# Patient Record
Sex: FEMALE | Race: WHITE | NOT HISPANIC OR LATINO | Employment: OTHER | URBAN - METROPOLITAN AREA
[De-identification: names, ages, dates, MRNs, and addresses within clinical notes are randomized per-mention and may not be internally consistent; named-entity substitution may affect disease eponyms.]

---

## 2017-02-28 ENCOUNTER — COMMUNICATION - HEALTHEAST (OUTPATIENT)
Dept: CARDIOLOGY | Facility: CLINIC | Age: 82
End: 2017-02-28

## 2017-02-28 DIAGNOSIS — I25.10 CORONARY ATHEROSCLEROSIS: ICD-10-CM

## 2017-05-03 ENCOUNTER — RECORDS - HEALTHEAST (OUTPATIENT)
Dept: LAB | Facility: CLINIC | Age: 82
End: 2017-05-03

## 2017-05-03 LAB
CHOLEST SERPL-MCNC: 149 MG/DL
FASTING STATUS PATIENT QL REPORTED: NO
HDLC SERPL-MCNC: 34 MG/DL
LDLC SERPL CALC-MCNC: 94 MG/DL
TRIGL SERPL-MCNC: 107 MG/DL

## 2017-05-09 ENCOUNTER — AMBULATORY - HEALTHEAST (OUTPATIENT)
Dept: CARDIOLOGY | Facility: CLINIC | Age: 82
End: 2017-05-09

## 2017-05-31 ENCOUNTER — COMMUNICATION - HEALTHEAST (OUTPATIENT)
Dept: CARDIOLOGY | Facility: CLINIC | Age: 82
End: 2017-05-31

## 2017-05-31 DIAGNOSIS — I25.10 CORONARY ATHEROSCLEROSIS: ICD-10-CM

## 2017-05-31 DIAGNOSIS — I25.10 CORONARY ARTERY DISEASE INVOLVING NATIVE CORONARY ARTERY WITHOUT ANGINA PECTORIS: ICD-10-CM

## 2017-08-16 ENCOUNTER — OFFICE VISIT - HEALTHEAST (OUTPATIENT)
Dept: CARDIOLOGY | Facility: CLINIC | Age: 82
End: 2017-08-16

## 2017-08-16 DIAGNOSIS — I25.118 ATHEROSCLEROSIS OF NATIVE CORONARY ARTERY OF NATIVE HEART WITH STABLE ANGINA PECTORIS (H): ICD-10-CM

## 2017-08-16 DIAGNOSIS — I10 ESSENTIAL HYPERTENSION, BENIGN: ICD-10-CM

## 2017-08-16 DIAGNOSIS — R00.1 SINUS BRADYCARDIA: ICD-10-CM

## 2017-08-16 DIAGNOSIS — E78.2 MIXED HYPERLIPIDEMIA: ICD-10-CM

## 2017-08-16 DIAGNOSIS — I65.23 BILATERAL CAROTID ARTERY STENOSIS: ICD-10-CM

## 2017-08-16 RX ORDER — AZELASTINE HYDROCHLORIDE 0.5 MG/ML
1 SOLUTION/ DROPS OPHTHALMIC 2 TIMES DAILY
Status: ON HOLD | COMMUNITY
Start: 2017-07-30 | End: 2022-01-01

## 2017-08-16 ASSESSMENT — MIFFLIN-ST. JEOR: SCORE: 1086.12

## 2017-08-27 ENCOUNTER — COMMUNICATION - HEALTHEAST (OUTPATIENT)
Dept: CARDIOLOGY | Facility: CLINIC | Age: 82
End: 2017-08-27

## 2017-08-27 DIAGNOSIS — I25.10 CORONARY ARTERY DISEASE INVOLVING NATIVE CORONARY ARTERY WITHOUT ANGINA PECTORIS: ICD-10-CM

## 2017-08-27 DIAGNOSIS — I25.10 CORONARY ATHEROSCLEROSIS: ICD-10-CM

## 2017-09-05 ENCOUNTER — HOSPITAL ENCOUNTER (OUTPATIENT)
Dept: ULTRASOUND IMAGING | Facility: HOSPITAL | Age: 82
Discharge: HOME | End: 2017-09-05
Attending: INTERNAL MEDICINE

## 2017-09-05 DIAGNOSIS — I65.23 BILATERAL CAROTID ARTERY STENOSIS: ICD-10-CM

## 2017-11-14 ENCOUNTER — HOME CARE/HOSPICE - HEALTHEAST (OUTPATIENT)
Dept: HOME HEALTH SERVICES | Facility: HOME HEALTH | Age: 82
End: 2017-11-14

## 2017-11-16 ENCOUNTER — HOME CARE/HOSPICE - HEALTHEAST (OUTPATIENT)
Dept: HOME HEALTH SERVICES | Facility: HOME HEALTH | Age: 82
End: 2017-11-16

## 2017-11-18 ENCOUNTER — HOME CARE/HOSPICE - HEALTHEAST (OUTPATIENT)
Dept: HOME HEALTH SERVICES | Facility: HOME HEALTH | Age: 82
End: 2017-11-18

## 2017-11-20 ENCOUNTER — HOME CARE/HOSPICE - HEALTHEAST (OUTPATIENT)
Dept: HOME HEALTH SERVICES | Facility: HOME HEALTH | Age: 82
End: 2017-11-20

## 2017-11-22 ENCOUNTER — HOME CARE/HOSPICE - HEALTHEAST (OUTPATIENT)
Dept: HOME HEALTH SERVICES | Facility: HOME HEALTH | Age: 82
End: 2017-11-22

## 2017-11-23 ENCOUNTER — HOME CARE/HOSPICE - HEALTHEAST (OUTPATIENT)
Dept: HOME HEALTH SERVICES | Facility: HOME HEALTH | Age: 82
End: 2017-11-23

## 2017-11-28 ENCOUNTER — HOME CARE/HOSPICE - HEALTHEAST (OUTPATIENT)
Dept: HOME HEALTH SERVICES | Facility: HOME HEALTH | Age: 82
End: 2017-11-28

## 2017-11-29 ENCOUNTER — HOME CARE/HOSPICE - HEALTHEAST (OUTPATIENT)
Dept: HOME HEALTH SERVICES | Facility: HOME HEALTH | Age: 82
End: 2017-11-29

## 2017-11-30 ENCOUNTER — HOME CARE/HOSPICE - HEALTHEAST (OUTPATIENT)
Dept: HOME HEALTH SERVICES | Facility: HOME HEALTH | Age: 82
End: 2017-11-30

## 2017-12-01 ENCOUNTER — HOME CARE/HOSPICE - HEALTHEAST (OUTPATIENT)
Dept: HOME HEALTH SERVICES | Facility: HOME HEALTH | Age: 82
End: 2017-12-01

## 2017-12-04 ENCOUNTER — HOME CARE/HOSPICE - HEALTHEAST (OUTPATIENT)
Dept: HOME HEALTH SERVICES | Facility: HOME HEALTH | Age: 82
End: 2017-12-04

## 2017-12-05 ENCOUNTER — HOME CARE/HOSPICE - HEALTHEAST (OUTPATIENT)
Dept: HOME HEALTH SERVICES | Facility: HOME HEALTH | Age: 82
End: 2017-12-05

## 2017-12-06 ENCOUNTER — HOME CARE/HOSPICE - HEALTHEAST (OUTPATIENT)
Dept: HOME HEALTH SERVICES | Facility: HOME HEALTH | Age: 82
End: 2017-12-06

## 2017-12-07 ENCOUNTER — HOME CARE/HOSPICE - HEALTHEAST (OUTPATIENT)
Dept: HOME HEALTH SERVICES | Facility: HOME HEALTH | Age: 82
End: 2017-12-07

## 2017-12-11 ENCOUNTER — HOME CARE/HOSPICE - HEALTHEAST (OUTPATIENT)
Dept: HOME HEALTH SERVICES | Facility: HOME HEALTH | Age: 82
End: 2017-12-11

## 2017-12-12 ENCOUNTER — HOME CARE/HOSPICE - HEALTHEAST (OUTPATIENT)
Dept: HOME HEALTH SERVICES | Facility: HOME HEALTH | Age: 82
End: 2017-12-12

## 2017-12-14 ENCOUNTER — HOME CARE/HOSPICE - HEALTHEAST (OUTPATIENT)
Dept: HOME HEALTH SERVICES | Facility: HOME HEALTH | Age: 82
End: 2017-12-14

## 2017-12-18 ENCOUNTER — HOME CARE/HOSPICE - HEALTHEAST (OUTPATIENT)
Dept: HOME HEALTH SERVICES | Facility: HOME HEALTH | Age: 82
End: 2017-12-18

## 2017-12-19 ENCOUNTER — HOME CARE/HOSPICE - HEALTHEAST (OUTPATIENT)
Dept: HOME HEALTH SERVICES | Facility: HOME HEALTH | Age: 82
End: 2017-12-19

## 2017-12-19 ASSESSMENT — MIFFLIN-ST. JEOR: SCORE: 1039.63

## 2017-12-20 ENCOUNTER — HOME CARE/HOSPICE - HEALTHEAST (OUTPATIENT)
Dept: HOME HEALTH SERVICES | Facility: HOME HEALTH | Age: 82
End: 2017-12-20

## 2017-12-22 ENCOUNTER — HOME CARE/HOSPICE - HEALTHEAST (OUTPATIENT)
Dept: HOME HEALTH SERVICES | Facility: HOME HEALTH | Age: 82
End: 2017-12-22

## 2017-12-27 ENCOUNTER — HOME CARE/HOSPICE - HEALTHEAST (OUTPATIENT)
Dept: HOME HEALTH SERVICES | Facility: HOME HEALTH | Age: 82
End: 2017-12-27

## 2017-12-28 ENCOUNTER — HOME CARE/HOSPICE - HEALTHEAST (OUTPATIENT)
Dept: HOME HEALTH SERVICES | Facility: HOME HEALTH | Age: 82
End: 2017-12-28

## 2017-12-28 RX ORDER — MIRTAZAPINE 15 MG/1
15 TABLET, FILM COATED ORAL AT BEDTIME
Status: SHIPPED | COMMUNITY
Start: 2017-12-28

## 2018-02-12 ENCOUNTER — COMMUNICATION - HEALTHEAST (OUTPATIENT)
Dept: CARDIOLOGY | Facility: CLINIC | Age: 83
End: 2018-02-12

## 2018-02-12 DIAGNOSIS — I25.10 CORONARY ATHEROSCLEROSIS: ICD-10-CM

## 2018-02-24 ENCOUNTER — RECORDS - HEALTHEAST (OUTPATIENT)
Dept: ADMINISTRATIVE | Facility: OTHER | Age: 83
End: 2018-02-24

## 2018-02-25 ENCOUNTER — RECORDS - HEALTHEAST (OUTPATIENT)
Dept: ADMINISTRATIVE | Facility: OTHER | Age: 83
End: 2018-02-25

## 2018-02-25 ENCOUNTER — ANESTHESIA - HEALTHEAST (OUTPATIENT)
Dept: SURGERY | Facility: HOSPITAL | Age: 83
End: 2018-02-25

## 2018-02-26 ENCOUNTER — RECORDS - HEALTHEAST (OUTPATIENT)
Dept: ADMINISTRATIVE | Facility: OTHER | Age: 83
End: 2018-02-26

## 2018-02-27 ENCOUNTER — RECORDS - HEALTHEAST (OUTPATIENT)
Dept: ADMINISTRATIVE | Facility: OTHER | Age: 83
End: 2018-02-27

## 2018-02-28 ENCOUNTER — RECORDS - HEALTHEAST (OUTPATIENT)
Dept: ADMINISTRATIVE | Facility: OTHER | Age: 83
End: 2018-02-28

## 2018-03-02 ENCOUNTER — ANESTHESIA - HEALTHEAST (OUTPATIENT)
Dept: SURGERY | Facility: CLINIC | Age: 83
End: 2018-03-02

## 2018-03-02 ENCOUNTER — SURGERY - HEALTHEAST (OUTPATIENT)
Dept: SURGERY | Facility: CLINIC | Age: 83
End: 2018-03-02

## 2018-03-02 ENCOUNTER — COMMUNICATION - HEALTHEAST (OUTPATIENT)
Dept: GERIATRICS | Facility: CLINIC | Age: 83
End: 2018-03-02

## 2018-03-02 DIAGNOSIS — S82.891A FRACTURE DISLOCATION OF ANKLE, RIGHT, CLOSED, INITIAL ENCOUNTER: ICD-10-CM

## 2018-03-02 ASSESSMENT — MIFFLIN-ST. JEOR: SCORE: 1057.77

## 2018-03-05 ENCOUNTER — OFFICE VISIT - HEALTHEAST (OUTPATIENT)
Dept: GERIATRICS | Facility: CLINIC | Age: 83
End: 2018-03-05

## 2018-03-05 ENCOUNTER — AMBULATORY - HEALTHEAST (OUTPATIENT)
Dept: GERIATRICS | Facility: CLINIC | Age: 83
End: 2018-03-05

## 2018-03-05 DIAGNOSIS — I25.118 CORONARY ARTERY DISEASE OF NATIVE ARTERY OF NATIVE HEART WITH STABLE ANGINA PECTORIS (H): ICD-10-CM

## 2018-03-05 DIAGNOSIS — D62 ACUTE BLOOD LOSS ANEMIA: ICD-10-CM

## 2018-03-05 DIAGNOSIS — I10 ESSENTIAL HYPERTENSION: ICD-10-CM

## 2018-03-05 DIAGNOSIS — S82.891A CLOSED RIGHT ANKLE FRACTURE: ICD-10-CM

## 2018-03-05 DIAGNOSIS — K21.9 GERD (GASTROESOPHAGEAL REFLUX DISEASE): ICD-10-CM

## 2018-03-05 DIAGNOSIS — E46 MALNUTRITION (H): ICD-10-CM

## 2018-03-07 ENCOUNTER — RECORDS - HEALTHEAST (OUTPATIENT)
Dept: LAB | Facility: CLINIC | Age: 83
End: 2018-03-07

## 2018-03-07 ENCOUNTER — AMBULATORY - HEALTHEAST (OUTPATIENT)
Dept: GERIATRICS | Facility: CLINIC | Age: 83
End: 2018-03-07

## 2018-03-08 LAB
ERYTHROCYTE [DISTWIDTH] IN BLOOD BY AUTOMATED COUNT: 14.6 % (ref 11–14.5)
HCT VFR BLD AUTO: 29.5 % (ref 35–47)
HGB BLD-MCNC: 9.4 G/DL (ref 12–16)
MCH RBC QN AUTO: 30.7 PG (ref 27–34)
MCHC RBC AUTO-ENTMCNC: 31.9 G/DL (ref 32–36)
MCV RBC AUTO: 96 FL (ref 80–100)
PLATELET # BLD AUTO: 280 THOU/UL (ref 140–440)
PMV BLD AUTO: 9.8 FL (ref 8.5–12.5)
RBC # BLD AUTO: 3.06 MILL/UL (ref 3.8–5.4)
WBC: 4.5 THOU/UL (ref 4–11)

## 2018-03-14 ENCOUNTER — OFFICE VISIT - HEALTHEAST (OUTPATIENT)
Dept: GERIATRICS | Facility: CLINIC | Age: 83
End: 2018-03-14

## 2018-03-14 DIAGNOSIS — D62 ACUTE BLOOD LOSS ANEMIA: ICD-10-CM

## 2018-03-14 DIAGNOSIS — S82.891A CLOSED FRACTURE OF RIGHT ANKLE, INITIAL ENCOUNTER: ICD-10-CM

## 2018-03-14 DIAGNOSIS — K21.9 GERD (GASTROESOPHAGEAL REFLUX DISEASE): ICD-10-CM

## 2018-03-14 DIAGNOSIS — I10 ESSENTIAL HYPERTENSION: ICD-10-CM

## 2018-03-14 DIAGNOSIS — E46 MALNUTRITION (H): ICD-10-CM

## 2018-03-14 DIAGNOSIS — I25.118 CORONARY ARTERY DISEASE OF NATIVE ARTERY OF NATIVE HEART WITH STABLE ANGINA PECTORIS (H): ICD-10-CM

## 2018-03-19 ENCOUNTER — OFFICE VISIT - HEALTHEAST (OUTPATIENT)
Dept: GERIATRICS | Facility: CLINIC | Age: 83
End: 2018-03-19

## 2018-03-19 DIAGNOSIS — S82.891A CLOSED RIGHT ANKLE FRACTURE: ICD-10-CM

## 2018-03-19 DIAGNOSIS — D50.0 BLOOD LOSS ANEMIA: ICD-10-CM

## 2018-03-19 DIAGNOSIS — I25.10 CAD (CORONARY ARTERY DISEASE): ICD-10-CM

## 2018-03-19 DIAGNOSIS — I10 ESSENTIAL HYPERTENSION: ICD-10-CM

## 2018-03-26 ENCOUNTER — OFFICE VISIT - HEALTHEAST (OUTPATIENT)
Dept: GERIATRICS | Facility: CLINIC | Age: 83
End: 2018-03-26

## 2018-03-26 DIAGNOSIS — S82.891A CLOSED RIGHT ANKLE FRACTURE: ICD-10-CM

## 2018-03-26 DIAGNOSIS — K21.9 GERD (GASTROESOPHAGEAL REFLUX DISEASE): ICD-10-CM

## 2018-03-26 DIAGNOSIS — I10 ESSENTIAL HYPERTENSION: ICD-10-CM

## 2018-03-26 DIAGNOSIS — D64.9 ANEMIA: ICD-10-CM

## 2018-03-28 ENCOUNTER — AMBULATORY - HEALTHEAST (OUTPATIENT)
Dept: GERIATRICS | Facility: CLINIC | Age: 83
End: 2018-03-28

## 2018-03-28 ENCOUNTER — COMMUNICATION - HEALTHEAST (OUTPATIENT)
Dept: GERIATRICS | Facility: CLINIC | Age: 83
End: 2018-03-28

## 2018-03-28 DIAGNOSIS — R52 PAIN: ICD-10-CM

## 2018-03-29 ENCOUNTER — OFFICE VISIT - HEALTHEAST (OUTPATIENT)
Dept: GERIATRICS | Facility: CLINIC | Age: 83
End: 2018-03-29

## 2018-03-29 DIAGNOSIS — I10 ESSENTIAL HYPERTENSION: ICD-10-CM

## 2018-03-29 DIAGNOSIS — S82.891D CLOSED FRACTURE OF RIGHT ANKLE WITH ROUTINE HEALING, SUBSEQUENT ENCOUNTER: ICD-10-CM

## 2018-03-29 DIAGNOSIS — K21.00 REFLUX ESOPHAGITIS: ICD-10-CM

## 2018-03-29 DIAGNOSIS — I25.118 CORONARY ARTERY DISEASE OF NATIVE ARTERY OF NATIVE HEART WITH STABLE ANGINA PECTORIS (H): ICD-10-CM

## 2018-03-29 DIAGNOSIS — N18.2 CRI (CHRONIC RENAL INSUFFICIENCY), STAGE 2 (MILD): ICD-10-CM

## 2018-03-29 RX ORDER — METOPROLOL SUCCINATE 25 MG/1
75 TABLET, EXTENDED RELEASE ORAL DAILY
Status: SHIPPED | COMMUNITY
Start: 2018-04-06 | End: 2022-01-01 | Stop reason: ALTCHOICE

## 2018-03-29 ASSESSMENT — MIFFLIN-ST. JEOR: SCORE: 1046.89

## 2018-04-03 ENCOUNTER — COMMUNICATION - HEALTHEAST (OUTPATIENT)
Dept: GERIATRICS | Facility: CLINIC | Age: 83
End: 2018-04-03

## 2018-04-03 DIAGNOSIS — R52 PAIN: ICD-10-CM

## 2018-04-03 RX ORDER — OXYCODONE HYDROCHLORIDE 5 MG/1
5 TABLET ORAL 4 TIMES DAILY PRN
Qty: 60 TABLET | Refills: 0 | Status: SHIPPED | OUTPATIENT
Start: 2018-04-03 | End: 2022-01-01

## 2018-04-04 ENCOUNTER — OFFICE VISIT - HEALTHEAST (OUTPATIENT)
Dept: GERIATRICS | Facility: CLINIC | Age: 83
End: 2018-04-04

## 2018-04-04 ENCOUNTER — RECORDS - HEALTHEAST (OUTPATIENT)
Dept: LAB | Facility: CLINIC | Age: 83
End: 2018-04-04

## 2018-04-04 DIAGNOSIS — S82.891D CLOSED FRACTURE OF RIGHT ANKLE WITH ROUTINE HEALING, SUBSEQUENT ENCOUNTER: ICD-10-CM

## 2018-04-04 DIAGNOSIS — H04.129 DRY EYE: ICD-10-CM

## 2018-04-04 DIAGNOSIS — R21 RASH: ICD-10-CM

## 2018-04-04 DIAGNOSIS — M81.0 OSTEOPOROSIS: ICD-10-CM

## 2018-04-04 DIAGNOSIS — D64.9 ANEMIA, UNSPECIFIED TYPE: ICD-10-CM

## 2018-04-05 ENCOUNTER — AMBULATORY - HEALTHEAST (OUTPATIENT)
Dept: GERIATRICS | Facility: CLINIC | Age: 83
End: 2018-04-05

## 2018-04-05 LAB
ANION GAP SERPL CALCULATED.3IONS-SCNC: 7 MMOL/L (ref 5–18)
BUN SERPL-MCNC: 31 MG/DL (ref 8–28)
CALCIUM SERPL-MCNC: 9.5 MG/DL (ref 8.5–10.5)
CHLORIDE BLD-SCNC: 109 MMOL/L (ref 98–107)
CO2 SERPL-SCNC: 21 MMOL/L (ref 22–31)
CREAT SERPL-MCNC: 1.44 MG/DL (ref 0.6–1.1)
GFR SERPL CREATININE-BSD FRML MDRD: 35 ML/MIN/1.73M2
GLUCOSE BLD-MCNC: 86 MG/DL (ref 70–125)
POTASSIUM BLD-SCNC: 4.4 MMOL/L (ref 3.5–5)
SODIUM SERPL-SCNC: 137 MMOL/L (ref 136–145)

## 2018-04-06 ENCOUNTER — OFFICE VISIT - HEALTHEAST (OUTPATIENT)
Dept: GERIATRICS | Facility: CLINIC | Age: 83
End: 2018-04-06

## 2018-04-06 DIAGNOSIS — I10 ESSENTIAL HYPERTENSION: ICD-10-CM

## 2018-04-06 DIAGNOSIS — D64.9 ANEMIA: ICD-10-CM

## 2018-04-06 DIAGNOSIS — S82.891D CLOSED FRACTURE OF RIGHT ANKLE WITH ROUTINE HEALING, SUBSEQUENT ENCOUNTER: ICD-10-CM

## 2018-04-06 DIAGNOSIS — K21.9 GERD (GASTROESOPHAGEAL REFLUX DISEASE): ICD-10-CM

## 2018-04-11 ENCOUNTER — RECORDS - HEALTHEAST (OUTPATIENT)
Dept: LAB | Facility: CLINIC | Age: 83
End: 2018-04-11

## 2018-04-11 ENCOUNTER — AMBULATORY - HEALTHEAST (OUTPATIENT)
Dept: GERIATRICS | Facility: CLINIC | Age: 83
End: 2018-04-11

## 2018-04-11 LAB
ANION GAP SERPL CALCULATED.3IONS-SCNC: 9 MMOL/L (ref 5–18)
BUN SERPL-MCNC: 38 MG/DL (ref 8–28)
CALCIUM SERPL-MCNC: 9.5 MG/DL (ref 8.5–10.5)
CHLORIDE BLD-SCNC: 109 MMOL/L (ref 98–107)
CO2 SERPL-SCNC: 19 MMOL/L (ref 22–31)
CREAT SERPL-MCNC: 1.71 MG/DL (ref 0.6–1.1)
GFR SERPL CREATININE-BSD FRML MDRD: 29 ML/MIN/1.73M2
GLUCOSE BLD-MCNC: 102 MG/DL (ref 70–125)
POTASSIUM BLD-SCNC: 4.3 MMOL/L (ref 3.5–5)
SODIUM SERPL-SCNC: 137 MMOL/L (ref 136–145)

## 2018-04-12 ENCOUNTER — OFFICE VISIT - HEALTHEAST (OUTPATIENT)
Dept: GERIATRICS | Facility: CLINIC | Age: 83
End: 2018-04-12

## 2018-04-12 DIAGNOSIS — N18.2 CRI (CHRONIC RENAL INSUFFICIENCY), STAGE 2 (MILD): ICD-10-CM

## 2018-04-12 DIAGNOSIS — I25.118 CORONARY ARTERY DISEASE OF NATIVE ARTERY OF NATIVE HEART WITH STABLE ANGINA PECTORIS (H): ICD-10-CM

## 2018-04-12 DIAGNOSIS — S82.891D CLOSED FRACTURE OF RIGHT ANKLE WITH ROUTINE HEALING, SUBSEQUENT ENCOUNTER: ICD-10-CM

## 2018-04-12 DIAGNOSIS — I10 ESSENTIAL HYPERTENSION: ICD-10-CM

## 2018-04-12 DIAGNOSIS — K21.00 CHRONIC REFLUX ESOPHAGITIS: ICD-10-CM

## 2018-04-12 ASSESSMENT — MIFFLIN-ST. JEOR: SCORE: 1037.81

## 2018-04-18 ENCOUNTER — RECORDS - HEALTHEAST (OUTPATIENT)
Dept: LAB | Facility: CLINIC | Age: 83
End: 2018-04-18

## 2018-04-19 ENCOUNTER — OFFICE VISIT - HEALTHEAST (OUTPATIENT)
Dept: GERIATRICS | Facility: CLINIC | Age: 83
End: 2018-04-19

## 2018-04-19 DIAGNOSIS — S82.891D CLOSED FRACTURE OF RIGHT ANKLE WITH ROUTINE HEALING, SUBSEQUENT ENCOUNTER: ICD-10-CM

## 2018-04-19 DIAGNOSIS — K21.00 CHRONIC REFLUX ESOPHAGITIS: ICD-10-CM

## 2018-04-19 DIAGNOSIS — I25.118 CORONARY ARTERY DISEASE OF NATIVE ARTERY OF NATIVE HEART WITH STABLE ANGINA PECTORIS (H): ICD-10-CM

## 2018-04-19 DIAGNOSIS — N18.2 CRI (CHRONIC RENAL INSUFFICIENCY), STAGE 2 (MILD): ICD-10-CM

## 2018-04-19 DIAGNOSIS — I10 ESSENTIAL HYPERTENSION: ICD-10-CM

## 2018-04-19 LAB
ANION GAP SERPL CALCULATED.3IONS-SCNC: 8 MMOL/L (ref 5–18)
BUN SERPL-MCNC: 39 MG/DL (ref 8–28)
CALCIUM SERPL-MCNC: 9.2 MG/DL (ref 8.5–10.5)
CHLORIDE BLD-SCNC: 111 MMOL/L (ref 98–107)
CO2 SERPL-SCNC: 17 MMOL/L (ref 22–31)
CREAT SERPL-MCNC: 1.62 MG/DL (ref 0.6–1.1)
GFR SERPL CREATININE-BSD FRML MDRD: 30 ML/MIN/1.73M2
GLUCOSE BLD-MCNC: 89 MG/DL (ref 70–125)
POTASSIUM BLD-SCNC: 4.3 MMOL/L (ref 3.5–5)
SODIUM SERPL-SCNC: 136 MMOL/L (ref 136–145)

## 2018-04-19 ASSESSMENT — MIFFLIN-ST. JEOR: SCORE: 1035.09

## 2018-04-26 ENCOUNTER — OFFICE VISIT - HEALTHEAST (OUTPATIENT)
Dept: GERIATRICS | Facility: CLINIC | Age: 83
End: 2018-04-26

## 2018-04-26 DIAGNOSIS — I25.118 CORONARY ARTERY DISEASE OF NATIVE ARTERY OF NATIVE HEART WITH STABLE ANGINA PECTORIS (H): ICD-10-CM

## 2018-04-26 DIAGNOSIS — N18.30 CRI (CHRONIC RENAL INSUFFICIENCY), STAGE 3 (MODERATE) (H): ICD-10-CM

## 2018-04-26 DIAGNOSIS — K21.00 REFLUX ESOPHAGITIS: ICD-10-CM

## 2018-04-26 DIAGNOSIS — S82.891D CLOSED FRACTURE OF RIGHT ANKLE WITH ROUTINE HEALING, SUBSEQUENT ENCOUNTER: ICD-10-CM

## 2018-04-26 DIAGNOSIS — I10 ESSENTIAL HYPERTENSION: ICD-10-CM

## 2018-04-26 ASSESSMENT — MIFFLIN-ST. JEOR: SCORE: 1048.7

## 2018-04-27 ENCOUNTER — HOME CARE/HOSPICE - HEALTHEAST (OUTPATIENT)
Dept: HOME HEALTH SERVICES | Facility: HOME HEALTH | Age: 83
End: 2018-04-27

## 2018-05-02 ENCOUNTER — AMBULATORY - HEALTHEAST (OUTPATIENT)
Dept: GERIATRICS | Facility: CLINIC | Age: 83
End: 2018-05-02

## 2018-05-03 ENCOUNTER — HOME CARE/HOSPICE - HEALTHEAST (OUTPATIENT)
Dept: HOME HEALTH SERVICES | Facility: HOME HEALTH | Age: 83
End: 2018-05-03

## 2018-05-05 ENCOUNTER — HOME CARE/HOSPICE - HEALTHEAST (OUTPATIENT)
Dept: HOME HEALTH SERVICES | Facility: HOME HEALTH | Age: 83
End: 2018-05-05

## 2018-05-07 ENCOUNTER — HOME CARE/HOSPICE - HEALTHEAST (OUTPATIENT)
Dept: HOME HEALTH SERVICES | Facility: HOME HEALTH | Age: 83
End: 2018-05-07

## 2018-05-08 ENCOUNTER — RECORDS - HEALTHEAST (OUTPATIENT)
Dept: LAB | Facility: CLINIC | Age: 83
End: 2018-05-08

## 2018-05-08 LAB
CHOLEST SERPL-MCNC: 145 MG/DL
FASTING STATUS PATIENT QL REPORTED: NO
HDLC SERPL-MCNC: 29 MG/DL
LDLC SERPL CALC-MCNC: 85 MG/DL
TRIGL SERPL-MCNC: 153 MG/DL

## 2018-05-09 ENCOUNTER — HOME CARE/HOSPICE - HEALTHEAST (OUTPATIENT)
Dept: HOME HEALTH SERVICES | Facility: HOME HEALTH | Age: 83
End: 2018-05-09

## 2018-05-10 ENCOUNTER — HOME CARE/HOSPICE - HEALTHEAST (OUTPATIENT)
Dept: HOME HEALTH SERVICES | Facility: HOME HEALTH | Age: 83
End: 2018-05-10

## 2018-05-11 ENCOUNTER — HOME CARE/HOSPICE - HEALTHEAST (OUTPATIENT)
Dept: HOME HEALTH SERVICES | Facility: HOME HEALTH | Age: 83
End: 2018-05-11

## 2018-05-14 ENCOUNTER — HOME CARE/HOSPICE - HEALTHEAST (OUTPATIENT)
Dept: HOME HEALTH SERVICES | Facility: HOME HEALTH | Age: 83
End: 2018-05-14

## 2018-05-16 ENCOUNTER — HOME CARE/HOSPICE - HEALTHEAST (OUTPATIENT)
Dept: HOME HEALTH SERVICES | Facility: HOME HEALTH | Age: 83
End: 2018-05-16

## 2018-05-17 ENCOUNTER — HOME CARE/HOSPICE - HEALTHEAST (OUTPATIENT)
Dept: HOME HEALTH SERVICES | Facility: HOME HEALTH | Age: 83
End: 2018-05-17

## 2018-05-21 ENCOUNTER — HOME CARE/HOSPICE - HEALTHEAST (OUTPATIENT)
Dept: HOME HEALTH SERVICES | Facility: HOME HEALTH | Age: 83
End: 2018-05-21

## 2018-05-22 ENCOUNTER — HOME CARE/HOSPICE - HEALTHEAST (OUTPATIENT)
Dept: HOME HEALTH SERVICES | Facility: HOME HEALTH | Age: 83
End: 2018-05-22

## 2018-05-23 ENCOUNTER — HOME CARE/HOSPICE - HEALTHEAST (OUTPATIENT)
Dept: HOME HEALTH SERVICES | Facility: HOME HEALTH | Age: 83
End: 2018-05-23

## 2018-05-24 ENCOUNTER — HOME CARE/HOSPICE - HEALTHEAST (OUTPATIENT)
Dept: HOME HEALTH SERVICES | Facility: HOME HEALTH | Age: 83
End: 2018-05-24

## 2018-05-25 ENCOUNTER — HOME CARE/HOSPICE - HEALTHEAST (OUTPATIENT)
Dept: HOME HEALTH SERVICES | Facility: HOME HEALTH | Age: 83
End: 2018-05-25

## 2018-08-06 ENCOUNTER — RECORDS - HEALTHEAST (OUTPATIENT)
Dept: ADMINISTRATIVE | Facility: OTHER | Age: 83
End: 2018-08-06

## 2018-08-06 ENCOUNTER — AMBULATORY - HEALTHEAST (OUTPATIENT)
Dept: CARDIOLOGY | Facility: CLINIC | Age: 83
End: 2018-08-06

## 2018-08-09 ENCOUNTER — OFFICE VISIT - HEALTHEAST (OUTPATIENT)
Dept: CARDIOLOGY | Facility: CLINIC | Age: 83
End: 2018-08-09

## 2018-08-09 DIAGNOSIS — S82.891A CLOSED FRACTURE OF RIGHT ANKLE, INITIAL ENCOUNTER: ICD-10-CM

## 2018-08-09 DIAGNOSIS — I25.119 CORONARY ARTERY DISEASE INVOLVING NATIVE CORONARY ARTERY OF NATIVE HEART WITH ANGINA PECTORIS (H): ICD-10-CM

## 2018-08-09 DIAGNOSIS — I10 ESSENTIAL HYPERTENSION: ICD-10-CM

## 2018-08-09 DIAGNOSIS — I77.9 BILATERAL CAROTID ARTERY DISEASE (H): ICD-10-CM

## 2018-08-09 DIAGNOSIS — E78.2 MIXED HYPERLIPIDEMIA: ICD-10-CM

## 2018-08-09 RX ORDER — ASPIRIN 325 MG
325 TABLET ORAL DAILY
Qty: 60 TABLET | Refills: 0 | Status: SHIPPED | OUTPATIENT
Start: 2018-08-09 | End: 2022-01-01

## 2018-08-09 ASSESSMENT — MIFFLIN-ST. JEOR: SCORE: 1036.22

## 2018-08-15 ENCOUNTER — COMMUNICATION - HEALTHEAST (OUTPATIENT)
Dept: ADMINISTRATIVE | Facility: CLINIC | Age: 83
End: 2018-08-15

## 2018-08-22 ENCOUNTER — RECORDS - HEALTHEAST (OUTPATIENT)
Dept: LAB | Facility: CLINIC | Age: 83
End: 2018-08-22

## 2018-08-22 LAB
FOLATE SERPL-MCNC: 19.5 NG/ML
IRON SATN MFR SERPL: 27 % (ref 20–50)
IRON SERPL-MCNC: 62 UG/DL (ref 42–175)
TIBC SERPL-MCNC: 226 UG/DL (ref 313–563)
TRANSFERRIN SERPL-MCNC: 181 MG/DL (ref 212–360)
VIT B12 SERPL-MCNC: 295 PG/ML (ref 213–816)

## 2018-09-05 ENCOUNTER — HOSPITAL ENCOUNTER (OUTPATIENT)
Dept: CARDIOLOGY | Facility: CLINIC | Age: 83
Discharge: HOME | End: 2018-09-05
Attending: INTERNAL MEDICINE

## 2018-09-05 ENCOUNTER — HOSPITAL ENCOUNTER (OUTPATIENT)
Dept: NUCLEAR MEDICINE | Facility: CLINIC | Age: 83
Discharge: HOME | End: 2018-09-05
Attending: INTERNAL MEDICINE

## 2018-09-05 DIAGNOSIS — I25.119 CORONARY ARTERY DISEASE INVOLVING NATIVE CORONARY ARTERY OF NATIVE HEART WITH ANGINA PECTORIS (H): ICD-10-CM

## 2018-09-05 LAB
CV STRESS CURRENT BP HE: NORMAL
CV STRESS CURRENT HR HE: 51
CV STRESS CURRENT HR HE: 52
CV STRESS CURRENT HR HE: 53
CV STRESS CURRENT HR HE: 58
CV STRESS CURRENT HR HE: 64
CV STRESS CURRENT HR HE: 65
CV STRESS CURRENT HR HE: 66
CV STRESS CURRENT HR HE: 66
CV STRESS CURRENT HR HE: 67
CV STRESS CURRENT HR HE: 69
CV STRESS CURRENT HR HE: 69
CV STRESS CURRENT HR HE: 70
CV STRESS CURRENT HR HE: 71
CV STRESS CURRENT HR HE: 72
CV STRESS DEVIATION TIME HE: NORMAL
CV STRESS ECHO PERCENT HR HE: NORMAL
CV STRESS EXERCISE STAGE HE: NORMAL
CV STRESS FINAL RESTING BP HE: NORMAL
CV STRESS FINAL RESTING HR HE: 64
CV STRESS MAX HR HE: 73
CV STRESS MAX TREADMILL GRADE HE: 0
CV STRESS MAX TREADMILL SPEED HE: 0
CV STRESS PEAK DIA BP HE: NORMAL
CV STRESS PEAK SYS BP HE: NORMAL
CV STRESS PHASE HE: NORMAL
CV STRESS PROTOCOL HE: NORMAL
CV STRESS RESTING PT POSITION HE: NORMAL
CV STRESS ST DEVIATION AMOUNT HE: NORMAL
CV STRESS ST DEVIATION ELEVATION HE: NORMAL
CV STRESS ST EVELATION AMOUNT HE: NORMAL
CV STRESS TEST TYPE HE: NORMAL
CV STRESS TOTAL STAGE TIME MIN 1 HE: NORMAL
NUC STRESS EJECTION FRACTION: 70 %
STRESS ECHO BASELINE BP: NORMAL
STRESS ECHO BASELINE HR: 51
STRESS ECHO CALCULATED PERCENT HR: 53 %
STRESS ECHO LAST STRESS BP: NORMAL
STRESS ECHO LAST STRESS HR: 72
STRESS ECHO POST ESTIMATED WORKLOAD: 1
STRESS ECHO POST EXERCISE DUR MIN: 4
STRESS ECHO POST EXERCISE DUR SEC: 0

## 2018-09-11 ENCOUNTER — COMMUNICATION - HEALTHEAST (OUTPATIENT)
Dept: CARDIOLOGY | Facility: CLINIC | Age: 83
End: 2018-09-11

## 2018-09-12 ENCOUNTER — COMMUNICATION - HEALTHEAST (OUTPATIENT)
Dept: CARDIOLOGY | Facility: CLINIC | Age: 83
End: 2018-09-12

## 2018-09-13 ENCOUNTER — COMMUNICATION - HEALTHEAST (OUTPATIENT)
Dept: ADMINISTRATIVE | Facility: CLINIC | Age: 83
End: 2018-09-13

## 2018-09-15 ENCOUNTER — COMMUNICATION - HEALTHEAST (OUTPATIENT)
Dept: CARDIOLOGY | Facility: CLINIC | Age: 83
End: 2018-09-15

## 2018-09-15 DIAGNOSIS — I25.10 CORONARY ATHEROSCLEROSIS: ICD-10-CM

## 2018-09-17 RX ORDER — ISOSORBIDE MONONITRATE 120 MG/1
TABLET, EXTENDED RELEASE ORAL
Qty: 30 TABLET | Refills: 11 | Status: SHIPPED | OUTPATIENT
Start: 2018-09-17

## 2018-09-18 ENCOUNTER — COMMUNICATION - HEALTHEAST (OUTPATIENT)
Dept: CARDIOLOGY | Facility: CLINIC | Age: 83
End: 2018-09-18

## 2018-09-18 DIAGNOSIS — I25.10 CORONARY ATHEROSCLEROSIS: ICD-10-CM

## 2018-11-19 ENCOUNTER — RECORDS - HEALTHEAST (OUTPATIENT)
Dept: LAB | Facility: CLINIC | Age: 83
End: 2018-11-19

## 2018-11-19 LAB
ANION GAP SERPL CALCULATED.3IONS-SCNC: 7 MMOL/L (ref 5–18)
BUN SERPL-MCNC: 24 MG/DL (ref 8–28)
CALCIUM SERPL-MCNC: 9.2 MG/DL (ref 8.5–10.5)
CHLORIDE BLD-SCNC: 105 MMOL/L (ref 98–107)
CO2 SERPL-SCNC: 20 MMOL/L (ref 22–31)
CREAT SERPL-MCNC: 1.33 MG/DL (ref 0.6–1.1)
GFR SERPL CREATININE-BSD FRML MDRD: 38 ML/MIN/1.73M2
GLUCOSE BLD-MCNC: 98 MG/DL (ref 70–125)
POTASSIUM BLD-SCNC: 5.3 MMOL/L (ref 3.5–5)
SODIUM SERPL-SCNC: 132 MMOL/L (ref 136–145)

## 2018-11-20 LAB — BACTERIA SPEC CULT: NO GROWTH

## 2018-11-21 ENCOUNTER — RECORDS - HEALTHEAST (OUTPATIENT)
Dept: ADMINISTRATIVE | Facility: OTHER | Age: 83
End: 2018-11-21

## 2018-11-21 ENCOUNTER — AMBULATORY - HEALTHEAST (OUTPATIENT)
Dept: CARDIOLOGY | Facility: CLINIC | Age: 83
End: 2018-11-21

## 2018-11-28 ENCOUNTER — OFFICE VISIT - HEALTHEAST (OUTPATIENT)
Dept: CARDIOLOGY | Facility: CLINIC | Age: 83
End: 2018-11-28

## 2018-11-28 DIAGNOSIS — I25.10 CORONARY ATHEROSCLEROSIS: ICD-10-CM

## 2018-11-28 DIAGNOSIS — E78.2 MIXED HYPERLIPIDEMIA: ICD-10-CM

## 2018-11-28 DIAGNOSIS — I77.9 CAROTID ARTERY DISEASE (H): ICD-10-CM

## 2018-11-28 DIAGNOSIS — I25.118 CORONARY ARTERY DISEASE OF NATIVE ARTERY OF NATIVE HEART WITH STABLE ANGINA PECTORIS (H): ICD-10-CM

## 2018-11-28 DIAGNOSIS — I10 ESSENTIAL HYPERTENSION: ICD-10-CM

## 2018-11-28 ASSESSMENT — MIFFLIN-ST. JEOR: SCORE: 1063.44

## 2018-12-10 ENCOUNTER — COMMUNICATION - HEALTHEAST (OUTPATIENT)
Dept: CARDIOLOGY | Facility: CLINIC | Age: 83
End: 2018-12-10

## 2019-01-24 ENCOUNTER — COMMUNICATION - HEALTHEAST (OUTPATIENT)
Dept: CARDIOLOGY | Facility: CLINIC | Age: 84
End: 2019-01-24

## 2019-02-06 ENCOUNTER — RECORDS - HEALTHEAST (OUTPATIENT)
Dept: ADMINISTRATIVE | Facility: OTHER | Age: 84
End: 2019-02-06

## 2019-04-09 ENCOUNTER — COMMUNICATION - HEALTHEAST (OUTPATIENT)
Dept: ADMINISTRATIVE | Facility: CLINIC | Age: 84
End: 2019-04-09

## 2019-08-01 ENCOUNTER — RECORDS - HEALTHEAST (OUTPATIENT)
Dept: ADMINISTRATIVE | Facility: OTHER | Age: 84
End: 2019-08-01

## 2019-08-01 ENCOUNTER — RECORDS - HEALTHEAST (OUTPATIENT)
Dept: LAB | Facility: CLINIC | Age: 84
End: 2019-08-01

## 2019-08-01 LAB
ALBUMIN SERPL-MCNC: 3.7 G/DL (ref 3.5–5)
ALP SERPL-CCNC: 78 U/L (ref 45–120)
ALT SERPL W P-5'-P-CCNC: 15 U/L (ref 0–45)
ANION GAP SERPL CALCULATED.3IONS-SCNC: 8 MMOL/L (ref 5–18)
AST SERPL W P-5'-P-CCNC: 17 U/L (ref 0–40)
BILIRUB SERPL-MCNC: 0.4 MG/DL (ref 0–1)
BUN SERPL-MCNC: 22 MG/DL (ref 8–28)
CALCIUM SERPL-MCNC: 9.3 MG/DL (ref 8.5–10.5)
CHLORIDE BLD-SCNC: 104 MMOL/L (ref 98–107)
CHOLEST SERPL-MCNC: 136 MG/DL
CO2 SERPL-SCNC: 17 MMOL/L (ref 22–31)
CREAT SERPL-MCNC: 1.4 MG/DL (ref 0.6–1.1)
FASTING STATUS PATIENT QL REPORTED: NO
GFR SERPL CREATININE-BSD FRML MDRD: 36 ML/MIN/1.73M2
GLUCOSE BLD-MCNC: 92 MG/DL (ref 70–125)
HDLC SERPL-MCNC: 35 MG/DL
LDLC SERPL CALC-MCNC: 75 MG/DL
POTASSIUM BLD-SCNC: 4.3 MMOL/L (ref 3.5–5)
PROT SERPL-MCNC: 7.8 G/DL (ref 6–8)
SODIUM SERPL-SCNC: 129 MMOL/L (ref 136–145)
TRIGL SERPL-MCNC: 128 MG/DL

## 2019-09-19 ENCOUNTER — COMMUNICATION - HEALTHEAST (OUTPATIENT)
Dept: CARDIOLOGY | Facility: CLINIC | Age: 84
End: 2019-09-19

## 2019-09-19 DIAGNOSIS — I10 ESSENTIAL HYPERTENSION: ICD-10-CM

## 2019-11-27 ENCOUNTER — COMMUNICATION - HEALTHEAST (OUTPATIENT)
Dept: CARDIOLOGY | Facility: CLINIC | Age: 84
End: 2019-11-27

## 2019-11-27 DIAGNOSIS — I10 ESSENTIAL HYPERTENSION: ICD-10-CM

## 2019-11-27 RX ORDER — AMLODIPINE BESYLATE 10 MG/1
TABLET ORAL
Qty: 30 TABLET | Refills: 2 | Status: SHIPPED | OUTPATIENT
Start: 2019-11-27 | End: 2022-01-01

## 2020-02-26 ENCOUNTER — COMMUNICATION - HEALTHEAST (OUTPATIENT)
Dept: CARDIOLOGY | Facility: CLINIC | Age: 85
End: 2020-02-26

## 2020-02-26 DIAGNOSIS — I10 ESSENTIAL HYPERTENSION: ICD-10-CM

## 2020-08-26 ENCOUNTER — RECORDS - HEALTHEAST (OUTPATIENT)
Dept: LAB | Facility: CLINIC | Age: 85
End: 2020-08-26

## 2020-08-27 LAB
ALBUMIN SERPL-MCNC: 4 G/DL (ref 3.5–5)
ALP SERPL-CCNC: 96 U/L (ref 45–120)
ALT SERPL W P-5'-P-CCNC: 10 U/L (ref 0–45)
ANION GAP SERPL CALCULATED.3IONS-SCNC: 14 MMOL/L (ref 5–18)
AST SERPL W P-5'-P-CCNC: 21 U/L (ref 0–40)
BILIRUB SERPL-MCNC: 0.3 MG/DL (ref 0–1)
BUN SERPL-MCNC: 19 MG/DL (ref 8–28)
CALCIUM SERPL-MCNC: 8.6 MG/DL (ref 8.5–10.5)
CHLORIDE BLD-SCNC: 108 MMOL/L (ref 98–107)
CHOLEST SERPL-MCNC: 150 MG/DL
CO2 SERPL-SCNC: 11 MMOL/L (ref 22–31)
CREAT SERPL-MCNC: 1.48 MG/DL (ref 0.6–1.1)
FASTING STATUS PATIENT QL REPORTED: ABNORMAL
GFR SERPL CREATININE-BSD FRML MDRD: 34 ML/MIN/1.73M2
GLUCOSE BLD-MCNC: 100 MG/DL (ref 70–125)
HDLC SERPL-MCNC: 37 MG/DL
LDLC SERPL CALC-MCNC: 90 MG/DL
POTASSIUM BLD-SCNC: 3.9 MMOL/L (ref 3.5–5)
PROT SERPL-MCNC: 8 G/DL (ref 6–8)
SODIUM SERPL-SCNC: 133 MMOL/L (ref 136–145)
TRIGL SERPL-MCNC: 117 MG/DL

## 2021-05-13 ENCOUNTER — RECORDS - HEALTHEAST (OUTPATIENT)
Dept: LAB | Facility: CLINIC | Age: 86
End: 2021-05-13

## 2021-05-14 LAB
ALBUMIN SERPL-MCNC: 3.3 G/DL (ref 3.5–5)
ALP SERPL-CCNC: 101 U/L (ref 45–120)
ALT SERPL W P-5'-P-CCNC: 11 U/L (ref 0–45)
ANION GAP SERPL CALCULATED.3IONS-SCNC: 11 MMOL/L (ref 5–18)
AST SERPL W P-5'-P-CCNC: 16 U/L (ref 0–40)
BASOPHILS # BLD AUTO: 0 THOU/UL (ref 0–0.2)
BASOPHILS NFR BLD AUTO: 1 % (ref 0–2)
BILIRUB SERPL-MCNC: 0.3 MG/DL (ref 0–1)
BUN SERPL-MCNC: 17 MG/DL (ref 8–28)
CALCIUM SERPL-MCNC: 8.3 MG/DL (ref 8.5–10.5)
CHLORIDE BLD-SCNC: 113 MMOL/L (ref 98–107)
CO2 SERPL-SCNC: 11 MMOL/L (ref 22–31)
CREAT SERPL-MCNC: 1.54 MG/DL (ref 0.6–1.1)
EOSINOPHIL # BLD AUTO: 0.3 THOU/UL (ref 0–0.4)
EOSINOPHIL NFR BLD AUTO: 7 % (ref 0–6)
ERYTHROCYTE [DISTWIDTH] IN BLOOD BY AUTOMATED COUNT: 14.4 % (ref 11–14.5)
GFR SERPL CREATININE-BSD FRML MDRD: 32 ML/MIN/1.73M2
GLUCOSE BLD-MCNC: 107 MG/DL (ref 70–125)
HCT VFR BLD AUTO: 29.9 % (ref 35–47)
HGB BLD-MCNC: 9.4 G/DL (ref 12–16)
IMM GRANULOCYTES # BLD: 0 THOU/UL
IMM GRANULOCYTES NFR BLD: 0 %
LYMPHOCYTES # BLD AUTO: 1.1 THOU/UL (ref 0.8–4.4)
LYMPHOCYTES NFR BLD AUTO: 23 % (ref 20–40)
MCH RBC QN AUTO: 31.3 PG (ref 27–34)
MCHC RBC AUTO-ENTMCNC: 31.4 G/DL (ref 32–36)
MCV RBC AUTO: 100 FL (ref 80–100)
MONOCYTES # BLD AUTO: 0.9 THOU/UL (ref 0–0.9)
MONOCYTES NFR BLD AUTO: 19 % (ref 2–10)
NEUTROPHILS # BLD AUTO: 2.4 THOU/UL (ref 2–7.7)
NEUTROPHILS NFR BLD AUTO: 50 % (ref 50–70)
PLATELET # BLD AUTO: 176 THOU/UL (ref 140–440)
PMV BLD AUTO: 11.3 FL (ref 8.5–12.5)
POTASSIUM BLD-SCNC: 4 MMOL/L (ref 3.5–5)
PROT SERPL-MCNC: 6.6 G/DL (ref 6–8)
RBC # BLD AUTO: 3 MILL/UL (ref 3.8–5.4)
SODIUM SERPL-SCNC: 135 MMOL/L (ref 136–145)
TSH SERPL DL<=0.005 MIU/L-ACNC: 1.31 UIU/ML (ref 0.3–5)
WBC: 4.8 THOU/UL (ref 4–11)

## 2021-05-19 ENCOUNTER — RECORDS - HEALTHEAST (OUTPATIENT)
Dept: LAB | Facility: CLINIC | Age: 86
End: 2021-05-19

## 2021-05-19 LAB
FOLATE SERPL-MCNC: >20 NG/ML
IRON SATN MFR SERPL: 27 % (ref 20–50)
IRON SERPL-MCNC: 36 UG/DL (ref 42–175)
TIBC SERPL-MCNC: 133 UG/DL (ref 313–563)
TRANSFERRIN SERPL-MCNC: 106 MG/DL (ref 212–360)
VIT B12 SERPL-MCNC: 244 PG/ML (ref 213–816)

## 2021-05-25 ENCOUNTER — RECORDS - HEALTHEAST (OUTPATIENT)
Dept: ADMINISTRATIVE | Facility: CLINIC | Age: 86
End: 2021-05-25

## 2021-05-26 ENCOUNTER — RECORDS - HEALTHEAST (OUTPATIENT)
Dept: ADMINISTRATIVE | Facility: CLINIC | Age: 86
End: 2021-05-26

## 2021-05-27 ENCOUNTER — RECORDS - HEALTHEAST (OUTPATIENT)
Dept: ADMINISTRATIVE | Facility: CLINIC | Age: 86
End: 2021-05-27

## 2021-05-28 ENCOUNTER — RECORDS - HEALTHEAST (OUTPATIENT)
Dept: ADMINISTRATIVE | Facility: CLINIC | Age: 86
End: 2021-05-28

## 2021-05-30 ENCOUNTER — RECORDS - HEALTHEAST (OUTPATIENT)
Dept: ADMINISTRATIVE | Facility: CLINIC | Age: 86
End: 2021-05-30

## 2021-05-31 ENCOUNTER — RECORDS - HEALTHEAST (OUTPATIENT)
Dept: ADMINISTRATIVE | Facility: CLINIC | Age: 86
End: 2021-05-31

## 2021-05-31 VITALS — WEIGHT: 144 LBS | HEIGHT: 65 IN | BODY MASS INDEX: 23.99 KG/M2

## 2021-05-31 VITALS — WEIGHT: 132 LBS | BODY MASS INDEX: 21.99 KG/M2 | HEIGHT: 65 IN

## 2021-06-01 VITALS
HEIGHT: 65 IN | BODY MASS INDEX: 22.8 KG/M2 | BODY MASS INDEX: 22.47 KG/M2 | WEIGHT: 135 LBS | HEIGHT: 65 IN | WEIGHT: 135 LBS | BODY MASS INDEX: 22.47 KG/M2 | BODY MASS INDEX: 21.97 KG/M2 | BODY MASS INDEX: 21.97 KG/M2 | WEIGHT: 137 LBS

## 2021-06-01 VITALS — WEIGHT: 136 LBS | HEIGHT: 65 IN | BODY MASS INDEX: 22.66 KG/M2

## 2021-06-01 VITALS — BODY MASS INDEX: 21.92 KG/M2 | HEIGHT: 65 IN | WEIGHT: 131.6 LBS

## 2021-06-01 VITALS — WEIGHT: 133.6 LBS | HEIGHT: 65 IN | BODY MASS INDEX: 22.26 KG/M2

## 2021-06-01 VITALS — WEIGHT: 133.2 LBS | BODY MASS INDEX: 22.17 KG/M2

## 2021-06-01 VITALS — BODY MASS INDEX: 23.03 KG/M2 | WEIGHT: 138.4 LBS

## 2021-06-01 VITALS — HEIGHT: 65 IN | BODY MASS INDEX: 22.33 KG/M2 | WEIGHT: 134 LBS

## 2021-06-01 VITALS — BODY MASS INDEX: 22.2 KG/M2 | WEIGHT: 134.1 LBS | BODY MASS INDEX: 22.32 KG/M2 | WEIGHT: 133.4 LBS

## 2021-06-01 VITALS — BODY MASS INDEX: 23.3 KG/M2 | WEIGHT: 140 LBS

## 2021-06-01 VITALS — BODY MASS INDEX: 22.16 KG/M2 | WEIGHT: 133 LBS | HEIGHT: 65 IN

## 2021-06-01 VITALS — WEIGHT: 131 LBS | HEIGHT: 65 IN | BODY MASS INDEX: 21.83 KG/M2

## 2021-06-02 ENCOUNTER — RECORDS - HEALTHEAST (OUTPATIENT)
Dept: ADMINISTRATIVE | Facility: CLINIC | Age: 86
End: 2021-06-02

## 2021-06-02 VITALS — BODY MASS INDEX: 23.16 KG/M2 | HEIGHT: 65 IN | WEIGHT: 139 LBS

## 2021-06-05 ENCOUNTER — RECORDS - HEALTHEAST (OUTPATIENT)
Dept: CARDIOLOGY | Facility: CLINIC | Age: 86
End: 2021-06-05

## 2021-06-05 DIAGNOSIS — I25.10 CORONARY ATHEROSCLEROSIS: ICD-10-CM

## 2021-06-05 DIAGNOSIS — E78.5 OTHER AND UNSPECIFIED HYPERLIPIDEMIA: ICD-10-CM

## 2021-06-05 DIAGNOSIS — I20.9 OTHER AND UNSPECIFIED ANGINA PECTORIS: ICD-10-CM

## 2021-06-05 DIAGNOSIS — I10 ESSENTIAL HYPERTENSION: ICD-10-CM

## 2021-06-16 PROBLEM — R11.0 NAUSEA: Status: ACTIVE | Noted: 2017-11-14

## 2021-06-16 PROBLEM — S82.891A: Status: ACTIVE | Noted: 2018-02-24

## 2021-06-16 PROBLEM — E86.0 DEHYDRATION: Status: ACTIVE | Noted: 2017-11-12

## 2021-06-16 PROBLEM — J18.9 ATYPICAL PNEUMONIA: Status: ACTIVE | Noted: 2017-11-13

## 2021-06-16 PROBLEM — S82.853A TRIMALLEOLAR FRACTURE: Status: ACTIVE | Noted: 2018-02-24

## 2021-06-16 PROBLEM — S82.891A CLOSED RIGHT ANKLE FRACTURE: Status: ACTIVE | Noted: 2018-03-02

## 2021-06-16 PROBLEM — N18.2 CRI (CHRONIC RENAL INSUFFICIENCY), STAGE 2 (MILD): Status: ACTIVE | Noted: 2018-03-29

## 2021-06-16 NOTE — ANESTHESIA CARE TRANSFER NOTE
Last vitals:   Vitals:    03/02/18 1237   BP: 172/77   Pulse: (!) 104   Resp: 16   Temp: 37.4  C (99.4  F)   SpO2: 100%     Patient's level of consciousness is drowsy  Spontaneous respirations: yes  Maintains airway independently: yes  Dentition unchanged: yes  Oropharynx: oropharynx clear of all foreign objects    QCDR Measures:  ASA# 20 - Surgical Safety Checklist: WHO surgical safety checklist completed prior to induction  PQRS# 430 - Adult PONV Prevention: 4558F - Pt received => 2 anti-emetic agents (different classes) preop & intraop  ASA# 8 - Peds PONV Prevention: NA - Not pediatric patient, not GA or 2 or more risk factors NOT present  PQRS# 424 - Sonam-op Temp Management: 4559F - At least one body temp DOCUMENTED => 35.5C or 95.9F within required timeframe  PQRS# 426 - PACU Transfer Protocol: - Transfer of care checklist used  ASA# 14 - Acute Post-op Pain: ASA14B - Patient did NOT experience pain >= 7 out of 10

## 2021-06-16 NOTE — PROGRESS NOTES
[facility]    DATE:3/14/2018    NAME:  Yun Rodgers             :  1935  MRN: [id number]  CODE STATUS:  FULL CODE    FACILITY:  [facility] [[id number]]       ROOM:   219    CHIEF COMPLAINT/REASON FOR VISIT:  Chief Complaint   Patient presents with     Problem Visit     Right ankle fractue     HISTORY OF PRESENT ILLNESS: Yun Rodgers is a 83 y.o. female with CAD - status post CABG and stent placement, CRD, GERD, Hypertension and Lupus who was admitted for a right ankle fracture sustained  after a mechanical fall at home.   She was due to have an ORIF but surgery was delayed due to severe edema.  She finally had a right ankle ORIF on 3/218.  Postoperatively, she was noted to have an episode of chest pain with negative troponins and negative EKG changes.  She also had acute blood loss anemia and received 1 unit packed RBC blood transfusion.  Otherwise,  pain has been controlled and she was sent back to our facility for further rehab.     Today, the patient was seen resting in bed.  She denies any pain and reports a poor appetite.  She states that she is not hungry most of the time but does try to consume all of her supplement. She is currently nonweightbearing but feels that her strength has improved. She does take big doses of Aspirin but is on Famotidine and Omeprazole for GERD symptoms.    Her blood pressure are within target range.   She is on Amlodipine, Isosorbide, and Metoprolol.  She also denies any headaches, chest pain, palpitations, dizziness, or lightheadedness.  She does not have any fevers or chills.  Last Hgb 9.4.    Past Medical History:   Diagnosis Date     CAD (coronary artery disease)      Carotid artery stenosis      Chronic kidney disease      History of transfusion      Hypertension      Lupus     states this usually manifests as a rash and tailbone pain     Pneumonia      Past Surgical History:   Procedure Laterality Date     ABDOMINAL  "SURGERY       APPENDECTOMY       COLOSTOMY CLOSURE       EYE SURGERY       HYSTERECTOMY       ORIF ANKLE FRACTURE Right 3/2/2018    Procedure: OPEN REDUCTION INTERNAL FIXATION TRIMALLEOLAR FRACTURE RIGHT ANKLE;  Surgeon: Shawn Vega DO;  Location: [facility] Main OR;  Service:      OK APPENDECTOMY      Description: Appendectomy;  Recorded: 09/26/2014;     OK COLOSTOMY      Description: Colostomy;  Recorded: 09/26/2014;     OK THROMBOENDARTECTMY NECK,NECK INCIS      Description: Carotid Thromboendarterectomy;  Recorded: 09/26/2014;     OK TOTAL ABDOM HYSTERECTOMY      Description: Total Abdominal Hysterectomy;  Recorded: 09/26/2014;     Resection of mesentery      per patient-- \"they took out my mesentery about 6 months after I had a baby\"     Family History   Problem Relation Age of Onset     Cancer Mother      Hypertension Mother      No Medical Problems Father      Social History     Social History     Marital status:      Spouse name: N/A     Number of children: N/A     Years of education: N/A     Occupational History     Not on file.     Social History Main Topics     Smoking status: Former Smoker     Packs/day: 2.50     Years: 50.00     Quit date: 3/1/2002     Smokeless tobacco: Never Used     Alcohol use 2.4 oz/week     4 Cans of beer per week     Drug use: No     Sexual activity: Not on file     Other Topics Concern     Not on file     Social History Narrative    Retired  for [employer].     Allergies   Allergen Reactions     Hydroxychloroquine Sulfate      Iodinated Contrast- Oral And Iv Dye      Lipitor [Atorvastatin]      Pravastatin      Robitussin [Guaifenesin]      Simvastatin      Tetracyclines      Current Outpatient Prescriptions   Medication Sig Dispense Refill     acetaminophen (TYLENOL) 500 MG tablet Take 2 tablets (1,000 mg total) by mouth 3 (three) times a day. 90 tablet 1     amLODIPine (NORVASC) 5 MG tablet Take 5 mg by mouth daily.       aspirin 325 MG " tablet Take 1 tablet (325 mg total) by mouth 2 (two) times a day with meals. 60 tablet 0     azelastine (OPTIVAR) 0.05 % ophthalmic solution Administer 1 drop to both eyes 2 (two) times a day.        cholecalciferol, vitamin D3, 1,000 unit tablet Take 1,000 Units by mouth daily.       glucosamine-chondroitin 500-400 mg tablet Take 2 tablets by mouth daily.       HYDROcodone-acetaminophen 5-325 mg per tablet Take 1-2 tablets by mouth every 4 (four) hours as needed for pain (Maximum APAP 4GM/24hours).       inhalational spacing device Spcr Use spacer with inhaler for optimal technique.Dx: atypical pneumonia with bronchospasm. 1 each 0     isosorbide mononitrate (IMDUR) 30 MG 24 hr tablet Take 150 mg by mouth daily.        metoprolol succinate (TOPROL-XL) 25 MG Take 25 mg by mouth daily.       mirtazapine (REMERON) 15 MG tablet Take 15 mg by mouth at bedtime. Indications: major depressive disorder       MULTIVITAMIN W-MINERALS/LUTEIN (CENTRUM SILVER ORAL) Take 1 tablet by mouth daily.       nitroglycerin (NITROSTAT) 0.4 MG SL tablet Place 0.4 mg under the tongue as needed for chest pain.       pantoprazole (PROTONIX) 40 MG tablet Take 40 mg by mouth 2 (two) times a day.       peg 400-propylene glycol (SYSTANE) 0.4-0.3 % Drop Administer 1 drop to both eyes 4 (four) times a day.        ranitidine (ZANTAC) 300 MG capsule Take 300 mg by mouth every evening.       senna-docusate (PERICOLACE) 8.6-50 mg tablet Take 2 tablets by mouth daily.  0     triamcinolone (KENALOG) 0.025 % cream Apply 1 application topically 3 (three) times a day as needed.        No current facility-administered medications for this visit.      REVIEW OF SYSTEMS:    Currently, no fever, chills, or rigors. Does not have any visual or hearing problems. Denies any chest pain, headaches, palpitations, lightheadedness, dizziness, shortness of breath, or cough. Appetite is good. Denies any GERD symptoms. Denies any difficulty with swallowing, nausea, or  vomiting.  Denies any abdominal pain, diarrhea or constipation. Denies any urinary symptoms. No insomnia. No active bleeding. No rash.       PHYSICAL EXAMINATION:  Vitals:    03/15/18 2257   BP: 117/53   Pulse: 61   Resp: 20   Temp: 96.7  F (35.9  C)   SpO2: 97%   Weight: 140 lb (63.5 kg)       GENERAL: Awake, Alert, oriented x3, not in any form of acute distress, answers questions appropriately, follows simple commands, conversant  HEENT: Head is normocephalic with normal hair distribution. No evidence of trauma. Ears: No acute purulent discharge. Eyes: Conjunctivae pink with no scleral jaundice. Nose: Normal mucosa and septum. NECK: Supple with no cervical or supraclavicular lymphadenopathy. Trachea is midline.   CHEST: No tenderness or deformity, no crepitus  LUNG: Clear to auscultation with good chest expansion. There are no crackles or wheezes, normal AP diameter.  BACK: No kyphosis of the thoracic spine. Symmetric, no curvature, ROM normal, no CVA tenderness, no spinal tenderness   CVS: There is good S1  S2, there are no murmurs, rubs, gallops, or heaves, rhythm is regular,  2+ pulses symmetric in all extremities.  ABDOMEN: Globular and soft, nontender to palpation, non distended, no masses, no organomegaly, good bowel sounds, no rebound or guarding, no peritoneal signs.   EXTREMITIES: Nonweightbearing on right lower extremity, there is no tenderness to palpation, no pedal edema, no cyanosis or clubbing, no calf tenderness.  Pulses equal in all extremities, normal cap refill, no joint swelling.  SKIN: Warm and dry, no erythema noted.  Skin color, texture, no rashes or lesions.  NEUROLOGICAL: The patient is oriented to person, place and time. Strength and sensation are grossly intact. Face is symmetric.    LABS:  \    Lab Results   Component Value Date    WBC 4.5 03/08/2018    HGB 9.4 (L) 03/08/2018    HCT 29.5 (L) 03/08/2018    MCV 96 03/08/2018     03/08/2018     Results for orders placed or performed  during the hospital encounter of 03/02/18   Basic Metabolic Panel   Result Value Ref Range    Sodium 136 136 - 145 mmol/L    Potassium 4.3 3.5 - 5.0 mmol/L    Chloride 109 (H) 98 - 107 mmol/L    CO2 20 (L) 22 - 31 mmol/L    Anion Gap, Calculation 7 5 - 18 mmol/L    Glucose 109 70 - 125 mg/dL    Calcium 9.4 8.5 - 10.5 mg/dL    BUN 17 8 - 28 mg/dL    Creatinine 0.88 0.60 - 1.10 mg/dL    GFR MDRD Af Amer >60 >60 mL/min/1.73m2    GFR MDRD Non Af Amer >60 >60 mL/min/1.73m2       ASSESSMENT/PLAN:    1. Closed fracture of right ankle, initial encounter - status post right ankle ORIF.  Will change oxycodone to 5 mg, 1 tablet twice daily ×1 week (in a.m. prior to PT and at at bedtime) and 1 tablet twice daily as needed.  After 1 week, decrease oxycodone to 5 mg, 1 tablet every 6 hours as needed.  A very long discussion was done with the patient regarding risks and benefits of narcotic use.  She agrees with above plans.  Will call orthopedics regarding end date for aspirin 325 mg twice daily   2. Essential hypertension - Blood pressure are within target range, will continue Norvasc, Metoprolol, and Isosorbide   3. Acute blood loss anemia - Last Hgb 9.4   4. Malnutrition - Last Albumin 2.6, followed by dietary and will continue HNS   5. Coronary artery disease of native artery of native heart with stable angina pectoris - Stable   6. GERD (gastroesophageal reflux disease) - Continue famotidine and omeprazole for now.  Consider discontinue omeprazole once the patient is off big doses of Aspirin                 Electronically signed by:  Veronica Black CNP    35 minutes TT of which 50% was spent in counseling and coordination of care of the above plan.    Time spent in interview and examination of patient, review of available records, and discussion with nursing staff. Continue care plan, efforts at therapy, and monitor nutritional status.

## 2021-06-16 NOTE — ANESTHESIA PREPROCEDURE EVALUATION
Anesthesia Evaluation      Patient summary reviewed   No history of anesthetic complications     Airway   Mallampati: II  Neck ROM: full   Pulmonary - negative ROS and normal exam   (+) pneumonia,                          Cardiovascular - normal exam  Exercise tolerance: > or = 4 METS  (+) hypertension, CAD, CABG/stent, angina, , hypercholesterolemia, PVD    ECG reviewed (2/24/18:  LAD, LVH?, NS ST changes)        Neuro/Psych - negative ROS     Endo/Other - negative ROS      GI/Hepatic/Renal    (+) GERD,   chronic renal disease,      Other findings: Echo 2/25/18: Moderate concentric left ventricular hypertrophy. Left ventricle ejection fraction is normal. The estimated left ventricular ejection fraction is 65% without wall motion abnormality.  Normal right ventricular size with mild decrease in systolic function.  Mild mitral and tricuspid regurgitation with borderline to mild pulmonary hypertension.  Chronic stable angina vs GERD symptoms, non-exertional but often at night in bed.  Carotid US 9/5/17 showed extensive bilateral atheroma w/o stenosis, plus likely left subclavian stenosis.  Cardiologist felt she had mild L carotid bruit.  One chart note states had stent to LAD in 2007.  Had chest pain on admission, serial troponins negative.    NMST 12/30/15: small area of mild, possibly reversable, ischemia distal AS area.  Lupus?  Between 2/24 and 2/25 her Hg dropped from 10.5 to 8.6, possibly due to rehydration as her baseline is said to be 8.3 (11/2017).  2/25:  GFR 46, K 4.6, alb 2.9, INR 1.25, plts 143K.        Dental    (+) lower dentures and upper dentures                       Anesthesia Plan  Planned anesthetic: general LMA and peripheral nerve block  Sciatic and saphenous nerve blocks for POP per surgeon request.  Decadron, Zofran.  Normal saline.  Ketamine.  1 U PRBC.  ASA 4   Induction: intravenous   Anesthetic plan and risks discussed with: patient  Anesthesia plan special considerations: antiemetics,    Post-op plan: routine recovery  DNR/DNI status   .  Plan is for suspension of DNR (Discussed with family and patient and they are OK with this plan.)

## 2021-06-16 NOTE — ANESTHESIA POSTPROCEDURE EVALUATION
Patient: Yun Rodgers  OPEN REDUCTION INTERNAL FIXATION TRIMALLEOLAR FRACTURE RIGHT ANKLE  Anesthesia type: general    Patient location: PACU  Last vitals:   Vitals:    03/02/18 1345   BP: 169/79   Pulse: 91   Resp: 16   Temp: 37.6  C (99.6  F)   SpO2: 100%     Post vital signs: stable  Level of consciousness: awake, alert, oriented and responds to simple questions  Post-anesthesia pain: pain controlled  Post-anesthesia nausea and vomiting: no  Pulmonary: unassisted, return to baseline  Cardiovascular: stable and blood pressure at baseline  Hydration: adequate  Anesthetic events: no    QCDR Measures:  ASA# 11 - Sonam-op Cardiac Arrest: ASA11B - Patient did NOT experience unanticipated cardiac arrest  ASA# 12 - Sonam-op Mortality Rate: ASA12B - Patient did NOT die  ASA# 13 - PACU Re-Intubation Rate: ASA13B - Patient did NOT require a new airway mgmt  ASA# 10 - Composite Anes Safety: ASA10A - No serious adverse event    Additional Notes:

## 2021-06-16 NOTE — ANESTHESIA PREPROCEDURE EVALUATION
Anesthesia Evaluation        Airway    Pulmonary - negative ROS                          Cardiovascular   Exercise tolerance: > or = 4 METS  (+) hypertension, CAD, CABG/stent, angina at rest, , hypercholesterolemia, PVD     Neuro/Psych - negative ROS     Endo/Other - negative ROS      GI/Hepatic/Renal    (+) GERD,   chronic renal disease CRI,      Other findings: Echo 2/25/18: Moderate concentric left ventricular hypertrophy. Left ventricle ejection fraction is normal. The estimated left ventricular ejection fraction is 65% without wall motion abnormality.  Normal right ventricular size with mild decrease in systolic function.  Mild mitral and tricuspid regurgitation with borderline to mild pulmonary hypertension.  Chronic stable angina vs GERD symptoms, non-exertional but often at night in bed.  Carotid US 9/5/17 showed extensive bilateral atheroma w/o stenosis, plus likely left subclavian stenosis.  Cardiologist felt she had mild L carotid bruit.  One chart note states had stent to LAD in 2007.  Had chest pain on admission, serial troponins negative.    NMST 12/30/15: small area of mild, possibly reversable, ischemia distal AS area.  Lupus?  Between 2/24 and 2/25 her Hg dropped from 10.5 to 8.6, possibly due to rehydration as her baseline is said to be 8.3 (11/2017).  2/25:  GFR 46, K 4.6, alb 2.9, INR 1.25, plts 143K.        Dental                         Anesthesia Plan

## 2021-06-16 NOTE — PROGRESS NOTES
[facility]    Facility:   [facility] [[id number]]   Code Status: FULL CODE       Chief Complaint   Patient presents with     Follow Up     TCU 3/26/18.       HPI (TCU H & P 3/5/18):  Yun Rodgers is a 83 y.o. female with a history of CAD status post CABG and stent placement, CRD, GERD, hypertension who sustained a right ankle fracture after a mechanical fall with placement of cast after closed reduction but surgery was delayed due to severe edema.  She finally had right ankle ORIF on 3/218.  Postoperatively, she was noted to have an episode of chest pain with negative troponins and negative EKG changes.  She also had acute blood loss anemia and received 1 unit packed RBC blood transfusion with most recent hemoglobin at 10.2 from 8.8.  Otherwise her pain has been controlled and she was sent back to our facility for further rehab.    Currently, the patient states that she is doing slightly better and she does not want to be on big doses of oxycodone as her pain has improved.  She notes that last night she developed severe pain that did improve with taking oxycodone.  She is currently nonweightbearing but feels that her strength has improved and she walks with a walker with assist.  Her appetite is better.  She does take big doses of aspirin and she states that occasionally she does have GERD symptoms.  She is on famotidine and omeprazole.  She also has not had any other episodes of chest pains.  Her blood pressure has noted to be high over the last few days.  Today blood pressure is at 164/78.  She is on amlodipine, isosorbide, metoprolol.  She also denies any weakness, dizziness or lightheadedness.  She does not have any fevers or chills.  She does not have any nausea, vomiting, diarrhea or constipation.      Past Medical History:  Past Medical History:   Diagnosis Date     CAD (coronary artery disease)      Carotid artery stenosis      Chronic kidney disease      History of  transfusion      Hypertension      Lupus     states this usually manifests as a rash and tailbone pain     Pneumonia        Medications:  Current Outpatient Prescriptions   Medication Sig Note     acetaminophen (TYLENOL) 500 MG tablet Take 2 tablets (1,000 mg total) by mouth 3 (three) times a day.      amLODIPine (NORVASC) 5 MG tablet Take 5 mg by mouth daily.      aspirin 325 MG tablet Take 1 tablet (325 mg total) by mouth 2 (two) times a day with meals.      azelastine (OPTIVAR) 0.05 % ophthalmic solution Administer 1 drop to both eyes 2 (two) times a day.       cholecalciferol, vitamin D3, 1,000 unit tablet Take 1,000 Units by mouth daily.      glucosamine-chondroitin 500-400 mg tablet Take 2 tablets by mouth daily.      HYDROcodone-acetaminophen 5-325 mg per tablet Take 1-2 tablets by mouth every 4 (four) hours as needed for pain (Maximum APAP 4GM/24hours).      inhalational spacing device Spcr Use spacer with inhaler for optimal technique.Dx: atypical pneumonia with bronchospasm.      isosorbide mononitrate (IMDUR) 30 MG 24 hr tablet Take 150 mg by mouth daily.  2/24/2018: Dose verified against clinic list.      metoprolol succinate (TOPROL-XL) 25 MG Take 25 mg by mouth daily.      mirtazapine (REMERON) 15 MG tablet Take 15 mg by mouth at bedtime. Indications: major depressive disorder      MULTIVITAMIN W-MINERALS/LUTEIN (CENTRUM SILVER ORAL) Take 1 tablet by mouth daily.      nitroglycerin (NITROSTAT) 0.4 MG SL tablet Place 0.4 mg under the tongue as needed for chest pain.      oxyCODONE (ROXICODONE) 5 MG immediate release tablet Take 5 mg by mouth 4 (four) times a day as needed for pain.      pantoprazole (PROTONIX) 40 MG tablet Take 40 mg by mouth 2 (two) times a day.      peg 400-propylene glycol (SYSTANE) 0.4-0.3 % Drop Administer 1 drop to both eyes 4 (four) times a day.       ranitidine (ZANTAC) 300 MG capsule Take 300 mg by mouth every evening.      senna-docusate (PERICOLACE) 8.6-50 mg tablet Take 2  tablets by mouth daily.      triamcinolone (KENALOG) 0.025 % cream Apply 1 application topically 3 (three) times a day as needed.         Today:  BPs have been somewhat elevated, meds reviewed. No headache, palpitations or chest pain or SOB. She has had increased reflux sx. Med review shows reduction and discontinuation of Ranitidine per pharmacy recommendation. Will restart. She has taken BID PPI and Zantac at home for quite some time due to difficult to control GERD. She has minimal pain in right foot and ankle. Saw ortho last week, splint removed and now in cast. Still NWB.       Physical Exam:  General: Patient is alert, pleasant female, no distress.  Vitals: /80, Temp 96.6, Pulse 73, RR 20, O2 sat 97% RA.  HEENT: Head is NCAT. Eyes show no injection or icterus. Nares negative. Oropharynx well hydrated.  Neck: Supple. No tenderness or adenopathy. No JVD.  Lungs: Clear bilaterally. No wheezes.  Cardiovascular: Regular rate and rhythm, normal S1, S2.  Abdomen: Soft, no tenderness on exam. Bowel sounds present. No guarding rebound or rigidity.  Extremities: No edema is noted.  Musculoskeletal: CAM right LE.  Psych: Mood appears good.      Labs:  Results for orders placed or performed during the hospital encounter of 03/02/18   Basic Metabolic Panel   Result Value Ref Range    Sodium 136 136 - 145 mmol/L    Potassium 4.3 3.5 - 5.0 mmol/L    Chloride 109 (H) 98 - 107 mmol/L    CO2 20 (L) 22 - 31 mmol/L    Anion Gap, Calculation 7 5 - 18 mmol/L    Glucose 109 70 - 125 mg/dL    Calcium 9.4 8.5 - 10.5 mg/dL    BUN 17 8 - 28 mg/dL    Creatinine 0.88 0.60 - 1.10 mg/dL    GFR MDRD Af Amer >60 >60 mL/min/1.73m2    GFR MDRD Non Af Amer >60 >60 mL/min/1.73m2       Lab Results   Component Value Date    WBC 4.5 03/08/2018    HGB 9.4 (L) 03/08/2018    HCT 29.5 (L) 03/08/2018    MCV 96 03/08/2018     03/08/2018       Assessment/Plan:  1. Right ankle trimalleolar fracture. Fall on 2/24/18, ORIF on 3/2/18. NWB, now in  CAM. Follow up per ortho.  2. HTN. On Amlodipine and metoprolol. BPs up. Increase Metoprolol. Cont to monitor.  3. GERD. Increased sx since zantac stopped so will restart. Also she is on BID aspirin for DVT prevention, ensure it is EC.  4. ABLA. Last hgb at 9.4. No need for intervention.  5. Hx CAD. No current cardiac concerns.      Total time spent was greater than 35 minutes, more than 50% spent in face-to-face counseling regarding disease state, treatment, side effects, documentation, review of clinical data and coordination of care.      Electronically signed by: Rebecca Knight MD

## 2021-06-16 NOTE — ANESTHESIA PROCEDURE NOTES
Peripheral Block    Patient location during procedure: pre-op  Start time: 3/2/2018 10:31 AM  End time: 3/2/2018 10:35 AM  post-op analgesia per surgeon order as noted in medical record  Staffing:  Performing  Anesthesiologist: STEFAN SCRUGGS  Preanesthetic Checklist  Completed: patient identified, site marked, risks, benefits, and alternatives discussed, timeout performed, consent obtained, at patient's request, airway assessed, oxygen available, suction available, emergency drugs available and hand hygiene performed  Peripheral Block  Block type: saphenous  Prep: ChloraPrep  Patient position: supine  Patient monitoring: cardiac monitor, continuous pulse oximetry, blood pressure and heart rate  Laterality: right  Injection technique: ultrasound guided    Ultrasound used to visualize needle placement in proximity to nerve being blocked: yes   Permanent ultrasound image captured for medical record  Sterile gel and probe cover used for ultrasound.    Needle  Needle type: Stimuplex   Needle gauge: 20G  Needle length: 6 in  no peripheral nerve catheter placed  Assessment  Injection assessment: negative aspiration for heme, no difficulty with injection, no paresthesia on injection and incremental injection

## 2021-06-16 NOTE — ANESTHESIA PROCEDURE NOTES
Peripheral Block    Patient location during procedure: pre-op  Start time: 3/2/2018 10:21 AM  End time: 3/2/2018 10:31 AM  post-op analgesia per surgeon order as noted in medical record  Staffing:  Performing  Anesthesiologist: STEFAN SCRUGGS  Preanesthetic Checklist  Completed: patient identified, site marked, risks, benefits, and alternatives discussed, timeout performed, consent obtained, at patient's request, airway assessed, oxygen available, suction available, emergency drugs available and hand hygiene performed  Peripheral Block  Block type: sciatic  Prep: ChloraPrep  Patient position: supine  Patient monitoring: cardiac monitor, continuous pulse oximetry, blood pressure and heart rate  Laterality: right  Injection technique: ultrasound guided    Ultrasound used to visualize needle placement in proximity to nerve being blocked: yes   Permanent ultrasound image captured for medical record  Sterile gel and probe cover used for ultrasound.    Needle  Needle type: Stimuplex   Needle gauge: 21 G  Needle length: 4 in  no peripheral nerve catheter placed  Assessment  Injection assessment: negative aspiration for heme, no difficulty with injection, no paresthesia on injection and incremental injection  Additional Notes  Block completed at the bifurcation of the tibial and peroneal branches.

## 2021-06-16 NOTE — PROGRESS NOTES
[facility]        Visit Type: H & P    Code Status:  FULL CODE  Facility:  [facility] [[id number]]          PCP: Ronaldo Grijalva MD       PHONE: [phone]     FAX:[phone]        ASSESSMENT/PLAN:  1. Closed right ankle fracture   status post right ankle ORIF.  Will change oxycodone to 5 mg, 1 tablet twice daily ×1 week (in a.m. prior to PT and at at bedtime) and 1 tablet twice daily as needed.  After 1 week, decrease oxycodone to 5 mg, 1 tablet every 6 hours as needed.  A very long discussion was done with the patient regarding risks and benefits of narcotic use.  She agrees with above plans.  Will call orthopedics regarding end date for aspirin 325 mg twice daily   2. Essential hypertension   uncontrolled blood pressures in this patient was a history of CAD status post bypass surgery.  Will increase amlodipine to 7.5 mg every morning and continue close monitoring of blood pressures   3. Acute blood loss anemia   will check hemogram on 3/8   4. Malnutrition   hemoglobin is low at 2.6, will ask dietitian to see   5. Coronary artery disease of native artery of native heart with stable angina pectoris   currently stable   6. GERD (gastroesophageal reflux disease)   continue famotidine and omeprazole for now.  Consider discontinue omeprazole once the patient is off big doses of aspirin         HISTORY OF PRESENT ILLNESS:   Yun Rodgers is a 83 y.o. female with a history of CAD status post CABG and stent placement, CRD, GERD, hypertension who sustained a right ankle fracture after a mechanical fall with placement of cast after closed reduction but surgery was delayed due to severe edema.  She finally had right ankle ORIF on 3/218.  Postoperatively, she was noted to have an episode of chest pain with negative troponins and negative EKG changes.  She also had acute blood loss anemia and received 1 unit packed RBC blood transfusion with most recent  hemoglobin at 10.2 from 8.8.  Otherwise her pain has been controlled and she was sent back to our facility for further rehab.    Currently, the patient states that she is doing slightly better and she does not want to be on big doses of oxycodone as her pain has improved.  She notes that last night she developed severe pain that did improve with taking oxycodone.  She is currently nonweightbearing but feels that her strength has improved and she walks with a walker with assist.  Her appetite is better.  She does take big doses of aspirin and she states that occasionally she does have GERD symptoms.  She is on famotidine and omeprazole.  She also has not had any other episodes of chest pains.  Her blood pressure has noted to be high over the last few days.  Today blood pressure is at 164/78.  She is on amlodipine, isosorbide, metoprolol.  She also denies any weakness, dizziness or lightheadedness.  She does not have any fevers or chills.  She does not have any nausea, vomiting, diarrhea or constipation.    Other review of systems are negative.      PAST MEDICAL/SURGICAL HISTORY:  Past Medical History:   Diagnosis Date     CAD (coronary artery disease)      Carotid artery stenosis      Chronic kidney disease      History of transfusion      Hypertension      Lupus     states this usually manifests as a rash and tailbone pain     Pneumonia      Past Surgical History:   Procedure Laterality Date     ABDOMINAL SURGERY       APPENDECTOMY       COLOSTOMY CLOSURE       EYE SURGERY       HYSTERECTOMY       ORIF ANKLE FRACTURE Right 3/2/2018    Procedure: OPEN REDUCTION INTERNAL FIXATION TRIMALLEOLAR FRACTURE RIGHT ANKLE;  Surgeon: Shawn Vega DO;  Location: [facility];  Service:      DC APPENDECTOMY      Description: Appendectomy;  Recorded: 09/26/2014;     DC COLOSTOMY      Description: Colostomy;  Recorded: 09/26/2014;     DC THROMBOENDARTECTMY NECK,NECK INCIS      Description: Carotid Thromboendarterectomy;  " Recorded: 09/26/2014;     MO TOTAL ABDOM HYSTERECTOMY      Description: Total Abdominal Hysterectomy;  Recorded: 09/26/2014;     Resection of mesentery      per patient-- \"they took out my mesentery about 6 months after I had a baby\"       SOCIAL HISTORY:  Social History     Social History     Marital status:      Spouse name: N/A     Number of children: N/A     Years of education: N/A     Occupational History     Not on file.     Social History Main Topics     Smoking status: Former Smoker     Packs/day: 2.50     Years: 50.00     Quit date: 3/1/2002     Smokeless tobacco: Never Used     Alcohol use 2.4 oz/week     4 Cans of beer per week     Drug use: No     Sexual activity: Not on file     Other Topics Concern     Not on file     Social History Narrative    Retired  for [employer].       FAMILY HISTORY:  Family History   Problem Relation Age of Onset     Cancer Mother      Hypertension Mother      No Medical Problems Father        MEDICATIONS:  Current Outpatient Prescriptions on File Prior to Visit   Medication Sig     acetaminophen (TYLENOL) 500 MG tablet Take 2 tablets (1,000 mg total) by mouth 3 (three) times a day.     amLODIPine (NORVASC) 5 MG tablet Take 5 mg by mouth daily.     aspirin 325 MG tablet Take 1 tablet (325 mg total) by mouth 2 (two) times a day with meals.     azelastine (OPTIVAR) 0.05 % ophthalmic solution Administer 1 drop to both eyes 2 (two) times a day.      cholecalciferol, vitamin D3, 1,000 unit tablet Take 1,000 Units by mouth daily.     glucosamine-chondroitin 500-400 mg tablet Take 2 tablets by mouth daily.     inhalational spacing device Spcr Use spacer with inhaler for optimal technique.Dx: atypical pneumonia with bronchospasm.     isosorbide mononitrate (IMDUR) 30 MG 24 hr tablet Take 150 mg by mouth daily.      metoprolol succinate (TOPROL-XL) 25 MG Take 25 mg by mouth daily.     mirtazapine (REMERON) 15 MG tablet Take 15 mg by mouth at bedtime. " Indications: major depressive disorder     MULTIVITAMIN W-MINERALS/LUTEIN (CENTRUM SILVER ORAL) Take 1 tablet by mouth daily.     nitroglycerin (NITROSTAT) 0.4 MG SL tablet Place 0.4 mg under the tongue as needed for chest pain.     pantoprazole (PROTONIX) 40 MG tablet Take 40 mg by mouth 2 (two) times a day.     peg 400-propylene glycol (SYSTANE) 0.4-0.3 % Drop Administer 1 drop to both eyes 4 (four) times a day.      ranitidine (ZANTAC) 300 MG capsule Take 300 mg by mouth every evening.     senna-docusate (PERICOLACE) 8.6-50 mg tablet Take 2 tablets by mouth daily.     triamcinolone (KENALOG) 0.025 % cream Apply 1 application topically 3 (three) times a day as needed.      [DISCONTINUED] oxyCODONE (ROXICODONE) 5 MG immediate release tablet Take 1-2 tablets (5-10 mg total) by mouth every 4 (four) hours as needed for pain (Pain 4-6 give one tab, pain 7-10 give two tabs.).     No current facility-administered medications on file prior to visit.        ALLERGIES:  Allergies   Allergen Reactions     Hydroxychloroquine Sulfate      Iodinated Contrast- Oral And Iv Dye      Lipitor [Atorvastatin]      Pravastatin      Robitussin [Guaifenesin]      Simvastatin      Tetracyclines          PHYSICAL EXAMINATION:  Vital signs 164/78, 79, 20, 96.8, 97%  General: Awake, Alert, oriented x3, not in any form of acute distress, answers questions appropriately, follows simple commands, conversant  HEENT: Likely pale conjunctiva, anicteric sclerae, oral mucosa is moist  NECK: Supple, without any lymphadenopathy, thyromegaly or any masses  LUNG: Clear to auscultation, good chest expansion. There are no crackles, no wheezes, normal AP diameter  BACK: No kyphosis of the thoracic spine  CVS: There is good S1  S2, there are no murmurs, no heaves, rhythm is regular  ABDOMEN: Globular and soft, nontender to palpation, no organomegaly, good bowel sounds  EXTREMITIES: Good range of motion on both upper and lower extremities except right ankle,  no pedal edema on the left, right ankle in a soft cast, good capillary refill on right toes, warm to touch, no cyanosis or clubbing, no calf tenderness  SKIN: Warm and dry, no rashes or erythema noted    LABS:  Lab Results   Component Value Date    WBC 10.7 02/28/2018    HGB 10.2 (L) 03/03/2018    HCT 26.1 (L) 02/28/2018    MCV 92 02/28/2018     (L) 02/28/2018     Lab Results   Component Value Date    CREATININE 0.88 03/03/2018    BUN 17 03/03/2018     03/03/2018    K 4.3 03/03/2018     (H) 03/03/2018    CO2 20 (L) 03/03/2018           >45 minutes of total time spent, greater than 55% of the time spent in coordination of care and counseling regarding the above medical issues and plan of care. I have reviewed the patient's medical records, labs and medications.       Electronically signed by:Erika Peoples MD

## 2021-06-16 NOTE — PROGRESS NOTES
[facility]    Facility:   [facility] [[id number]]   Code Status: FULL CODE       Chief Complaint   Patient presents with     Follow Up     TCU 3/19/18.       HPI (TCU H & P 3/5/18):  Yun Rodgers is a 83 y.o. female with a history of CAD status post CABG and stent placement, CRD, GERD, hypertension who sustained a right ankle fracture after a mechanical fall with placement of cast after closed reduction but surgery was delayed due to severe edema.  She finally had right ankle ORIF on 3/218.  Postoperatively, she was noted to have an episode of chest pain with negative troponins and negative EKG changes.  She also had acute blood loss anemia and received 1 unit packed RBC blood transfusion with most recent hemoglobin at 10.2 from 8.8.  Otherwise her pain has been controlled and she was sent back to our facility for further rehab.    Currently, the patient states that she is doing slightly better and she does not want to be on big doses of oxycodone as her pain has improved.  She notes that last night she developed severe pain that did improve with taking oxycodone.  She is currently nonweightbearing but feels that her strength has improved and she walks with a walker with assist.  Her appetite is better.  She does take big doses of aspirin and she states that occasionally she does have GERD symptoms.  She is on famotidine and omeprazole.  She also has not had any other episodes of chest pains.  Her blood pressure has noted to be high over the last few days.  Today blood pressure is at 164/78.  She is on amlodipine, isosorbide, metoprolol.  She also denies any weakness, dizziness or lightheadedness.  She does not have any fevers or chills.  She does not have any nausea, vomiting, diarrhea or constipation.      Past Medical History:  Past Medical History:   Diagnosis Date     CAD (coronary artery disease)      Carotid artery stenosis      Chronic kidney disease      History of  transfusion      Hypertension      Lupus     states this usually manifests as a rash and tailbone pain     Pneumonia        Medications:  Current Outpatient Prescriptions   Medication Sig Note     acetaminophen (TYLENOL) 500 MG tablet Take 2 tablets (1,000 mg total) by mouth 3 (three) times a day.      amLODIPine (NORVASC) 5 MG tablet Take 5 mg by mouth daily.      aspirin 325 MG tablet Take 1 tablet (325 mg total) by mouth 2 (two) times a day with meals.      azelastine (OPTIVAR) 0.05 % ophthalmic solution Administer 1 drop to both eyes 2 (two) times a day.       cholecalciferol, vitamin D3, 1,000 unit tablet Take 1,000 Units by mouth daily.      glucosamine-chondroitin 500-400 mg tablet Take 2 tablets by mouth daily.      HYDROcodone-acetaminophen 5-325 mg per tablet Take 1-2 tablets by mouth every 4 (four) hours as needed for pain (Maximum APAP 4GM/24hours).      inhalational spacing device Spcr Use spacer with inhaler for optimal technique.Dx: atypical pneumonia with bronchospasm.      isosorbide mononitrate (IMDUR) 30 MG 24 hr tablet Take 150 mg by mouth daily.  2/24/2018: Dose verified against clinic list.      metoprolol succinate (TOPROL-XL) 25 MG Take 25 mg by mouth daily.      mirtazapine (REMERON) 15 MG tablet Take 15 mg by mouth at bedtime. Indications: major depressive disorder      MULTIVITAMIN W-MINERALS/LUTEIN (CENTRUM SILVER ORAL) Take 1 tablet by mouth daily.      nitroglycerin (NITROSTAT) 0.4 MG SL tablet Place 0.4 mg under the tongue as needed for chest pain.      pantoprazole (PROTONIX) 40 MG tablet Take 40 mg by mouth 2 (two) times a day.      peg 400-propylene glycol (SYSTANE) 0.4-0.3 % Drop Administer 1 drop to both eyes 4 (four) times a day.       ranitidine (ZANTAC) 300 MG capsule Take 300 mg by mouth every evening.      senna-docusate (PERICOLACE) 8.6-50 mg tablet Take 2 tablets by mouth daily.      triamcinolone (KENALOG) 0.025 % cream Apply 1 application topically 3 (three) times a day  as needed.         Today:  She is doing well. Limited by NWB status. Minimal pain since immobilized. Has follow up with ortho later this week. No fever, cough or congestin. Appetite is good. No diarrhea or constipation. Denies dizziness. No concerns per nursing.       Physical Exam:  General: Patient is alert, pleasant female, no distress.  Vitals: /58, Temp 96, Pulse 62, RR 20, O2 sat 98% RA.  HEENT: Head is NCAT. Eyes show no injection or icterus. Nares negative. Oropharynx well hydrated.  Neck: Supple. No tenderness or adenopathy. No JVD.  Lungs: Clear bilaterally. No wheezes.  Cardiovascular: Regular rate and rhythm, normal S1, S2.  Back: No spinal or CVA tenderness.  Abdomen: Soft, no tenderness on exam. Bowel sounds present. No guarding rebound or rigidity.  : Deferred.  Extremities: No edema is noted.  Musculoskeletal: Splint right LE.  Psych: Mood appears good.      Labs:  Results for orders placed or performed during the hospital encounter of 03/02/18   Basic Metabolic Panel   Result Value Ref Range    Sodium 136 136 - 145 mmol/L    Potassium 4.3 3.5 - 5.0 mmol/L    Chloride 109 (H) 98 - 107 mmol/L    CO2 20 (L) 22 - 31 mmol/L    Anion Gap, Calculation 7 5 - 18 mmol/L    Glucose 109 70 - 125 mg/dL    Calcium 9.4 8.5 - 10.5 mg/dL    BUN 17 8 - 28 mg/dL    Creatinine 0.88 0.60 - 1.10 mg/dL    GFR MDRD Af Amer >60 >60 mL/min/1.73m2    GFR MDRD Non Af Amer >60 >60 mL/min/1.73m2       Lab Results   Component Value Date    WBC 4.5 03/08/2018    HGB 9.4 (L) 03/08/2018    HCT 29.5 (L) 03/08/2018    MCV 96 03/08/2018     03/08/2018       Assessment/Plan:  1. Right ankle trimalleolar fracture. Fall on 2/24/18, ORIF on 3/2/18. NWB, continue in splint. Follow up with ortho on 3/23/18.  2. HTN. On Amlodipine and metoprolol. Cont to monitor BPs.  3. ABLA. Last hgb at 9.4. No need for intervention.  4. Hx CAD. No current cardiac concerns.    Overall she is stable, continue care plan, follow up with ortho  later this week.       Electronically signed by: Rebecca Knight MD

## 2021-06-17 NOTE — PROGRESS NOTES
"[facility]    Name:   Yun Rodgers  : 1935  Facility:   [facility] [[id number]]   Room: 219  Code Status: FULL CODE -   Fac type:   SNF (Skilled Nursing Facility, TCU) -     CHIEF COMPLAINT / REASON FOR VISIT:  Chief Complaint   Patient presents with     Discharge Summary     TCU discharge after hospitalization for right trimalleolar fracture and surgical repair.   [facility] from 18 until 18  [facility] TCU from 18 until 18    Patient last seen by me on 18.  Plan to discharge on 18.    HPI: Yun is a 83 y.o. female with a history significant for hypertension, coronary artery disease (with stable angina and S/P CABG), anemia, and chronic gastric reflux with esophagitis.  She was stepping down some steps and landed wrong, falling to the ground.  She states that in the ambulance she developed a burning left chest pain that lasted for about 2 hours, resolving spontaneously (believed it was her heartburn).  This has been a problem over the last few years.  At [facility] ED, she was found to have a right trimalleolar fracture and underwent surgical repair on 18.  She was transferred to [facility] TCU for ongoing therapy.  On 18, and okay was given for weightbearing as tolerated to the right lower extremity, and her cam boot was discontinued.    CURRENT ISSUES    Pain Management  Pain is being adequately managed with oxycodone 5 mg 4 times daily as needed, and she is using it more and more infrequently, essentially only before therapy.  We are going to discharge her with remaining doses.  She also describes experiencing \"little headaches\" as of late, and she points to her left temple when describing these.     Hypertension and Chronic Renal Insufficiency  As of late, her hypertension has not been very well controlled. Dr. Peoples increased her amlodipine from 5 mg to 7.5 mg every morning " and also started her on 25 mg of metoprolol succinate each evening. Blood pressure still remained elevated with systolics in the 150s and 160s and occasionally even as high as a 180s. Diastolics have been okay, and her apical pulse is generally in the 60s and occasionally in the 70s. Dr. Knight increased her metoprolol succinate to 50 mg, but blood pressures continue to be elevated, mostly systolically. On 04/6/18, her metoprolol was increased again by Dr. Knight to 75 mg each evening.     At my visit on 03/29/18 , the patient was started on chlorthalidone 25 mg daily. This was decreased to 12.5 mg daily on 04/05/18. Unfortunately, her kidneys did not tolerate this medication, and her creatinine jumped from 0.88 to 1.71, per her last BMP result on 04/11/18. Additionally, her BUN elevated to 38, from 17, prior to medication initiation. Upon today's visit, her BMP revealed a creatinine of 1.62 and a BUN of 39. At Kin Arriaga's last visit with the patient on 04/12/18, her amlodipine was increased to 10 mg in the morning.  Blood pressures are moderately improved; however, she has had several apical pulses <60.  At this juncture, we will defer to her PCP for further adjustments.    Gastric Reflux  She has had problems as well with chronic gastric reflux and esophagitis. Her ranitidine was briefly discontinued per a pharmacy consult but reinstated at 300 mg nightly. She also continues on pantoprazole (40 mg twice daily). She describes today that she has not experienced any heartburn recently.     She has chronic anemia. Her last hemoglobin was 9.4.    ROS: She tells me she is not experiencing any heartburn as long as she is on her current regimen.  No headaches or recent chest pains, coughing or congestion, vomiting, dizziness or dyspnea, dysuria, difficulty chewing or swallowing, constipation or diarrhea, integumentary issues, or any problems with appetite or with sleep.      Past Medical History:   Diagnosis  Date     CAD (coronary artery disease)      Carotid artery stenosis      Chronic kidney disease      History of transfusion      Hypertension      Lupus     states this usually manifests as a rash and tailbone pain     Pneumonia               Family History   Problem Relation Age of Onset     Cancer Mother      Hypertension Mother      No Medical Problems Father      Social History     Social History     Marital status:      Spouse name: N/A     Number of children: N/A     Years of education: N/A     Social History Main Topics     Smoking status: Former Smoker     Packs/day: 2.50     Years: 50.00     Quit date: 3/1/2002     Smokeless tobacco: Never Used     Alcohol use 2.4 oz/week     4 Cans of beer per week     Drug use: No     Sexual activity: Not on file     Other Topics Concern     Not on file     Social History Narrative    Retired  for [employer].     MEDICATIONS: Reviewed from the MAR, physician orders, and earlier progress notes.   Current Outpatient Prescriptions   Medication Sig     acetaminophen (TYLENOL) 500 MG tablet Take 2 tablets (1,000 mg total) by mouth 3 (three) times a day.     amLODIPine (NORVASC) 5 MG tablet Take 10 mg by mouth daily.      aspirin 325 MG tablet Take 1 tablet (325 mg total) by mouth 2 (two) times a day with meals.     azelastine (OPTIVAR) 0.05 % ophthalmic solution Administer 1 drop to both eyes 2 (two) times a day.      cholecalciferol, vitamin D3, 1,000 unit tablet Take 1,000 Units by mouth daily.     glucosamine-chondroitin 500-400 mg tablet Take 2 tablets by mouth daily.     isosorbide mononitrate (IMDUR) 30 MG 24 hr tablet Take 150 mg by mouth daily.      metoprolol succinate (TOPROL-XL) 50 MG 24 hr tablet Take 75 mg by mouth every evening. Increased from 25 mg on 03/26/18.   Increased from 50 mg on 04/06/18.     mirtazapine (REMERON) 15 MG tablet Take 15 mg by mouth at bedtime. Indications: major depressive disorder     MULTIVITAMIN  "W-MINERALS/LUTEIN (CENTRUM SILVER ORAL) Take 1 tablet by mouth daily.     nitroglycerin (NITROSTAT) 0.4 MG SL tablet Place 0.4 mg under the tongue as needed for chest pain.     oxyCODONE (ROXICODONE) 5 MG immediate release tablet Take 1 tablet (5 mg total) by mouth 4 (four) times a day as needed for pain.     pantoprazole (PROTONIX) 40 MG tablet Take 40 mg by mouth 2 (two) times a day.     peg 400-propylene glycol (SYSTANE) 0.4-0.3 % Drop Administer 1 drop to both eyes 4 (four) times a day.      ranitidine (ZANTAC) 300 MG capsule Take 300 mg by mouth every evening.     senna-docusate (PERICOLACE) 8.6-50 mg tablet Take 2 tablets by mouth daily.     triamcinolone (KENALOG) 0.025 % cream Apply 1 application topically 3 (three) times a day as needed.      ALLERGIES:   Allergies   Allergen Reactions     Primaquine Rash     Demeclocycline Other (See Comments)     \"sore bottom\"     Guaiacol Hives     Robitussin D     Hydroxychloroquine Sulfate      Iodinated Contrast- Oral And Iv Dye      Lipitor [Atorvastatin]      Metrizamide Other (See Comments)     Chest tightness,sshakes     Pravastatin      Robitussin [Guaifenesin]      Simvastatin      Tetracyclines      DIET: Low Sodium    Vitals:    04/26/18 1441   BP: 166/67   Pulse: 65   Resp: 20   Temp: 96.7  F (35.9  C)   Weight: 134 lb (60.8 kg)   Height: 5' 5\" (1.651 m)   Weight gain of about 3 pounds.  Body mass index is 22.3 kg/(m^2).    EXAMINATION:   General: Pleasant, alert, and conversant elderly female, sitting in a wheelchair in her room, in no apparent distress.  Head: Normocephalic and atraumatic.   Eyes: PERRLA, sclerae clear.   ENT: Moist oral mucosa. She has full upper and lower dentures. No rhinorrhea or nasal discharge.  There is some hearing loss for which she has binaural hearing aids.  Cardiovascular: Regular rate and rhythm with a split S1 at the apex.  Respiratory: Lungs clear to auscultation bilaterally.   Abdomen: Soft and nontender. "   Musculoskeletal/Extremities: Age-related degenerative joint disease.  2+ ankle edema on the right, none of the left.  She no longer needs to wear the cam boot.  Integument: No rashes, clinically significant lesions, or skin breakdown.   Cognitive/Psychiatric: Alert and oriented ×3. Affect is euthymic.    DIAGNOSTICS:   Results for orders placed or performed in visit on 04/19/18   Basic Metabolic Panel   Result Value Ref Range    Sodium 136 136 - 145 mmol/L    Potassium 4.3 3.5 - 5.0 mmol/L    Chloride 111 (H) 98 - 107 mmol/L    CO2 17 (L) 22 - 31 mmol/L    Anion Gap, Calculation 8 5 - 18 mmol/L    Glucose 89 70 - 125 mg/dL    Calcium 9.2 8.5 - 10.5 mg/dL    BUN 39 (H) 8 - 28 mg/dL    Creatinine 1.62 (H) 0.60 - 1.10 mg/dL    GFR MDRD Af Amer 37 (L) >60 mL/min/1.73m2    GFR MDRD Non Af Amer 30 (L) >60 mL/min/1.73m2     Lab Results   Component Value Date    WBC 4.5 03/08/2018    HGB 9.4 (L) 03/08/2018    HCT 29.5 (L) 03/08/2018    MCV 96 03/08/2018     03/08/2018     CrCl cannot be calculated (Patient's most recent sCr result is older than the maximum 5 days allowed.).    ASSESSMENT/Plan:      ICD-10-CM    1. Closed fracture of right ankle with routine healing, subsequent encounter S82.891D    2. Essential hypertension I10    3. Chronic Reflux Esophagitis K21.0    4. Coronary artery disease of native artery of native heart with stable angina pectoris I25.118    5. CRI (chronic renal insufficiency), stage 3 (moderate) N18.3      DISCHARGE PLAN/FACE TO FACE:  I certify that this patient is under my care and that I had a face-to-face encounter that meets the physician face-to-face encounter requirements with this patient.     Date of Face-to-Face Encounter:  04/26/18     I certify that, based on my findings, the following services are medically necessary home health services: Home physical therapy    My clinical findings support the need for the above skilled services because: (Please write a brief narrative  summary that describes what the RN, PT, SLP, or other services will be doing in the home. A list of diagnoses in this section does not meet the CMS requirements): Continuing therapy with particular attention paid to adjustment to home obstacles.    This patient is homebound because: (Please write a brief narrative summmary describing the functional limitations as to why this patient is homebound and specifically what makes this patient homebound.):  After recent ankle fracture, she is now full weightbearing but not completely ambulatory.    The patient is, or has been, under my care and I have initiated the establishment of the plan of care. This patient will be followed by a physician who will periodically review the plan of care and should follow-up in 7-10 days.    Approximate time spent with this patient was greater than 30 minutes with greater than 50% spent in discussions regarding services required upon discharge.      Electronically signed by: Tex Arriaga CNP

## 2021-06-17 NOTE — PROGRESS NOTES
[facility]    DATE:2018    NAME:  Yun Rodgers             :  1935  MRN: [id number]  CODE STATUS:  FULL CODE    FACILITY:  [facility] [[id number]]       ROOM:   219    CHIEF COMPLAINT/REASON FOR VISIT:  Chief Complaint   Patient presents with     Problem Visit     Right ankle fracture, Atypical rash, and Dry eyes     HISTORY OF PRESENT ILLNESS: Yun Rodgers is a 83 y.o. female with CAD - status post CABG and stent placement, CRD, GERD, Hypertension and Lupus who was admitted for a right ankle fracture sustained  after a mechanical fall at home.   She was due to have an ORIF but surgery was delayed due to severe edema.  She finally had a right ankle ORIF on 3/218.  Postoperatively, she was noted to have an episode of chest pain with negative troponins and negative EKG changes.  She also had acute blood loss anemia and received 1 unit packed RBC blood transfusion.  Otherwise,  pain has been controlled and she was sent back to our facility for further rehab.     Today, the patient was seen resting in bed. Her cast is now off and she has a NWB boot on the right lower extremity.  She has an upcoming appointment next week and she hopes that her weight bearing status will change. Denies pain. No insomnia.  Normotensive and navigates the facility in  Her wheelchair.       Past Medical History:   Diagnosis Date     CAD (coronary artery disease)      Carotid artery stenosis      Chronic kidney disease      History of transfusion      Hypertension      Lupus     states this usually manifests as a rash and tailbone pain     Pneumonia      Past Surgical History:   Procedure Laterality Date     ABDOMINAL SURGERY       APPENDECTOMY       COLOSTOMY CLOSURE       EYE SURGERY       HYSTERECTOMY       ORIF ANKLE FRACTURE Right 3/2/2018    Procedure: OPEN REDUCTION INTERNAL FIXATION TRIMALLEOLAR FRACTURE RIGHT ANKLE;  Surgeon: Shawn Vega DO;  Location: [facility]  "OR;  Service:      AR APPENDECTOMY      Description: Appendectomy;  Recorded: 09/26/2014;     AR COLOSTOMY      Description: Colostomy;  Recorded: 09/26/2014;     AR THROMBOENDARTECTMY NECK,NECK INCIS      Description: Carotid Thromboendarterectomy;  Recorded: 09/26/2014;     AR TOTAL ABDOM HYSTERECTOMY      Description: Total Abdominal Hysterectomy;  Recorded: 09/26/2014;     Resection of mesentery      per patient-- \"they took out my mesentery about 6 months after I had a baby\"     Family History   Problem Relation Age of Onset     Cancer Mother      Hypertension Mother      No Medical Problems Father      Social History     Social History     Marital status:      Spouse name: N/A     Number of children: N/A     Years of education: N/A     Occupational History     Not on file.     Social History Main Topics     Smoking status: Former Smoker     Packs/day: 2.50     Years: 50.00     Quit date: 3/1/2002     Smokeless tobacco: Never Used     Alcohol use 2.4 oz/week     4 Cans of beer per week     Drug use: No     Sexual activity: Not on file     Other Topics Concern     Not on file     Social History Narrative    Retired  for [employer].     Allergies   Allergen Reactions     Primaquine Rash     Demeclocycline Other (See Comments)     \"sore bottom\"     Guaiacol Hives     Robitussin D     Hydroxychloroquine Sulfate      Iodinated Contrast- Oral And Iv Dye      Lipitor [Atorvastatin]      Metrizamide Other (See Comments)     Chest tightness,sshakes     Pravastatin      Robitussin [Guaifenesin]      Simvastatin      Tetracyclines      Current Outpatient Prescriptions   Medication Sig Dispense Refill     acetaminophen (TYLENOL) 500 MG tablet Take 2 tablets (1,000 mg total) by mouth 3 (three) times a day. 90 tablet 1     amLODIPine (NORVASC) 5 MG tablet Take 7.5 mg by mouth daily.        aspirin 325 MG tablet Take 1 tablet (325 mg total) by mouth 2 (two) times a day with meals. 60 tablet 0 "     azelastine (OPTIVAR) 0.05 % ophthalmic solution Administer 1 drop to both eyes 2 (two) times a day.        chlorthalidone (HYGROTEN) 25 MG tablet Take 12.5 mg by mouth daily.        cholecalciferol, vitamin D3, 1,000 unit tablet Take 1,000 Units by mouth daily.       glucosamine-chondroitin 500-400 mg tablet Take 2 tablets by mouth daily.       isosorbide mononitrate (IMDUR) 30 MG 24 hr tablet Take 150 mg by mouth daily.        metoprolol succinate (TOPROL-XL) 50 MG 24 hr tablet Take 50 mg by mouth every evening. Increased from 25 mg on 03/26/18.       mirtazapine (REMERON) 15 MG tablet Take 15 mg by mouth at bedtime. Indications: major depressive disorder       MULTIVITAMIN W-MINERALS/LUTEIN (CENTRUM SILVER ORAL) Take 1 tablet by mouth daily.       nitroglycerin (NITROSTAT) 0.4 MG SL tablet Place 0.4 mg under the tongue as needed for chest pain.       oxyCODONE (ROXICODONE) 5 MG immediate release tablet Take 1 tablet (5 mg total) by mouth 4 (four) times a day as needed for pain. 60 tablet 0     pantoprazole (PROTONIX) 40 MG tablet Take 40 mg by mouth 2 (two) times a day.       peg 400-propylene glycol (SYSTANE) 0.4-0.3 % Drop Administer 1 drop to both eyes 4 (four) times a day.        ranitidine (ZANTAC) 300 MG capsule Take 300 mg by mouth every evening.       senna-docusate (PERICOLACE) 8.6-50 mg tablet Take 2 tablets by mouth daily.  0     triamcinolone (KENALOG) 0.025 % cream Apply 1 application topically 3 (three) times a day as needed.        No current facility-administered medications for this visit.        REVIEW OF SYSTEMS:    Currently, no fever, chills, or rigors. Does not have any visual or hearing problems. Denies any chest pain, headaches, palpitations, lightheadedness, dizziness, shortness of breath, or cough. Appetite is good. Denies any GERD symptoms. Denies any difficulty with swallowing, nausea, or vomiting.  Denies any abdominal pain, diarrhea or constipation. Denies any urinary symptoms. No  insomnia. No active bleeding. No rash.       PHYSICAL EXAMINATION:  Vitals:    04/04/18 1436   BP: 158/69   Pulse: 69   Resp: 20   Temp: 96.6  F (35.9  C)   SpO2: 96%   Weight: 138 lb 6.4 oz (62.8 kg)       GENERAL: Awake, Alert, oriented x3, not in any form of acute distress, answers questions appropriately, follows simple commands, conversant  HEENT: Head is normocephalic with normal hair distribution. No evidence of trauma. Ears: No acute purulent discharge. Eyes: Conjunctivae pink with no scleral jaundice. Nose: Normal mucosa and septum. NECK: Supple with no cervical or supraclavicular lymphadenopathy. Trachea is midline.   CHEST: No tenderness or deformity, no crepitus  LUNG: Clear to auscultation with good chest expansion. There are no crackles or wheezes, normal AP diameter.  BACK: No kyphosis of the thoracic spine. Symmetric, no curvature, ROM normal, no CVA tenderness, no spinal tenderness   CVS: There is good S1  S2, there are no murmurs, rubs, gallops, or heaves, rhythm is regular,  2+ pulses symmetric in all extremities.  ABDOMEN: Globular and soft, nontender to palpation, non distended, no masses, no organomegaly, good bowel sounds, no rebound or guarding, no peritoneal signs.   EXTREMITIES: Nonweightbearing on right lower extremity, there is no tenderness to palpation, no pedal edema, no cyanosis or clubbing, no calf tenderness.  Pulses equal in all extremities, normal cap refill, no joint swelling.  SKIN: Warm and dry, no erythema noted.  Skin color, texture, no rashes or lesions.  NEUROLOGICAL: The patient is oriented to person, place and time. Strength and sensation are grossly intact. Face is symmetric.    LABS:     Lab Results   Component Value Date    WBC 4.5 03/08/2018    HGB 9.4 (L) 03/08/2018    HCT 29.5 (L) 03/08/2018    MCV 96 03/08/2018     03/08/2018     Results for orders placed or performed in visit on 04/05/18   Basic Metabolic Panel   Result Value Ref Range    Sodium 137 136 -  145 mmol/L    Potassium 4.4 3.5 - 5.0 mmol/L    Chloride 109 (H) 98 - 107 mmol/L    CO2 21 (L) 22 - 31 mmol/L    Anion Gap, Calculation 7 5 - 18 mmol/L    Glucose 86 70 - 125 mg/dL    Calcium 9.5 8.5 - 10.5 mg/dL    BUN 31 (H) 8 - 28 mg/dL    Creatinine 1.44 (H) 0.60 - 1.10 mg/dL    GFR MDRD Af Amer 42 (L) >60 mL/min/1.73m2    GFR MDRD Non Af Amer 35 (L) >60 mL/min/1.73m2       ASSESSMENT/PLAN:    1. Closed fracture of right ankle with routine healing, subsequent encounter - status post right ankle ORIF.  Pain controlled on prn Oxycodone.  Followed by Orthopedics   2. Anemia, unspecified type -  - Last Hgb 9.4, will continue to monitor   3. Osteoporosis - Continue Glucosamine + Chondroitin and Vitamin D supplementation   4. Rash - May keep TMC cream at bedside to self administer as needed   5. Dry eye - May  Keep Systane at the bedside to self administer                             Electronically signed by:  Veronica Black, CNP

## 2021-06-17 NOTE — PROGRESS NOTES
"[facility]    Name:   Yun Rodgers  : 1935  Facility:   [facility] [[id number]]   Room: 219  Code Status: FULL CODE -   Fac type:   SNF (Skilled Nursing Facility, TCU) -     CHIEF COMPLAINT / REASON FOR VISIT:  Chief Complaint   Patient presents with     Review Of Multiple Medical Conditions     TCU follow-up after hospitalization for closed right ankle fracture.  Also being addressed his hypertension.   [facility] from 18 until 18    HPI: Yun is a 83 y.o. female with a history significant for hypertension, coronary artery disease (with stable angina and S/P CABG), anemia, and chronic gastric reflux with esophagitis.  She was stepping down some steps and landed wrong, falling to the ground.  She states that in the ambulance she developed a burning left chest pain that lasted for about 2 hours, resolving spontaneously (believed it was her heartburn).  This has been a problem over the last few years.  At [facility] ED, she was found to have a right trimalleolar fracture and underwent surgical repair on 18.      CURRENT ISSUES    Pain is being adequately managed with oxycodone 5 mg 4 times daily as needed.  She tells me takes one before therapy and another at bedtime.  She has some \"tailbone\" pain, and she tells me that this hurts worse (because she has \"no padding\") then the ankle pain.  She describes her leg pain as an ache, and she may become a little nauseated.  She is still nonweightbearing and wears a cam boot.    Another problem has been her hypertension which has not been very well controlled.  Dr. Peoples increased her amlodipine from 5 mg to 7.5 mg every morning and also started her on 25 mg of metoprolol succinate each evening.  Blood pressure still remained elevated with systolics in the 150s and 160s and occasionally even as high as a 180s.  Diastolics have been okay, and her apical pulse is generally in the 60s and occasionally " in the 70s.  Dr. Knight increased her metoprolol succinate to 50 mg, but blood pressures continue to be elevated, mostly systolically, and we are going to try adding 25 mg of chlorthalidone daily.  She does have stage II renal insufficiency and, overall, her renal profile looks like it can handle a thiazide diuretic.    She has had problems as well with chronic gastric reflux and esophagitis.  Her ranitidine was briefly discontinued per a pharmacy consult but reinstated at 300 mg nightly.  She also continues on pantoprazole (40 mg twice daily).    She has chronic anemia.  Her last hemoglobin was 9.4.    ROS: She tells me she is not experiencing any heartburn as long as she is on her current regimen.  No headaches or recent chest pains, coughing or congestion, vomiting, dizziness or dyspnea, dysuria, difficulty chewing or swallowing, constipation or diarrhea, integumentary issues, or any problems with appetite or sleep.    Past Medical History:   Diagnosis Date     CAD (coronary artery disease)      Carotid artery stenosis      Chronic kidney disease      History of transfusion      Hypertension      Lupus     states this usually manifests as a rash and tailbone pain     Pneumonia               Family History   Problem Relation Age of Onset     Cancer Mother      Hypertension Mother      No Medical Problems Father      Social History     Social History     Marital status:      Spouse name: N/A     Number of children: N/A     Years of education: N/A     Social History Main Topics     Smoking status: Former Smoker     Packs/day: 2.50     Years: 50.00     Quit date: 3/1/2002     Smokeless tobacco: Never Used     Alcohol use 2.4 oz/week     4 Cans of beer per week     Drug use: No     Sexual activity: Not on file     Other Topics Concern     Not on file     Social History Narrative    Retired  for [employer].       MEDICATIONS: Reviewed from the MAR, physician orders, and earlier  progress notes.  Updated by me today (03/29/18) with adjustments to oxycodone and amlodipine and the addition of chlorthalidone reflected below.  Current Outpatient Prescriptions   Medication Sig     chlorthalidone (HYGROTEN) 25 MG tablet Take 25 mg by mouth daily.     metoprolol succinate (TOPROL-XL) 50 MG 24 hr tablet Take 50 mg by mouth every evening. Increased from 25 mg on 03/26/18.     acetaminophen (TYLENOL) 500 MG tablet Take 2 tablets (1,000 mg total) by mouth 3 (three) times a day.     amLODIPine (NORVASC) 5 MG tablet Take 7.5 mg by mouth daily.      aspirin 325 MG tablet Take 1 tablet (325 mg total) by mouth 2 (two) times a day with meals.     azelastine (OPTIVAR) 0.05 % ophthalmic solution Administer 1 drop to both eyes 2 (two) times a day.      cholecalciferol, vitamin D3, 1,000 unit tablet Take 1,000 Units by mouth daily.     glucosamine-chondroitin 500-400 mg tablet Take 2 tablets by mouth daily.     HYDROcodone-acetaminophen 5-325 mg per tablet Take 1-2 tablets by mouth every 4 (four) hours as needed for pain (Maximum APAP 4GM/24hours).     inhalational spacing device Spcr Use spacer with inhaler for optimal technique.Dx: atypical pneumonia with bronchospasm.     isosorbide mononitrate (IMDUR) 30 MG 24 hr tablet Take 150 mg by mouth daily.      mirtazapine (REMERON) 15 MG tablet Take 15 mg by mouth at bedtime. Indications: major depressive disorder     MULTIVITAMIN W-MINERALS/LUTEIN (CENTRUM SILVER ORAL) Take 1 tablet by mouth daily.     nitroglycerin (NITROSTAT) 0.4 MG SL tablet Place 0.4 mg under the tongue as needed for chest pain.     oxyCODONE (ROXICODONE) 5 MG immediate release tablet Take 1 tablet (5 mg total) by mouth 4 (four) times a day as needed for pain.     pantoprazole (PROTONIX) 40 MG tablet Take 40 mg by mouth 2 (two) times a day.     peg 400-propylene glycol (SYSTANE) 0.4-0.3 % Drop Administer 1 drop to both eyes 4 (four) times a day.      ranitidine (ZANTAC) 300 MG capsule Take 300  "mg by mouth every evening.     senna-docusate (PERICOLACE) 8.6-50 mg tablet Take 2 tablets by mouth daily.     triamcinolone (KENALOG) 0.025 % cream Apply 1 application topically 3 (three) times a day as needed.      ALLERGIES:   Allergies   Allergen Reactions     Hydroxychloroquine Sulfate      Iodinated Contrast- Oral And Iv Dye      Lipitor [Atorvastatin]      Pravastatin      Robitussin [Guaifenesin]      Simvastatin      Tetracyclines      DIET: Unknown    Vitals:    03/29/18 1303   BP: 165/88   Pulse: 75   Resp: 20   Temp: 97.3  F (36.3  C)   Weight: 133 lb 9.6 oz (60.6 kg)   Height: 5' 5\" (1.651 m)   Weight has fluctuated and may be inaccurate.  She had a weight of 141.2 pounds on 02/28/18.  Body mass index is 22.23 kg/(m^2).    EXAMINATION:   General: Pleasant, alert, and conversant elderly female, sitting in a wheelchair, in no apparent distress.  Head: Normocephalic and atraumatic.   Eyes: PERRLA, sclerae clear.   ENT: Moist oral mucosa.  She has full upper and lower dentures.  No rhinorrhea or nasal discharge.  There is some hearing loss for which she has binaural hearing aids.  Cardiovascular: Regular rate and rhythm.  2/6 systolic ejection murmur at the left sternal border.  Respiratory: Lungs clear to auscultation bilaterally.   Abdomen: Soft and nontender.   Musculoskeletal/Extremities: Age-related degenerative joint disease.  There is a bit of swelling in the affected right foot.  She is wearing the cam boot.  Integument: No rashes, clinically significant lesions, or skin breakdown.   Cognitive/Psychiatric: Alert and oriented ×3.  Affect is euthymic.    DIAGNOSTICS:   Results for orders placed or performed during the hospital encounter of 03/02/18   Basic Metabolic Panel   Result Value Ref Range    Sodium 136 136 - 145 mmol/L    Potassium 4.3 3.5 - 5.0 mmol/L    Chloride 109 (H) 98 - 107 mmol/L    CO2 20 (L) 22 - 31 mmol/L    Anion Gap, Calculation 7 5 - 18 mmol/L    Glucose 109 70 - 125 mg/dL    " Calcium 9.4 8.5 - 10.5 mg/dL    BUN 17 8 - 28 mg/dL    Creatinine 0.88 0.60 - 1.10 mg/dL    GFR MDRD Af Amer >60 >60 mL/min/1.73m2    GFR MDRD Non Af Amer >60 >60 mL/min/1.73m2     Lab Results   Component Value Date    WBC 4.5 03/08/2018    HGB 9.4 (L) 03/08/2018    HCT 29.5 (L) 03/08/2018    MCV 96 03/08/2018     03/08/2018     CrCl cannot be calculated (Patient's most recent sCr result is older than the maximum 5 days allowed.).    ASSESSMENT/Plan:      ICD-10-CM    1. Closed fracture of right ankle with routine healing, subsequent encounter S82.891D    2. Essential hypertension I10    3. Chronic Reflux Esophagitis K21.0    4. Coronary artery disease of native artery of native heart with stable angina pectoris I25.118    5. CRI (chronic renal insufficiency), stage 2 (mild) N18.2      CHANGES:    Start chlorthalidone 25 mg daily for hypertension.  Check a BMP on 04/05/18.    CARE PLAN:    The care plan has been reviewed and all orders signed. Changes to care plan, if any, as noted. Otherwise, continue care plan of care.  Due to the nature of items addressed during this visit (particularly hypertension), approximately 35 minutes was spent with this patient with greater than 50% spent in counseling and coordination of care.    The above has been created using voice recognition software. Please be aware that this may unintentionally  produce inaccuracies and/or nonsensical sentences.      Electronically signed by: Tex Arriaga CNP

## 2021-06-17 NOTE — PROGRESS NOTES
"[facility]    Name:   Yun Rodgers  : 1935  Facility:   [facility] [[id number]]   Room: 219  Code Status: FULL CODE -   Fac type:   SNF (Skilled Nursing Facility, TCU) -     CHIEF COMPLAINT / REASON FOR VISIT:  Chief Complaint   Patient presents with     Review Of Multiple Medical Conditions   TCU Follow-Up Visit    Last seen by Kin Arriaga on 18.    HPI: Yun is a 83 y.o. female with a history significant for hypertension, coronary artery disease (with stable angina and S/P CABG), anemia, and chronic gastric reflux with esophagitis.  She was stepping down some steps and landed wrong, falling to the ground.  She states that in the ambulance she developed a burning left chest pain that lasted for about 2 hours, resolving spontaneously (believed it was her heartburn).  This has been a problem over the last few years.  At [facility] ED, she was found to have a right trimalleolar fracture and underwent surgical repair on 18.    CURRENT ISSUES    Pain is being adequately managed with oxycodone 5 mg 4 times daily as needed. Today, she describes that she utilizes this minimally. She states, \"It's sore just every once in a while.\" She also describes experiencing \"little headaches\" as of late, and she points to her left temple when describing these. She states that she has experienced them in the past.     As of late, her hypertension has not been very well controlled. Dr. Peoples increased her amlodipine from 5 mg to 7.5 mg every morning and also started her on 25 mg of metoprolol succinate each evening. Blood pressure still remained elevated with systolics in the 150s and 160s and occasionally even as high as a 180s. Diastolics have been okay, and her apical pulse is generally in the 60s and occasionally in the 70s. Dr. Knight increased her metoprolol succinate to 50 mg, but blood pressures continue to be elevated, mostly systolically. On 18, her " metoprolol was increased again by Dr. Knight to 75 mg each evening.     At Kin Arriaga's visit on 03/29/18 , the patient was started on chlorthalidone 25 mg daily. This was decreased to 12.5 mg daily on 04/05/18. Unfortunately, her kidneys did not tolerate this medication, and her creatinine jumped from 0.88 to 1.71, per her last BMP result on 04/11/18. Additionally, her BUN elevated to 38, from 17, prior to medication initiation. Upon today's visit, her BMP revealed a creatinine of 1.62 and a BUN of 39. At Kin Arriaga's last visit with the patient on 04/12/18, her amlodipine was increased to 10 mg in the morning. Upon review of her blood pressures today, it appears she has achieved much better control of her blood pressures, with systolics between 110s-140s and diastolics in the 50s.     She has had problems as well with chronic gastric reflux and esophagitis. Her ranitidine was briefly discontinued per a pharmacy consult but reinstated at 300 mg nightly. She also continues on pantoprazole (40 mg twice daily). She describes today that she has not experienced any heartburn recently.     She has chronic anemia. Her last hemoglobin was 9.4.    ROS: She tells me she is not experiencing any heartburn as long as she is on her current regimen.  No headaches or recent chest pains, coughing or congestion, vomiting, dizziness or dyspnea, dysuria, difficulty chewing or swallowing, constipation or diarrhea, integumentary issues, or any problems with appetite or with sleep.      Past Medical History:   Diagnosis Date     CAD (coronary artery disease)      Carotid artery stenosis      Chronic kidney disease      History of transfusion      Hypertension      Lupus     states this usually manifests as a rash and tailbone pain     Pneumonia               Family History   Problem Relation Age of Onset     Cancer Mother      Hypertension Mother      No Medical Problems Father      Social History     Social History      Marital status:      Spouse name: N/A     Number of children: N/A     Years of education: N/A     Social History Main Topics     Smoking status: Former Smoker     Packs/day: 2.50     Years: 50.00     Quit date: 3/1/2002     Smokeless tobacco: Never Used     Alcohol use 2.4 oz/week     4 Cans of beer per week     Drug use: No     Sexual activity: Not on file     Other Topics Concern     Not on file     Social History Narrative    Retired  for [employer].     MEDICATIONS: Reviewed from the MAR, physician orders, and earlier progress notes.   Current Outpatient Prescriptions   Medication Sig     acetaminophen (TYLENOL) 500 MG tablet Take 2 tablets (1,000 mg total) by mouth 3 (three) times a day.     amLODIPine (NORVASC) 5 MG tablet Take 10 mg by mouth daily.      aspirin 325 MG tablet Take 1 tablet (325 mg total) by mouth 2 (two) times a day with meals.     azelastine (OPTIVAR) 0.05 % ophthalmic solution Administer 1 drop to both eyes 2 (two) times a day.      cholecalciferol, vitamin D3, 1,000 unit tablet Take 1,000 Units by mouth daily.     glucosamine-chondroitin 500-400 mg tablet Take 2 tablets by mouth daily.     isosorbide mononitrate (IMDUR) 30 MG 24 hr tablet Take 150 mg by mouth daily.      metoprolol succinate (TOPROL-XL) 50 MG 24 hr tablet Take 75 mg by mouth every evening. Increased from 25 mg on 03/26/18.   Increased from 50 mg on 04/06/18.     mirtazapine (REMERON) 15 MG tablet Take 15 mg by mouth at bedtime. Indications: major depressive disorder     MULTIVITAMIN W-MINERALS/LUTEIN (CENTRUM SILVER ORAL) Take 1 tablet by mouth daily.     nitroglycerin (NITROSTAT) 0.4 MG SL tablet Place 0.4 mg under the tongue as needed for chest pain.     oxyCODONE (ROXICODONE) 5 MG immediate release tablet Take 1 tablet (5 mg total) by mouth 4 (four) times a day as needed for pain.     pantoprazole (PROTONIX) 40 MG tablet Take 40 mg by mouth 2 (two) times a day.     peg 400-propylene glycol  "(SYSTANE) 0.4-0.3 % Drop Administer 1 drop to both eyes 4 (four) times a day.      ranitidine (ZANTAC) 300 MG capsule Take 300 mg by mouth every evening.     senna-docusate (PERICOLACE) 8.6-50 mg tablet Take 2 tablets by mouth daily.     triamcinolone (KENALOG) 0.025 % cream Apply 1 application topically 3 (three) times a day as needed.      ALLERGIES:   Allergies   Allergen Reactions     Primaquine Rash     Demeclocycline Other (See Comments)     \"sore bottom\"     Guaiacol Hives     Robitussin D     Hydroxychloroquine Sulfate      Iodinated Contrast- Oral And Iv Dye      Lipitor [Atorvastatin]      Metrizamide Other (See Comments)     Chest tightness,sshakes     Pravastatin      Robitussin [Guaifenesin]      Simvastatin      Tetracyclines      DIET: Low Sodium    Vitals:    04/19/18 1051   BP: 143/57   Pulse: (!) 58   Resp: 20   Temp: 97  F (36.1  C)   Weight: 131 lb (59.4 kg)   Height: 5' 5\" (1.651 m)     Body mass index is 21.8 kg/(m^2).    EXAMINATION:   General: Pleasant, alert, and conversant elderly female, sitting in a wheelchair in her room, in no apparent distress.  Head: Normocephalic and atraumatic.   Eyes: PERRLA, sclerae clear.   ENT: Moist oral mucosa. She has full upper and lower dentures. No rhinorrhea or nasal discharge.  There is some hearing loss for which she has binaural hearing aids.  Cardiovascular: Regular rate and rhythm. 2/6 systolic ejection murmur at the pulmonic space.  Respiratory: Lungs clear to auscultation bilaterally.   Abdomen: Soft and nontender.   Musculoskeletal/Extremities: Age-related degenerative joint disease. She current has the CAM boot off, as she is in the process of getting ready for her day. She tells me she plans to wear the CAM boot.   Integument: No rashes, clinically significant lesions, or skin breakdown.   Cognitive/Psychiatric: Alert and oriented ×3. Affect is euthymic.    DIAGNOSTICS:   Results for orders placed or performed in visit on 04/19/18   Basic " Metabolic Panel   Result Value Ref Range    Sodium 136 136 - 145 mmol/L    Potassium 4.3 3.5 - 5.0 mmol/L    Chloride 111 (H) 98 - 107 mmol/L    CO2 17 (L) 22 - 31 mmol/L    Anion Gap, Calculation 8 5 - 18 mmol/L    Glucose 89 70 - 125 mg/dL    Calcium 9.2 8.5 - 10.5 mg/dL    BUN 39 (H) 8 - 28 mg/dL    Creatinine 1.62 (H) 0.60 - 1.10 mg/dL    GFR MDRD Af Amer 37 (L) >60 mL/min/1.73m2    GFR MDRD Non Af Amer 30 (L) >60 mL/min/1.73m2     Lab Results   Component Value Date    WBC 4.5 03/08/2018    HGB 9.4 (L) 03/08/2018    HCT 29.5 (L) 03/08/2018    MCV 96 03/08/2018     03/08/2018     Estimated Creatinine Clearance: 24.7 mL/min (by C-G formula based on Cr of 1.62).    ASSESSMENT/Plan:      ICD-10-CM    1. Closed fracture of right ankle with routine healing, subsequent encounter S82.891D    2. Essential hypertension I10    3. Chronic reflux esophagitis K21.0    4. Coronary artery disease of native artery of native heart with stable angina pectoris I25.118    5. CRI (chronic renal insufficiency), stage 2 (mild) N18.2      CHANGES:    None.     CARE PLAN:    The care plan has been reviewed and all orders signed. Changes to care plan, if any, as noted. Otherwise, continue care plan of care.     Kin Arriaga was present during today's bedside visit. He has reviewed, and is in agreement, with the information provided in this note.     Electronically signed by: Mary Jo Jin     Electronically signed by: Tex Arriaga, [facility]

## 2021-06-17 NOTE — PROGRESS NOTES
[facility]    Facility:   [facility] [[id number]]   Code Status: FULL CODE       Chief Complaint   Patient presents with     Follow Up     TCU 4/6/18.       HPI (TCU H & P 3/5/18):  Yun Rodgers is a 83 y.o. female with a history of CAD status post CABG and stent placement, CRD, GERD, hypertension who sustained a right ankle fracture after a mechanical fall with placement of cast after closed reduction but surgery was delayed due to severe edema.  She finally had right ankle ORIF on 3/218.  Postoperatively, she was noted to have an episode of chest pain with negative troponins and negative EKG changes.  She also had acute blood loss anemia and received 1 unit packed RBC blood transfusion with most recent hemoglobin at 10.2 from 8.8.  Otherwise her pain has been controlled and she was sent back to our facility for further rehab.    Currently, the patient states that she is doing slightly better and she does not want to be on big doses of oxycodone as her pain has improved.  She notes that last night she developed severe pain that did improve with taking oxycodone.  She is currently nonweightbearing but feels that her strength has improved and she walks with a walker with assist.  Her appetite is better.  She does take big doses of aspirin and she states that occasionally she does have GERD symptoms.  She is on famotidine and omeprazole.  She also has not had any other episodes of chest pains.  Her blood pressure has noted to be high over the last few days.  Today blood pressure is at 164/78.  She is on amlodipine, isosorbide, metoprolol.  She also denies any weakness, dizziness or lightheadedness.  She does not have any fevers or chills.  She does not have any nausea, vomiting, diarrhea or constipation.      Past Medical History:  Past Medical History:   Diagnosis Date     CAD (coronary artery disease)      Carotid artery stenosis      Chronic kidney disease      History of  transfusion      Hypertension      Lupus     states this usually manifests as a rash and tailbone pain     Pneumonia        Medications:  Current Outpatient Prescriptions   Medication Sig Note     acetaminophen (TYLENOL) 500 MG tablet Take 2 tablets (1,000 mg total) by mouth 3 (three) times a day.      amLODIPine (NORVASC) 5 MG tablet Take 7.5 mg by mouth daily.       aspirin 325 MG tablet Take 1 tablet (325 mg total) by mouth 2 (two) times a day with meals.      azelastine (OPTIVAR) 0.05 % ophthalmic solution Administer 1 drop to both eyes 2 (two) times a day.       chlorthalidone (HYGROTEN) 25 MG tablet Take 12.5 mg by mouth daily.       cholecalciferol, vitamin D3, 1,000 unit tablet Take 1,000 Units by mouth daily.      glucosamine-chondroitin 500-400 mg tablet Take 2 tablets by mouth daily.      isosorbide mononitrate (IMDUR) 30 MG 24 hr tablet Take 150 mg by mouth daily.  2/24/2018: Dose verified against clinic list.      metoprolol succinate (TOPROL-XL) 50 MG 24 hr tablet Take 50 mg by mouth every evening. Increased from 25 mg on 03/26/18.      mirtazapine (REMERON) 15 MG tablet Take 15 mg by mouth at bedtime. Indications: major depressive disorder      MULTIVITAMIN W-MINERALS/LUTEIN (CENTRUM SILVER ORAL) Take 1 tablet by mouth daily.      nitroglycerin (NITROSTAT) 0.4 MG SL tablet Place 0.4 mg under the tongue as needed for chest pain.      oxyCODONE (ROXICODONE) 5 MG immediate release tablet Take 1 tablet (5 mg total) by mouth 4 (four) times a day as needed for pain.      pantoprazole (PROTONIX) 40 MG tablet Take 40 mg by mouth 2 (two) times a day.      peg 400-propylene glycol (SYSTANE) 0.4-0.3 % Drop Administer 1 drop to both eyes 4 (four) times a day.       ranitidine (ZANTAC) 300 MG capsule Take 300 mg by mouth every evening.      senna-docusate (PERICOLACE) 8.6-50 mg tablet Take 2 tablets by mouth daily.      triamcinolone (KENALOG) 0.025 % cream Apply 1 application topically 3 (three) times a day as  needed.         Today:  She is doing well, continues with NWB in [facility]. Next appt is Fri 4/20/18. No further GERD sx since restarting Zantac. BPs still high in am, okay later in day. Will increase Metoprolol. Denies headache, palpitations, dizziness. No chest pain or SOB. Appetite is good. No bowel problems.       Physical Exam:  General: Patient is alert, pleasant female, no distress.  Vitals: Reviewed.  HEENT: Head is NCAT. Eyes show no injection or icterus. Nares negative. Oropharynx well hydrated.  Neck: Supple. No tenderness or adenopathy. No JVD.  Lungs: Clear bilaterally. No wheezes.  Cardiovascular: Regular rate and rhythm, normal S1, S2.  Abdomen: Soft, no tenderness on exam. Bowel sounds present. No guarding rebound or rigidity.  Extremities: No edema is noted.  Musculoskeletal: CAM right LE.  Psych: Mood appears good.      Labs:  Results for orders placed or performed during the hospital encounter of 03/02/18   Basic Metabolic Panel   Result Value Ref Range    Sodium 136 136 - 145 mmol/L    Potassium 4.3 3.5 - 5.0 mmol/L    Chloride 109 (H) 98 - 107 mmol/L    CO2 20 (L) 22 - 31 mmol/L    Anion Gap, Calculation 7 5 - 18 mmol/L    Glucose 109 70 - 125 mg/dL    Calcium 9.4 8.5 - 10.5 mg/dL    BUN 17 8 - 28 mg/dL    Creatinine 0.88 0.60 - 1.10 mg/dL    GFR MDRD Af Amer >60 >60 mL/min/1.73m2    GFR MDRD Non Af Amer >60 >60 mL/min/1.73m2       Lab Results   Component Value Date    WBC 4.5 03/08/2018    HGB 9.4 (L) 03/08/2018    HCT 29.5 (L) 03/08/2018    MCV 96 03/08/2018     03/08/2018       Assessment/Plan:  1. Right ankle trimalleolar fracture. Fall on 2/24/18, ORIF on 3/2/18. NWB in [facility]. Follow up per ortho.  2. HTN. On Amlodipine and metoprolol. Increase Metoprolol.   3. GERD. Improved since Zantac restarted. Also on PPI.   4. ABLA. Last hgb at 9.4. No need for intervention.  5. Hx CAD. No current cardiac concerns.        Electronically signed by: Rebecca Knight MD

## 2021-06-17 NOTE — PROGRESS NOTES
"[facility]    Name:   Yun Rodgers  : 1935  Facility:   [facility] [[id number]]   Room: 219  Code Status: FULL CODE -   Fac type:   SNF (Skilled Nursing Facility, TCU) -     CHIEF COMPLAINT / REASON FOR VISIT:  Chief Complaint   Patient presents with     Review Of Multiple Medical Conditions     Last seen by Kin Arriaga on 3/29/2018. Subsequently seen by Veronica Black on 2018 and then by Dr. Knight on 2018.     HPI: Yun is a 83 y.o. female with a history significant for hypertension, coronary artery disease (with stable angina and S/P CABG), anemia, and chronic gastric reflux with esophagitis.  She was stepping down some steps and landed wrong, falling to the ground.  She states that in the ambulance she developed a burning left chest pain that lasted for about 2 hours, resolving spontaneously (believed it was her heartburn).  This has been a problem over the last few years.  At [facility] ED, she was found to have a right trimalleolar fracture and underwent surgical repair on 18.    CURRENT ISSUES    Pain is being adequately managed with oxycodone 5 mg 4 times daily as needed. She describes today that she only takes one before physical therapy for prophylactic pain management. She states, \"I take the pain medicine until they start working on my leg.\" She is still non-weightbearing and wears a CAM boot. She does describe intermittent nausea, but denies emesis with these episodes.     As of late, her hypertension has not been very well controlled. Dr. Peoples increased her amlodipine from 5 mg to 7.5 mg every morning and also started her on 25 mg of metoprolol succinate each evening.  Blood pressure still remained elevated with systolics in the 150s and 160s and occasionally even as high as a 180s. Diastolics have been okay, and her apical pulse is generally in the 60s and occasionally in the 70s. Dr. Knight increased her metoprolol " succinate to 50 mg, but blood pressures continue to be elevated, mostly systolically. On 4/6/18, her metoprolol was increased again by Dr. Knight to 75 mg each evening.     At Kin Arriaga's last visit, the patient was started on chlorthalidone 25 mg daily. This was decreased to 12.5 mg daily on 4/5/18. Unfortunately, her kidneys did not tolerate this medication, and her creatinine jumped from 0.88 to 1.71, per her last BMP result on 4/11/18. Additionally, her BUN elevated to 38, from 17, prior to medication initiation. The chlorthalidone has since been discontinued, but her blood pressures remain elevated. As a result, we will trial her on an increased dose of her amlodipine, increasing it from 7.5 mg to 10 mg in the morning.      She has had problems as well with chronic gastric reflux and esophagitis. Her ranitidine was briefly discontinued per a pharmacy consult but reinstated at 300 mg nightly. She also continues on pantoprazole (40 mg twice daily). She tells us today that she has taken Tums intermittently in the past at home if she has flare-ups of heart burn. She explains today that she had a colostomy many years ago, and has a shortened bowel.     She has chronic anemia. Her last hemoglobin was 9.4.    ROS: She tells me she is not experiencing any heartburn as long as she is on her current regimen.  No headaches or recent chest pains, coughing or congestion, vomiting, dizziness or dyspnea, dysuria, difficulty chewing or swallowing, constipation or diarrhea, integumentary issues, or any problems with appetite. She does describe new sleep difficulties that have developed over the last four days, where she has no trouble falling asleep, but will wake-up in the middle of the night and is unable to fall back asleep. We talked about how her mirtazapine should be aiding with this. She verbalizes today that she would like to try to avoid being placed on additional medication. We agreed that if her sleeping  issues persist, we would certainly readdress this at her next visit.     Past Medical History:   Diagnosis Date     CAD (coronary artery disease)      Carotid artery stenosis      Chronic kidney disease      History of transfusion      Hypertension      Lupus     states this usually manifests as a rash and tailbone pain     Pneumonia               Family History   Problem Relation Age of Onset     Cancer Mother      Hypertension Mother      No Medical Problems Father      Social History     Social History     Marital status:      Spouse name: N/A     Number of children: N/A     Years of education: N/A     Social History Main Topics     Smoking status: Former Smoker     Packs/day: 2.50     Years: 50.00     Quit date: 3/1/2002     Smokeless tobacco: Never Used     Alcohol use 2.4 oz/week     4 Cans of beer per week     Drug use: No     Sexual activity: Not on file     Other Topics Concern     Not on file     Social History Narrative    Retired  for [employer].     MEDICATIONS: Reviewed from the MAR, physician orders, and earlier progress notes.   Current Outpatient Prescriptions   Medication Sig     acetaminophen (TYLENOL) 500 MG tablet Take 2 tablets (1,000 mg total) by mouth 3 (three) times a day.     amLODIPine (NORVASC) 5 MG tablet Take 10 mg by mouth daily.      aspirin 325 MG tablet Take 1 tablet (325 mg total) by mouth 2 (two) times a day with meals.     azelastine (OPTIVAR) 0.05 % ophthalmic solution Administer 1 drop to both eyes 2 (two) times a day.      cholecalciferol, vitamin D3, 1,000 unit tablet Take 1,000 Units by mouth daily.     glucosamine-chondroitin 500-400 mg tablet Take 2 tablets by mouth daily.     isosorbide mononitrate (IMDUR) 30 MG 24 hr tablet Take 150 mg by mouth daily.      metoprolol succinate (TOPROL-XL) 50 MG 24 hr tablet Take 75 mg by mouth every evening. Increased from 25 mg on 03/26/18.   Increased from 50 mg on 04/06/18.     mirtazapine (REMERON) 15  "MG tablet Take 15 mg by mouth at bedtime. Indications: major depressive disorder     MULTIVITAMIN W-MINERALS/LUTEIN (CENTRUM SILVER ORAL) Take 1 tablet by mouth daily.     nitroglycerin (NITROSTAT) 0.4 MG SL tablet Place 0.4 mg under the tongue as needed for chest pain.     oxyCODONE (ROXICODONE) 5 MG immediate release tablet Take 1 tablet (5 mg total) by mouth 4 (four) times a day as needed for pain.     pantoprazole (PROTONIX) 40 MG tablet Take 40 mg by mouth 2 (two) times a day.     peg 400-propylene glycol (SYSTANE) 0.4-0.3 % Drop Administer 1 drop to both eyes 4 (four) times a day.      ranitidine (ZANTAC) 300 MG capsule Take 300 mg by mouth every evening.     senna-docusate (PERICOLACE) 8.6-50 mg tablet Take 2 tablets by mouth daily.     triamcinolone (KENALOG) 0.025 % cream Apply 1 application topically 3 (three) times a day as needed.      ALLERGIES:   Allergies   Allergen Reactions     Primaquine Rash     Demeclocycline Other (See Comments)     \"sore bottom\"     Guaiacol Hives     Robitussin D     Hydroxychloroquine Sulfate      Iodinated Contrast- Oral And Iv Dye      Lipitor [Atorvastatin]      Metrizamide Other (See Comments)     Chest tightness,sshakes     Pravastatin      Robitussin [Guaifenesin]      Simvastatin      Tetracyclines      DIET: Unknown    Vitals:    04/12/18 1414   BP: 136/64   Pulse: 67   Resp: 20   Temp: 97.2  F (36.2  C)   Weight: 131 lb 9.6 oz (59.7 kg)   Height: 5' 5\" (1.651 m)   Weight has fluctuated and may be inaccurate. She had a weight of 141.2 pounds on 02/28/18.  Body mass index is 21.9 kg/(m^2).    EXAMINATION:   General: Pleasant, alert, and conversant elderly female, sitting in a wheelchair in her room, in no apparent distress.  Head: Normocephalic and atraumatic.   Eyes: PERRLA, sclerae clear.   ENT: Moist oral mucosa. She has full upper and lower dentures. No rhinorrhea or nasal discharge.  There is some hearing loss for which she has binaural hearing " aids.  Cardiovascular: Regular rate and rhythm. 2/6 systolic ejection murmur at the left sternal border.  Respiratory: Lungs clear to auscultation bilaterally.   Abdomen: Soft and nontender.   Musculoskeletal/Extremities: Age-related degenerative joint disease. There is a bit of swelling in the affected right foot. She is wearing the CAM boot.  Integument: No rashes, clinically significant lesions, or skin breakdown.   Cognitive/Psychiatric: Alert and oriented ×3. Affect is euthymic.    DIAGNOSTICS:   Results for orders placed or performed in visit on 04/11/18   Basic Metabolic Panel   Result Value Ref Range    Sodium 137 136 - 145 mmol/L    Potassium 4.3 3.5 - 5.0 mmol/L    Chloride 109 (H) 98 - 107 mmol/L    CO2 19 (L) 22 - 31 mmol/L    Anion Gap, Calculation 9 5 - 18 mmol/L    Glucose 102 70 - 125 mg/dL    Calcium 9.5 8.5 - 10.5 mg/dL    BUN 38 (H) 8 - 28 mg/dL    Creatinine 1.71 (H) 0.60 - 1.10 mg/dL    GFR MDRD Af Amer 35 (L) >60 mL/min/1.73m2    GFR MDRD Non Af Amer 29 (L) >60 mL/min/1.73m2     Lab Results   Component Value Date    WBC 4.5 03/08/2018    HGB 9.4 (L) 03/08/2018    HCT 29.5 (L) 03/08/2018    MCV 96 03/08/2018     03/08/2018     Estimated Creatinine Clearance: 23.5 mL/min (by C-G formula based on Cr of 1.71).    ASSESSMENT/Plan:      ICD-10-CM    1. Closed fracture of right ankle with routine healing, subsequent encounter S82.891D    2. Essential hypertension I10    3. Chronic reflux esophagitis K21.0    4. Coronary artery disease of native artery of native heart with stable angina pectoris I25.118    5. CRI (chronic renal insufficiency), stage 2 (mild) N18.2      CHANGES:    Increase amlodipine to 10 mg daily for hypertension. BMP already ordered for 4/19.     CARE PLAN:    The care plan has been reviewed and all orders signed. Changes to care plan, if any, as noted. Otherwise, continue care plan of care.  Time spent with this patient was approximately 35 minutes, with greater than 50%  spent in counseling and coordination of care that included issues surrounding her hypertension and renal function.    Kin Arriaga was present during today's bedside visit. He has reviewed, and is in agreement, with the information provided in this note.     Electronically signed by: Mary Jo Jin    Electronically signed by: Tex Arriaga, BLAIR

## 2021-06-19 NOTE — LETTER
Letter by Samina Del Toro MD at      Author: Samina Del Toro MD Service: -- Author Type: --    Filed:  Encounter Date: 4/9/2019 Status: (Other)         Yun Rodgers  [address] [place] [address]      April 9, 2019      Dear Yun,    This letter is to remind you that you will be due for your follow up appointment with Dr. Samina Del Toro  . To help ensure you are in the best health possible, a regular follow-up with your cardiologist is essential.     Please call our Patient Scheduling Line at [phone] to schedule your appointment at your earliest convenience.  If you have recently scheduled an appointment, please disregard this letter.    We look forward to seeing you again. As always, we are available at the number  above for any questions or concerns you may have.      Sincerely,     The Physicians and Staff of [facility]

## 2021-06-23 NOTE — TELEPHONE ENCOUNTER
Pt LVM that she needed to update her insurance.  New insurance states Dr Del Toro is not in her network.  Call transferred to schedulers / reception.  -aydee

## 2021-06-25 NOTE — PROGRESS NOTES
Progress Notes by Samina Del Toro MD at 8/16/2017  1:10 PM     Author: Samina Del Toro MD Service: -- Author Type: Physician    Filed: 8/16/2017  1:47 PM Encounter Date: 8/16/2017 Status: Signed    : Samina Del Toro MD (Physician)           Click to link to [facility] HEART CARE NOTE       Assessment/Plan:   An 82-year-old woman presents to the clinic today for routine cardiology follow-up.    1. Coronary artery disease, stable angina. The patient's angina has been controlled after increasing Imdur to 150 mg daily.  Previous nuclear stress test was reported a small size of anteroseptal ischemia. Considering it was a small size of ischemia and CKD, she was treated her medically.   Continue her current medications including aspirin, isosorbide mononitrate, metoprolol.    2. Sinus bradycardia: Stable heart rate 50-60 BPM at rest. No obvious dizziness or fatigue. No change in mild dyspnea on exertion.  Continue to monitor it.  She is on metoprolol XL 25 mg daily.      3. Essential hypertension.  Her blood pressure is high.  Continue metoprolol XL 25 mg daily, lisinopril 40 mg daily.  Increased amlodipine from 2.5 to 5.0 mg daily.  She will recheck her blood pressure more frequent in next 2 weeks. The target is less than 140/90 mmHg.    4. Dyslipidemia. The patient is not able to tolerate statins.  Last LDL was 95.     5. Peripheral vascular disease.  US bilateral carotid artery to re-evaluate carotid artery stenosis.    Thank you for the opportunity to be involved in the care of Loida Rodgers. If you have any questions, please feel free to contact me.  I will see the patient again in 12  months.     History of Present Illness:   It is my pleasure to see Mrs. Loida Rodgers today at the [facility] for routine cardiology follow-up. Yun is an 82-year-old pleasant woman with a medical history of coronary artery disease, status post stent placement to LAD in 2007, essential hypertension,  hyperlipidemia, peripheral vascular disease, and chronic kidney disease.     The patient has has no chest pain.  Her mild dyspnea on exertion has been stable over last year. She denies any palpitations, shortness of breath at rest, orthopnea, PND or leg swelling.  She has occasional lightheadedness when she stands up quickly.  Her blood pressure is mildly high 160/60 mmgHg.  Heart rate is between 50-60 BPM.    Past Medical History:     Patient Active Problem List   Diagnosis   ? Mixed hyperlipidemia   ? Benign Essential Hypertension   ? Coronary Artery Disease   ? Carotid Artery Stenosis   ? Peripheral Vascular Disease   ? Chronic Reflux Esophagitis   ? Chest Pain   ? Angina pectoris   ? Stable angina   ? Sinus bradycardia       Past Surgical History:     Past Surgical History:   Procedure Laterality Date   ? ND APPENDECTOMY      Description: Appendectomy;  Recorded: 09/26/2014;   ? ND COLOSTOMY      Description: Colostomy;  Recorded: 09/26/2014;   ? ND THROMBOENDARTECTMY NECK,NECK INCIS      Description: Carotid Thromboendarterectomy;  Recorded: 09/26/2014;   ? ND TOTAL ABDOM HYSTERECTOMY      Description: Total Abdominal Hysterectomy;  Recorded: 09/26/2014;       Family History:   History reviewed. No pertinent family history.     Social History:    reports that she quit smoking about 15 years ago. She smoked 2.50 packs per day. She does not have any smokeless tobacco history on file.    Review of Systems:   General: Weight Loss  Eyes: Visual Distubance  Ears/Nose/Throat: Hearing Loss  Lungs: Shortness of Breath  Heart: Chest Pain     Bladder: WNL  Muscle/Joints: Muscle Weakness, Muscle Pain  Skin: WNL  Nervous System: WNL  Mental Health: WNL     Blood: WNL    Meds:     Current Outpatient Prescriptions:   ?  acetaminophen-codeine (TYLENOL #3) 300-30 mg per tablet, Take 1 tablet by mouth as needed., Disp: , Rfl:   ?  aspirin 325 MG tablet, Take 162 mg by mouth daily., Disp: , Rfl:   ?  cholecalciferol, vitamin D3,  1,000 unit tablet, Take 1,000 Units by mouth daily., Disp: , Rfl:   ?  erythromycin with ethanol (EMGEL) 2 % gel, Apply 1 application topically daily., Disp: , Rfl:   ?  fexofenadine (ALLEGRA) 180 MG tablet, Take 180 mg by mouth daily., Disp: , Rfl:   ?  glucosamine-chondroitin 500-400 mg tablet, Take 2 tablets by mouth daily., Disp: , Rfl:   ?  isosorbide mononitrate (IMDUR) 120 MG 24 hr tablet, TAKE 1 TABLET BY MOUTH EVERY DAY AS DIRECTED, Disp: 90 tablet, Rfl: 0  ?  isosorbide mononitrate (IMDUR) 30 MG 24 hr tablet, TAKE 1 TABLET (30 MG TOTAL) BY MOUTH DAILY. (TAKES 150MG ISOSORBIDE DAILY), Disp: 90 tablet, Rfl: 0  ?  LANOLIN/MINERAL OIL/PETROLATUM (EYE LUBRICANT OPHT), Apply to eye as needed., Disp: , Rfl:   ?  lisinopril (PRINIVIL,ZESTRIL) 20 MG tablet, Take 40 mg by mouth daily., Disp: , Rfl:   ?  metoprolol succinate (TOPROL-XL) 25 MG, TAKE 1 TABLET (25 MG TOTAL) BY MOUTH DAILY., Disp: 90 tablet, Rfl: 0  ?  MULTIVITAMIN W-MINERALS/LUTEIN (CENTRUM SILVER ORAL), Take 1 tablet by mouth daily., Disp: , Rfl:   ?  nitroglycerin (NITROSTAT) 0.4 MG SL tablet, Place 0.4 mg under the tongue as needed for chest pain., Disp: , Rfl:   ?  pantoprazole (PROTONIX) 40 MG tablet, Take 40 mg by mouth 2 (two) times a day., Disp: , Rfl:   ?  peg 400-propylene glycol (SYSTANE) 0.4-0.3 % Drop, Administer 1 drop to both eyes 4 (four) times a day. , Disp: , Rfl:   ?  ranitidine (ZANTAC) 300 MG capsule, Take 300 mg by mouth every evening., Disp: , Rfl:   ?  triamcinolone (KENALOG) 0.025 % cream, Apply 1 application topically 2 (two) times a day., Disp: , Rfl:   ?  amLODIPine (NORVASC) 5 MG tablet, TAKE 1 TABLET BY MOUTH DAILY, Disp: 90 tablet, Rfl: 2  ?  azelastine (OPTIVAR) 0.05 % ophthalmic solution, Administer 1 drop to both eyes 2 (two) times a day., Disp: , Rfl:     Allergies:   Hydroxychloroquine sulfate; Iodinated contrast- oral and iv dye; Robitussin [guaifenesin]; and Tetracyclines      Objective:      Physical Exam  144 lb  "(65.3 kg)  5' 4.5\" (1.638 m)  Body mass index is 24.34 kg/(m^2).  /62 (Patient Site: Left Arm, Patient Position: Sitting, Cuff Size: Adult Regular)  Pulse (!) 52  Resp 16  Ht 5' 4.5\" (1.638 m)  Wt 144 lb (65.3 kg)  BMI 24.34 kg/m2    General Appearance:   Awake, Alert, No acute distress.   HEENT:  Pupil equal and reactive to light. No scleral icterus; the mucous membranes were moist.   Neck: Positive cervical bruits. No JVD. No thyromegaly.     Chest: The spine was straight. The chest was symmetric.   Lungs:   Respirations unlabored; Lungs are clear to auscultation. No crackles. No wheezing.   Cardiovascular:   Regular rhythm and rate, normal first and second heart sounds with no murmurs. No rubs or gallops.    Abdomen:  Soft. No tenderness. Non-distended. Bowels sounds are present   Extremities: Equal tibial pulses. No leg edema.   Skin: No rashes or ulcers. Warm, Dry.   Musculoskeletal: No tenderness. No deformity.   Neurologic: Mood and affect are appropriate. No focal deficits.           Lab Review   Lab Results   Component Value Date     05/03/2017    K 5.9 (H) 05/03/2017     (H) 05/03/2017    CO2 18 (L) 05/03/2017    BUN 25 05/03/2017    CREATININE 1.56 (H) 05/03/2017    CALCIUM 9.1 05/03/2017     Lab Results   Component Value Date    WBC 3.9 (L) 01/20/2011    HGB 11.9 (L) 01/20/2011    HCT 35.0 01/20/2011    MCV 95 01/20/2011     01/20/2011     Lab Results   Component Value Date    CHOL 149 05/03/2017    CHOL 157 01/26/2016    CHOL 153 10/14/2014     Lab Results   Component Value Date    HDL 34 (L) 05/03/2017    HDL 39 (L) 01/26/2016    HDL 40 10/14/2014     Lab Results   Component Value Date    LDLCALC 94 05/03/2017    LDLCALC 95 01/26/2016    LDLCALC 90 10/14/2014     Lab Results   Component Value Date    TRIG 107 05/03/2017    TRIG 116 01/26/2016    TRIG 116 10/14/2014                "

## 2021-06-26 NOTE — PROGRESS NOTES
Progress Notes by Samina Del Toro MD at 11/28/2018  3:30 PM     Author: Samina Del Toro MD Service: -- Author Type: Physician    Filed: 11/28/2018  4:14 PM Encounter Date: 11/28/2018 Status: Signed    : Samina Del Toro MD (Physician)           Click to link to [facility] HEART [facility] NOTE       Assessment/Plan:   An 83-year-old woman presents to the clinic today for routine cardiology follow-up.    1. Coronary artery disease, stable angina.  The patient had no chest pain over last to 4 months.  She had Lexiscan nuclear stress test on September 5, 2018 which was reported a small area of distal anteroseptal ischemia, normal wall motion, normal ejection fraction.  The patient was treated medically, increased isosorbide mononitrate to 150 mg daily.  Her chest pain was resolved.  Continue medical treatment.     Continue aspirin, isosorbide mononitrate 150 mg daily, metoprolol XL 50 mg daily.    2. Essential hypertension.  Her blood pressure is high.  For some reason, she is not taking lisinopril anymore.  Continue metoprolol XL 50 mg daily, increase amlodipine to 10 mg daily for better blood pressure control.      3. Dyslipidemia. The patient is not able to tolerate statins.  Last LDL was 85.     4. Peripheral vascular disease.  Moderate carotid artery stenosis, stable    Thank you for the opportunity to be involved in the care of Loida Rodgers. If you have any questions, please feel free to contact me.  I will see the patient again in 6 months.     History of Present Illness:   It is my pleasure to see Mrs. Loida Rodgers today at the [facility] for routine cardiology follow-up. Yun is an 83-year-old pleasant woman with a medical history of coronary artery disease, status post stent placement to LAD in 2007, essential hypertension, hyperlipidemia, peripheral vascular disease, and chronic kidney disease.     The patient states that she has done well over last 4 months.  Her dyspnea on  exertion has also been gradually improved.  She had no chest pain over last to 4 months.  She had no palpitations, shortness of breath at rest, orthopnea, PND.  She has trace leg swelling.  She has occasional lightheadedness when she stands up quickly.  Her blood pressure is mildly higher today.  Her pulse controlled well.    Past Medical History:     Patient Active Problem List   Diagnosis   ? Mixed hyperlipidemia   ? Essential hypertension   ? Coronary Artery Disease   ? Carotid artery disease (H)   ? Peripheral Vascular Disease   ? Chronic Reflux Esophagitis   ? Chest pain, unspecified type   ? Angina pectoris (H)   ? Stable angina (H)   ? Sinus bradycardia   ? Dehydration   ? Cough   ? Malaise   ? Hyperkalemia   ? Gastroesophageal reflux disease without esophagitis   ? Atypical pneumonia   ? Nausea   ? Trimalleolar fracture   ? CRI (chronic renal insufficiency), stage 3 (moderate) (H)   ? Anemia, unspecified type   ? Coronary artery disease of native artery of native heart with stable angina pectoris (H)   ? PVD (pulmonary valve disease)   ? Fracture dislocation of ankle, right, closed, initial encounter   ? Closed right ankle fracture   ? CRI (chronic renal insufficiency), stage 2 (mild)       Past Surgical History:     Past Surgical History:   Procedure Laterality Date   ? ABDOMINAL SURGERY     ? APPENDECTOMY     ? COLOSTOMY CLOSURE     ? EYE SURGERY     ? HYSTERECTOMY     ? ORIF ANKLE FRACTURE Right 3/2/2018    Procedure: OPEN REDUCTION INTERNAL FIXATION TRIMALLEOLAR FRACTURE RIGHT ANKLE;  Surgeon: Shawn Vega DO;  Location: [facility];  Service:    ? VT APPENDECTOMY      Description: Appendectomy;  Recorded: 09/26/2014;   ? VT COLOSTOMY      Description: Colostomy;  Recorded: 09/26/2014;   ? VT THROMBOENDARTECTMY NECK,NECK INCIS      Description: Carotid Thromboendarterectomy;  Recorded: 09/26/2014;   ? VT TOTAL ABDOM HYSTERECTOMY      Description: Total Abdominal Hysterectomy;  Recorded:  "09/26/2014;   ? Resection of mesentery      per patient-- \"they took out my mesentery about 6 months after I had a baby\"       Family History:     Family History   Problem Relation Age of Onset   ? Cancer Mother    ? Hypertension Mother    ? No Medical Problems Father         Social History:    reports that she quit smoking about 16 years ago. She has a 125.00 pack-year smoking history. she has never used smokeless tobacco. She reports that she drinks about 2.4 oz of alcohol per week. She reports that she does not use drugs.    Review of Systems:   General: WNL  Eyes: Visual Distubance  Ears/Nose/Throat: Hearing Loss  Lungs: WNL  Heart: Leg Swelling  Stomach: WNL  Bladder: WNL  Muscle/Joints: WNL  Skin: WNL  Nervous System: WNL  Mental Health: WNL     Blood: WNL    Meds:     Current Outpatient Medications:   ?  acetaminophen (TYLENOL) 500 MG tablet, Take 2 tablets (1,000 mg total) by mouth 3 (three) times a day., Disp: 90 tablet, Rfl: 1  ?  amLODIPine (NORVASC) 10 MG tablet, Take 1 tablet (10 mg total) by mouth daily., Disp: 30 tablet, Rfl: 11  ?  aspirin 325 MG tablet, Take 1 tablet (325 mg total) by mouth daily., Disp: 60 tablet, Rfl: 0  ?  azelastine (OPTIVAR) 0.05 % ophthalmic solution, Administer 1 drop to both eyes 2 (two) times a day. , Disp: , Rfl:   ?  cholecalciferol, vitamin D3, 1,000 unit tablet, Take 1,000 Units by mouth daily., Disp: , Rfl:   ?  glucosamine-chondroitin 500-400 mg tablet, Take 2 tablets by mouth daily., Disp: , Rfl:   ?  isosorbide mononitrate (IMDUR) 120 MG 24 hr tablet, TAKE 1 TABLET BY MOUTH EVERY DAY **TAKE W/30 MG TABLET** - **PATIENT ONLY WANTS 1 MONTH PER FILL**, Disp: 30 tablet, Rfl: 11  ?  isosorbide mononitrate (IMDUR) 30 MG 24 hr tablet, Take 150 mg by mouth daily. , Disp: , Rfl:   ?  metoprolol succinate (TOPROL-XL) 50 MG 24 hr tablet, Take 75 mg by mouth every evening. Increased from 25 mg on 03/26/18.  Increased from 50 mg on 04/06/18., Disp: , Rfl:   ?  mirtazapine " "(REMERON) 15 MG tablet, Take 15 mg by mouth at bedtime. Indications: major depressive disorder, Disp: , Rfl:   ?  MULTIVITAMIN W-MINERALS/LUTEIN (CENTRUM SILVER ORAL), Take 1 tablet by mouth daily., Disp: , Rfl:   ?  nitroglycerin (NITROSTAT) 0.4 MG SL tablet, Place 0.4 mg under the tongue as needed for chest pain., Disp: , Rfl:   ?  oxyCODONE (ROXICODONE) 5 MG immediate release tablet, Take 1 tablet (5 mg total) by mouth 4 (four) times a day as needed for pain., Disp: 60 tablet, Rfl: 0  ?  pantoprazole (PROTONIX) 40 MG tablet, Take 40 mg by mouth 2 (two) times a day., Disp: , Rfl:   ?  peg 400-propylene glycol (SYSTANE) 0.4-0.3 % Drop, Administer 1 drop to both eyes 4 (four) times a day. , Disp: , Rfl:   ?  ranitidine (ZANTAC) 300 MG capsule, Take 300 mg by mouth every evening., Disp: , Rfl:   ?  senna-docusate (PERICOLACE) 8.6-50 mg tablet, Take 2 tablets by mouth daily., Disp: , Rfl: 0  ?  triamcinolone (KENALOG) 0.025 % cream, Apply 1 application topically 3 (three) times a day as needed. , Disp: , Rfl:     Allergies:   Primaquine; Demeclocycline; Guaiacol; Hydroxychloroquine sulfate; Iodinated contrast- oral and iv dye; Lipitor [atorvastatin]; Metrizamide; Pravastatin; Robitussin [guaifenesin]; Simvastatin; and Tetracyclines      Objective:      Physical Exam  139 lb (63 kg)  5' 4.5\" (1.638 m)  Body mass index is 23.49 kg/m .  /58 (Patient Site: Left Arm, Patient Position: Sitting, Cuff Size: Adult Regular)   Pulse (!) 56   Resp 18   Ht 5' 4.5\" (1.638 m)   Wt 139 lb (63 kg)   BMI 23.49 kg/m      General Appearance:   Awake, Alert, No acute distress.   HEENT:  Pupil equal and reactive to light. No scleral icterus; the mucous membranes were moist.   Neck: No cervical bruits. No JVD. No thyromegaly.     Chest: The spine was straight. The chest was symmetric.   Lungs:   Respirations unlabored; Lungs are clear to auscultation. No crackles. No wheezing.   Cardiovascular:   Regular rhythm and rate, normal " first and second heart sounds with no murmurs. No rubs or gallops.    Abdomen:  Soft. No tenderness. Non-distended. Bowels sounds are present   Extremities: Equal tibial pulses. Trace leg edema.   Skin: No rashes or ulcers. Warm, Dry.   Musculoskeletal: No tenderness. No deformity.   Neurologic: Mood and affect are appropriate. No focal deficits.         EKG: Personally reviewed  Sinus tachycardia   Possible Left atrial enlargement   Left axis deviation   Left ventricular hypertrophy   Junctional ST depression, probably normal   Abnormal ECG   When compared with ECG of 24-FEB-2018 17:01,   Vent. rate has increased BY  39 BPM Confirmed     Cardiac Imaging Studies  ECHO on 2-:    Moderate concentric left ventricular hypertrophy.    Left ventricle ejection fraction is normal. The estimated left ventricular ejection fraction is 65% without wall motion abnormality.    Normal right ventricular size with mild decrease in systolic function    Mild mitral and tricuspid regurgitation with borderline to mild pulmonary hypertension    Nuclear stress test on 9-5-2018:    The pharmacologic nuclear stress test is abnormal.    Stress nuclear images suggest a small area of mild distal anteroseptal ischemia.    The left ventricular ejection fraction is 70% without wall motion abnormality.    When compared to the images of 12/30/2015, no interval change.    The patient is at a low risk of future cardiac ischemic events.    Lab Review   Lab Results   Component Value Date     (L) 11/19/2018    K 5.3 (H) 11/19/2018     11/19/2018    CO2 20 (L) 11/19/2018    BUN 24 11/19/2018    CREATININE 1.33 (H) 11/19/2018    CALCIUM 9.2 11/19/2018     Lab Results   Component Value Date    WBC 4.5 03/08/2018    HGB 9.4 (L) 03/08/2018    HCT 29.5 (L) 03/08/2018    MCV 96 03/08/2018     03/08/2018     Lab Results   Component Value Date    CHOL 145 05/08/2018    CHOL 149 05/03/2017    CHOL 157 01/26/2016     Lab Results    Component Value Date    HDL 29 (L) 05/08/2018    HDL 34 (L) 05/03/2017    HDL 39 (L) 01/26/2016     Lab Results   Component Value Date    LDLCALC 85 05/08/2018    LDLCALC 94 05/03/2017    LDLCALC 95 01/26/2016     Lab Results   Component Value Date    TRIG 153 (H) 05/08/2018    TRIG 107 05/03/2017    TRIG 116 01/26/2016     Lab Results   Component Value Date    TROPONINI 0.05 03/03/2018     Lab Results   Component Value Date     (H) 11/12/2017     No results found for: TSH

## 2021-06-26 NOTE — PROGRESS NOTES
Progress Notes by Samina Del Toro MD at 8/9/2018  1:10 PM     Author: Samina Del Toro MD Service: -- Author Type: Physician    Filed: 8/9/2018  3:45 PM Encounter Date: 8/9/2018 Status: Signed    : Samina Del Toro MD (Physician)           Click to link to [facility] NOTE       Assessment/Plan:   An 83-year-old woman presents to the clinic today for routine cardiology follow-up.    1. Coronary artery disease, stable angina. The patient's angina has been gradually getting worse over last several months.  Her dyspnea on exertion also has been mildly worse.  We discussed for the evaluation.  A pharmacological Regadenonson nuclear stress test is requested for evaluation of myocardial ischemia.  If she has high risk, we will discuss a coronary angiogram with possible PCI.  If her nuclear stress test is similar to previous one, continue medical treatment.    Continue her current medications including aspirin, isosorbide mononitrate, metoprolol.    2. Essential hypertension.  Her blood pressure has been controlled.  Continue metoprolol XL 25 mg daily, lisinopril 40 mg daily and amlodipine 7.5 mg daily.      3. Dyslipidemia. The patient is not able to tolerate statins.  Last LDL was 95.     4. Peripheral vascular disease.  Moderate carotid artery stenosis, stable    Total 40 minutes were spent in this visit for face to face visit, physical exam, review of current labs/imaging studies, plan for ongoing treatment with >50% spent on counseling and coordination of care as documented in the above mentioned note.    Thank you for the opportunity to be involved in the care of Loida Rodgers. If you have any questions, please feel free to contact me.  I will see the patient again in 3 months.     History of Present Illness:   It is my pleasure to see Mrs. Loida Rodgers today at the [facility] for routine cardiology follow-up. Yun is an 83-year-old pleasant woman with a medical history  of coronary artery disease, status post stent placement to LAD in 2007, essential hypertension, hyperlipidemia, peripheral vascular disease, and chronic kidney disease.     The patient had intermittent chest pain nearly a year.  She described her chest pain as pressure, lasted 3-5 minutes, not typically associated with exertion, no radiation.  Her chest pain has been gradually getting worse over last few months.  Her dyspnea on exertion has also been gradually getting worse over last few months. She had no palpitations, shortness of breath at rest, orthopnea, PND or leg swelling.  She has occasional lightheadedness when she stands up quickly.  Her blood pressure and heart rate are controlled well.    Past Medical History:     Patient Active Problem List   Diagnosis   ? Mixed hyperlipidemia   ? Essential hypertension   ? Coronary Artery Disease   ? Carotid artery disease (H)   ? Peripheral Vascular Disease   ? Chronic Reflux Esophagitis   ? Chest pain, unspecified type   ? Angina pectoris (H)   ? Stable angina (H)   ? Sinus bradycardia   ? Dehydration   ? Cough   ? Malaise   ? Hyperkalemia   ? Gastroesophageal reflux disease without esophagitis   ? Atypical pneumonia   ? Nausea   ? Trimalleolar fracture   ? CRI (chronic renal insufficiency), stage 3 (moderate)   ? Anemia, unspecified type   ? Coronary artery disease of native artery of native heart with stable angina pectoris (H)   ? PVD (pulmonary valve disease)   ? Fracture dislocation of ankle, right, closed, initial encounter   ? Closed right ankle fracture   ? CRI (chronic renal insufficiency), stage 2 (mild)       Past Surgical History:     Past Surgical History:   Procedure Laterality Date   ? ABDOMINAL SURGERY     ? APPENDECTOMY     ? COLOSTOMY CLOSURE     ? EYE SURGERY     ? HYSTERECTOMY     ? ORIF ANKLE FRACTURE Right 3/2/2018    Procedure: OPEN REDUCTION INTERNAL FIXATION TRIMALLEOLAR FRACTURE RIGHT ANKLE;  Surgeon: Shawn Vega DO;  Location:  "[facility];  Service:    ? MS APPENDECTOMY      Description: Appendectomy;  Recorded: 09/26/2014;   ? MS COLOSTOMY      Description: Colostomy;  Recorded: 09/26/2014;   ? MS THROMBOENDARTECTMY NECK,NECK INCIS      Description: Carotid Thromboendarterectomy;  Recorded: 09/26/2014;   ? MS TOTAL ABDOM HYSTERECTOMY      Description: Total Abdominal Hysterectomy;  Recorded: 09/26/2014;   ? Resection of mesentery      per patient-- \"they took out my mesentery about 6 months after I had a baby\"       Family History:     Family History   Problem Relation Age of Onset   ? Cancer Mother    ? Hypertension Mother    ? No Medical Problems Father         Social History:    reports that she quit smoking about 16 years ago. She has a 125.00 pack-year smoking history. She has never used smokeless tobacco. She reports that she drinks about 2.4 oz of alcohol per week  She reports that she does not use illicit drugs.    Review of Systems:   General: Weight Loss  Eyes: Visual Distubance  Ears/Nose/Throat: Hearing Loss  Lungs: Shortness of Breath  Heart: Chest Pain, Shortness of Breath with activity, Leg Swelling  Stomach: WNL  Bladder: Frequent Urination at Night  Muscle/Joints: Muscle Weakness  Skin: Poor Wound Healing  Nervous System: WNL  Mental Health: Anxiety     Blood: Easy Bleeding    Meds:     Current Outpatient Prescriptions:   ?  acetaminophen (TYLENOL) 500 MG tablet, Take 2 tablets (1,000 mg total) by mouth 3 (three) times a day., Disp: 90 tablet, Rfl: 1  ?  amLODIPine (NORVASC) 5 MG tablet, Take 7.5 mg by mouth daily. , Disp: , Rfl:   ?  aspirin 325 MG tablet, Take 1 tablet (325 mg total) by mouth daily., Disp: 60 tablet, Rfl: 0  ?  azelastine (OPTIVAR) 0.05 % ophthalmic solution, Administer 1 drop to both eyes 2 (two) times a day. , Disp: , Rfl:   ?  cholecalciferol, vitamin D3, 1,000 unit tablet, Take 1,000 Units by mouth daily., Disp: , Rfl:   ?  glucosamine-chondroitin 500-400 mg tablet, Take 2 tablets by mouth " "daily., Disp: , Rfl:   ?  isosorbide mononitrate (IMDUR) 30 MG 24 hr tablet, Take 150 mg by mouth daily. , Disp: , Rfl:   ?  metoprolol succinate (TOPROL-XL) 50 MG 24 hr tablet, Take 75 mg by mouth every evening. Increased from 25 mg on 03/26/18.  Increased from 50 mg on 04/06/18., Disp: , Rfl:   ?  mirtazapine (REMERON) 15 MG tablet, Take 15 mg by mouth at bedtime. Indications: major depressive disorder, Disp: , Rfl:   ?  MULTIVITAMIN W-MINERALS/LUTEIN (CENTRUM SILVER ORAL), Take 1 tablet by mouth daily., Disp: , Rfl:   ?  nitroglycerin (NITROSTAT) 0.4 MG SL tablet, Place 0.4 mg under the tongue as needed for chest pain., Disp: , Rfl:   ?  pantoprazole (PROTONIX) 40 MG tablet, Take 40 mg by mouth 2 (two) times a day., Disp: , Rfl:   ?  peg 400-propylene glycol (SYSTANE) 0.4-0.3 % Drop, Administer 1 drop to both eyes 4 (four) times a day. , Disp: , Rfl:   ?  ranitidine (ZANTAC) 300 MG capsule, Take 300 mg by mouth every evening., Disp: , Rfl:   ?  senna-docusate (PERICOLACE) 8.6-50 mg tablet, Take 2 tablets by mouth daily., Disp: , Rfl: 0  ?  triamcinolone (KENALOG) 0.025 % cream, Apply 1 application topically 3 (three) times a day as needed. , Disp: , Rfl:   ?  oxyCODONE (ROXICODONE) 5 MG immediate release tablet, Take 1 tablet (5 mg total) by mouth 4 (four) times a day as needed for pain., Disp: 60 tablet, Rfl: 0    Allergies:   Primaquine; Demeclocycline; Guaiacol; Hydroxychloroquine sulfate; Iodinated contrast- oral and iv dye; Lipitor [atorvastatin]; Metrizamide; Pravastatin; Robitussin [guaifenesin]; Simvastatin; and Tetracyclines      Objective:      Physical Exam  133 lb (60.3 kg)  5' 4.5\" (1.638 m)  Body mass index is 22.48 kg/(m^2).  /62 (Patient Site: Left Arm, Patient Position: Sitting, Cuff Size: Adult Regular)  Pulse (!) 56  Resp 18  Ht 5' 4.5\" (1.638 m)  Wt 133 lb (60.3 kg)  BMI 22.48 kg/m2    General Appearance:   Awake, Alert, No acute distress.   HEENT:  Pupil equal and reactive to " light. No scleral icterus; the mucous membranes were moist.   Neck: No cervical bruits. No JVD. No thyromegaly.     Chest: The spine was straight. The chest was symmetric.   Lungs:   Respirations unlabored; Lungs are clear to auscultation. No crackles. No wheezing.   Cardiovascular:   Regular rhythm and rate, normal first and second heart sounds with no murmurs. No rubs or gallops.    Abdomen:  Soft. No tenderness. Non-distended. Bowels sounds are present   Extremities: Equal tibial pulses. No leg edema.   Skin: No rashes or ulcers. Warm, Dry.   Musculoskeletal: No tenderness. No deformity.   Neurologic: Mood and affect are appropriate. No focal deficits.         EKG: Personally reviewed  Sinus tachycardia   Possible Left atrial enlargement   Left axis deviation   Left ventricular hypertrophy   Junctional ST depression, probably normal   Abnormal ECG   When compared with ECG of 24-FEB-2018 17:01,   Vent. rate has increased BY  39 BPM Confirmed     Cardiac Imaging Studies  ECHO on 2-:    Moderate concentric left ventricular hypertrophy.    Left ventricle ejection fraction is normal. The estimated left ventricular ejection fraction is 65% without wall motion abnormality.    Normal right ventricular size with mild decrease in systolic function    Mild mitral and tricuspid regurgitation with borderline to mild pulmonary hypertension    Lab Review   Lab Results   Component Value Date     04/19/2018    K 4.3 04/19/2018     (H) 04/19/2018    CO2 17 (L) 04/19/2018    BUN 39 (H) 04/19/2018    CREATININE 1.62 (H) 04/19/2018    CALCIUM 9.2 04/19/2018     Lab Results   Component Value Date    WBC 4.5 03/08/2018    HGB 9.4 (L) 03/08/2018    HCT 29.5 (L) 03/08/2018    MCV 96 03/08/2018     03/08/2018     Lab Results   Component Value Date    CHOL 145 05/08/2018    CHOL 149 05/03/2017    CHOL 157 01/26/2016     Lab Results   Component Value Date    HDL 29 (L) 05/08/2018    HDL 34 (L) 05/03/2017    HDL 39  (L) 01/26/2016     Lab Results   Component Value Date    LDLCALC 85 05/08/2018    LDLCALC 94 05/03/2017    LDLCALC 95 01/26/2016     Lab Results   Component Value Date    TRIG 153 (H) 05/08/2018    TRIG 107 05/03/2017    TRIG 116 01/26/2016     No components found for: CHOLHDL  Lab Results   Component Value Date    TROPONINI 0.05 03/03/2018     Lab Results   Component Value Date     (H) 11/12/2017     No results found for: TSH

## 2022-01-01 ENCOUNTER — APPOINTMENT (OUTPATIENT)
Dept: PHYSICAL THERAPY | Facility: HOSPITAL | Age: 87
DRG: 375 | End: 2022-01-01
Payer: COMMERCIAL

## 2022-01-01 ENCOUNTER — LAB REQUISITION (OUTPATIENT)
Dept: LAB | Facility: CLINIC | Age: 87
End: 2022-01-01
Payer: COMMERCIAL

## 2022-01-01 ENCOUNTER — APPOINTMENT (OUTPATIENT)
Dept: OCCUPATIONAL THERAPY | Facility: HOSPITAL | Age: 87
DRG: 291 | End: 2022-01-01
Payer: COMMERCIAL

## 2022-01-01 ENCOUNTER — PATIENT OUTREACH (OUTPATIENT)
Dept: CARE COORDINATION | Facility: CLINIC | Age: 87
End: 2022-01-01

## 2022-01-01 ENCOUNTER — HOSPITAL ENCOUNTER (INPATIENT)
Facility: HOSPITAL | Age: 87
LOS: 4 days | Discharge: HOME HEALTH | DRG: 291 | End: 2022-08-12
Attending: EMERGENCY MEDICINE | Admitting: INTERNAL MEDICINE
Payer: COMMERCIAL

## 2022-01-01 ENCOUNTER — APPOINTMENT (OUTPATIENT)
Dept: OCCUPATIONAL THERAPY | Facility: HOSPITAL | Age: 87
DRG: 375 | End: 2022-01-01
Attending: EMERGENCY MEDICINE
Payer: COMMERCIAL

## 2022-01-01 ENCOUNTER — APPOINTMENT (OUTPATIENT)
Dept: CT IMAGING | Facility: HOSPITAL | Age: 87
DRG: 291 | End: 2022-01-01
Attending: EMERGENCY MEDICINE
Payer: COMMERCIAL

## 2022-01-01 ENCOUNTER — APPOINTMENT (OUTPATIENT)
Dept: PHYSICAL THERAPY | Facility: HOSPITAL | Age: 87
DRG: 388 | End: 2022-01-01
Payer: COMMERCIAL

## 2022-01-01 ENCOUNTER — HOSPITAL ENCOUNTER (INPATIENT)
Facility: HOSPITAL | Age: 87
LOS: 3 days | Discharge: NURSING, REHAB OR LONG-TERM CARE | DRG: 388 | End: 2022-06-17
Attending: EMERGENCY MEDICINE | Admitting: FAMILY MEDICINE
Payer: COMMERCIAL

## 2022-01-01 ENCOUNTER — APPOINTMENT (OUTPATIENT)
Dept: SPEECH THERAPY | Facility: HOSPITAL | Age: 87
DRG: 375 | End: 2022-01-01
Payer: COMMERCIAL

## 2022-01-01 ENCOUNTER — HOSPITAL ENCOUNTER (EMERGENCY)
Facility: HOSPITAL | Age: 87
Discharge: HOME | End: 2022-06-23
Admitting: NURSE PRACTITIONER
Payer: COMMERCIAL

## 2022-01-01 ENCOUNTER — APPOINTMENT (OUTPATIENT)
Dept: SPEECH THERAPY | Facility: HOSPITAL | Age: 87
DRG: 388 | End: 2022-01-01
Payer: COMMERCIAL

## 2022-01-01 ENCOUNTER — APPOINTMENT (OUTPATIENT)
Dept: RADIOLOGY | Facility: HOSPITAL | Age: 87
DRG: 388 | End: 2022-01-01
Attending: PHYSICIAN ASSISTANT
Payer: COMMERCIAL

## 2022-01-01 ENCOUNTER — APPOINTMENT (OUTPATIENT)
Dept: RADIOLOGY | Facility: HOSPITAL | Age: 87
DRG: 388 | End: 2022-01-01
Attending: FAMILY MEDICINE
Payer: COMMERCIAL

## 2022-01-01 ENCOUNTER — DOCUMENTATION ONLY (OUTPATIENT)
Dept: OTHER | Facility: CLINIC | Age: 87
End: 2022-01-01

## 2022-01-01 ENCOUNTER — APPOINTMENT (OUTPATIENT)
Dept: PHYSICAL THERAPY | Facility: HOSPITAL | Age: 87
DRG: 375 | End: 2022-01-01
Attending: EMERGENCY MEDICINE
Payer: COMMERCIAL

## 2022-01-01 ENCOUNTER — ANESTHESIA EVENT (OUTPATIENT)
Dept: SURGERY | Facility: HOSPITAL | Age: 87
DRG: 375 | End: 2022-01-01
Payer: COMMERCIAL

## 2022-01-01 ENCOUNTER — TRANSITIONAL CARE UNIT VISIT (OUTPATIENT)
Dept: GERIATRICS | Facility: CLINIC | Age: 87
End: 2022-01-01

## 2022-01-01 ENCOUNTER — LAB REQUISITION (OUTPATIENT)
Dept: LAB | Facility: CLINIC | Age: 87
End: 2022-01-01
Payer: MEDICARE

## 2022-01-01 ENCOUNTER — HOSPITAL ENCOUNTER (OUTPATIENT)
Facility: CLINIC | Age: 87
Discharge: HOME | End: 2022-03-04
Attending: ORTHOPAEDIC SURGERY | Admitting: ORTHOPAEDIC SURGERY
Payer: COMMERCIAL

## 2022-01-01 ENCOUNTER — APPOINTMENT (OUTPATIENT)
Dept: OCCUPATIONAL THERAPY | Facility: HOSPITAL | Age: 87
DRG: 388 | End: 2022-01-01
Attending: FAMILY MEDICINE
Payer: COMMERCIAL

## 2022-01-01 ENCOUNTER — APPOINTMENT (OUTPATIENT)
Dept: PHYSICAL THERAPY | Facility: HOSPITAL | Age: 87
DRG: 291 | End: 2022-01-01
Attending: INTERNAL MEDICINE
Payer: COMMERCIAL

## 2022-01-01 ENCOUNTER — LAB REQUISITION (OUTPATIENT)
Dept: LAB | Facility: CLINIC | Age: 87
End: 2022-01-01

## 2022-01-01 ENCOUNTER — APPOINTMENT (OUTPATIENT)
Dept: CARDIOLOGY | Facility: HOSPITAL | Age: 87
DRG: 291 | End: 2022-01-01
Attending: INTERNAL MEDICINE
Payer: COMMERCIAL

## 2022-01-01 ENCOUNTER — APPOINTMENT (OUTPATIENT)
Dept: SPEECH THERAPY | Facility: HOSPITAL | Age: 87
DRG: 388 | End: 2022-01-01
Attending: FAMILY MEDICINE
Payer: COMMERCIAL

## 2022-01-01 ENCOUNTER — TRANSITIONAL CARE UNIT VISIT (OUTPATIENT)
Dept: GERIATRICS | Facility: CLINIC | Age: 87
End: 2022-01-01
Payer: COMMERCIAL

## 2022-01-01 ENCOUNTER — APPOINTMENT (OUTPATIENT)
Dept: SPEECH THERAPY | Facility: HOSPITAL | Age: 87
DRG: 375 | End: 2022-01-01
Attending: EMERGENCY MEDICINE
Payer: COMMERCIAL

## 2022-01-01 ENCOUNTER — APPOINTMENT (OUTPATIENT)
Dept: PHYSICAL THERAPY | Facility: HOSPITAL | Age: 87
DRG: 291 | End: 2022-01-01
Payer: COMMERCIAL

## 2022-01-01 ENCOUNTER — ANESTHESIA (OUTPATIENT)
Dept: SURGERY | Facility: CLINIC | Age: 87
End: 2022-01-01
Payer: COMMERCIAL

## 2022-01-01 ENCOUNTER — DISCHARGE SUMMARY NURSING HOME (OUTPATIENT)
Dept: GERIATRICS | Facility: CLINIC | Age: 87
End: 2022-01-01

## 2022-01-01 ENCOUNTER — HOSPITAL ENCOUNTER (INPATIENT)
Facility: HOSPITAL | Age: 87
LOS: 5 days | Discharge: HOME HEALTH | DRG: 375 | End: 2022-08-03
Attending: EMERGENCY MEDICINE | Admitting: EMERGENCY MEDICINE
Payer: COMMERCIAL

## 2022-01-01 ENCOUNTER — APPOINTMENT (OUTPATIENT)
Dept: CT IMAGING | Facility: HOSPITAL | Age: 87
DRG: 388 | End: 2022-01-01
Attending: EMERGENCY MEDICINE
Payer: COMMERCIAL

## 2022-01-01 ENCOUNTER — APPOINTMENT (OUTPATIENT)
Dept: OCCUPATIONAL THERAPY | Facility: HOSPITAL | Age: 87
DRG: 375 | End: 2022-01-01
Payer: COMMERCIAL

## 2022-01-01 ENCOUNTER — TELEPHONE (OUTPATIENT)
Dept: CARDIOLOGY | Facility: CLINIC | Age: 87
End: 2022-01-01

## 2022-01-01 ENCOUNTER — ANESTHESIA (OUTPATIENT)
Dept: SURGERY | Facility: HOSPITAL | Age: 87
DRG: 375 | End: 2022-01-01
Payer: COMMERCIAL

## 2022-01-01 ENCOUNTER — ANESTHESIA EVENT (OUTPATIENT)
Dept: SURGERY | Facility: CLINIC | Age: 87
End: 2022-01-01
Payer: COMMERCIAL

## 2022-01-01 ENCOUNTER — DISCHARGE SUMMARY NURSING HOME (OUTPATIENT)
Dept: GERIATRICS | Facility: CLINIC | Age: 87
End: 2022-01-01
Payer: COMMERCIAL

## 2022-01-01 ENCOUNTER — APPOINTMENT (OUTPATIENT)
Dept: OCCUPATIONAL THERAPY | Facility: HOSPITAL | Age: 87
DRG: 291 | End: 2022-01-01
Attending: INTERNAL MEDICINE
Payer: COMMERCIAL

## 2022-01-01 ENCOUNTER — APPOINTMENT (OUTPATIENT)
Dept: PHYSICAL THERAPY | Facility: HOSPITAL | Age: 87
DRG: 388 | End: 2022-01-01
Attending: FAMILY MEDICINE
Payer: COMMERCIAL

## 2022-01-01 VITALS
WEIGHT: 97.2 LBS | TEMPERATURE: 97.9 F | OXYGEN SATURATION: 100 % | HEART RATE: 68 BPM | BODY MASS INDEX: 16.19 KG/M2 | HEIGHT: 65 IN | SYSTOLIC BLOOD PRESSURE: 110 MMHG | RESPIRATION RATE: 14 BRPM | DIASTOLIC BLOOD PRESSURE: 54 MMHG

## 2022-01-01 VITALS
TEMPERATURE: 98.6 F | RESPIRATION RATE: 18 BRPM | HEIGHT: 64 IN | SYSTOLIC BLOOD PRESSURE: 146 MMHG | WEIGHT: 96.1 LBS | OXYGEN SATURATION: 96 % | BODY MASS INDEX: 16.41 KG/M2 | DIASTOLIC BLOOD PRESSURE: 63 MMHG | HEART RATE: 71 BPM

## 2022-01-01 VITALS
HEART RATE: 57 BPM | OXYGEN SATURATION: 100 % | BODY MASS INDEX: 16.13 KG/M2 | TEMPERATURE: 97.6 F | SYSTOLIC BLOOD PRESSURE: 103 MMHG | WEIGHT: 96.8 LBS | DIASTOLIC BLOOD PRESSURE: 47 MMHG | RESPIRATION RATE: 14 BRPM | HEIGHT: 65 IN

## 2022-01-01 VITALS
SYSTOLIC BLOOD PRESSURE: 162 MMHG | WEIGHT: 94.3 LBS | HEART RATE: 59 BPM | BODY MASS INDEX: 15.71 KG/M2 | TEMPERATURE: 97.7 F | RESPIRATION RATE: 16 BRPM | HEIGHT: 65 IN | DIASTOLIC BLOOD PRESSURE: 71 MMHG | OXYGEN SATURATION: 99 %

## 2022-01-01 VITALS
RESPIRATION RATE: 20 BRPM | SYSTOLIC BLOOD PRESSURE: 123 MMHG | WEIGHT: 97.2 LBS | OXYGEN SATURATION: 99 % | BODY MASS INDEX: 16.19 KG/M2 | TEMPERATURE: 98.2 F | DIASTOLIC BLOOD PRESSURE: 68 MMHG | HEIGHT: 65 IN | HEART RATE: 60 BPM

## 2022-01-01 VITALS
HEART RATE: 65 BPM | BODY MASS INDEX: 15.53 KG/M2 | TEMPERATURE: 97.3 F | HEIGHT: 65 IN | RESPIRATION RATE: 16 BRPM | WEIGHT: 93.2 LBS | OXYGEN SATURATION: 90 % | SYSTOLIC BLOOD PRESSURE: 123 MMHG | DIASTOLIC BLOOD PRESSURE: 65 MMHG

## 2022-01-01 VITALS
SYSTOLIC BLOOD PRESSURE: 109 MMHG | HEIGHT: 64 IN | WEIGHT: 94.7 LBS | HEART RATE: 62 BPM | OXYGEN SATURATION: 94 % | TEMPERATURE: 99 F | BODY MASS INDEX: 16.17 KG/M2 | DIASTOLIC BLOOD PRESSURE: 60 MMHG | RESPIRATION RATE: 16 BRPM

## 2022-01-01 VITALS
WEIGHT: 93.4 LBS | BODY MASS INDEX: 15.56 KG/M2 | RESPIRATION RATE: 18 BRPM | OXYGEN SATURATION: 97 % | TEMPERATURE: 97.8 F | HEIGHT: 65 IN | SYSTOLIC BLOOD PRESSURE: 122 MMHG | DIASTOLIC BLOOD PRESSURE: 59 MMHG | HEART RATE: 61 BPM

## 2022-01-01 VITALS
DIASTOLIC BLOOD PRESSURE: 57 MMHG | OXYGEN SATURATION: 92 % | TEMPERATURE: 97.3 F | RESPIRATION RATE: 16 BRPM | HEART RATE: 65 BPM | WEIGHT: 94 LBS | SYSTOLIC BLOOD PRESSURE: 145 MMHG | BODY MASS INDEX: 16.14 KG/M2

## 2022-01-01 VITALS
HEIGHT: 65 IN | OXYGEN SATURATION: 96 % | SYSTOLIC BLOOD PRESSURE: 137 MMHG | WEIGHT: 103.3 LBS | TEMPERATURE: 98.5 F | RESPIRATION RATE: 18 BRPM | HEART RATE: 70 BPM | BODY MASS INDEX: 17.21 KG/M2 | DIASTOLIC BLOOD PRESSURE: 65 MMHG

## 2022-01-01 VITALS
HEIGHT: 64 IN | DIASTOLIC BLOOD PRESSURE: 76 MMHG | HEART RATE: 70 BPM | BODY MASS INDEX: 16.56 KG/M2 | TEMPERATURE: 98.7 F | WEIGHT: 97 LBS | SYSTOLIC BLOOD PRESSURE: 153 MMHG | OXYGEN SATURATION: 97 % | RESPIRATION RATE: 14 BRPM

## 2022-01-01 VITALS
SYSTOLIC BLOOD PRESSURE: 157 MMHG | WEIGHT: 104.9 LBS | OXYGEN SATURATION: 100 % | DIASTOLIC BLOOD PRESSURE: 86 MMHG | TEMPERATURE: 98.5 F | BODY MASS INDEX: 17.48 KG/M2 | RESPIRATION RATE: 17 BRPM | HEART RATE: 68 BPM | HEIGHT: 65 IN

## 2022-01-01 DIAGNOSIS — I50.31 ACUTE DIASTOLIC (CONGESTIVE) HEART FAILURE (H): ICD-10-CM

## 2022-01-01 DIAGNOSIS — Z01.818 ENCOUNTER FOR OTHER PREPROCEDURAL EXAMINATION: ICD-10-CM

## 2022-01-01 DIAGNOSIS — E87.1 HYPONATREMIA: ICD-10-CM

## 2022-01-01 DIAGNOSIS — D50.0 IRON DEFICIENCY ANEMIA DUE TO CHRONIC BLOOD LOSS: ICD-10-CM

## 2022-01-01 DIAGNOSIS — E87.1 HYPONATREMIA: Primary | ICD-10-CM

## 2022-01-01 DIAGNOSIS — K62.5 RECTAL BLEEDING: Primary | ICD-10-CM

## 2022-01-01 DIAGNOSIS — K62.5 RECTAL BLEEDING: ICD-10-CM

## 2022-01-01 DIAGNOSIS — N17.9 ACUTE RENAL FAILURE, UNSPECIFIED ACUTE RENAL FAILURE TYPE (H): ICD-10-CM

## 2022-01-01 DIAGNOSIS — E87.20 ACIDOSIS: ICD-10-CM

## 2022-01-01 DIAGNOSIS — N18.9 ACUTE KIDNEY INJURY SUPERIMPOSED ON CHRONIC KIDNEY DISEASE (H): ICD-10-CM

## 2022-01-01 DIAGNOSIS — K64.4 EXTERNAL HEMORRHOIDS: ICD-10-CM

## 2022-01-01 DIAGNOSIS — R33.9 URINARY RETENTION: ICD-10-CM

## 2022-01-01 DIAGNOSIS — I25.10 CORONARY ATHEROSCLEROSIS: ICD-10-CM

## 2022-01-01 DIAGNOSIS — I50.31 ACUTE DIASTOLIC HEART FAILURE (H): Primary | ICD-10-CM

## 2022-01-01 DIAGNOSIS — D64.9 ANEMIA, UNSPECIFIED TYPE: ICD-10-CM

## 2022-01-01 DIAGNOSIS — K59.00 CONSTIPATION, UNSPECIFIED CONSTIPATION TYPE: ICD-10-CM

## 2022-01-01 DIAGNOSIS — N17.9 ACUTE KIDNEY INJURY SUPERIMPOSED ON CHRONIC KIDNEY DISEASE (H): ICD-10-CM

## 2022-01-01 DIAGNOSIS — K62.5 HEMORRHAGE OF ANUS AND RECTUM: ICD-10-CM

## 2022-01-01 DIAGNOSIS — G90.09 OTHER IDIOPATHIC PERIPHERAL AUTONOMIC NEUROPATHY: ICD-10-CM

## 2022-01-01 DIAGNOSIS — I12.9 HYPERTENSIVE CHRONIC KIDNEY DISEASE WITH STAGE 1 THROUGH STAGE 4 CHRONIC KIDNEY DISEASE, OR UNSPECIFIED CHRONIC KIDNEY DISEASE: ICD-10-CM

## 2022-01-01 DIAGNOSIS — R30.0 DYSURIA: ICD-10-CM

## 2022-01-01 DIAGNOSIS — I25.10 CORONARY ARTERY DISEASE INVOLVING NATIVE CORONARY ARTERY OF NATIVE HEART WITHOUT ANGINA PECTORIS: ICD-10-CM

## 2022-01-01 DIAGNOSIS — I50.9 CONGESTIVE HEART FAILURE, UNSPECIFIED HF CHRONICITY, UNSPECIFIED HEART FAILURE TYPE (H): ICD-10-CM

## 2022-01-01 DIAGNOSIS — Z01.812 ENCOUNTER FOR PREPROCEDURAL LABORATORY EXAMINATION: ICD-10-CM

## 2022-01-01 DIAGNOSIS — N17.9 ACUTE KIDNEY FAILURE, UNSPECIFIED (H): ICD-10-CM

## 2022-01-01 DIAGNOSIS — Z71.89 OTHER SPECIFIED COUNSELING: ICD-10-CM

## 2022-01-01 DIAGNOSIS — K92.2 LOWER GI BLEED: ICD-10-CM

## 2022-01-01 DIAGNOSIS — I10 ESSENTIAL HYPERTENSION: ICD-10-CM

## 2022-01-01 DIAGNOSIS — K21.9 GASTROESOPHAGEAL REFLUX DISEASE WITHOUT ESOPHAGITIS: Primary | ICD-10-CM

## 2022-01-01 DIAGNOSIS — E61.1 IRON DEFICIENCY: Primary | ICD-10-CM

## 2022-01-01 DIAGNOSIS — K56.41 FECAL IMPACTION IN RECTUM (H): ICD-10-CM

## 2022-01-01 DIAGNOSIS — I10 BENIGN ESSENTIAL HYPERTENSION: ICD-10-CM

## 2022-01-01 DIAGNOSIS — D64.9 ANEMIA: ICD-10-CM

## 2022-01-01 DIAGNOSIS — E86.1 HYPOTENSION DUE TO HYPOVOLEMIA: ICD-10-CM

## 2022-01-01 DIAGNOSIS — F51.01 PRIMARY INSOMNIA: ICD-10-CM

## 2022-01-01 DIAGNOSIS — Z11.59 ENCOUNTER FOR SCREENING FOR OTHER VIRAL DISEASES: Primary | ICD-10-CM

## 2022-01-01 DIAGNOSIS — D64.9 ANEMIA, UNSPECIFIED: ICD-10-CM

## 2022-01-01 DIAGNOSIS — E87.6 HYPOKALEMIA: ICD-10-CM

## 2022-01-01 DIAGNOSIS — R62.7 FAILURE TO THRIVE IN ADULT: ICD-10-CM

## 2022-01-01 DIAGNOSIS — C20 MALIGNANT NEOPLASM OF RECTUM (H): ICD-10-CM

## 2022-01-01 DIAGNOSIS — S82.891D CLOSED FRACTURE OF RIGHT ANKLE WITH ROUTINE HEALING, SUBSEQUENT ENCOUNTER: Primary | ICD-10-CM

## 2022-01-01 DIAGNOSIS — K62.89 RECTAL MASS: ICD-10-CM

## 2022-01-01 DIAGNOSIS — M62.81 GENERALIZED MUSCLE WEAKNESS: ICD-10-CM

## 2022-01-01 DIAGNOSIS — I50.9 ACUTE DECOMPENSATED HEART FAILURE (H): Primary | ICD-10-CM

## 2022-01-01 DIAGNOSIS — C34.92 MALIGNANT NEOPLASM OF LEFT LUNG, UNSPECIFIED PART OF LUNG (H): Primary | ICD-10-CM

## 2022-01-01 DIAGNOSIS — D50.0 IRON DEFICIENCY ANEMIA DUE TO CHRONIC BLOOD LOSS: Primary | ICD-10-CM

## 2022-01-01 DIAGNOSIS — G90.9 DISORDER OF THE AUTONOMIC NERVOUS SYSTEM, UNSPECIFIED: ICD-10-CM

## 2022-01-01 DIAGNOSIS — R07.9 CHEST PAIN, UNSPECIFIED TYPE: ICD-10-CM

## 2022-01-01 DIAGNOSIS — N30.00 ACUTE CYSTITIS WITHOUT HEMATURIA: ICD-10-CM

## 2022-01-01 DIAGNOSIS — R26.9 ABNORMAL GAIT: ICD-10-CM

## 2022-01-01 LAB
ABO/RH(D): NORMAL
ALBUMIN MFR UR ELPH: 20.5 MG/DL
ALBUMIN MFR UR ELPH: 21.5 MG/DL
ALBUMIN PERCENT: 52.2 % (ref 51–67)
ALBUMIN SERPL BCG-MCNC: 2.2 G/DL (ref 3.5–5.2)
ALBUMIN SERPL ELPH-MCNC: 3.5 G/DL (ref 3.2–4.7)
ALBUMIN SERPL-MCNC: 1.8 G/DL (ref 3.5–5)
ALBUMIN SERPL-MCNC: 2.1 G/DL (ref 3.5–5)
ALBUMIN SERPL-MCNC: 2.3 G/DL (ref 3.5–5)
ALBUMIN SERPL-MCNC: 3.3 G/DL (ref 3.5–5)
ALBUMIN UR-MCNC: 10 MG/DL
ALBUMIN UR-MCNC: 10 MG/DL
ALBUMIN UR-MCNC: 20 MG/DL
ALBUMIN UR-MCNC: 70 MG/DL
ALP SERPL-CCNC: 72 U/L (ref 45–120)
ALP SERPL-CCNC: 72 U/L (ref 45–120)
ALP SERPL-CCNC: 73 U/L (ref 35–104)
ALP SERPL-CCNC: 87 U/L (ref 45–120)
ALPHA 1 PERCENT: 2.5 % (ref 2–4)
ALPHA 2 PERCENT: 10.2 % (ref 5–13)
ALPHA1 GLOB SERPL ELPH-MCNC: 0.2 G/DL (ref 0.1–0.3)
ALPHA2 GLOB SERPL ELPH-MCNC: 0.7 G/DL (ref 0.4–0.9)
ALT SERPL W P-5'-P-CCNC: 10 U/L (ref 0–45)
ALT SERPL W P-5'-P-CCNC: 11 U/L (ref 0–45)
ALT SERPL W P-5'-P-CCNC: 16 U/L (ref 0–45)
ALT SERPL W P-5'-P-CCNC: 7 U/L (ref 10–35)
ANION GAP SERPL CALCULATED.3IONS-SCNC: 11 MMOL/L (ref 5–18)
ANION GAP SERPL CALCULATED.3IONS-SCNC: 3 MMOL/L (ref 5–18)
ANION GAP SERPL CALCULATED.3IONS-SCNC: 3 MMOL/L (ref 5–18)
ANION GAP SERPL CALCULATED.3IONS-SCNC: 4 MMOL/L (ref 5–18)
ANION GAP SERPL CALCULATED.3IONS-SCNC: 5 MMOL/L (ref 5–18)
ANION GAP SERPL CALCULATED.3IONS-SCNC: 6 MMOL/L (ref 5–18)
ANION GAP SERPL CALCULATED.3IONS-SCNC: 6 MMOL/L (ref 7–15)
ANION GAP SERPL CALCULATED.3IONS-SCNC: 7 MMOL/L (ref 5–18)
ANION GAP SERPL CALCULATED.3IONS-SCNC: 8 MMOL/L (ref 5–18)
ANION GAP SERPL CALCULATED.3IONS-SCNC: 9 MMOL/L (ref 5–18)
ANION GAP SERPL CALCULATED.3IONS-SCNC: 9 MMOL/L (ref 5–18)
ANION GAP SERPL CALCULATED.3IONS-SCNC: 9 MMOL/L (ref 7–15)
ANTIBODY SCREEN: NEGATIVE
APPEARANCE UR: ABNORMAL
APPEARANCE UR: CLEAR
APTT PPP: 29 SECONDS (ref 22–38)
APTT PPP: 32 SECONDS (ref 22–38)
AST SERPL W P-5'-P-CCNC: 15 U/L (ref 0–40)
AST SERPL W P-5'-P-CCNC: 17 U/L (ref 0–40)
AST SERPL W P-5'-P-CCNC: 18 U/L (ref 10–35)
AST SERPL W P-5'-P-CCNC: 22 U/L (ref 0–40)
ATRIAL RATE - MUSE: 55 BPM
ATRIAL RATE - MUSE: 77 BPM
ATRIAL RATE - MUSE: 89 BPM
B-GLOBULIN SERPL ELPH-MCNC: 0.5 G/DL (ref 0.7–1.2)
BACTERIA SPEC CULT: ABNORMAL
BACTERIA SPEC CULT: NORMAL
BACTERIA UR CULT: NORMAL
BACTERIA UR CULT: NORMAL
BASE EXCESS BLDV CALC-SCNC: -17.7 MMOL/L
BASOPHILS # BLD AUTO: 0 10E3/UL (ref 0–0.2)
BASOPHILS NFR BLD AUTO: 0 %
BASOPHILS NFR BLD AUTO: 1 %
BETA PERCENT: 7.9 % (ref 10–17)
BILIRUB SERPL-MCNC: 0.3 MG/DL
BILIRUB SERPL-MCNC: 0.4 MG/DL (ref 0–1)
BILIRUB SERPL-MCNC: 0.5 MG/DL (ref 0–1)
BILIRUB SERPL-MCNC: 0.8 MG/DL (ref 0–1)
BILIRUB UR QL STRIP: NEGATIVE
BLD PROD TYP BPU: NORMAL
BLOOD COMPONENT TYPE: NORMAL
BNP SERPL-MCNC: 2648 PG/ML (ref 0–167)
BUN SERPL-MCNC: 10 MG/DL (ref 8–28)
BUN SERPL-MCNC: 10 MG/DL (ref 8–28)
BUN SERPL-MCNC: 11 MG/DL (ref 8–28)
BUN SERPL-MCNC: 11 MG/DL (ref 8–28)
BUN SERPL-MCNC: 12 MG/DL (ref 8–28)
BUN SERPL-MCNC: 12 MG/DL (ref 8–28)
BUN SERPL-MCNC: 13 MG/DL (ref 8–28)
BUN SERPL-MCNC: 13.3 MG/DL (ref 8–23)
BUN SERPL-MCNC: 14 MG/DL (ref 8–28)
BUN SERPL-MCNC: 14.2 MG/DL (ref 8–23)
BUN SERPL-MCNC: 17 MG/DL (ref 8–28)
BUN SERPL-MCNC: 17 MG/DL (ref 8–28)
BUN SERPL-MCNC: 18 MG/DL (ref 8–28)
BUN SERPL-MCNC: 19 MG/DL (ref 8–28)
BUN SERPL-MCNC: 23 MG/DL (ref 8–28)
BUN SERPL-MCNC: 23 MG/DL (ref 8–28)
BUN SERPL-MCNC: 28 MG/DL (ref 8–28)
BUN SERPL-MCNC: 9 MG/DL (ref 8–28)
CALCIUM SERPL-MCNC: 7.3 MG/DL (ref 8.5–10.5)
CALCIUM SERPL-MCNC: 7.4 MG/DL (ref 8.5–10.5)
CALCIUM SERPL-MCNC: 7.5 MG/DL (ref 8.5–10.5)
CALCIUM SERPL-MCNC: 7.6 MG/DL (ref 8.5–10.5)
CALCIUM SERPL-MCNC: 7.6 MG/DL (ref 8.5–10.5)
CALCIUM SERPL-MCNC: 7.8 MG/DL (ref 8.5–10.5)
CALCIUM SERPL-MCNC: 7.9 MG/DL (ref 8.5–10.5)
CALCIUM SERPL-MCNC: 8 MG/DL (ref 8.5–10.5)
CALCIUM SERPL-MCNC: 8 MG/DL (ref 8.5–10.5)
CALCIUM SERPL-MCNC: 8.1 MG/DL (ref 8.5–10.5)
CALCIUM SERPL-MCNC: 8.1 MG/DL (ref 8.8–10.2)
CALCIUM SERPL-MCNC: 8.2 MG/DL (ref 8.8–10.2)
CALCIUM SERPL-MCNC: 8.6 MG/DL (ref 8.5–10.5)
CALCIUM SERPL-MCNC: 8.6 MG/DL (ref 8.5–10.5)
CALCIUM SERPL-MCNC: 8.8 MG/DL (ref 8.5–10.5)
CALCIUM SERPL-MCNC: 9 MG/DL (ref 8.5–10.5)
CHLORIDE BLD-SCNC: 101 MMOL/L (ref 98–107)
CHLORIDE BLD-SCNC: 102 MMOL/L (ref 98–107)
CHLORIDE BLD-SCNC: 103 MMOL/L (ref 98–107)
CHLORIDE BLD-SCNC: 104 MMOL/L (ref 98–107)
CHLORIDE BLD-SCNC: 106 MMOL/L (ref 98–107)
CHLORIDE BLD-SCNC: 106 MMOL/L (ref 98–107)
CHLORIDE BLD-SCNC: 107 MMOL/L (ref 98–107)
CHLORIDE BLD-SCNC: 108 MMOL/L (ref 98–107)
CHLORIDE BLD-SCNC: 109 MMOL/L (ref 98–107)
CHLORIDE BLD-SCNC: 111 MMOL/L (ref 98–107)
CHLORIDE BLD-SCNC: 111 MMOL/L (ref 98–107)
CHLORIDE BLD-SCNC: 112 MMOL/L (ref 98–107)
CHLORIDE BLD-SCNC: 117 MMOL/L (ref 98–107)
CHLORIDE BLD-SCNC: 119 MMOL/L (ref 98–107)
CHLORIDE BLD-SCNC: 125 MMOL/L (ref 98–107)
CHLORIDE SERPL-SCNC: 102 MMOL/L (ref 98–107)
CHLORIDE SERPL-SCNC: 105 MMOL/L (ref 98–107)
CHLORIDE UR-SCNC: 92 MMOL/L
CO2 SERPL-SCNC: 11 MMOL/L (ref 22–31)
CO2 SERPL-SCNC: 11 MMOL/L (ref 22–31)
CO2 SERPL-SCNC: 12 MMOL/L (ref 22–31)
CO2 SERPL-SCNC: 15 MMOL/L (ref 22–31)
CO2 SERPL-SCNC: 16 MMOL/L (ref 22–31)
CO2 SERPL-SCNC: 17 MMOL/L (ref 22–31)
CO2 SERPL-SCNC: 18 MMOL/L (ref 22–31)
CO2 SERPL-SCNC: 20 MMOL/L (ref 22–31)
CO2 SERPL-SCNC: 20 MMOL/L (ref 22–31)
CO2 SERPL-SCNC: 21 MMOL/L (ref 22–31)
CO2 SERPL-SCNC: 21 MMOL/L (ref 22–31)
CO2 SERPL-SCNC: 22 MMOL/L (ref 22–31)
CO2 SERPL-SCNC: 23 MMOL/L (ref 22–31)
CO2 SERPL-SCNC: 26 MMOL/L (ref 22–31)
CO2 SERPL-SCNC: 27 MMOL/L (ref 22–31)
CO2 SERPL-SCNC: 28 MMOL/L (ref 22–31)
CO2 SERPL-SCNC: 31 MMOL/L (ref 22–31)
CODING SYSTEM: NORMAL
COLONOSCOPY: NORMAL
COLOR UR AUTO: ABNORMAL
COLOR UR AUTO: COLORLESS
CREAT SERPL-MCNC: 0.8 MG/DL (ref 0.6–1.1)
CREAT SERPL-MCNC: 0.88 MG/DL (ref 0.6–1.1)
CREAT SERPL-MCNC: 0.92 MG/DL (ref 0.6–1.1)
CREAT SERPL-MCNC: 0.95 MG/DL (ref 0.6–1.1)
CREAT SERPL-MCNC: 0.97 MG/DL (ref 0.6–1.1)
CREAT SERPL-MCNC: 0.97 MG/DL (ref 0.6–1.1)
CREAT SERPL-MCNC: 1.01 MG/DL (ref 0.6–1.1)
CREAT SERPL-MCNC: 1.02 MG/DL (ref 0.6–1.1)
CREAT SERPL-MCNC: 1.03 MG/DL (ref 0.6–1.1)
CREAT SERPL-MCNC: 1.05 MG/DL (ref 0.6–1.1)
CREAT SERPL-MCNC: 1.1 MG/DL (ref 0.51–0.95)
CREAT SERPL-MCNC: 1.15 MG/DL (ref 0.6–1.1)
CREAT SERPL-MCNC: 1.16 MG/DL (ref 0.6–1.1)
CREAT SERPL-MCNC: 1.19 MG/DL (ref 0.51–0.95)
CREAT SERPL-MCNC: 1.21 MG/DL (ref 0.6–1.1)
CREAT SERPL-MCNC: 1.23 MG/DL (ref 0.6–1.1)
CREAT SERPL-MCNC: 1.24 MG/DL (ref 0.6–1.1)
CREAT SERPL-MCNC: 1.34 MG/DL (ref 0.6–1.1)
CREAT SERPL-MCNC: 1.35 MG/DL (ref 0.6–1.1)
CREAT SERPL-MCNC: 1.47 MG/DL (ref 0.6–1.1)
CREAT SERPL-MCNC: 1.95 MG/DL (ref 0.6–1.1)
CREAT UR-MCNC: 10 MG/DL
CREAT UR-MCNC: 66 MG/DL
CROSSMATCH: NORMAL
CYSTATIN C (ROCHE): 1.7 MG/L (ref 0.6–1)
CYSTATIN C SERPL-MCNC: 2 MG/L
D DIMER PPP FEU-MCNC: 1.82 UG/ML FEU (ref 0–0.5)
DEPRECATED CALCIDIOL+CALCIFEROL SERPL-MC: 42 UG/L (ref 20–75)
DEPRECATED HCO3 PLAS-SCNC: 19 MMOL/L (ref 22–29)
DEPRECATED HCO3 PLAS-SCNC: 22 MMOL/L (ref 22–29)
DIASTOLIC BLOOD PRESSURE - MUSE: 81 MMHG
DIASTOLIC BLOOD PRESSURE - MUSE: NORMAL MMHG
DIASTOLIC BLOOD PRESSURE - MUSE: NORMAL MMHG
EOSINOPHIL # BLD AUTO: 0 10E3/UL (ref 0–0.7)
EOSINOPHIL # BLD AUTO: 0.1 10E3/UL (ref 0–0.7)
EOSINOPHIL NFR BLD AUTO: 0 %
EOSINOPHIL NFR BLD AUTO: 1 %
ERYTHROCYTE [DISTWIDTH] IN BLOOD BY AUTOMATED COUNT: 13.5 % (ref 10–15)
ERYTHROCYTE [DISTWIDTH] IN BLOOD BY AUTOMATED COUNT: 13.7 % (ref 10–15)
ERYTHROCYTE [DISTWIDTH] IN BLOOD BY AUTOMATED COUNT: 13.8 % (ref 10–15)
ERYTHROCYTE [DISTWIDTH] IN BLOOD BY AUTOMATED COUNT: 13.9 % (ref 10–15)
ERYTHROCYTE [DISTWIDTH] IN BLOOD BY AUTOMATED COUNT: 14.4 % (ref 10–15)
ERYTHROCYTE [DISTWIDTH] IN BLOOD BY AUTOMATED COUNT: 14.5 % (ref 10–15)
ERYTHROCYTE [DISTWIDTH] IN BLOOD BY AUTOMATED COUNT: 14.6 % (ref 10–15)
ERYTHROCYTE [DISTWIDTH] IN BLOOD BY AUTOMATED COUNT: 14.7 % (ref 10–15)
ERYTHROCYTE [DISTWIDTH] IN BLOOD BY AUTOMATED COUNT: 14.8 % (ref 10–15)
ERYTHROCYTE [DISTWIDTH] IN BLOOD BY AUTOMATED COUNT: 15 % (ref 10–15)
ERYTHROCYTE [DISTWIDTH] IN BLOOD BY AUTOMATED COUNT: 15 % (ref 10–15)
ERYTHROCYTE [DISTWIDTH] IN BLOOD BY AUTOMATED COUNT: 15.2 % (ref 10–15)
ERYTHROCYTE [DISTWIDTH] IN BLOOD BY AUTOMATED COUNT: 15.5 % (ref 10–15)
ERYTHROCYTE [DISTWIDTH] IN BLOOD BY AUTOMATED COUNT: 15.7 % (ref 10–15)
ERYTHROCYTE [DISTWIDTH] IN BLOOD BY AUTOMATED COUNT: 15.9 % (ref 10–15)
ERYTHROCYTE [DISTWIDTH] IN BLOOD BY AUTOMATED COUNT: 16.2 % (ref 10–15)
FERRITIN SERPL-MCNC: 571 NG/ML (ref 11–328)
FERRITIN SERPL-MCNC: 626 NG/ML (ref 11–328)
FOLATE SERPL-MCNC: 18.4 NG/ML
GAMMA GLOB SERPL ELPH-MCNC: 1.8 G/DL (ref 0.6–1.4)
GAMMA GLOBULIN PERCENT: 27.2 % (ref 9–20)
GFR SERPL CREATININE-BSD FRML MDRD: 24 ML/MIN/1.73M2
GFR SERPL CREATININE-BSD FRML MDRD: 32 ML/MIN/1.73M2
GFR SERPL CREATININE-BSD FRML MDRD: 34 ML/MIN/1.73M2
GFR SERPL CREATININE-BSD FRML MDRD: 38 ML/MIN/1.73M2
GFR SERPL CREATININE-BSD FRML MDRD: 38 ML/MIN/1.73M2
GFR SERPL CREATININE-BSD FRML MDRD: 42 ML/MIN/1.73M2
GFR SERPL CREATININE-BSD FRML MDRD: 42 ML/MIN/1.73M2
GFR SERPL CREATININE-BSD FRML MDRD: 43 ML/MIN/1.73M2
GFR SERPL CREATININE-BSD FRML MDRD: 44 ML/MIN/1.73M2
GFR SERPL CREATININE-BSD FRML MDRD: 45 ML/MIN/1.73M2
GFR SERPL CREATININE-BSD FRML MDRD: 46 ML/MIN/1.73M2
GFR SERPL CREATININE-BSD FRML MDRD: 48 ML/MIN/1.73M2
GFR SERPL CREATININE-BSD FRML MDRD: 51 ML/MIN/1.73M2
GFR SERPL CREATININE-BSD FRML MDRD: 52 ML/MIN/1.73M2
GFR SERPL CREATININE-BSD FRML MDRD: 53 ML/MIN/1.73M2
GFR SERPL CREATININE-BSD FRML MDRD: 54 ML/MIN/1.73M2
GFR SERPL CREATININE-BSD FRML MDRD: 56 ML/MIN/1.73M2
GFR SERPL CREATININE-BSD FRML MDRD: 56 ML/MIN/1.73M2
GFR SERPL CREATININE-BSD FRML MDRD: 58 ML/MIN/1.73M2
GFR SERPL CREATININE-BSD FRML MDRD: 60 ML/MIN/1.73M2
GFR SERPL CREATININE-BSD FRML MDRD: 63 ML/MIN/1.73M2
GFR SERPL CREATININE-BSD FRML MDRD: 71 ML/MIN/1.73M2
GFR/BSA.PRED SERPLBLD CYS-BASED-ARV: 26 ML/MIN/BSA
GLUCOSE BLD-MCNC: 100 MG/DL (ref 70–125)
GLUCOSE BLD-MCNC: 102 MG/DL (ref 70–125)
GLUCOSE BLD-MCNC: 105 MG/DL (ref 70–125)
GLUCOSE BLD-MCNC: 110 MG/DL (ref 70–125)
GLUCOSE BLD-MCNC: 110 MG/DL (ref 70–125)
GLUCOSE BLD-MCNC: 112 MG/DL (ref 70–125)
GLUCOSE BLD-MCNC: 127 MG/DL (ref 70–125)
GLUCOSE BLD-MCNC: 160 MG/DL (ref 70–125)
GLUCOSE BLD-MCNC: 82 MG/DL (ref 70–125)
GLUCOSE BLD-MCNC: 82 MG/DL (ref 70–125)
GLUCOSE BLD-MCNC: 83 MG/DL (ref 70–125)
GLUCOSE BLD-MCNC: 83 MG/DL (ref 70–125)
GLUCOSE BLD-MCNC: 85 MG/DL (ref 70–125)
GLUCOSE BLD-MCNC: 86 MG/DL (ref 70–125)
GLUCOSE BLD-MCNC: 86 MG/DL (ref 70–125)
GLUCOSE BLD-MCNC: 88 MG/DL (ref 70–125)
GLUCOSE BLD-MCNC: 92 MG/DL (ref 70–125)
GLUCOSE BLD-MCNC: 96 MG/DL (ref 70–125)
GLUCOSE BLD-MCNC: 98 MG/DL (ref 70–125)
GLUCOSE SERPL-MCNC: 86 MG/DL (ref 70–99)
GLUCOSE SERPL-MCNC: 92 MG/DL (ref 70–99)
GLUCOSE UR STRIP-MCNC: NEGATIVE MG/DL
GRAM STAIN RESULT: NORMAL
GRAM STAIN RESULT: NORMAL
HCO3 BLDV-SCNC: 12 MMOL/L (ref 24–30)
HCT VFR BLD AUTO: 21 % (ref 35–47)
HCT VFR BLD AUTO: 21.1 % (ref 35–47)
HCT VFR BLD AUTO: 22.1 % (ref 35–47)
HCT VFR BLD AUTO: 22.3 % (ref 35–47)
HCT VFR BLD AUTO: 22.3 % (ref 35–47)
HCT VFR BLD AUTO: 23.4 % (ref 35–47)
HCT VFR BLD AUTO: 24.6 % (ref 35–47)
HCT VFR BLD AUTO: 24.6 % (ref 35–47)
HCT VFR BLD AUTO: 24.9 % (ref 35–47)
HCT VFR BLD AUTO: 25.1 % (ref 35–47)
HCT VFR BLD AUTO: 27.1 % (ref 35–47)
HCT VFR BLD AUTO: 27.3 % (ref 35–47)
HCT VFR BLD AUTO: 27.4 % (ref 35–47)
HCT VFR BLD AUTO: 27.9 % (ref 35–47)
HCT VFR BLD AUTO: 29.1 % (ref 35–47)
HCT VFR BLD AUTO: 29.5 % (ref 35–47)
HCT VFR BLD AUTO: 29.6 % (ref 35–47)
HCT VFR BLD AUTO: 29.7 % (ref 35–47)
HEMOCCULT STL QL: POSITIVE
HGB BLD-MCNC: 10.7 G/DL (ref 11.7–15.7)
HGB BLD-MCNC: 10.9 G/DL (ref 11.7–15.7)
HGB BLD-MCNC: 6.8 G/DL (ref 11.7–15.7)
HGB BLD-MCNC: 6.9 G/DL (ref 11.7–15.7)
HGB BLD-MCNC: 6.9 G/DL (ref 11.7–15.7)
HGB BLD-MCNC: 7 G/DL (ref 11.7–15.7)
HGB BLD-MCNC: 7.2 G/DL (ref 11.7–15.7)
HGB BLD-MCNC: 7.3 G/DL (ref 11.7–15.7)
HGB BLD-MCNC: 7.3 G/DL (ref 11.7–15.7)
HGB BLD-MCNC: 7.4 G/DL (ref 11.7–15.7)
HGB BLD-MCNC: 7.6 G/DL (ref 11.7–15.7)
HGB BLD-MCNC: 7.7 G/DL (ref 11.7–15.7)
HGB BLD-MCNC: 7.8 G/DL (ref 11.7–15.7)
HGB BLD-MCNC: 7.9 G/DL (ref 11.7–15.7)
HGB BLD-MCNC: 7.9 G/DL (ref 11.7–15.7)
HGB BLD-MCNC: 8 G/DL (ref 11.7–15.7)
HGB BLD-MCNC: 8.1 G/DL (ref 11.7–15.7)
HGB BLD-MCNC: 8.1 G/DL (ref 11.7–15.7)
HGB BLD-MCNC: 8.3 G/DL (ref 11.7–15.7)
HGB BLD-MCNC: 8.4 G/DL (ref 11.7–15.7)
HGB BLD-MCNC: 8.4 G/DL (ref 11.7–15.7)
HGB BLD-MCNC: 8.6 G/DL (ref 11.7–15.7)
HGB BLD-MCNC: 8.8 G/DL (ref 11.7–15.7)
HGB BLD-MCNC: 8.9 G/DL (ref 11.7–15.7)
HGB BLD-MCNC: 8.9 G/DL (ref 11.7–15.7)
HGB BLD-MCNC: 9 G/DL (ref 11.7–15.7)
HGB BLD-MCNC: 9.1 G/DL (ref 11.7–15.7)
HGB BLD-MCNC: 9.3 G/DL (ref 11.7–15.7)
HGB BLD-MCNC: 9.3 G/DL (ref 11.7–15.7)
HGB BLD-MCNC: 9.5 G/DL (ref 11.7–15.7)
HGB BLD-MCNC: 9.6 G/DL (ref 11.7–15.7)
HGB BLD-MCNC: 9.6 G/DL (ref 11.7–15.7)
HGB UR QL STRIP: ABNORMAL
HGB UR QL STRIP: NEGATIVE
HOLD SPECIMEN: NORMAL
HYALINE CASTS: 1 /LPF
IMM GRANULOCYTES # BLD: 0 10E3/UL
IMM GRANULOCYTES NFR BLD: 0 %
IMM GRANULOCYTES NFR BLD: 1 %
INR PPP: 1.22 (ref 0.85–1.15)
INR PPP: 1.23 (ref 0.85–1.15)
INR PPP: 1.36 (ref 0.85–1.15)
INTERPRETATION ECG - MUSE: NORMAL
IRON SATN MFR SERPL: 19 % (ref 20–50)
IRON SERPL-MCNC: 20 UG/DL (ref 42–175)
IRON SERPL-MCNC: 22 UG/DL (ref 37–145)
ISSUE DATE AND TIME: NORMAL
KAPPA LC FREE SER-MCNC: 13.1 MG/DL (ref 0.33–1.94)
KAPPA LC FREE/LAMBDA FREE SER NEPH: 1.33 {RATIO} (ref 0.26–1.65)
KETONES UR STRIP-MCNC: 10 MG/DL
KETONES UR STRIP-MCNC: NEGATIVE MG/DL
LACTATE SERPL-SCNC: 1.2 MMOL/L (ref 0.7–2)
LAMBDA LC FREE SERPL-MCNC: 9.86 MG/DL (ref 0.57–2.63)
LEUKOCYTE ESTERASE UR QL STRIP: ABNORMAL
LEUKOCYTE ESTERASE UR QL STRIP: ABNORMAL
LEUKOCYTE ESTERASE UR QL STRIP: NEGATIVE
LEUKOCYTE ESTERASE UR QL STRIP: NEGATIVE
LVEF ECHO: NORMAL
LYMPHOCYTES # BLD AUTO: 1.1 10E3/UL (ref 0.8–5.3)
LYMPHOCYTES # BLD AUTO: 1.2 10E3/UL (ref 0.8–5.3)
LYMPHOCYTES NFR BLD AUTO: 10 %
LYMPHOCYTES NFR BLD AUTO: 13 %
LYMPHOCYTES NFR BLD AUTO: 14 %
LYMPHOCYTES NFR BLD AUTO: 16 %
MAGNESIUM SERPL-MCNC: 1.6 MG/DL (ref 1.8–2.6)
MAGNESIUM SERPL-MCNC: 1.6 MG/DL (ref 1.8–2.6)
MAGNESIUM SERPL-MCNC: 1.7 MG/DL (ref 1.8–2.6)
MAGNESIUM SERPL-MCNC: 1.8 MG/DL (ref 1.8–2.6)
MAGNESIUM SERPL-MCNC: 1.9 MG/DL (ref 1.8–2.6)
MAGNESIUM SERPL-MCNC: 1.9 MG/DL (ref 1.8–2.6)
MAGNESIUM SERPL-MCNC: 2 MG/DL (ref 1.8–2.6)
MAGNESIUM SERPL-MCNC: 2 MG/DL (ref 1.8–2.6)
MAGNESIUM SERPL-MCNC: 2.1 MG/DL (ref 1.8–2.6)
MAGNESIUM SERPL-MCNC: 2.3 MG/DL (ref 1.8–2.6)
MCH RBC QN AUTO: 30.4 PG (ref 26.5–33)
MCH RBC QN AUTO: 30.7 PG (ref 26.5–33)
MCH RBC QN AUTO: 30.8 PG (ref 26.5–33)
MCH RBC QN AUTO: 30.9 PG (ref 26.5–33)
MCH RBC QN AUTO: 31 PG (ref 26.5–33)
MCH RBC QN AUTO: 31.1 PG (ref 26.5–33)
MCH RBC QN AUTO: 31.2 PG (ref 26.5–33)
MCH RBC QN AUTO: 31.3 PG (ref 26.5–33)
MCH RBC QN AUTO: 31.9 PG (ref 26.5–33)
MCH RBC QN AUTO: 31.9 PG (ref 26.5–33)
MCH RBC QN AUTO: 32 PG (ref 26.5–33)
MCH RBC QN AUTO: 32.2 PG (ref 26.5–33)
MCH RBC QN AUTO: 32.2 PG (ref 26.5–33)
MCH RBC QN AUTO: 32.3 PG (ref 26.5–33)
MCHC RBC AUTO-ENTMCNC: 30.9 G/DL (ref 31.5–36.5)
MCHC RBC AUTO-ENTMCNC: 31 G/DL (ref 31.5–36.5)
MCHC RBC AUTO-ENTMCNC: 31.3 G/DL (ref 31.5–36.5)
MCHC RBC AUTO-ENTMCNC: 31.3 G/DL (ref 31.5–36.5)
MCHC RBC AUTO-ENTMCNC: 31.6 G/DL (ref 31.5–36.5)
MCHC RBC AUTO-ENTMCNC: 31.7 G/DL (ref 31.5–36.5)
MCHC RBC AUTO-ENTMCNC: 31.9 G/DL (ref 31.5–36.5)
MCHC RBC AUTO-ENTMCNC: 32.1 G/DL (ref 31.5–36.5)
MCHC RBC AUTO-ENTMCNC: 32.1 G/DL (ref 31.5–36.5)
MCHC RBC AUTO-ENTMCNC: 32.4 G/DL (ref 31.5–36.5)
MCHC RBC AUTO-ENTMCNC: 32.5 G/DL (ref 31.5–36.5)
MCHC RBC AUTO-ENTMCNC: 32.5 G/DL (ref 31.5–36.5)
MCHC RBC AUTO-ENTMCNC: 32.6 G/DL (ref 31.5–36.5)
MCHC RBC AUTO-ENTMCNC: 32.7 G/DL (ref 31.5–36.5)
MCHC RBC AUTO-ENTMCNC: 32.7 G/DL (ref 31.5–36.5)
MCHC RBC AUTO-ENTMCNC: 32.8 G/DL (ref 31.5–36.5)
MCHC RBC AUTO-ENTMCNC: 33.3 G/DL (ref 31.5–36.5)
MCHC RBC AUTO-ENTMCNC: 33.5 G/DL (ref 31.5–36.5)
MCV RBC AUTO: 100 FL (ref 78–100)
MCV RBC AUTO: 101 FL (ref 78–100)
MCV RBC AUTO: 101 FL (ref 78–100)
MCV RBC AUTO: 102 FL (ref 78–100)
MCV RBC AUTO: 104 FL (ref 78–100)
MCV RBC AUTO: 91 FL (ref 78–100)
MCV RBC AUTO: 92 FL (ref 78–100)
MCV RBC AUTO: 94 FL (ref 78–100)
MCV RBC AUTO: 94 FL (ref 78–100)
MCV RBC AUTO: 95 FL (ref 78–100)
MCV RBC AUTO: 96 FL (ref 78–100)
MCV RBC AUTO: 97 FL (ref 78–100)
MCV RBC AUTO: 97 FL (ref 78–100)
MCV RBC AUTO: 98 FL (ref 78–100)
MCV RBC AUTO: 99 FL (ref 78–100)
MCV RBC AUTO: 99 FL (ref 78–100)
MONOCYTES # BLD AUTO: 0.7 10E3/UL (ref 0–1.3)
MONOCYTES # BLD AUTO: 1 10E3/UL (ref 0–1.3)
MONOCYTES # BLD AUTO: 1 10E3/UL (ref 0–1.3)
MONOCYTES # BLD AUTO: 1.3 10E3/UL (ref 0–1.3)
MONOCYTES NFR BLD AUTO: 10 %
MONOCYTES NFR BLD AUTO: 11 %
MONOCYTES NFR BLD AUTO: 12 %
MONOCYTES NFR BLD AUTO: 13 %
MUCOUS THREADS #/AREA URNS LPF: PRESENT /LPF
NEUTROPHILS # BLD AUTO: 5.1 10E3/UL (ref 1.6–8.3)
NEUTROPHILS # BLD AUTO: 5.7 10E3/UL (ref 1.6–8.3)
NEUTROPHILS # BLD AUTO: 6.2 10E3/UL (ref 1.6–8.3)
NEUTROPHILS # BLD AUTO: 9 10E3/UL (ref 1.6–8.3)
NEUTROPHILS NFR BLD AUTO: 71 %
NEUTROPHILS NFR BLD AUTO: 72 %
NEUTROPHILS NFR BLD AUTO: 72 %
NEUTROPHILS NFR BLD AUTO: 79 %
NITRATE UR QL: NEGATIVE
NITRATE UR QL: POSITIVE
NRBC # BLD AUTO: 0 10E3/UL
NRBC BLD AUTO-RTO: 0 /100
OXYHGB MFR BLDV: 47.5 % (ref 70–75)
P AXIS - MUSE: 77 DEGREES
P AXIS - MUSE: 85 DEGREES
P AXIS - MUSE: 90 DEGREES
PATH ICD:: 17.9
PATH REPORT.COMMENTS IMP SPEC: ABNORMAL
PATH REPORT.COMMENTS IMP SPEC: YES
PATH REPORT.FINAL DX SPEC: ABNORMAL
PATH REPORT.GROSS SPEC: ABNORMAL
PATH REPORT.MICROSCOPIC SPEC OTHER STN: ABNORMAL
PATH REPORT.RELEVANT HX SPEC: ABNORMAL
PCO2 BLDV: 27 MM HG (ref 35–50)
PH BLDV: 7.2 [PH] (ref 7.35–7.45)
PH UR STRIP: 6.5 [PH] (ref 5–7)
PH UR STRIP: 7 [PH] (ref 5–7)
PHOSPHATE SERPL-MCNC: 3.2 MG/DL (ref 2.5–4.5)
PHOTO IMAGE: ABNORMAL
PLATELET # BLD AUTO: 127 10E3/UL (ref 150–450)
PLATELET # BLD AUTO: 142 10E3/UL (ref 150–450)
PLATELET # BLD AUTO: 142 10E3/UL (ref 150–450)
PLATELET # BLD AUTO: 146 10E3/UL (ref 150–450)
PLATELET # BLD AUTO: 149 10E3/UL (ref 150–450)
PLATELET # BLD AUTO: 158 10E3/UL (ref 150–450)
PLATELET # BLD AUTO: 161 10E3/UL (ref 150–450)
PLATELET # BLD AUTO: 168 10E3/UL (ref 150–450)
PLATELET # BLD AUTO: 182 10E3/UL (ref 150–450)
PLATELET # BLD AUTO: 183 10E3/UL (ref 150–450)
PLATELET # BLD AUTO: 185 10E3/UL (ref 150–450)
PLATELET # BLD AUTO: 185 10E3/UL (ref 150–450)
PLATELET # BLD AUTO: 191 10E3/UL (ref 150–450)
PLATELET # BLD AUTO: 202 10E3/UL (ref 150–450)
PLATELET # BLD AUTO: 207 10E3/UL (ref 150–450)
PLATELET # BLD AUTO: 210 10E3/UL (ref 150–450)
PLATELET # BLD AUTO: 212 10E3/UL (ref 150–450)
PLATELET # BLD AUTO: 226 10E3/UL (ref 150–450)
PO2 BLDV: 29 MM HG (ref 25–47)
POTASSIUM BLD-SCNC: 2.1 MMOL/L (ref 3.5–5)
POTASSIUM BLD-SCNC: 2.2 MMOL/L (ref 3.5–5)
POTASSIUM BLD-SCNC: 2.2 MMOL/L (ref 3.5–5)
POTASSIUM BLD-SCNC: 2.5 MMOL/L (ref 3.5–5)
POTASSIUM BLD-SCNC: 2.5 MMOL/L (ref 3.5–5)
POTASSIUM BLD-SCNC: 2.7 MMOL/L (ref 3.5–5)
POTASSIUM BLD-SCNC: 2.8 MMOL/L (ref 3.5–5)
POTASSIUM BLD-SCNC: 3 MMOL/L (ref 3.5–5)
POTASSIUM BLD-SCNC: 3.1 MMOL/L (ref 3.5–5)
POTASSIUM BLD-SCNC: 3.2 MMOL/L (ref 3.5–5)
POTASSIUM BLD-SCNC: 3.2 MMOL/L (ref 3.5–5)
POTASSIUM BLD-SCNC: 3.3 MMOL/L (ref 3.5–5)
POTASSIUM BLD-SCNC: 3.3 MMOL/L (ref 3.5–5)
POTASSIUM BLD-SCNC: 3.4 MMOL/L (ref 3.5–5)
POTASSIUM BLD-SCNC: 3.5 MMOL/L (ref 3.5–5)
POTASSIUM BLD-SCNC: 3.6 MMOL/L (ref 3.5–5)
POTASSIUM BLD-SCNC: 3.7 MMOL/L (ref 3.5–5)
POTASSIUM BLD-SCNC: 3.7 MMOL/L (ref 3.5–5)
POTASSIUM BLD-SCNC: 3.8 MMOL/L (ref 3.5–5)
POTASSIUM BLD-SCNC: 4 MMOL/L (ref 3.5–5)
POTASSIUM BLD-SCNC: 4.1 MMOL/L (ref 3.5–5)
POTASSIUM BLD-SCNC: 4.2 MMOL/L (ref 3.5–5)
POTASSIUM BLD-SCNC: 4.7 MMOL/L (ref 3.5–5)
POTASSIUM BLD-SCNC: 4.7 MMOL/L (ref 3.5–5)
POTASSIUM SERPL-SCNC: 3.8 MMOL/L (ref 3.4–5.3)
POTASSIUM SERPL-SCNC: 4.3 MMOL/L (ref 3.4–5.3)
POTASSIUM UR-SCNC: 34 MMOL/L
PR INTERVAL - MUSE: 100 MS
PR INTERVAL - MUSE: 122 MS
PR INTERVAL - MUSE: 130 MS
PROT PATTERN SERPL ELPH-IMP: ABNORMAL
PROT SERPL-MCNC: 5.2 G/DL (ref 6.4–8.3)
PROT SERPL-MCNC: 5.5 G/DL (ref 6–8)
PROT SERPL-MCNC: 6.1 G/DL (ref 6–8)
PROT SERPL-MCNC: 7.6 G/DL (ref 6–8)
PROT/CREAT 24H UR: 0.31 MG/MG CR
PROT/CREAT 24H UR: 2.15 MG/MG CR
PTH-INTACT SERPL-MCNC: 37 PG/ML (ref 15–65)
QRS DURATION - MUSE: 128 MS
QRS DURATION - MUSE: 144 MS
QRS DURATION - MUSE: 152 MS
QT - MUSE: 366 MS
QT - MUSE: 386 MS
QT - MUSE: 406 MS
QTC - MUSE: 388 MS
QTC - MUSE: 436 MS
QTC - MUSE: 445 MS
R AXIS - MUSE: -41 DEGREES
R AXIS - MUSE: -42 DEGREES
R AXIS - MUSE: 16 DEGREES
RBC # BLD AUTO: 2.21 10E6/UL (ref 3.8–5.2)
RBC # BLD AUTO: 2.21 10E6/UL (ref 3.8–5.2)
RBC # BLD AUTO: 2.24 10E6/UL (ref 3.8–5.2)
RBC # BLD AUTO: 2.25 10E6/UL (ref 3.8–5.2)
RBC # BLD AUTO: 2.29 10E6/UL (ref 3.8–5.2)
RBC # BLD AUTO: 2.41 10E6/UL (ref 3.8–5.2)
RBC # BLD AUTO: 2.57 10E6/UL (ref 3.8–5.2)
RBC # BLD AUTO: 2.6 10E6/UL (ref 3.8–5.2)
RBC # BLD AUTO: 2.61 10E6/UL (ref 3.8–5.2)
RBC # BLD AUTO: 2.72 10E6/UL (ref 3.8–5.2)
RBC # BLD AUTO: 2.73 10E6/UL (ref 3.8–5.2)
RBC # BLD AUTO: 2.79 10E6/UL (ref 3.8–5.2)
RBC # BLD AUTO: 2.88 10E6/UL (ref 3.8–5.2)
RBC # BLD AUTO: 2.9 10E6/UL (ref 3.8–5.2)
RBC # BLD AUTO: 2.91 10E6/UL (ref 3.8–5.2)
RBC # BLD AUTO: 2.94 10E6/UL (ref 3.8–5.2)
RBC # BLD AUTO: 2.94 10E6/UL (ref 3.8–5.2)
RBC # BLD AUTO: 2.98 10E6/UL (ref 3.8–5.2)
RBC URINE: 0 /HPF
RBC URINE: 1 /HPF
RBC URINE: 1 /HPF
REVIEWING PATHOLOGIST: ABNORMAL
SAO2 % BLDV: 48.2 % (ref 70–75)
SARS-COV-2 RNA RESP QL NAA+PROBE: NEGATIVE
SODIUM SERPL-SCNC: 128 MMOL/L (ref 136–145)
SODIUM SERPL-SCNC: 129 MMOL/L (ref 136–145)
SODIUM SERPL-SCNC: 129 MMOL/L (ref 136–145)
SODIUM SERPL-SCNC: 130 MMOL/L (ref 136–145)
SODIUM SERPL-SCNC: 130 MMOL/L (ref 136–145)
SODIUM SERPL-SCNC: 131 MMOL/L (ref 136–145)
SODIUM SERPL-SCNC: 132 MMOL/L (ref 136–145)
SODIUM SERPL-SCNC: 132 MMOL/L (ref 136–145)
SODIUM SERPL-SCNC: 133 MMOL/L (ref 136–145)
SODIUM SERPL-SCNC: 134 MMOL/L (ref 136–145)
SODIUM SERPL-SCNC: 134 MMOL/L (ref 136–145)
SODIUM SERPL-SCNC: 135 MMOL/L (ref 136–145)
SODIUM SERPL-SCNC: 135 MMOL/L (ref 136–145)
SODIUM SERPL-SCNC: 136 MMOL/L (ref 136–145)
SODIUM SERPL-SCNC: 137 MMOL/L (ref 136–145)
SODIUM SERPL-SCNC: 140 MMOL/L (ref 136–145)
SODIUM SERPL-SCNC: 141 MMOL/L (ref 136–145)
SODIUM SERPL-SCNC: 142 MMOL/L (ref 136–145)
SODIUM SERPL-SCNC: 143 MMOL/L (ref 136–145)
SODIUM UR-SCNC: 72 MMOL/L
SP GR UR STRIP: 1.01 (ref 1–1.03)
SPECIMEN EXPIRATION DATE: NORMAL
SQUAMOUS EPITHELIAL: <1 /HPF
SQUAMOUS EPITHELIAL: <1 /HPF
SYSTOLIC BLOOD PRESSURE - MUSE: 176 MMHG
SYSTOLIC BLOOD PRESSURE - MUSE: NORMAL MMHG
SYSTOLIC BLOOD PRESSURE - MUSE: NORMAL MMHG
T AXIS - MUSE: 23 DEGREES
T AXIS - MUSE: 28 DEGREES
T AXIS - MUSE: 88 DEGREES
TIBC SERPL-MCNC: 108 UG/DL (ref 313–563)
TOTAL PROTEIN SERUM FOR ELP (SYNCED VALUE): 6.7 G/DL
TOTAL PROTEIN SERUM FOR ELP: 6.7 G/DL (ref 6–8)
TRANSFERRIN SERPL-MCNC: 76.7 MG/DL (ref 200–360)
TRANSFERRIN SERPL-MCNC: 86 MG/DL (ref 212–360)
TRANSITIONAL EPI: <1 /HPF
TROPONIN I SERPL-MCNC: 0.09 NG/ML (ref 0–0.29)
TROPONIN I SERPL-MCNC: 0.12 NG/ML (ref 0–0.29)
TROPONIN I SERPL-MCNC: 0.14 NG/ML (ref 0–0.29)
TROPONIN I SERPL-MCNC: 0.16 NG/ML (ref 0–0.29)
TSH SERPL DL<=0.005 MIU/L-ACNC: 0.68 UIU/ML (ref 0.3–5)
TSH SERPL DL<=0.005 MIU/L-ACNC: 1.42 UIU/ML (ref 0.3–4.2)
TSH SERPL DL<=0.005 MIU/L-ACNC: 2.11 UIU/ML (ref 0.3–4.2)
UNIT ABO/RH: NORMAL
UNIT NUMBER: NORMAL
UNIT STATUS: NORMAL
UNIT TYPE ISBT: 600
UROBILINOGEN UR STRIP-MCNC: <2 MG/DL
UROBILINOGEN UR STRIP-MCNC: <2 MG/DL
UROBILINOGEN UR STRIP-MCNC: NORMAL MG/DL
UROBILINOGEN UR STRIP-MCNC: NORMAL MG/DL
VENTRICULAR RATE- MUSE: 55 BPM
VENTRICULAR RATE- MUSE: 77 BPM
VENTRICULAR RATE- MUSE: 89 BPM
VIT B12 SERPL-MCNC: 1158 PG/ML (ref 213–816)
VIT B12 SERPL-MCNC: 402 PG/ML (ref 232–1245)
VIT B12 SERPL-MCNC: 451 PG/ML (ref 232–1245)
WBC # BLD AUTO: 10.9 10E3/UL (ref 4–11)
WBC # BLD AUTO: 11.4 10E3/UL (ref 4–11)
WBC # BLD AUTO: 5 10E3/UL (ref 4–11)
WBC # BLD AUTO: 5.1 10E3/UL (ref 4–11)
WBC # BLD AUTO: 6.1 10E3/UL (ref 4–11)
WBC # BLD AUTO: 6.2 10E3/UL (ref 4–11)
WBC # BLD AUTO: 6.3 10E3/UL (ref 4–11)
WBC # BLD AUTO: 6.4 10E3/UL (ref 4–11)
WBC # BLD AUTO: 6.5 10E3/UL (ref 4–11)
WBC # BLD AUTO: 6.7 10E3/UL (ref 4–11)
WBC # BLD AUTO: 6.8 10E3/UL (ref 4–11)
WBC # BLD AUTO: 7 10E3/UL (ref 4–11)
WBC # BLD AUTO: 7.4 10E3/UL (ref 4–11)
WBC # BLD AUTO: 7.5 10E3/UL (ref 4–11)
WBC # BLD AUTO: 7.7 10E3/UL (ref 4–11)
WBC # BLD AUTO: 7.8 10E3/UL (ref 4–11)
WBC # BLD AUTO: 8 10E3/UL (ref 4–11)
WBC # BLD AUTO: 8.4 10E3/UL (ref 4–11)
WBC CLUMPS #/AREA URNS HPF: PRESENT /HPF
WBC URINE: 1 /HPF
WBC URINE: 19 /HPF
WBC URINE: >182 /HPF

## 2022-01-01 PROCEDURE — 250N000013 HC RX MED GY IP 250 OP 250 PS 637: Performed by: FAMILY MEDICINE

## 2022-01-01 PROCEDURE — 84443 ASSAY THYROID STIM HORMONE: CPT | Mod: ORL | Performed by: INTERNAL MEDICINE

## 2022-01-01 PROCEDURE — 97530 THERAPEUTIC ACTIVITIES: CPT | Mod: GP

## 2022-01-01 PROCEDURE — 250N000013 HC RX MED GY IP 250 OP 250 PS 637: Performed by: NURSE PRACTITIONER

## 2022-01-01 PROCEDURE — 83540 ASSAY OF IRON: CPT | Performed by: NURSE PRACTITIONER

## 2022-01-01 PROCEDURE — 3E1H88Z IRRIGATION OF LOWER GI USING IRRIGATING SUBSTANCE, VIA NATURAL OR ARTIFICIAL OPENING ENDOSCOPIC: ICD-10-PCS | Performed by: INTERNAL MEDICINE

## 2022-01-01 PROCEDURE — 250N000011 HC RX IP 250 OP 636: Performed by: INTERNAL MEDICINE

## 2022-01-01 PROCEDURE — 85027 COMPLETE CBC AUTOMATED: CPT | Performed by: EMERGENCY MEDICINE

## 2022-01-01 PROCEDURE — 93005 ELECTROCARDIOGRAM TRACING: CPT | Performed by: EMERGENCY MEDICINE

## 2022-01-01 PROCEDURE — P9016 RBC LEUKOCYTES REDUCED: HCPCS | Performed by: INTERNAL MEDICINE

## 2022-01-01 PROCEDURE — 36415 COLL VENOUS BLD VENIPUNCTURE: CPT | Mod: ORL | Performed by: NURSE PRACTITIONER

## 2022-01-01 PROCEDURE — 85025 COMPLETE CBC W/AUTO DIFF WBC: CPT | Performed by: EMERGENCY MEDICINE

## 2022-01-01 PROCEDURE — 84155 ASSAY OF PROTEIN SERUM: CPT | Performed by: INTERNAL MEDICINE

## 2022-01-01 PROCEDURE — 80048 BASIC METABOLIC PNL TOTAL CA: CPT | Performed by: EMERGENCY MEDICINE

## 2022-01-01 PROCEDURE — 250N000013 HC RX MED GY IP 250 OP 250 PS 637: Performed by: INTERNAL MEDICINE

## 2022-01-01 PROCEDURE — 85014 HEMATOCRIT: CPT | Performed by: EMERGENCY MEDICINE

## 2022-01-01 PROCEDURE — 84132 ASSAY OF SERUM POTASSIUM: CPT | Performed by: INTERNAL MEDICINE

## 2022-01-01 PROCEDURE — 36415 COLL VENOUS BLD VENIPUNCTURE: CPT | Performed by: INTERNAL MEDICINE

## 2022-01-01 PROCEDURE — 99222 1ST HOSP IP/OBS MODERATE 55: CPT | Performed by: INTERNAL MEDICINE

## 2022-01-01 PROCEDURE — 210N000001 HC R&B IMCU HEART CARE

## 2022-01-01 PROCEDURE — 99232 SBSQ HOSP IP/OBS MODERATE 35: CPT | Performed by: INTERNAL MEDICINE

## 2022-01-01 PROCEDURE — 85610 PROTHROMBIN TIME: CPT | Performed by: EMERGENCY MEDICINE

## 2022-01-01 PROCEDURE — 0DBQ8ZX EXCISION OF ANUS, VIA NATURAL OR ARTIFICIAL OPENING ENDOSCOPIC, DIAGNOSTIC: ICD-10-PCS | Performed by: INTERNAL MEDICINE

## 2022-01-01 PROCEDURE — 86923 COMPATIBILITY TEST ELECTRIC: CPT | Performed by: INTERNAL MEDICINE

## 2022-01-01 PROCEDURE — 86850 RBC ANTIBODY SCREEN: CPT | Performed by: EMERGENCY MEDICINE

## 2022-01-01 PROCEDURE — 250N000009 HC RX 250: Performed by: ANESTHESIOLOGY

## 2022-01-01 PROCEDURE — 84156 ASSAY OF PROTEIN URINE: CPT | Performed by: NURSE PRACTITIONER

## 2022-01-01 PROCEDURE — 85027 COMPLETE CBC AUTOMATED: CPT | Mod: ORL | Performed by: INTERNAL MEDICINE

## 2022-01-01 PROCEDURE — 250N000009 HC RX 250: Performed by: NURSE ANESTHETIST, CERTIFIED REGISTERED

## 2022-01-01 PROCEDURE — 82310 ASSAY OF CALCIUM: CPT | Performed by: EMERGENCY MEDICINE

## 2022-01-01 PROCEDURE — 99221 1ST HOSP IP/OBS SF/LOW 40: CPT | Performed by: STUDENT IN AN ORGANIZED HEALTH CARE EDUCATION/TRAINING PROGRAM

## 2022-01-01 PROCEDURE — 36415 COLL VENOUS BLD VENIPUNCTURE: CPT | Performed by: EMERGENCY MEDICINE

## 2022-01-01 PROCEDURE — 85027 COMPLETE CBC AUTOMATED: CPT | Performed by: INTERNAL MEDICINE

## 2022-01-01 PROCEDURE — 93306 TTE W/DOPPLER COMPLETE: CPT | Mod: 26 | Performed by: INTERNAL MEDICINE

## 2022-01-01 PROCEDURE — 97110 THERAPEUTIC EXERCISES: CPT | Mod: GO

## 2022-01-01 PROCEDURE — 93306 TTE W/DOPPLER COMPLETE: CPT

## 2022-01-01 PROCEDURE — 96374 THER/PROPH/DIAG INJ IV PUSH: CPT | Mod: 59

## 2022-01-01 PROCEDURE — 97116 GAIT TRAINING THERAPY: CPT | Mod: GP

## 2022-01-01 PROCEDURE — 92526 ORAL FUNCTION THERAPY: CPT | Mod: GN

## 2022-01-01 PROCEDURE — 36415 COLL VENOUS BLD VENIPUNCTURE: CPT | Performed by: FAMILY MEDICINE

## 2022-01-01 PROCEDURE — 80048 BASIC METABOLIC PNL TOTAL CA: CPT | Mod: ORL | Performed by: NURSE PRACTITIONER

## 2022-01-01 PROCEDURE — 85027 COMPLETE CBC AUTOMATED: CPT | Performed by: FAMILY MEDICINE

## 2022-01-01 PROCEDURE — 258N000003 HC RX IP 258 OP 636: Performed by: FAMILY MEDICINE

## 2022-01-01 PROCEDURE — 85018 HEMOGLOBIN: CPT | Performed by: INTERNAL MEDICINE

## 2022-01-01 PROCEDURE — 99310 SBSQ NF CARE HIGH MDM 45: CPT | Performed by: NURSE PRACTITIONER

## 2022-01-01 PROCEDURE — 85018 HEMOGLOBIN: CPT | Mod: ORL | Performed by: NURSE PRACTITIONER

## 2022-01-01 PROCEDURE — 250N000013 HC RX MED GY IP 250 OP 250 PS 637: Performed by: EMERGENCY MEDICINE

## 2022-01-01 PROCEDURE — 360N000074 HC SURGERY LEVEL 1, PER MIN: Performed by: ORTHOPAEDIC SURGERY

## 2022-01-01 PROCEDURE — 250N000013 HC RX MED GY IP 250 OP 250 PS 637: Performed by: PHYSICIAN ASSISTANT

## 2022-01-01 PROCEDURE — 99223 1ST HOSP IP/OBS HIGH 75: CPT | Mod: FS | Performed by: INTERNAL MEDICINE

## 2022-01-01 PROCEDURE — 999N000127 HC STATISTIC PERIPHERAL IV START W US GUIDANCE

## 2022-01-01 PROCEDURE — 84132 ASSAY OF SERUM POTASSIUM: CPT | Performed by: EMERGENCY MEDICINE

## 2022-01-01 PROCEDURE — 250N000009 HC RX 250: Performed by: INTERNAL MEDICINE

## 2022-01-01 PROCEDURE — 83880 ASSAY OF NATRIURETIC PEPTIDE: CPT | Performed by: EMERGENCY MEDICINE

## 2022-01-01 PROCEDURE — 80053 COMPREHEN METABOLIC PANEL: CPT | Mod: ORL | Performed by: INTERNAL MEDICINE

## 2022-01-01 PROCEDURE — 80048 BASIC METABOLIC PNL TOTAL CA: CPT | Performed by: INTERNAL MEDICINE

## 2022-01-01 PROCEDURE — 99239 HOSP IP/OBS DSCHRG MGMT >30: CPT | Performed by: INTERNAL MEDICINE

## 2022-01-01 PROCEDURE — 82272 OCCULT BLD FECES 1-3 TESTS: CPT | Performed by: EMERGENCY MEDICINE

## 2022-01-01 PROCEDURE — 97165 OT EVAL LOW COMPLEX 30 MIN: CPT | Mod: GO

## 2022-01-01 PROCEDURE — 86923 COMPATIBILITY TEST ELECTRIC: CPT | Performed by: NURSE PRACTITIONER

## 2022-01-01 PROCEDURE — 83735 ASSAY OF MAGNESIUM: CPT | Performed by: EMERGENCY MEDICINE

## 2022-01-01 PROCEDURE — 258N000003 HC RX IP 258 OP 636: Performed by: ANESTHESIOLOGY

## 2022-01-01 PROCEDURE — 82310 ASSAY OF CALCIUM: CPT | Performed by: INTERNAL MEDICINE

## 2022-01-01 PROCEDURE — 82436 ASSAY OF URINE CHLORIDE: CPT | Performed by: INTERNAL MEDICINE

## 2022-01-01 PROCEDURE — 88305 TISSUE EXAM BY PATHOLOGIST: CPT | Mod: TC | Performed by: INTERNAL MEDICINE

## 2022-01-01 PROCEDURE — 999N000285 HC STATISTIC VASC ACCESS LAB DRAW WITH PIV START

## 2022-01-01 PROCEDURE — 83735 ASSAY OF MAGNESIUM: CPT | Performed by: INTERNAL MEDICINE

## 2022-01-01 PROCEDURE — 82805 BLOOD GASES W/O2 SATURATION: CPT | Performed by: INTERNAL MEDICINE

## 2022-01-01 PROCEDURE — 97535 SELF CARE MNGMENT TRAINING: CPT | Mod: GO

## 2022-01-01 PROCEDURE — 258N000003 HC RX IP 258 OP 636: Performed by: INTERNAL MEDICINE

## 2022-01-01 PROCEDURE — BW111ZZ FLUOROSCOPY OF ABDOMEN AND PELVIS USING LOW OSMOLAR CONTRAST: ICD-10-PCS | Performed by: RADIOLOGY

## 2022-01-01 PROCEDURE — 36415 COLL VENOUS BLD VENIPUNCTURE: CPT | Mod: ORL | Performed by: INTERNAL MEDICINE

## 2022-01-01 PROCEDURE — 85027 COMPLETE CBC AUTOMATED: CPT | Performed by: NURSE PRACTITIONER

## 2022-01-01 PROCEDURE — 120N000001 HC R&B MED SURG/OB

## 2022-01-01 PROCEDURE — 250N000009 HC RX 250: Performed by: ORTHOPAEDIC SURGERY

## 2022-01-01 PROCEDURE — 83735 ASSAY OF MAGNESIUM: CPT | Performed by: FAMILY MEDICINE

## 2022-01-01 PROCEDURE — 97166 OT EVAL MOD COMPLEX 45 MIN: CPT | Mod: GO

## 2022-01-01 PROCEDURE — 82610 CYSTATIN C: CPT | Performed by: NURSE PRACTITIONER

## 2022-01-01 PROCEDURE — 87075 CULTR BACTERIA EXCEPT BLOOD: CPT | Performed by: ORTHOPAEDIC SURGERY

## 2022-01-01 PROCEDURE — 86901 BLOOD TYPING SEROLOGIC RH(D): CPT | Performed by: EMERGENCY MEDICINE

## 2022-01-01 PROCEDURE — P9604 ONE-WAY ALLOW PRORATED TRIP: HCPCS | Mod: ORL | Performed by: INTERNAL MEDICINE

## 2022-01-01 PROCEDURE — 250N000011 HC RX IP 250 OP 636: Performed by: EMERGENCY MEDICINE

## 2022-01-01 PROCEDURE — 82607 VITAMIN B-12: CPT | Mod: ORL | Performed by: INTERNAL MEDICINE

## 2022-01-01 PROCEDURE — 71046 X-RAY EXAM CHEST 2 VIEWS: CPT

## 2022-01-01 PROCEDURE — 99223 1ST HOSP IP/OBS HIGH 75: CPT | Performed by: FAMILY MEDICINE

## 2022-01-01 PROCEDURE — 370N000017 HC ANESTHESIA TECHNICAL FEE, PER MIN: Performed by: ORTHOPAEDIC SURGERY

## 2022-01-01 PROCEDURE — 36430 TRANSFUSION BLD/BLD COMPNT: CPT

## 2022-01-01 PROCEDURE — 99233 SBSQ HOSP IP/OBS HIGH 50: CPT | Performed by: INTERNAL MEDICINE

## 2022-01-01 PROCEDURE — 99223 1ST HOSP IP/OBS HIGH 75: CPT | Performed by: NURSE PRACTITIONER

## 2022-01-01 PROCEDURE — 99223 1ST HOSP IP/OBS HIGH 75: CPT | Performed by: EMERGENCY MEDICINE

## 2022-01-01 PROCEDURE — 85018 HEMOGLOBIN: CPT | Performed by: NURSE PRACTITIONER

## 2022-01-01 PROCEDURE — 84484 ASSAY OF TROPONIN QUANT: CPT | Performed by: EMERGENCY MEDICINE

## 2022-01-01 PROCEDURE — 85018 HEMOGLOBIN: CPT | Performed by: FAMILY MEDICINE

## 2022-01-01 PROCEDURE — 87635 SARS-COV-2 COVID-19 AMP PRB: CPT | Performed by: EMERGENCY MEDICINE

## 2022-01-01 PROCEDURE — U0005 INFEC AGEN DETEC AMPLI PROBE: HCPCS | Mod: ORL | Performed by: FAMILY MEDICINE

## 2022-01-01 PROCEDURE — 99309 SBSQ NF CARE MODERATE MDM 30: CPT | Performed by: NURSE PRACTITIONER

## 2022-01-01 PROCEDURE — 250N000011 HC RX IP 250 OP 636: Performed by: NURSE PRACTITIONER

## 2022-01-01 PROCEDURE — 96361 HYDRATE IV INFUSION ADD-ON: CPT

## 2022-01-01 PROCEDURE — 99285 EMERGENCY DEPT VISIT HI MDM: CPT | Mod: 25

## 2022-01-01 PROCEDURE — 370N000017 HC ANESTHESIA TECHNICAL FEE, PER MIN: Performed by: INTERNAL MEDICINE

## 2022-01-01 PROCEDURE — 74174 CTA ABD&PLVS W/CONTRAST: CPT

## 2022-01-01 PROCEDURE — 250N000011 HC RX IP 250 OP 636: Performed by: NURSE ANESTHETIST, CERTIFIED REGISTERED

## 2022-01-01 PROCEDURE — U0005 INFEC AGEN DETEC AMPLI PROBE: HCPCS | Mod: ORL | Performed by: NURSE PRACTITIONER

## 2022-01-01 PROCEDURE — 84466 ASSAY OF TRANSFERRIN: CPT | Performed by: NURSE PRACTITIONER

## 2022-01-01 PROCEDURE — 97162 PT EVAL MOD COMPLEX 30 MIN: CPT | Mod: GP

## 2022-01-01 PROCEDURE — 82610 CYSTATIN C: CPT | Performed by: INTERNAL MEDICINE

## 2022-01-01 PROCEDURE — 99207 PR APP CREDIT; MD BILLING SHARED VISIT: CPT | Mod: FS | Performed by: FAMILY MEDICINE

## 2022-01-01 PROCEDURE — P9603 ONE-WAY ALLOW PRORATED MILES: HCPCS | Mod: ORL | Performed by: INTERNAL MEDICINE

## 2022-01-01 PROCEDURE — 83605 ASSAY OF LACTIC ACID: CPT | Performed by: EMERGENCY MEDICINE

## 2022-01-01 PROCEDURE — 80053 COMPREHEN METABOLIC PANEL: CPT | Performed by: EMERGENCY MEDICINE

## 2022-01-01 PROCEDURE — P9603 ONE-WAY ALLOW PRORATED MILES: HCPCS | Mod: ORL | Performed by: NURSE PRACTITIONER

## 2022-01-01 PROCEDURE — P9603 ONE-WAY ALLOW PRORATED MILES: HCPCS | Mod: ORL | Performed by: FAMILY MEDICINE

## 2022-01-01 PROCEDURE — 87086 URINE CULTURE/COLONY COUNT: CPT | Mod: ORL | Performed by: INTERNAL MEDICINE

## 2022-01-01 PROCEDURE — 84133 ASSAY OF URINE POTASSIUM: CPT | Performed by: INTERNAL MEDICINE

## 2022-01-01 PROCEDURE — 84300 ASSAY OF URINE SODIUM: CPT | Performed by: INTERNAL MEDICINE

## 2022-01-01 PROCEDURE — 360N000075 HC SURGERY LEVEL 2, PER MIN: Performed by: INTERNAL MEDICINE

## 2022-01-01 PROCEDURE — 250N000011 HC RX IP 250 OP 636: Performed by: PHYSICIAN ASSISTANT

## 2022-01-01 PROCEDURE — P9016 RBC LEUKOCYTES REDUCED: HCPCS | Performed by: EMERGENCY MEDICINE

## 2022-01-01 PROCEDURE — 84443 ASSAY THYROID STIM HORMONE: CPT | Performed by: FAMILY MEDICINE

## 2022-01-01 PROCEDURE — 83970 ASSAY OF PARATHORMONE: CPT | Performed by: NURSE PRACTITIONER

## 2022-01-01 PROCEDURE — 81001 URINALYSIS AUTO W/SCOPE: CPT | Mod: ORL | Performed by: NURSE PRACTITIONER

## 2022-01-01 PROCEDURE — 272N000001 HC OR GENERAL SUPPLY STERILE: Performed by: ORTHOPAEDIC SURGERY

## 2022-01-01 PROCEDURE — 96360 HYDRATION IV INFUSION INIT: CPT

## 2022-01-01 PROCEDURE — 82306 VITAMIN D 25 HYDROXY: CPT | Performed by: INTERNAL MEDICINE

## 2022-01-01 PROCEDURE — 86923 COMPATIBILITY TEST ELECTRIC: CPT | Performed by: EMERGENCY MEDICINE

## 2022-01-01 PROCEDURE — 250N000013 HC RX MED GY IP 250 OP 250 PS 637: Performed by: ANESTHESIOLOGY

## 2022-01-01 PROCEDURE — P9604 ONE-WAY ALLOW PRORATED TRIP: HCPCS | Mod: ORL | Performed by: NURSE PRACTITIONER

## 2022-01-01 PROCEDURE — 258N000003 HC RX IP 258 OP 636: Performed by: NURSE ANESTHETIST, CERTIFIED REGISTERED

## 2022-01-01 PROCEDURE — 82728 ASSAY OF FERRITIN: CPT | Mod: ORL | Performed by: INTERNAL MEDICINE

## 2022-01-01 PROCEDURE — 81001 URINALYSIS AUTO W/SCOPE: CPT | Mod: ORL | Performed by: INTERNAL MEDICINE

## 2022-01-01 PROCEDURE — 99232 SBSQ HOSP IP/OBS MODERATE 35: CPT | Performed by: EMERGENCY MEDICINE

## 2022-01-01 PROCEDURE — 85025 COMPLETE CBC W/AUTO DIFF WBC: CPT | Mod: ORL | Performed by: NURSE PRACTITIONER

## 2022-01-01 PROCEDURE — 710N000012 HC RECOVERY PHASE 2, PER MINUTE: Performed by: ORTHOPAEDIC SURGERY

## 2022-01-01 PROCEDURE — 0DBP8ZX EXCISION OF RECTUM, VIA NATURAL OR ARTIFICIAL OPENING ENDOSCOPIC, DIAGNOSTIC: ICD-10-PCS | Performed by: INTERNAL MEDICINE

## 2022-01-01 PROCEDURE — 84165 PROTEIN E-PHORESIS SERUM: CPT | Mod: 26 | Performed by: PATHOLOGY

## 2022-01-01 PROCEDURE — 87086 URINE CULTURE/COLONY COUNT: CPT | Mod: ORL | Performed by: NURSE PRACTITIONER

## 2022-01-01 PROCEDURE — 99316 NF DSCHRG MGMT 30 MIN+: CPT | Performed by: NURSE PRACTITIONER

## 2022-01-01 PROCEDURE — 80048 BASIC METABOLIC PNL TOTAL CA: CPT | Mod: ORL | Performed by: FAMILY MEDICINE

## 2022-01-01 PROCEDURE — 74176 CT ABD & PELVIS W/O CONTRAST: CPT

## 2022-01-01 PROCEDURE — 250N000013 HC RX MED GY IP 250 OP 250 PS 637

## 2022-01-01 PROCEDURE — 81003 URINALYSIS AUTO W/O SCOPE: CPT | Performed by: NURSE PRACTITIONER

## 2022-01-01 PROCEDURE — 84156 ASSAY OF PROTEIN URINE: CPT | Performed by: INTERNAL MEDICINE

## 2022-01-01 PROCEDURE — 82607 VITAMIN B-12: CPT | Performed by: FAMILY MEDICINE

## 2022-01-01 PROCEDURE — 84466 ASSAY OF TRANSFERRIN: CPT | Performed by: INTERNAL MEDICINE

## 2022-01-01 PROCEDURE — 97161 PT EVAL LOW COMPLEX 20 MIN: CPT | Mod: GP

## 2022-01-01 PROCEDURE — 92610 EVALUATE SWALLOWING FUNCTION: CPT | Mod: GN | Performed by: SPEECH-LANGUAGE PATHOLOGIST

## 2022-01-01 PROCEDURE — P9604 ONE-WAY ALLOW PRORATED TRIP: HCPCS | Mod: ORL | Performed by: FAMILY MEDICINE

## 2022-01-01 PROCEDURE — 99233 SBSQ HOSP IP/OBS HIGH 50: CPT | Performed by: EMERGENCY MEDICINE

## 2022-01-01 PROCEDURE — 258N000003 HC RX IP 258 OP 636: Performed by: EMERGENCY MEDICINE

## 2022-01-01 PROCEDURE — 88305 TISSUE EXAM BY PATHOLOGIST: CPT | Mod: 26 | Performed by: PATHOLOGY

## 2022-01-01 PROCEDURE — 80048 BASIC METABOLIC PNL TOTAL CA: CPT | Performed by: FAMILY MEDICINE

## 2022-01-01 PROCEDURE — 71275 CT ANGIOGRAPHY CHEST: CPT

## 2022-01-01 PROCEDURE — 36415 COLL VENOUS BLD VENIPUNCTURE: CPT | Performed by: NURSE PRACTITIONER

## 2022-01-01 PROCEDURE — 36415 COLL VENOUS BLD VENIPUNCTURE: CPT | Mod: ORL | Performed by: FAMILY MEDICINE

## 2022-01-01 PROCEDURE — 85018 HEMOGLOBIN: CPT | Performed by: EMERGENCY MEDICINE

## 2022-01-01 PROCEDURE — C9803 HOPD COVID-19 SPEC COLLECT: HCPCS

## 2022-01-01 PROCEDURE — 87070 CULTURE OTHR SPECIMN AEROBIC: CPT | Performed by: ORTHOPAEDIC SURGERY

## 2022-01-01 PROCEDURE — 83735 ASSAY OF MAGNESIUM: CPT | Mod: ORL | Performed by: FAMILY MEDICINE

## 2022-01-01 PROCEDURE — C9113 INJ PANTOPRAZOLE SODIUM, VIA: HCPCS | Performed by: EMERGENCY MEDICINE

## 2022-01-01 PROCEDURE — 97110 THERAPEUTIC EXERCISES: CPT | Mod: GP

## 2022-01-01 PROCEDURE — 51702 INSERT TEMP BLADDER CATH: CPT

## 2022-01-01 PROCEDURE — 84165 PROTEIN E-PHORESIS SERUM: CPT | Mod: TC | Performed by: INTERNAL MEDICINE

## 2022-01-01 PROCEDURE — 81001 URINALYSIS AUTO W/SCOPE: CPT | Performed by: FAMILY MEDICINE

## 2022-01-01 PROCEDURE — 84484 ASSAY OF TROPONIN QUANT: CPT | Performed by: INTERNAL MEDICINE

## 2022-01-01 PROCEDURE — 51798 US URINE CAPACITY MEASURE: CPT

## 2022-01-01 PROCEDURE — 85379 FIBRIN DEGRADATION QUANT: CPT | Performed by: EMERGENCY MEDICINE

## 2022-01-01 PROCEDURE — 85730 THROMBOPLASTIN TIME PARTIAL: CPT | Performed by: EMERGENCY MEDICINE

## 2022-01-01 PROCEDURE — 80048 BASIC METABOLIC PNL TOTAL CA: CPT | Mod: ORL | Performed by: INTERNAL MEDICINE

## 2022-01-01 PROCEDURE — 82728 ASSAY OF FERRITIN: CPT | Performed by: NURSE PRACTITIONER

## 2022-01-01 PROCEDURE — 93005 ELECTROCARDIOGRAM TRACING: CPT

## 2022-01-01 PROCEDURE — 250N000013 HC RX MED GY IP 250 OP 250 PS 637: Performed by: STUDENT IN AN ORGANIZED HEALTH CARE EDUCATION/TRAINING PROGRAM

## 2022-01-01 PROCEDURE — 92610 EVALUATE SWALLOWING FUNCTION: CPT | Mod: GN

## 2022-01-01 PROCEDURE — G0463 HOSPITAL OUTPT CLINIC VISIT: HCPCS

## 2022-01-01 PROCEDURE — 999N000141 HC STATISTIC PRE-PROCEDURE NURSING ASSESSMENT: Performed by: INTERNAL MEDICINE

## 2022-01-01 PROCEDURE — G0378 HOSPITAL OBSERVATION PER HR: HCPCS

## 2022-01-01 PROCEDURE — 82040 ASSAY OF SERUM ALBUMIN: CPT | Performed by: EMERGENCY MEDICINE

## 2022-01-01 PROCEDURE — 93010 ELECTROCARDIOGRAM REPORT: CPT | Performed by: INTERNAL MEDICINE

## 2022-01-01 PROCEDURE — 82746 ASSAY OF FOLIC ACID SERUM: CPT | Performed by: FAMILY MEDICINE

## 2022-01-01 PROCEDURE — 999N000141 HC STATISTIC PRE-PROCEDURE NURSING ASSESSMENT: Performed by: ORTHOPAEDIC SURGERY

## 2022-01-01 PROCEDURE — 99233 SBSQ HOSP IP/OBS HIGH 50: CPT | Performed by: CLINICAL NURSE SPECIALIST

## 2022-01-01 PROCEDURE — 83521 IG LIGHT CHAINS FREE EACH: CPT | Performed by: INTERNAL MEDICINE

## 2022-01-01 PROCEDURE — 87077 CULTURE AEROBIC IDENTIFY: CPT | Performed by: ORTHOPAEDIC SURGERY

## 2022-01-01 PROCEDURE — 80069 RENAL FUNCTION PANEL: CPT | Performed by: INTERNAL MEDICINE

## 2022-01-01 PROCEDURE — P9016 RBC LEUKOCYTES REDUCED: HCPCS | Performed by: NURSE PRACTITIONER

## 2022-01-01 PROCEDURE — 87205 SMEAR GRAM STAIN: CPT | Performed by: ORTHOPAEDIC SURGERY

## 2022-01-01 PROCEDURE — 258N000003 HC RX IP 258 OP 636: Performed by: NURSE PRACTITIONER

## 2022-01-01 PROCEDURE — 85027 COMPLETE CBC AUTOMATED: CPT | Mod: ORL | Performed by: FAMILY MEDICINE

## 2022-01-01 PROCEDURE — 74019 RADEX ABDOMEN 2 VIEWS: CPT

## 2022-01-01 PROCEDURE — 84132 ASSAY OF SERUM POTASSIUM: CPT | Performed by: FAMILY MEDICINE

## 2022-01-01 PROCEDURE — 272N000001 HC OR GENERAL SUPPLY STERILE: Performed by: INTERNAL MEDICINE

## 2022-01-01 RX ORDER — SENNOSIDES 8.6 MG
8.6 TABLET ORAL DAILY
Status: DISCONTINUED | OUTPATIENT
Start: 2022-01-01 | End: 2022-01-01 | Stop reason: HOSPADM

## 2022-01-01 RX ORDER — CEPHALEXIN 500 MG/1
500 CAPSULE ORAL 3 TIMES DAILY
Qty: 42 CAPSULE | Refills: 0 | Status: SHIPPED | OUTPATIENT
Start: 2022-01-01 | End: 2022-01-01

## 2022-01-01 RX ORDER — LOPERAMIDE HCL 2 MG
2 CAPSULE ORAL 4 TIMES DAILY PRN
COMMUNITY
Start: 2022-01-01

## 2022-01-01 RX ORDER — MINERAL OIL, PETROLATUM, PHENYLEPHRINE HCL 2.5; 140; 749 MG/G; MG/G; MG/G
OINTMENT TOPICAL 4 TIMES DAILY PRN
COMMUNITY
Start: 2022-01-01

## 2022-01-01 RX ORDER — ONDANSETRON 4 MG/1
4 TABLET, ORALLY DISINTEGRATING ORAL EVERY 30 MIN PRN
Status: DISCONTINUED | OUTPATIENT
Start: 2022-01-01 | End: 2022-01-01 | Stop reason: HOSPADM

## 2022-01-01 RX ORDER — SENNOSIDES 8.6 MG
1 TABLET ORAL DAILY PRN
Status: DISCONTINUED | OUTPATIENT
Start: 2022-01-01 | End: 2022-01-01 | Stop reason: HOSPADM

## 2022-01-01 RX ORDER — ACETAMINOPHEN 500 MG
1000 TABLET ORAL 2 TIMES DAILY PRN
Status: DISCONTINUED | OUTPATIENT
Start: 2022-01-01 | End: 2022-01-01

## 2022-01-01 RX ORDER — NITROGLYCERIN 0.4 MG/1
0.4 TABLET SUBLINGUAL EVERY 5 MIN PRN
Status: DISCONTINUED | OUTPATIENT
Start: 2022-01-01 | End: 2022-01-01 | Stop reason: HOSPADM

## 2022-01-01 RX ORDER — MAGNESIUM SULFATE HEPTAHYDRATE 40 MG/ML
2 INJECTION, SOLUTION INTRAVENOUS ONCE
Status: COMPLETED | OUTPATIENT
Start: 2022-01-01 | End: 2022-01-01

## 2022-01-01 RX ORDER — SODIUM CHLORIDE, SODIUM LACTATE, POTASSIUM CHLORIDE, CALCIUM CHLORIDE 600; 310; 30; 20 MG/100ML; MG/100ML; MG/100ML; MG/100ML
INJECTION, SOLUTION INTRAVENOUS CONTINUOUS
Status: DISCONTINUED | OUTPATIENT
Start: 2022-01-01 | End: 2022-01-01 | Stop reason: HOSPADM

## 2022-01-01 RX ORDER — ISOSORBIDE MONONITRATE 30 MG/1
TABLET, EXTENDED RELEASE ORAL
Qty: 30 TABLET | Refills: 0 | OUTPATIENT
Start: 2022-01-01

## 2022-01-01 RX ORDER — OXYCODONE HYDROCHLORIDE 5 MG/1
5 TABLET ORAL EVERY 4 HOURS PRN
Status: DISCONTINUED | OUTPATIENT
Start: 2022-01-01 | End: 2022-01-01 | Stop reason: HOSPADM

## 2022-01-01 RX ORDER — HYDROCORTISONE 25 MG/G
1 CREAM TOPICAL 2 TIMES DAILY PRN
COMMUNITY

## 2022-01-01 RX ORDER — POTASSIUM CHLORIDE 7.45 MG/ML
10 INJECTION INTRAVENOUS
Status: COMPLETED | OUTPATIENT
Start: 2022-01-01 | End: 2022-01-01

## 2022-01-01 RX ORDER — POTASSIUM CHLORIDE 750 MG/1
10 TABLET, EXTENDED RELEASE ORAL ONCE
Status: COMPLETED | OUTPATIENT
Start: 2022-01-01 | End: 2022-01-01

## 2022-01-01 RX ORDER — FENTANYL CITRATE 50 UG/ML
25 INJECTION, SOLUTION INTRAMUSCULAR; INTRAVENOUS EVERY 5 MIN PRN
Status: DISCONTINUED | OUTPATIENT
Start: 2022-01-01 | End: 2022-01-01 | Stop reason: HOSPADM

## 2022-01-01 RX ORDER — POTASSIUM CHLORIDE 1.5 G/1.58G
40 POWDER, FOR SOLUTION ORAL ONCE
Status: COMPLETED | OUTPATIENT
Start: 2022-01-01 | End: 2022-01-01

## 2022-01-01 RX ORDER — POTASSIUM CHLORIDE 1500 MG/1
20 TABLET, EXTENDED RELEASE ORAL ONCE
Status: COMPLETED | OUTPATIENT
Start: 2022-01-01 | End: 2022-01-01

## 2022-01-01 RX ORDER — FERROUS GLUCONATE 324(37.5)
324 TABLET ORAL 2 TIMES DAILY
Status: DISCONTINUED | OUTPATIENT
Start: 2022-01-01 | End: 2022-01-01 | Stop reason: HOSPADM

## 2022-01-01 RX ORDER — FUROSEMIDE 10 MG/ML
20 INJECTION INTRAMUSCULAR; INTRAVENOUS EVERY 12 HOURS
Status: DISCONTINUED | OUTPATIENT
Start: 2022-01-01 | End: 2022-01-01

## 2022-01-01 RX ORDER — ISOSORBIDE MONONITRATE 30 MG/1
30 TABLET, EXTENDED RELEASE ORAL DAILY
COMMUNITY

## 2022-01-01 RX ORDER — FENTANYL CITRATE 50 UG/ML
25 INJECTION, SOLUTION INTRAMUSCULAR; INTRAVENOUS
Status: DISCONTINUED | OUTPATIENT
Start: 2022-01-01 | End: 2022-01-01 | Stop reason: HOSPADM

## 2022-01-01 RX ORDER — METOPROLOL TARTRATE 25 MG/1
25 TABLET, FILM COATED ORAL 2 TIMES DAILY
Status: DISCONTINUED | OUTPATIENT
Start: 2022-01-01 | End: 2022-01-01 | Stop reason: HOSPADM

## 2022-01-01 RX ORDER — MEPERIDINE HYDROCHLORIDE 25 MG/ML
12.5 INJECTION INTRAMUSCULAR; INTRAVENOUS; SUBCUTANEOUS
Status: DISCONTINUED | OUTPATIENT
Start: 2022-01-01 | End: 2022-01-01 | Stop reason: HOSPADM

## 2022-01-01 RX ORDER — FEXOFENADINE HCL 60 MG/1
180 TABLET, FILM COATED ORAL DAILY
Status: DISCONTINUED | OUTPATIENT
Start: 2022-01-01 | End: 2022-01-01 | Stop reason: HOSPADM

## 2022-01-01 RX ORDER — LIDOCAINE 40 MG/G
CREAM TOPICAL
Status: DISCONTINUED | OUTPATIENT
Start: 2022-01-01 | End: 2022-01-01 | Stop reason: HOSPADM

## 2022-01-01 RX ORDER — POTASSIUM CHLORIDE 1.5 G/1.58G
20 POWDER, FOR SOLUTION ORAL ONCE
Status: COMPLETED | OUTPATIENT
Start: 2022-01-01 | End: 2022-01-01

## 2022-01-01 RX ORDER — OXYCODONE HYDROCHLORIDE 5 MG/1
5-10 TABLET ORAL EVERY 4 HOURS PRN
Qty: 25 TABLET | Refills: 0 | Status: SHIPPED | OUTPATIENT
Start: 2022-01-01 | End: 2022-01-01

## 2022-01-01 RX ORDER — HYDRALAZINE HYDROCHLORIDE 20 MG/ML
10 INJECTION INTRAMUSCULAR; INTRAVENOUS EVERY 4 HOURS PRN
Status: DISCONTINUED | OUTPATIENT
Start: 2022-01-01 | End: 2022-01-01 | Stop reason: HOSPADM

## 2022-01-01 RX ORDER — ACETAMINOPHEN 325 MG/1
650 TABLET ORAL EVERY 4 HOURS PRN
Status: DISCONTINUED | OUTPATIENT
Start: 2022-01-01 | End: 2022-01-01 | Stop reason: HOSPADM

## 2022-01-01 RX ORDER — PANTOPRAZOLE SODIUM 40 MG/1
40 TABLET, DELAYED RELEASE ORAL
Status: DISCONTINUED | OUTPATIENT
Start: 2022-01-01 | End: 2022-01-01 | Stop reason: HOSPADM

## 2022-01-01 RX ORDER — MIRTAZAPINE 15 MG/1
15 TABLET, FILM COATED ORAL AT BEDTIME
Status: DISCONTINUED | OUTPATIENT
Start: 2022-01-01 | End: 2022-01-01 | Stop reason: HOSPADM

## 2022-01-01 RX ORDER — SODIUM BICARBONATE 650 MG/1
1300 TABLET ORAL 2 TIMES DAILY
Status: DISCONTINUED | OUTPATIENT
Start: 2022-01-01 | End: 2022-01-01

## 2022-01-01 RX ORDER — DOCUSATE SODIUM 100 MG/1
100 CAPSULE, LIQUID FILLED ORAL 2 TIMES DAILY
Status: DISCONTINUED | OUTPATIENT
Start: 2022-01-01 | End: 2022-01-01 | Stop reason: HOSPADM

## 2022-01-01 RX ORDER — PANTOPRAZOLE SODIUM 40 MG/1
40 TABLET, DELAYED RELEASE ORAL 2 TIMES DAILY
Status: DISCONTINUED | OUTPATIENT
Start: 2022-01-01 | End: 2022-01-01 | Stop reason: HOSPADM

## 2022-01-01 RX ORDER — ONDANSETRON 2 MG/ML
4 INJECTION INTRAMUSCULAR; INTRAVENOUS EVERY 30 MIN PRN
Status: DISCONTINUED | OUTPATIENT
Start: 2022-01-01 | End: 2022-01-01 | Stop reason: HOSPADM

## 2022-01-01 RX ORDER — POLYETHYLENE GLYCOL 3350 17 G/17G
17 POWDER, FOR SOLUTION ORAL DAILY PRN
Status: DISCONTINUED | OUTPATIENT
Start: 2022-01-01 | End: 2022-01-01 | Stop reason: HOSPADM

## 2022-01-01 RX ORDER — ISOSORBIDE MONONITRATE 60 MG/1
120 TABLET, EXTENDED RELEASE ORAL DAILY
Status: DISCONTINUED | OUTPATIENT
Start: 2022-01-01 | End: 2022-01-01 | Stop reason: HOSPADM

## 2022-01-01 RX ORDER — POTASSIUM CHLORIDE 20MEQ/15ML
40 LIQUID (ML) ORAL ONCE
Status: COMPLETED | OUTPATIENT
Start: 2022-01-01 | End: 2022-01-01

## 2022-01-01 RX ORDER — ACETAMINOPHEN 325 MG/1
975 TABLET ORAL EVERY 8 HOURS
Status: DISCONTINUED | OUTPATIENT
Start: 2022-01-01 | End: 2022-01-01 | Stop reason: HOSPADM

## 2022-01-01 RX ORDER — HYDROMORPHONE HCL IN WATER/PF 6 MG/30 ML
0.2 PATIENT CONTROLLED ANALGESIA SYRINGE INTRAVENOUS EVERY 5 MIN PRN
Status: DISCONTINUED | OUTPATIENT
Start: 2022-01-01 | End: 2022-01-01 | Stop reason: HOSPADM

## 2022-01-01 RX ORDER — POLYETHYLENE GLYCOL 3350 17 G/17G
17 POWDER, FOR SOLUTION ORAL DAILY
Qty: 510 G | Refills: 0 | Status: SHIPPED | OUTPATIENT
Start: 2022-01-01 | End: 2022-01-01

## 2022-01-01 RX ORDER — HYDROCORTISONE 25 MG/G
OINTMENT TOPICAL 2 TIMES DAILY PRN
Status: DISCONTINUED | OUTPATIENT
Start: 2022-01-01 | End: 2022-01-01

## 2022-01-01 RX ORDER — FERROUS GLUCONATE 324(37.5)
324 TABLET ORAL 2 TIMES DAILY
Status: ON HOLD | COMMUNITY
Start: 2022-01-01 | End: 2022-01-01

## 2022-01-01 RX ORDER — POLYETHYLENE GLYCOL 3350 17 G/17G
238 POWDER, FOR SOLUTION ORAL ONCE
Status: DISCONTINUED | OUTPATIENT
Start: 2022-01-01 | End: 2022-01-01

## 2022-01-01 RX ORDER — SODIUM CHLORIDE 9 MG/ML
INJECTION, SOLUTION INTRAVENOUS CONTINUOUS
Status: DISCONTINUED | OUTPATIENT
Start: 2022-01-01 | End: 2022-01-01

## 2022-01-01 RX ORDER — ISOSORBIDE MONONITRATE 120 MG/1
TABLET, EXTENDED RELEASE ORAL
Qty: 30 TABLET | Refills: 0 | OUTPATIENT
Start: 2022-01-01

## 2022-01-01 RX ORDER — NYSTATIN 100000 [USP'U]/G
POWDER TOPICAL 2 TIMES DAILY PRN
COMMUNITY

## 2022-01-01 RX ORDER — POTASSIUM CHLORIDE 1500 MG/1
40 TABLET, EXTENDED RELEASE ORAL ONCE
Status: COMPLETED | OUTPATIENT
Start: 2022-01-01 | End: 2022-01-01

## 2022-01-01 RX ORDER — FUROSEMIDE 10 MG/ML
20 INJECTION INTRAMUSCULAR; INTRAVENOUS ONCE
Status: COMPLETED | OUTPATIENT
Start: 2022-01-01 | End: 2022-01-01

## 2022-01-01 RX ORDER — METOPROLOL TARTRATE 25 MG/1
25 TABLET, FILM COATED ORAL 2 TIMES DAILY
Status: DISCONTINUED | OUTPATIENT
Start: 2022-01-01 | End: 2022-01-01

## 2022-01-01 RX ORDER — ISOSORBIDE MONONITRATE 30 MG/1
30 TABLET, EXTENDED RELEASE ORAL DAILY
Status: DISCONTINUED | OUTPATIENT
Start: 2022-01-01 | End: 2022-01-01 | Stop reason: HOSPADM

## 2022-01-01 RX ORDER — NYSTATIN 100000 [USP'U]/G
POWDER TOPICAL 2 TIMES DAILY PRN
Status: ON HOLD | COMMUNITY
Start: 2022-01-01 | End: 2022-01-01

## 2022-01-01 RX ORDER — POTASSIUM CHLORIDE 20MEQ/15ML
10 LIQUID (ML) ORAL ONCE
Status: COMPLETED | OUTPATIENT
Start: 2022-01-01 | End: 2022-01-01

## 2022-01-01 RX ORDER — CEFAZOLIN SODIUM/WATER 2 G/20 ML
2 SYRINGE (ML) INTRAVENOUS
Status: COMPLETED | OUTPATIENT
Start: 2022-01-01 | End: 2022-01-01

## 2022-01-01 RX ORDER — PANTOPRAZOLE SODIUM 40 MG/1
40 TABLET, DELAYED RELEASE ORAL AT BEDTIME
Status: DISCONTINUED | OUTPATIENT
Start: 2022-01-01 | End: 2022-01-01 | Stop reason: HOSPADM

## 2022-01-01 RX ORDER — SENNOSIDES 8.6 MG
8.6 TABLET ORAL 2 TIMES DAILY
Status: DISCONTINUED | OUTPATIENT
Start: 2022-01-01 | End: 2022-01-01

## 2022-01-01 RX ORDER — AMLODIPINE BESYLATE 10 MG/1
TABLET ORAL
Qty: 30 TABLET | Refills: 0 | OUTPATIENT
Start: 2022-01-01

## 2022-01-01 RX ORDER — POTASSIUM CHLORIDE 7.45 MG/ML
10 INJECTION INTRAVENOUS ONCE
Status: DISCONTINUED | OUTPATIENT
Start: 2022-01-01 | End: 2022-01-01 | Stop reason: ALTCHOICE

## 2022-01-01 RX ORDER — METOPROLOL TARTRATE 25 MG/1
25 TABLET, FILM COATED ORAL 2 TIMES DAILY
COMMUNITY
Start: 2022-01-01

## 2022-01-01 RX ORDER — FUROSEMIDE 10 MG/ML
40 INJECTION INTRAMUSCULAR; INTRAVENOUS EVERY 12 HOURS
Status: DISCONTINUED | OUTPATIENT
Start: 2022-01-01 | End: 2022-01-01

## 2022-01-01 RX ORDER — FERROUS GLUCONATE 324(38)MG
324 TABLET ORAL 2 TIMES DAILY
Status: DISCONTINUED | OUTPATIENT
Start: 2022-01-01 | End: 2022-01-01 | Stop reason: HOSPADM

## 2022-01-01 RX ORDER — ACETAMINOPHEN 500 MG
1000 TABLET ORAL 2 TIMES DAILY PRN
Status: DISCONTINUED | OUTPATIENT
Start: 2022-01-01 | End: 2022-01-01 | Stop reason: HOSPADM

## 2022-01-01 RX ORDER — DOCUSATE SODIUM 100 MG/1
100 CAPSULE, LIQUID FILLED ORAL 2 TIMES DAILY
COMMUNITY
End: 2022-01-01

## 2022-01-01 RX ORDER — IOPAMIDOL 755 MG/ML
75 INJECTION, SOLUTION INTRAVASCULAR ONCE
Status: COMPLETED | OUTPATIENT
Start: 2022-01-01 | End: 2022-01-01

## 2022-01-01 RX ORDER — PROPOFOL 10 MG/ML
INJECTION, EMULSION INTRAVENOUS CONTINUOUS PRN
Status: DISCONTINUED | OUTPATIENT
Start: 2022-01-01 | End: 2022-01-01

## 2022-01-01 RX ORDER — SENNOSIDES A AND B 8.6 MG/1
1 TABLET, FILM COATED ORAL DAILY PRN
Status: ON HOLD | COMMUNITY
End: 2022-01-01

## 2022-01-01 RX ORDER — ESOMEPRAZOLE MAGNESIUM 40 MG/1
40 CAPSULE, DELAYED RELEASE ORAL AT BEDTIME
Status: ON HOLD | COMMUNITY
End: 2022-01-01

## 2022-01-01 RX ORDER — SENNOSIDES 8.6 MG
2 TABLET ORAL AT BEDTIME
Status: DISCONTINUED | OUTPATIENT
Start: 2022-01-01 | End: 2022-01-01 | Stop reason: HOSPADM

## 2022-01-01 RX ORDER — FEXOFENADINE HCL 180 MG/1
180 TABLET ORAL DAILY
COMMUNITY

## 2022-01-01 RX ORDER — POLYETHYLENE GLYCOL 3350 17 G/17G
17 POWDER, FOR SOLUTION ORAL DAILY
Status: DISCONTINUED | OUTPATIENT
Start: 2022-01-01 | End: 2022-01-01

## 2022-01-01 RX ORDER — ESOMEPRAZOLE MAGNESIUM 40 MG/1
CAPSULE, DELAYED RELEASE ORAL
Qty: 30 CAPSULE | Refills: 0 | OUTPATIENT
Start: 2022-01-01

## 2022-01-01 RX ORDER — POTASSIUM CHLORIDE 1500 MG/1
20 TABLET, EXTENDED RELEASE ORAL 2 TIMES DAILY
Status: DISCONTINUED | OUTPATIENT
Start: 2022-01-01 | End: 2022-01-01 | Stop reason: HOSPADM

## 2022-01-01 RX ORDER — SENNOSIDES A AND B 8.6 MG/1
1 TABLET, FILM COATED ORAL DAILY PRN
COMMUNITY

## 2022-01-01 RX ORDER — ONDANSETRON 4 MG/1
4 TABLET, ORALLY DISINTEGRATING ORAL EVERY 6 HOURS PRN
Status: DISCONTINUED | OUTPATIENT
Start: 2022-01-01 | End: 2022-01-01 | Stop reason: HOSPADM

## 2022-01-01 RX ORDER — FERROUS GLUCONATE 324(38)MG
324 TABLET ORAL
Qty: 30 TABLET | Refills: 0 | Status: SHIPPED | OUTPATIENT
Start: 2022-01-01 | End: 2022-01-01

## 2022-01-01 RX ORDER — ESOMEPRAZOLE MAGNESIUM 40 MG/1
40 CAPSULE, DELAYED RELEASE ORAL AT BEDTIME
COMMUNITY

## 2022-01-01 RX ORDER — BUPIVACAINE HYDROCHLORIDE 5 MG/ML
INJECTION, SOLUTION PERINEURAL PRN
Status: DISCONTINUED | OUTPATIENT
Start: 2022-01-01 | End: 2022-01-01 | Stop reason: HOSPADM

## 2022-01-01 RX ORDER — GLYCOPYRROLATE 0.2 MG/ML
INJECTION, SOLUTION INTRAMUSCULAR; INTRAVENOUS PRN
Status: DISCONTINUED | OUTPATIENT
Start: 2022-01-01 | End: 2022-01-01

## 2022-01-01 RX ORDER — CEFAZOLIN SODIUM/WATER 2 G/20 ML
2 SYRINGE (ML) INTRAVENOUS SEE ADMIN INSTRUCTIONS
Status: DISCONTINUED | OUTPATIENT
Start: 2022-01-01 | End: 2022-01-01 | Stop reason: HOSPADM

## 2022-01-01 RX ORDER — ACETAMINOPHEN 325 MG/1
650 TABLET ORAL EVERY 6 HOURS PRN
Status: DISCONTINUED | OUTPATIENT
Start: 2022-01-01 | End: 2022-01-01 | Stop reason: HOSPADM

## 2022-01-01 RX ORDER — HYDROCORTISONE 25 MG/G
1 CREAM TOPICAL 2 TIMES DAILY PRN
Status: DISCONTINUED | OUTPATIENT
Start: 2022-01-01 | End: 2022-01-01 | Stop reason: HOSPADM

## 2022-01-01 RX ORDER — SODIUM BICARBONATE 650 MG/1
650 TABLET ORAL 2 TIMES DAILY
Status: DISCONTINUED | OUTPATIENT
Start: 2022-01-01 | End: 2022-01-01 | Stop reason: HOSPADM

## 2022-01-01 RX ORDER — LANOLIN ALCOHOL/MO/W.PET/CERES
3 CREAM (GRAM) TOPICAL AT BEDTIME
COMMUNITY
Start: 2022-01-01

## 2022-01-01 RX ORDER — SODIUM BICARBONATE 650 MG/1
650 TABLET ORAL 2 TIMES DAILY
Qty: 60 TABLET | Refills: 0 | Status: SHIPPED | OUTPATIENT
Start: 2022-01-01 | End: 2022-01-01

## 2022-01-01 RX ORDER — CIPROFLOXACIN 500 MG/1
500 TABLET, FILM COATED ORAL 2 TIMES DAILY
COMMUNITY
Start: 2022-01-01 | End: 2022-01-01

## 2022-01-01 RX ORDER — METOPROLOL SUCCINATE 25 MG/1
75 TABLET, EXTENDED RELEASE ORAL DAILY
Status: DISCONTINUED | OUTPATIENT
Start: 2022-01-01 | End: 2022-01-01 | Stop reason: HOSPADM

## 2022-01-01 RX ORDER — POLYETHYLENE GLYCOL 3350, SODIUM SULFATE ANHYDROUS, SODIUM BICARBONATE, SODIUM CHLORIDE, POTASSIUM CHLORIDE 236; 22.74; 6.74; 5.86; 2.97 G/4L; G/4L; G/4L; G/4L; G/4L
4000 POWDER, FOR SOLUTION ORAL ONCE
Status: COMPLETED | OUTPATIENT
Start: 2022-01-01 | End: 2022-01-01

## 2022-01-01 RX ORDER — MAGNESIUM HYDROXIDE 1200 MG/15ML
LIQUID ORAL PRN
Status: DISCONTINUED | OUTPATIENT
Start: 2022-01-01 | End: 2022-01-01 | Stop reason: HOSPADM

## 2022-01-01 RX ORDER — SENNOSIDES A AND B 8.6 MG/1
2 TABLET, FILM COATED ORAL AT BEDTIME
Qty: 60 TABLET | Refills: 0 | Status: SHIPPED | OUTPATIENT
Start: 2022-01-01

## 2022-01-01 RX ORDER — LIDOCAINE HYDROCHLORIDE 10 MG/ML
INJECTION, SOLUTION EPIDURAL; INFILTRATION; INTRACAUDAL; PERINEURAL PRN
Status: DISCONTINUED | OUTPATIENT
Start: 2022-01-01 | End: 2022-01-01 | Stop reason: HOSPADM

## 2022-01-01 RX ORDER — PANTOPRAZOLE SODIUM 40 MG/1
TABLET, DELAYED RELEASE ORAL
Qty: 60 TABLET | Refills: 0 | OUTPATIENT
Start: 2022-01-01

## 2022-01-01 RX ORDER — ACETAMINOPHEN 650 MG/1
650 SUPPOSITORY RECTAL EVERY 6 HOURS PRN
Status: DISCONTINUED | OUTPATIENT
Start: 2022-01-01 | End: 2022-01-01 | Stop reason: HOSPADM

## 2022-01-01 RX ORDER — LANOLIN ALCOHOL/MO/W.PET/CERES
3 CREAM (GRAM) TOPICAL
Status: DISCONTINUED | OUTPATIENT
Start: 2022-01-01 | End: 2022-01-01 | Stop reason: HOSPADM

## 2022-01-01 RX ORDER — LIDOCAINE HYDROCHLORIDE 10 MG/ML
INJECTION, SOLUTION INFILTRATION; PERINEURAL PRN
Status: DISCONTINUED | OUTPATIENT
Start: 2022-01-01 | End: 2022-01-01

## 2022-01-01 RX ORDER — ACETAMINOPHEN 500 MG
1000 TABLET ORAL 2 TIMES DAILY PRN
COMMUNITY

## 2022-01-01 RX ORDER — ONDANSETRON 2 MG/ML
INJECTION INTRAMUSCULAR; INTRAVENOUS PRN
Status: DISCONTINUED | OUTPATIENT
Start: 2022-01-01 | End: 2022-01-01

## 2022-01-01 RX ORDER — HYDROCORTISONE 2.5 %
CREAM (GRAM) TOPICAL 2 TIMES DAILY PRN
Status: DISCONTINUED | OUTPATIENT
Start: 2022-01-01 | End: 2022-01-01 | Stop reason: HOSPADM

## 2022-01-01 RX ORDER — LOPERAMIDE HCL 2 MG
2 CAPSULE ORAL 4 TIMES DAILY PRN
Status: DISCONTINUED | OUTPATIENT
Start: 2022-01-01 | End: 2022-01-01 | Stop reason: HOSPADM

## 2022-01-01 RX ORDER — ACETAMINOPHEN 325 MG/1
1000 TABLET ORAL EVERY 8 HOURS
Qty: 50 TABLET | Refills: 0 | Status: ON HOLD | COMMUNITY
Start: 2022-01-01 | End: 2022-01-01

## 2022-01-01 RX ORDER — HYDROXYZINE HYDROCHLORIDE 25 MG/1
25 TABLET, FILM COATED ORAL ONCE
Status: COMPLETED | OUTPATIENT
Start: 2022-01-01 | End: 2022-01-01

## 2022-01-01 RX ORDER — AMLODIPINE BESYLATE 5 MG/1
10 TABLET ORAL DAILY
Status: DISCONTINUED | OUTPATIENT
Start: 2022-01-01 | End: 2022-01-01 | Stop reason: HOSPADM

## 2022-01-01 RX ORDER — ONDANSETRON 2 MG/ML
4 INJECTION INTRAMUSCULAR; INTRAVENOUS EVERY 6 HOURS PRN
Status: DISCONTINUED | OUTPATIENT
Start: 2022-01-01 | End: 2022-01-01 | Stop reason: HOSPADM

## 2022-01-01 RX ORDER — IBUPROFEN 200 MG
TABLET ORAL DAILY
Status: DISCONTINUED | OUTPATIENT
Start: 2022-01-01 | End: 2022-01-01 | Stop reason: HOSPADM

## 2022-01-01 RX ORDER — POLYETHYLENE GLYCOL 3350 17 G/17G
1 POWDER, FOR SOLUTION ORAL DAILY PRN
COMMUNITY

## 2022-01-01 RX ORDER — POTASSIUM CHLORIDE 7.45 MG/ML
10 INJECTION INTRAVENOUS ONCE
Status: COMPLETED | OUTPATIENT
Start: 2022-01-01 | End: 2022-01-01

## 2022-01-01 RX ORDER — ANORECTAL OINTMENT 15.7; .44; 24; 20.6 G/100G; G/100G; G/100G; G/100G
OINTMENT TOPICAL 3 TIMES DAILY
COMMUNITY
Start: 2022-01-01

## 2022-01-01 RX ORDER — TRIAMCINOLONE ACETONIDE 0.25 MG/G
CREAM TOPICAL 3 TIMES DAILY PRN
Status: DISCONTINUED | OUTPATIENT
Start: 2022-01-01 | End: 2022-01-01 | Stop reason: HOSPADM

## 2022-01-01 RX ORDER — POTASSIUM CHLORIDE 1500 MG/1
20 TABLET, EXTENDED RELEASE ORAL DAILY
Qty: 7 TABLET | Refills: 0 | Status: SHIPPED | OUTPATIENT
Start: 2022-01-01 | End: 2022-01-01

## 2022-01-01 RX ORDER — FERROUS GLUCONATE 324(37.5)
1 TABLET ORAL 2 TIMES DAILY
COMMUNITY

## 2022-01-01 RX ADMIN — SENNOSIDES 2 TABLET: 8.6 TABLET, FILM COATED ORAL at 20:55

## 2022-01-01 RX ADMIN — POLYETHYLENE GLYCOL 3350 17 G: 17 POWDER, FOR SOLUTION ORAL at 00:27

## 2022-01-01 RX ADMIN — SODIUM BICARBONATE: 84 INJECTION, SOLUTION INTRAVENOUS at 08:39

## 2022-01-01 RX ADMIN — MAGNESIUM SULFATE IN WATER 2 G: 40 INJECTION, SOLUTION INTRAVENOUS at 12:29

## 2022-01-01 RX ADMIN — POLYPROPYLENE GLYCOL 400, PROPYLENE GLYCOL 1 DROP: .4; .3 LIQUID OPHTHALMIC at 20:52

## 2022-01-01 RX ADMIN — ANORECTAL OINTMENT: 15.7; .44; 24; 20.6 OINTMENT TOPICAL at 08:55

## 2022-01-01 RX ADMIN — POLYETHYLENE GLYCOL 400 AND PROPYLENE GLYCOL 1 DROP: 4; 3 SOLUTION/ DROPS OPHTHALMIC at 08:40

## 2022-01-01 RX ADMIN — POLYPROPYLENE GLYCOL 400, PROPYLENE GLYCOL 1 DROP: .4; .3 LIQUID OPHTHALMIC at 17:11

## 2022-01-01 RX ADMIN — SODIUM BICARBONATE: 84 INJECTION, SOLUTION INTRAVENOUS at 10:27

## 2022-01-01 RX ADMIN — SENNOSIDES 8.6 MG: 8.6 TABLET, FILM COATED ORAL at 08:21

## 2022-01-01 RX ADMIN — ACETAMINOPHEN 975 MG: 325 TABLET, FILM COATED ORAL at 13:15

## 2022-01-01 RX ADMIN — SODIUM BICARBONATE: 84 INJECTION, SOLUTION INTRAVENOUS at 13:48

## 2022-01-01 RX ADMIN — SODIUM CHLORIDE 1000 ML: 9 INJECTION, SOLUTION INTRAVENOUS at 19:59

## 2022-01-01 RX ADMIN — PROCHLORPERAZINE EDISYLATE 5 MG: 5 INJECTION INTRAMUSCULAR; INTRAVENOUS at 05:57

## 2022-01-01 RX ADMIN — ISOSORBIDE MONONITRATE 30 MG: 30 TABLET, EXTENDED RELEASE ORAL at 12:11

## 2022-01-01 RX ADMIN — POTASSIUM CHLORIDE 10 MEQ: 7.46 INJECTION, SOLUTION INTRAVENOUS at 13:41

## 2022-01-01 RX ADMIN — PANTOPRAZOLE SODIUM 40 MG: 40 TABLET, DELAYED RELEASE ORAL at 19:50

## 2022-01-01 RX ADMIN — POLYPROPYLENE GLYCOL 400, PROPYLENE GLYCOL 1 DROP: .4; .3 LIQUID OPHTHALMIC at 16:13

## 2022-01-01 RX ADMIN — POLYPROPYLENE GLYCOL 400, PROPYLENE GLYCOL 1 DROP: .4; .3 LIQUID OPHTHALMIC at 11:10

## 2022-01-01 RX ADMIN — POLYETHYLENE GLYCOL 3350 17 G: 17 POWDER, FOR SOLUTION ORAL at 08:45

## 2022-01-01 RX ADMIN — POTASSIUM CHLORIDE 20 MEQ: 1500 TABLET, EXTENDED RELEASE ORAL at 08:13

## 2022-01-01 RX ADMIN — ANORECTAL OINTMENT: 15.7; .44; 24; 20.6 OINTMENT TOPICAL at 10:53

## 2022-01-01 RX ADMIN — MIRTAZAPINE 15 MG: 15 TABLET, FILM COATED ORAL at 20:07

## 2022-01-01 RX ADMIN — ANORECTAL OINTMENT: 15.7; .44; 24; 20.6 OINTMENT TOPICAL at 08:12

## 2022-01-01 RX ADMIN — PANTOPRAZOLE SODIUM 40 MG: 40 TABLET, DELAYED RELEASE ORAL at 21:13

## 2022-01-01 RX ADMIN — ACETAMINOPHEN 975 MG: 325 TABLET, FILM COATED ORAL at 14:19

## 2022-01-01 RX ADMIN — FUROSEMIDE 20 MG: 10 INJECTION, SOLUTION INTRAMUSCULAR; INTRAVENOUS at 03:43

## 2022-01-01 RX ADMIN — PANTOPRAZOLE SODIUM 40 MG: 40 TABLET, DELAYED RELEASE ORAL at 20:26

## 2022-01-01 RX ADMIN — ACETAMINOPHEN 650 MG: 325 TABLET ORAL at 01:47

## 2022-01-01 RX ADMIN — MIRTAZAPINE 15 MG: 15 TABLET, FILM COATED ORAL at 20:44

## 2022-01-01 RX ADMIN — POLYETHYLENE GLYCOL 400 AND PROPYLENE GLYCOL 1 DROP: 4; 3 SOLUTION/ DROPS OPHTHALMIC at 12:46

## 2022-01-01 RX ADMIN — MIRTAZAPINE 15 MG: 15 TABLET, FILM COATED ORAL at 20:50

## 2022-01-01 RX ADMIN — ACETAMINOPHEN 1000 MG: 500 TABLET ORAL at 22:05

## 2022-01-01 RX ADMIN — SODIUM BICARBONATE 1300 MG: 650 TABLET ORAL at 08:26

## 2022-01-01 RX ADMIN — PANTOPRAZOLE SODIUM 40 MG: 40 TABLET, DELAYED RELEASE ORAL at 06:32

## 2022-01-01 RX ADMIN — FUROSEMIDE 40 MG: 10 INJECTION, SOLUTION INTRAMUSCULAR; INTRAVENOUS at 05:44

## 2022-01-01 RX ADMIN — ANORECTAL OINTMENT: 15.7; .44; 24; 20.6 OINTMENT TOPICAL at 08:53

## 2022-01-01 RX ADMIN — POTASSIUM CHLORIDE 20 MEQ: 1500 TABLET, EXTENDED RELEASE ORAL at 12:01

## 2022-01-01 RX ADMIN — ISOSORBIDE MONONITRATE 120 MG: 60 TABLET, EXTENDED RELEASE ORAL at 08:39

## 2022-01-01 RX ADMIN — ANORECTAL OINTMENT: 15.7; .44; 24; 20.6 OINTMENT TOPICAL at 22:39

## 2022-01-01 RX ADMIN — FERROUS GLUCONATE 324 MG: 324 TABLET ORAL at 12:08

## 2022-01-01 RX ADMIN — IRON SUCROSE 300 MG: 20 INJECTION, SOLUTION INTRAVENOUS at 18:13

## 2022-01-01 RX ADMIN — PANTOPRAZOLE SODIUM 40 MG: 40 TABLET, DELAYED RELEASE ORAL at 06:17

## 2022-01-01 RX ADMIN — MIRTAZAPINE 15 MG: 15 TABLET, FILM COATED ORAL at 20:38

## 2022-01-01 RX ADMIN — SODIUM BICARBONATE 650 MG: 650 TABLET ORAL at 08:38

## 2022-01-01 RX ADMIN — MIRTAZAPINE 15 MG: 15 TABLET, FILM COATED ORAL at 22:03

## 2022-01-01 RX ADMIN — METOPROLOL SUCCINATE 75 MG: 25 TABLET, EXTENDED RELEASE ORAL at 08:48

## 2022-01-01 RX ADMIN — ANORECTAL OINTMENT: 15.7; .44; 24; 20.6 OINTMENT TOPICAL at 14:53

## 2022-01-01 RX ADMIN — ISOSORBIDE MONONITRATE 120 MG: 60 TABLET, EXTENDED RELEASE ORAL at 12:10

## 2022-01-01 RX ADMIN — IRON SUCROSE 100 MG: 20 INJECTION, SOLUTION INTRAVENOUS at 16:16

## 2022-01-01 RX ADMIN — SENNOSIDES 1 TABLET: 8.6 TABLET, FILM COATED ORAL at 10:43

## 2022-01-01 RX ADMIN — POLYETHYLENE GLYCOL 400 AND PROPYLENE GLYCOL 1 DROP: 4; 3 SOLUTION/ DROPS OPHTHALMIC at 12:03

## 2022-01-01 RX ADMIN — FERROUS GLUCONATE 324 MG: 324 TABLET ORAL at 20:48

## 2022-01-01 RX ADMIN — ISOSORBIDE MONONITRATE 30 MG: 30 TABLET, EXTENDED RELEASE ORAL at 08:21

## 2022-01-01 RX ADMIN — ISOSORBIDE MONONITRATE 120 MG: 60 TABLET, EXTENDED RELEASE ORAL at 08:20

## 2022-01-01 RX ADMIN — SENNOSIDES 8.6 MG: 8.6 TABLET, FILM COATED ORAL at 08:44

## 2022-01-01 RX ADMIN — PANTOPRAZOLE SODIUM 40 MG: 40 TABLET, DELAYED RELEASE ORAL at 08:15

## 2022-01-01 RX ADMIN — PANTOPRAZOLE SODIUM 40 MG: 40 TABLET, DELAYED RELEASE ORAL at 17:08

## 2022-01-01 RX ADMIN — IOPAMIDOL 75 ML: 755 INJECTION, SOLUTION INTRAVENOUS at 02:53

## 2022-01-01 RX ADMIN — METOPROLOL TARTRATE 25 MG: 25 TABLET, FILM COATED ORAL at 20:47

## 2022-01-01 RX ADMIN — FUROSEMIDE 20 MG: 10 INJECTION, SOLUTION INTRAMUSCULAR; INTRAVENOUS at 06:31

## 2022-01-01 RX ADMIN — ISOSORBIDE MONONITRATE 30 MG: 30 TABLET, EXTENDED RELEASE ORAL at 09:15

## 2022-01-01 RX ADMIN — POTASSIUM CHLORIDE 10 MEQ: 7.46 INJECTION, SOLUTION INTRAVENOUS at 11:24

## 2022-01-01 RX ADMIN — ANORECTAL OINTMENT: 15.7; .44; 24; 20.6 OINTMENT TOPICAL at 20:51

## 2022-01-01 RX ADMIN — OXYCODONE HYDROCHLORIDE 5 MG: 5 TABLET ORAL at 14:12

## 2022-01-01 RX ADMIN — ANORECTAL OINTMENT: 15.7; .44; 24; 20.6 OINTMENT TOPICAL at 08:39

## 2022-01-01 RX ADMIN — POLYPROPYLENE GLYCOL 400, PROPYLENE GLYCOL 1 DROP: .4; .3 LIQUID OPHTHALMIC at 08:13

## 2022-01-01 RX ADMIN — METOPROLOL TARTRATE 12.5 MG: 25 TABLET, FILM COATED ORAL at 20:50

## 2022-01-01 RX ADMIN — POLYPROPYLENE GLYCOL 400, PROPYLENE GLYCOL 1 DROP: .4; .3 LIQUID OPHTHALMIC at 20:09

## 2022-01-01 RX ADMIN — PANTOPRAZOLE SODIUM 40 MG: 40 TABLET, DELAYED RELEASE ORAL at 16:52

## 2022-01-01 RX ADMIN — METOPROLOL TARTRATE 25 MG: 25 TABLET, FILM COATED ORAL at 20:50

## 2022-01-01 RX ADMIN — FEXOFENADINE HYDROCHLORIDE 180 MG: 60 TABLET ORAL at 10:45

## 2022-01-01 RX ADMIN — POLYPROPYLENE GLYCOL 400, PROPYLENE GLYCOL 1 DROP: .4; .3 LIQUID OPHTHALMIC at 08:42

## 2022-01-01 RX ADMIN — PANTOPRAZOLE SODIUM 40 MG: 40 TABLET, DELAYED RELEASE ORAL at 20:43

## 2022-01-01 RX ADMIN — METOPROLOL TARTRATE 12.5 MG: 25 TABLET, FILM COATED ORAL at 08:33

## 2022-01-01 RX ADMIN — SODIUM BICARBONATE 650 MG: 650 TABLET ORAL at 10:23

## 2022-01-01 RX ADMIN — POLYETHYLENE GLYCOL 400 AND PROPYLENE GLYCOL 1 DROP: 4; 3 SOLUTION/ DROPS OPHTHALMIC at 20:51

## 2022-01-01 RX ADMIN — POLYETHYLENE GLYCOL 3350, SODIUM SULFATE ANHYDROUS, SODIUM BICARBONATE, SODIUM CHLORIDE, POTASSIUM CHLORIDE 4000 ML: 236; 22.74; 6.74; 5.86; 2.97 POWDER, FOR SOLUTION ORAL at 16:47

## 2022-01-01 RX ADMIN — HYDROCORTISONE: 25 CREAM TOPICAL at 22:06

## 2022-01-01 RX ADMIN — POLYPROPYLENE GLYCOL 400, PROPYLENE GLYCOL 1 DROP: .4; .3 LIQUID OPHTHALMIC at 08:25

## 2022-01-01 RX ADMIN — POLYPROPYLENE GLYCOL 400, PROPYLENE GLYCOL 1 DROP: .4; .3 LIQUID OPHTHALMIC at 13:11

## 2022-01-01 RX ADMIN — ACETAMINOPHEN 650 MG: 325 TABLET ORAL at 21:04

## 2022-01-01 RX ADMIN — SODIUM CHLORIDE: 9 INJECTION, SOLUTION INTRAVENOUS at 00:40

## 2022-01-01 RX ADMIN — POTASSIUM CHLORIDE 40 MEQ: 20 SOLUTION ORAL at 17:09

## 2022-01-01 RX ADMIN — POLYPROPYLENE GLYCOL 400, PROPYLENE GLYCOL 1 DROP: .4; .3 LIQUID OPHTHALMIC at 20:37

## 2022-01-01 RX ADMIN — PANTOPRAZOLE SODIUM 40 MG: 40 TABLET, DELAYED RELEASE ORAL at 16:29

## 2022-01-01 RX ADMIN — POLYPROPYLENE GLYCOL 400, PROPYLENE GLYCOL 1 DROP: .4; .3 LIQUID OPHTHALMIC at 17:09

## 2022-01-01 RX ADMIN — ISOSORBIDE MONONITRATE 30 MG: 30 TABLET, EXTENDED RELEASE ORAL at 08:39

## 2022-01-01 RX ADMIN — SODIUM BICARBONATE 1300 MG: 650 TABLET ORAL at 08:14

## 2022-01-01 RX ADMIN — ACETAMINOPHEN 650 MG: 325 TABLET ORAL at 21:02

## 2022-01-01 RX ADMIN — SODIUM BICARBONATE 1300 MG: 650 TABLET ORAL at 08:43

## 2022-01-01 RX ADMIN — MIRTAZAPINE 15 MG: 15 TABLET, FILM COATED ORAL at 20:47

## 2022-01-01 RX ADMIN — POLYPROPYLENE GLYCOL 400, PROPYLENE GLYCOL 1 DROP: .4; .3 LIQUID OPHTHALMIC at 18:13

## 2022-01-01 RX ADMIN — SODIUM BICARBONATE: 84 INJECTION, SOLUTION INTRAVENOUS at 00:00

## 2022-01-01 RX ADMIN — ISOSORBIDE MONONITRATE 120 MG: 60 TABLET, EXTENDED RELEASE ORAL at 10:14

## 2022-01-01 RX ADMIN — POLYPROPYLENE GLYCOL 400, PROPYLENE GLYCOL 1 DROP: .4; .3 LIQUID OPHTHALMIC at 00:26

## 2022-01-01 RX ADMIN — AMLODIPINE BESYLATE 10 MG: 5 TABLET ORAL at 08:15

## 2022-01-01 RX ADMIN — METOPROLOL TARTRATE 25 MG: 25 TABLET, FILM COATED ORAL at 08:38

## 2022-01-01 RX ADMIN — SENNOSIDES 2 TABLET: 8.6 TABLET, FILM COATED ORAL at 20:44

## 2022-01-01 RX ADMIN — SODIUM CHLORIDE 50 ML: 9 INJECTION, SOLUTION INTRAVENOUS at 18:52

## 2022-01-01 RX ADMIN — METOPROLOL TARTRATE 12.5 MG: 25 TABLET, FILM COATED ORAL at 21:05

## 2022-01-01 RX ADMIN — POLYETHYLENE GLYCOL 3350 17 G: 17 POWDER, FOR SOLUTION ORAL at 08:22

## 2022-01-01 RX ADMIN — ISOSORBIDE MONONITRATE 120 MG: 60 TABLET, EXTENDED RELEASE ORAL at 08:44

## 2022-01-01 RX ADMIN — POTASSIUM CHLORIDE 20 MEQ: 1.5 POWDER, FOR SOLUTION ORAL at 08:32

## 2022-01-01 RX ADMIN — FERROUS GLUCONATE 324 MG: 324 TABLET ORAL at 21:06

## 2022-01-01 RX ADMIN — ANORECTAL OINTMENT: 15.7; .44; 24; 20.6 OINTMENT TOPICAL at 14:11

## 2022-01-01 RX ADMIN — ANORECTAL OINTMENT: 15.7; .44; 24; 20.6 OINTMENT TOPICAL at 21:04

## 2022-01-01 RX ADMIN — METOPROLOL TARTRATE 25 MG: 25 TABLET, FILM COATED ORAL at 20:28

## 2022-01-01 RX ADMIN — POTASSIUM CHLORIDE 20 MEQ: 1500 TABLET, EXTENDED RELEASE ORAL at 08:44

## 2022-01-01 RX ADMIN — PANTOPRAZOLE SODIUM 40 MG: 40 TABLET, DELAYED RELEASE ORAL at 16:41

## 2022-01-01 RX ADMIN — POLYPROPYLENE GLYCOL 400, PROPYLENE GLYCOL 1 DROP: .4; .3 LIQUID OPHTHALMIC at 21:08

## 2022-01-01 RX ADMIN — SODIUM BICARBONATE 650 MG: 650 TABLET ORAL at 20:25

## 2022-01-01 RX ADMIN — METOPROLOL TARTRATE 25 MG: 25 TABLET, FILM COATED ORAL at 08:39

## 2022-01-01 RX ADMIN — ISOSORBIDE MONONITRATE 120 MG: 60 TABLET, EXTENDED RELEASE ORAL at 08:54

## 2022-01-01 RX ADMIN — METOPROLOL TARTRATE 25 MG: 25 TABLET, FILM COATED ORAL at 07:06

## 2022-01-01 RX ADMIN — ISOSORBIDE MONONITRATE 120 MG: 60 TABLET, EXTENDED RELEASE ORAL at 08:29

## 2022-01-01 RX ADMIN — SENNOSIDES 2 TABLET: 8.6 TABLET, FILM COATED ORAL at 20:04

## 2022-01-01 RX ADMIN — ACETAMINOPHEN 1000 MG: 500 TABLET ORAL at 22:09

## 2022-01-01 RX ADMIN — ACETAMINOPHEN 975 MG: 325 TABLET, FILM COATED ORAL at 20:34

## 2022-01-01 RX ADMIN — POTASSIUM CHLORIDE 40 MEQ: 1.5 POWDER, FOR SOLUTION ORAL at 00:26

## 2022-01-01 RX ADMIN — METOPROLOL TARTRATE 25 MG: 25 TABLET, FILM COATED ORAL at 08:12

## 2022-01-01 RX ADMIN — ACETAMINOPHEN 975 MG: 325 TABLET, FILM COATED ORAL at 12:20

## 2022-01-01 RX ADMIN — DOCUSATE SODIUM 100 MG: 100 CAPSULE, LIQUID FILLED ORAL at 19:49

## 2022-01-01 RX ADMIN — PANTOPRAZOLE SODIUM 40 MG: 40 TABLET, DELAYED RELEASE ORAL at 22:33

## 2022-01-01 RX ADMIN — ISOSORBIDE MONONITRATE 30 MG: 30 TABLET, EXTENDED RELEASE ORAL at 08:14

## 2022-01-01 RX ADMIN — POLYPROPYLENE GLYCOL 400, PROPYLENE GLYCOL 1 DROP: .4; .3 LIQUID OPHTHALMIC at 09:16

## 2022-01-01 RX ADMIN — POLYETHYLENE GLYCOL 3350 17 G: 17 POWDER, FOR SOLUTION ORAL at 10:46

## 2022-01-01 RX ADMIN — POLYPROPYLENE GLYCOL 400, PROPYLENE GLYCOL 1 DROP: .4; .3 LIQUID OPHTHALMIC at 12:21

## 2022-01-01 RX ADMIN — MAGNESIUM SULFATE IN WATER 2 G: 40 INJECTION, SOLUTION INTRAVENOUS at 08:25

## 2022-01-01 RX ADMIN — SENNOSIDES 2 TABLET: 8.6 TABLET, FILM COATED ORAL at 21:02

## 2022-01-01 RX ADMIN — MINERAL OIL 286 ML: 1000 LIQUID ORAL at 22:07

## 2022-01-01 RX ADMIN — PANTOPRAZOLE SODIUM 40 MG: 40 TABLET, DELAYED RELEASE ORAL at 16:17

## 2022-01-01 RX ADMIN — PANTOPRAZOLE SODIUM 40 MG: 40 TABLET, DELAYED RELEASE ORAL at 17:17

## 2022-01-01 RX ADMIN — METOPROLOL TARTRATE 25 MG: 25 TABLET, FILM COATED ORAL at 20:43

## 2022-01-01 RX ADMIN — PANTOPRAZOLE SODIUM 40 MG: 40 TABLET, DELAYED RELEASE ORAL at 09:19

## 2022-01-01 RX ADMIN — POTASSIUM CHLORIDE 10 MEQ: 750 TABLET, EXTENDED RELEASE ORAL at 13:48

## 2022-01-01 RX ADMIN — FEXOFENADINE HYDROCHLORIDE 180 MG: 60 TABLET ORAL at 08:32

## 2022-01-01 RX ADMIN — POTASSIUM CHLORIDE 10 MEQ: 7.46 INJECTION, SOLUTION INTRAVENOUS at 09:03

## 2022-01-01 RX ADMIN — PANTOPRAZOLE SODIUM 40 MG: 40 TABLET, DELAYED RELEASE ORAL at 08:12

## 2022-01-01 RX ADMIN — FEXOFENADINE HYDROCHLORIDE 180 MG: 60 TABLET ORAL at 10:34

## 2022-01-01 RX ADMIN — POTASSIUM CHLORIDE 20 MEQ: 1.5 POWDER, FOR SOLUTION ORAL at 03:39

## 2022-01-01 RX ADMIN — FERROUS GLUCONATE 324 MG: 324 TABLET ORAL at 08:54

## 2022-01-01 RX ADMIN — FEXOFENADINE HYDROCHLORIDE 180 MG: 60 TABLET ORAL at 08:54

## 2022-01-01 RX ADMIN — ANORECTAL OINTMENT: 15.7; .44; 24; 20.6 OINTMENT TOPICAL at 20:56

## 2022-01-01 RX ADMIN — ISOSORBIDE MONONITRATE 30 MG: 30 TABLET, EXTENDED RELEASE ORAL at 10:14

## 2022-01-01 RX ADMIN — ACETAMINOPHEN 650 MG: 325 TABLET ORAL at 20:54

## 2022-01-01 RX ADMIN — POLYPROPYLENE GLYCOL 400, PROPYLENE GLYCOL 1 DROP: .4; .3 LIQUID OPHTHALMIC at 12:15

## 2022-01-01 RX ADMIN — MIRTAZAPINE 15 MG: 15 TABLET, FILM COATED ORAL at 20:55

## 2022-01-01 RX ADMIN — POLYETHYLENE GLYCOL 400 AND PROPYLENE GLYCOL 1 DROP: 4; 3 SOLUTION/ DROPS OPHTHALMIC at 20:47

## 2022-01-01 RX ADMIN — ISOSORBIDE MONONITRATE 30 MG: 30 TABLET, EXTENDED RELEASE ORAL at 10:38

## 2022-01-01 RX ADMIN — POLYETHYLENE GLYCOL 400 AND PROPYLENE GLYCOL 1 DROP: 4; 3 SOLUTION/ DROPS OPHTHALMIC at 16:20

## 2022-01-01 RX ADMIN — METOPROLOL TARTRATE 25 MG: 25 TABLET, FILM COATED ORAL at 10:43

## 2022-01-01 RX ADMIN — ACETAMINOPHEN 975 MG: 325 TABLET, FILM COATED ORAL at 00:28

## 2022-01-01 RX ADMIN — SENNOSIDES 2 TABLET: 8.6 TABLET, FILM COATED ORAL at 20:28

## 2022-01-01 RX ADMIN — PANTOPRAZOLE SODIUM 40 MG: 40 TABLET, DELAYED RELEASE ORAL at 08:21

## 2022-01-01 RX ADMIN — Medication 286 ML: at 10:27

## 2022-01-01 RX ADMIN — FEXOFENADINE HYDROCHLORIDE 180 MG: 60 TABLET ORAL at 08:45

## 2022-01-01 RX ADMIN — FEXOFENADINE HYDROCHLORIDE 180 MG: 60 TABLET ORAL at 08:14

## 2022-01-01 RX ADMIN — POLYETHYLENE GLYCOL 400 AND PROPYLENE GLYCOL 1 DROP: 4; 3 SOLUTION/ DROPS OPHTHALMIC at 14:04

## 2022-01-01 RX ADMIN — METOPROLOL TARTRATE 25 MG: 25 TABLET, FILM COATED ORAL at 08:53

## 2022-01-01 RX ADMIN — SODIUM CHLORIDE, POTASSIUM CHLORIDE, SODIUM LACTATE AND CALCIUM CHLORIDE: 600; 310; 30; 20 INJECTION, SOLUTION INTRAVENOUS at 11:53

## 2022-01-01 RX ADMIN — PANTOPRAZOLE SODIUM 40 MG: 40 TABLET, DELAYED RELEASE ORAL at 10:36

## 2022-01-01 RX ADMIN — SENNOSIDES 2 TABLET: 8.6 TABLET, FILM COATED ORAL at 20:47

## 2022-01-01 RX ADMIN — PANTOPRAZOLE SODIUM 40 MG: 40 TABLET, DELAYED RELEASE ORAL at 06:47

## 2022-01-01 RX ADMIN — POLYETHYLENE GLYCOL 400 AND PROPYLENE GLYCOL 1 DROP: 4; 3 SOLUTION/ DROPS OPHTHALMIC at 10:33

## 2022-01-01 RX ADMIN — ANORECTAL OINTMENT: 15.7; .44; 24; 20.6 OINTMENT TOPICAL at 13:22

## 2022-01-01 RX ADMIN — PANTOPRAZOLE SODIUM 40 MG: 40 TABLET, DELAYED RELEASE ORAL at 10:41

## 2022-01-01 RX ADMIN — POTASSIUM CHLORIDE 10 MEQ: 7.46 INJECTION, SOLUTION INTRAVENOUS at 12:26

## 2022-01-01 RX ADMIN — POLYETHYLENE GLYCOL 400 AND PROPYLENE GLYCOL 1 DROP: 4; 3 SOLUTION/ DROPS OPHTHALMIC at 12:48

## 2022-01-01 RX ADMIN — POTASSIUM CHLORIDE 40 MEQ: 1.5 POWDER, FOR SOLUTION ORAL at 23:45

## 2022-01-01 RX ADMIN — DOCUSATE SODIUM 100 MG: 100 CAPSULE, LIQUID FILLED ORAL at 08:13

## 2022-01-01 RX ADMIN — POLYPROPYLENE GLYCOL 400, PROPYLENE GLYCOL 1 DROP: .4; .3 LIQUID OPHTHALMIC at 13:54

## 2022-01-01 RX ADMIN — FERROUS GLUCONATE 324 MG: 324 TABLET ORAL at 20:55

## 2022-01-01 RX ADMIN — ISOSORBIDE MONONITRATE 30 MG: 30 TABLET, EXTENDED RELEASE ORAL at 08:11

## 2022-01-01 RX ADMIN — POTASSIUM CHLORIDE 40 MEQ: 1.5 POWDER, FOR SOLUTION ORAL at 00:58

## 2022-01-01 RX ADMIN — MIRTAZAPINE 15 MG: 15 TABLET, FILM COATED ORAL at 20:29

## 2022-01-01 RX ADMIN — ISOSORBIDE MONONITRATE 30 MG: 30 TABLET, EXTENDED RELEASE ORAL at 08:54

## 2022-01-01 RX ADMIN — PANTOPRAZOLE SODIUM 40 MG: 40 TABLET, DELAYED RELEASE ORAL at 08:38

## 2022-01-01 RX ADMIN — GLYCOPYRROLATE 0.2 MG: 0.2 INJECTION, SOLUTION INTRAMUSCULAR; INTRAVENOUS at 09:21

## 2022-01-01 RX ADMIN — POTASSIUM CHLORIDE 10 MEQ: 750 TABLET, EXTENDED RELEASE ORAL at 16:11

## 2022-01-01 RX ADMIN — METOPROLOL TARTRATE 25 MG: 25 TABLET, FILM COATED ORAL at 22:33

## 2022-01-01 RX ADMIN — FEXOFENADINE HYDROCHLORIDE 180 MG: 60 TABLET ORAL at 10:07

## 2022-01-01 RX ADMIN — POTASSIUM CHLORIDE 10 MEQ: 750 TABLET, EXTENDED RELEASE ORAL at 12:47

## 2022-01-01 RX ADMIN — FEXOFENADINE HYDROCHLORIDE 180 MG: 60 TABLET ORAL at 12:09

## 2022-01-01 RX ADMIN — SENNOSIDES 2 TABLET: 8.6 TABLET, FILM COATED ORAL at 20:50

## 2022-01-01 RX ADMIN — Medication 2 G: at 12:04

## 2022-01-01 RX ADMIN — ISOSORBIDE MONONITRATE 120 MG: 60 TABLET, EXTENDED RELEASE ORAL at 10:38

## 2022-01-01 RX ADMIN — MAGNESIUM SULFATE IN WATER 2 G: 40 INJECTION, SOLUTION INTRAVENOUS at 05:39

## 2022-01-01 RX ADMIN — ANORECTAL OINTMENT: 15.7; .44; 24; 20.6 OINTMENT TOPICAL at 13:12

## 2022-01-01 RX ADMIN — PHENYLEPHRINE HYDROCHLORIDE 100 MCG: 10 INJECTION INTRAVENOUS at 12:22

## 2022-01-01 RX ADMIN — ISOSORBIDE MONONITRATE 30 MG: 30 TABLET, EXTENDED RELEASE ORAL at 08:45

## 2022-01-01 RX ADMIN — PANTOPRAZOLE SODIUM 40 MG: 40 INJECTION, POWDER, FOR SOLUTION INTRAVENOUS at 17:40

## 2022-01-01 RX ADMIN — SENNOSIDES 8.6 MG: 8.6 TABLET, FILM COATED ORAL at 08:15

## 2022-01-01 RX ADMIN — POLYETHYLENE GLYCOL 400 AND PROPYLENE GLYCOL 1 DROP: 4; 3 SOLUTION/ DROPS OPHTHALMIC at 16:29

## 2022-01-01 RX ADMIN — POTASSIUM CHLORIDE 10 MEQ: 20 SOLUTION ORAL at 18:03

## 2022-01-01 RX ADMIN — HYDROCORTISONE: 25 CREAM TOPICAL at 20:48

## 2022-01-01 RX ADMIN — ACETAMINOPHEN 1000 MG: 500 TABLET ORAL at 20:59

## 2022-01-01 RX ADMIN — FERROUS GLUCONATE 324 MG: 324 TABLET ORAL at 20:47

## 2022-01-01 RX ADMIN — ACETAMINOPHEN 975 MG: 325 TABLET, FILM COATED ORAL at 19:49

## 2022-01-01 RX ADMIN — POLYETHYLENE GLYCOL 400 AND PROPYLENE GLYCOL 1 DROP: 4; 3 SOLUTION/ DROPS OPHTHALMIC at 20:07

## 2022-01-01 RX ADMIN — FEXOFENADINE HYDROCHLORIDE 180 MG: 60 TABLET ORAL at 08:12

## 2022-01-01 RX ADMIN — POLYPROPYLENE GLYCOL 400, PROPYLENE GLYCOL 1 DROP: .4; .3 LIQUID OPHTHALMIC at 08:52

## 2022-01-01 RX ADMIN — PANTOPRAZOLE SODIUM 40 MG: 40 TABLET, DELAYED RELEASE ORAL at 08:45

## 2022-01-01 RX ADMIN — ANORECTAL OINTMENT: 15.7; .44; 24; 20.6 OINTMENT TOPICAL at 20:47

## 2022-01-01 RX ADMIN — MIRTAZAPINE 15 MG: 15 TABLET, FILM COATED ORAL at 00:28

## 2022-01-01 RX ADMIN — PANTOPRAZOLE SODIUM 40 MG: 40 TABLET, DELAYED RELEASE ORAL at 16:16

## 2022-01-01 RX ADMIN — MIRTAZAPINE 15 MG: 15 TABLET, FILM COATED ORAL at 22:33

## 2022-01-01 RX ADMIN — ANORECTAL OINTMENT: 15.7; .44; 24; 20.6 OINTMENT TOPICAL at 13:26

## 2022-01-01 RX ADMIN — PANTOPRAZOLE SODIUM 40 MG: 40 TABLET, DELAYED RELEASE ORAL at 20:36

## 2022-01-01 RX ADMIN — ISOSORBIDE MONONITRATE 30 MG: 30 TABLET, EXTENDED RELEASE ORAL at 10:43

## 2022-01-01 RX ADMIN — POTASSIUM CHLORIDE 10 MEQ: 7.46 INJECTION, SOLUTION INTRAVENOUS at 10:11

## 2022-01-01 RX ADMIN — POTASSIUM CHLORIDE 20 MEQ: 1.5 POWDER, FOR SOLUTION ORAL at 10:35

## 2022-01-01 RX ADMIN — SODIUM BICARBONATE 1300 MG: 650 TABLET ORAL at 20:35

## 2022-01-01 RX ADMIN — AMLODIPINE BESYLATE 10 MG: 5 TABLET ORAL at 08:43

## 2022-01-01 RX ADMIN — Medication 3 MG: at 01:47

## 2022-01-01 RX ADMIN — MIRTAZAPINE 15 MG: 15 TABLET, FILM COATED ORAL at 21:07

## 2022-01-01 RX ADMIN — POTASSIUM CHLORIDE 10 MEQ: 750 TABLET, EXTENDED RELEASE ORAL at 10:15

## 2022-01-01 RX ADMIN — FERROUS GLUCONATE 324 MG: 324 TABLET ORAL at 08:31

## 2022-01-01 RX ADMIN — SODIUM BICARBONATE 1300 MG: 650 TABLET ORAL at 19:50

## 2022-01-01 RX ADMIN — POTASSIUM CHLORIDE 20 MEQ: 1.5 POWDER, FOR SOLUTION ORAL at 09:18

## 2022-01-01 RX ADMIN — ANORECTAL OINTMENT: 15.7; .44; 24; 20.6 OINTMENT TOPICAL at 20:40

## 2022-01-01 RX ADMIN — POTASSIUM CHLORIDE 40 MEQ: 1500 TABLET, EXTENDED RELEASE ORAL at 19:43

## 2022-01-01 RX ADMIN — FUROSEMIDE 40 MG: 10 INJECTION, SOLUTION INTRAMUSCULAR; INTRAVENOUS at 07:32

## 2022-01-01 RX ADMIN — POLYPROPYLENE GLYCOL 400, PROPYLENE GLYCOL 1 DROP: .4; .3 LIQUID OPHTHALMIC at 12:01

## 2022-01-01 RX ADMIN — METOPROLOL TARTRATE 12.5 MG: 25 TABLET, FILM COATED ORAL at 09:15

## 2022-01-01 RX ADMIN — POLYETHYLENE GLYCOL 400 AND PROPYLENE GLYCOL 1 DROP: 4; 3 SOLUTION/ DROPS OPHTHALMIC at 08:13

## 2022-01-01 RX ADMIN — SODIUM BICARBONATE 650 MG: 650 TABLET ORAL at 10:46

## 2022-01-01 RX ADMIN — POLYPROPYLENE GLYCOL 400, PROPYLENE GLYCOL 1 DROP: .4; .3 LIQUID OPHTHALMIC at 20:46

## 2022-01-01 RX ADMIN — POLYPROPYLENE GLYCOL 400, PROPYLENE GLYCOL 1 DROP: .4; .3 LIQUID OPHTHALMIC at 20:56

## 2022-01-01 RX ADMIN — ANORECTAL OINTMENT: 15.7; .44; 24; 20.6 OINTMENT TOPICAL at 13:55

## 2022-01-01 RX ADMIN — FERROUS GLUCONATE 324 MG: 324 TABLET ORAL at 10:15

## 2022-01-01 RX ADMIN — ISOSORBIDE MONONITRATE 120 MG: 60 TABLET, EXTENDED RELEASE ORAL at 12:46

## 2022-01-01 RX ADMIN — SODIUM CHLORIDE, POTASSIUM CHLORIDE, SODIUM LACTATE AND CALCIUM CHLORIDE: 600; 310; 30; 20 INJECTION, SOLUTION INTRAVENOUS at 08:23

## 2022-01-01 RX ADMIN — POTASSIUM CHLORIDE 20 MEQ: 1500 TABLET, EXTENDED RELEASE ORAL at 11:10

## 2022-01-01 RX ADMIN — SODIUM BICARBONATE 650 MG: 650 TABLET ORAL at 20:04

## 2022-01-01 RX ADMIN — PHENYLEPHRINE HYDROCHLORIDE 200 MCG: 10 INJECTION INTRAVENOUS at 12:30

## 2022-01-01 RX ADMIN — ISOSORBIDE MONONITRATE 120 MG: 60 TABLET, EXTENDED RELEASE ORAL at 08:14

## 2022-01-01 RX ADMIN — POTASSIUM CHLORIDE 20 MEQ: 1500 TABLET, EXTENDED RELEASE ORAL at 10:27

## 2022-01-01 RX ADMIN — POLYETHYLENE GLYCOL 400 AND PROPYLENE GLYCOL 1 DROP: 4; 3 SOLUTION/ DROPS OPHTHALMIC at 20:33

## 2022-01-01 RX ADMIN — FEXOFENADINE HYDROCHLORIDE 180 MG: 60 TABLET ORAL at 08:21

## 2022-01-01 RX ADMIN — POLYPROPYLENE GLYCOL 400, PROPYLENE GLYCOL 1 DROP: .4; .3 LIQUID OPHTHALMIC at 16:17

## 2022-01-01 RX ADMIN — ANORECTAL OINTMENT: 15.7; .44; 24; 20.6 OINTMENT TOPICAL at 08:33

## 2022-01-01 RX ADMIN — MIRTAZAPINE 15 MG: 15 TABLET, FILM COATED ORAL at 20:49

## 2022-01-01 RX ADMIN — ISOSORBIDE MONONITRATE 30 MG: 30 TABLET, EXTENDED RELEASE ORAL at 08:29

## 2022-01-01 RX ADMIN — SODIUM BICARBONATE: 84 INJECTION, SOLUTION INTRAVENOUS at 20:48

## 2022-01-01 RX ADMIN — LIDOCAINE HYDROCHLORIDE 30 MG: 10 INJECTION, SOLUTION INFILTRATION; PERINEURAL at 08:59

## 2022-01-01 RX ADMIN — SODIUM BICARBONATE 650 MG: 650 TABLET ORAL at 10:38

## 2022-01-01 RX ADMIN — METOPROLOL TARTRATE 25 MG: 25 TABLET, FILM COATED ORAL at 05:49

## 2022-01-01 RX ADMIN — SENNOSIDES 1 TABLET: 8.6 TABLET, FILM COATED ORAL at 21:04

## 2022-01-01 RX ADMIN — ANORECTAL OINTMENT: 15.7; .44; 24; 20.6 OINTMENT TOPICAL at 21:07

## 2022-01-01 RX ADMIN — PANTOPRAZOLE SODIUM 40 MG: 40 TABLET, DELAYED RELEASE ORAL at 21:00

## 2022-01-01 RX ADMIN — AMLODIPINE BESYLATE 10 MG: 5 TABLET ORAL at 08:21

## 2022-01-01 RX ADMIN — POLYPROPYLENE GLYCOL 400, PROPYLENE GLYCOL 1 DROP: .4; .3 LIQUID OPHTHALMIC at 08:21

## 2022-01-01 RX ADMIN — SENNOSIDES 8.6 MG: 8.6 TABLET, FILM COATED ORAL at 00:29

## 2022-01-01 RX ADMIN — POLYETHYLENE GLYCOL 400 AND PROPYLENE GLYCOL 1 DROP: 4; 3 SOLUTION/ DROPS OPHTHALMIC at 16:41

## 2022-01-01 RX ADMIN — MAGNESIUM SULFATE HEPTAHYDRATE 1 G: 500 INJECTION, SOLUTION INTRAMUSCULAR; INTRAVENOUS at 11:48

## 2022-01-01 RX ADMIN — METOPROLOL TARTRATE 12.5 MG: 25 TABLET, FILM COATED ORAL at 08:26

## 2022-01-01 RX ADMIN — ONDANSETRON 4 MG: 2 INJECTION INTRAMUSCULAR; INTRAVENOUS at 09:11

## 2022-01-01 RX ADMIN — ANORECTAL OINTMENT: 15.7; .44; 24; 20.6 OINTMENT TOPICAL at 10:33

## 2022-01-01 RX ADMIN — ANORECTAL OINTMENT: 15.7; .44; 24; 20.6 OINTMENT TOPICAL at 06:39

## 2022-01-01 RX ADMIN — PANTOPRAZOLE SODIUM 40 MG: 40 TABLET, DELAYED RELEASE ORAL at 12:08

## 2022-01-01 RX ADMIN — POLYPROPYLENE GLYCOL 400, PROPYLENE GLYCOL 1 DROP: .4; .3 LIQUID OPHTHALMIC at 08:32

## 2022-01-01 RX ADMIN — HYDROXYZINE HYDROCHLORIDE 25 MG: 25 TABLET ORAL at 03:49

## 2022-01-01 RX ADMIN — POLYETHYLENE GLYCOL 400 AND PROPYLENE GLYCOL 1 DROP: 4; 3 SOLUTION/ DROPS OPHTHALMIC at 13:21

## 2022-01-01 RX ADMIN — POTASSIUM CHLORIDE 10 MEQ: 7.46 INJECTION, SOLUTION INTRAVENOUS at 14:47

## 2022-01-01 RX ADMIN — ISOSORBIDE MONONITRATE 120 MG: 60 TABLET, EXTENDED RELEASE ORAL at 10:44

## 2022-01-01 RX ADMIN — ANORECTAL OINTMENT: 15.7; .44; 24; 20.6 OINTMENT TOPICAL at 20:48

## 2022-01-01 RX ADMIN — POLYETHYLENE GLYCOL 400 AND PROPYLENE GLYCOL 1 DROP: 4; 3 SOLUTION/ DROPS OPHTHALMIC at 16:53

## 2022-01-01 RX ADMIN — FERROUS GLUCONATE 324 MG: 324 TABLET ORAL at 09:19

## 2022-01-01 RX ADMIN — METOPROLOL TARTRATE 12.5 MG: 25 TABLET, FILM COATED ORAL at 21:00

## 2022-01-01 RX ADMIN — POLYPROPYLENE GLYCOL 400, PROPYLENE GLYCOL 1 DROP: .4; .3 LIQUID OPHTHALMIC at 13:25

## 2022-01-01 RX ADMIN — PROPOFOL 100 MCG/KG/MIN: 10 INJECTION, EMULSION INTRAVENOUS at 08:59

## 2022-01-01 RX ADMIN — ANORECTAL OINTMENT: 15.7; .44; 24; 20.6 OINTMENT TOPICAL at 20:05

## 2022-01-01 RX ADMIN — POTASSIUM CHLORIDE 10 MEQ: 20 SOLUTION ORAL at 17:54

## 2022-01-01 RX ADMIN — ANORECTAL OINTMENT: 15.7; .44; 24; 20.6 OINTMENT TOPICAL at 14:04

## 2022-01-01 RX ADMIN — SODIUM BICARBONATE 650 MG: 650 TABLET ORAL at 20:50

## 2022-01-01 RX ADMIN — POTASSIUM CHLORIDE 10 MEQ: 7.46 INJECTION, SOLUTION INTRAVENOUS at 20:12

## 2022-01-01 RX ADMIN — Medication 1 MG: at 01:03

## 2022-01-01 RX ADMIN — ISOSORBIDE MONONITRATE 120 MG: 60 TABLET, EXTENDED RELEASE ORAL at 09:16

## 2022-01-01 RX ADMIN — METOPROLOL SUCCINATE 75 MG: 25 TABLET, EXTENDED RELEASE ORAL at 08:13

## 2022-01-01 RX ADMIN — FEXOFENADINE HYDROCHLORIDE 180 MG: 60 TABLET ORAL at 09:20

## 2022-01-01 RX ADMIN — PANTOPRAZOLE SODIUM 40 MG: 40 TABLET, DELAYED RELEASE ORAL at 16:11

## 2022-01-01 RX ADMIN — LIDOCAINE HYDROCHLORIDE 20 MG: 10 INJECTION, SOLUTION INFILTRATION; PERINEURAL at 11:59

## 2022-01-01 RX ADMIN — FEXOFENADINE HYDROCHLORIDE 180 MG: 60 TABLET ORAL at 08:39

## 2022-01-01 RX ADMIN — POLYPROPYLENE GLYCOL 400, PROPYLENE GLYCOL 1 DROP: .4; .3 LIQUID OPHTHALMIC at 17:18

## 2022-01-01 RX ADMIN — SODIUM CHLORIDE 250 ML: 9 INJECTION, SOLUTION INTRAVENOUS at 18:36

## 2022-01-01 RX ADMIN — FUROSEMIDE 40 MG: 10 INJECTION, SOLUTION INTRAMUSCULAR; INTRAVENOUS at 18:51

## 2022-01-01 RX ADMIN — PANTOPRAZOLE SODIUM 40 MG: 40 TABLET, DELAYED RELEASE ORAL at 00:28

## 2022-01-01 RX ADMIN — METOPROLOL TARTRATE 25 MG: 25 TABLET, FILM COATED ORAL at 20:04

## 2022-01-01 RX ADMIN — POLYETHYLENE GLYCOL 3350, SODIUM SULFATE ANHYDROUS, SODIUM BICARBONATE, SODIUM CHLORIDE, POTASSIUM CHLORIDE 4000 ML: 236; 22.74; 6.74; 5.86; 2.97 POWDER, FOR SOLUTION ORAL at 10:33

## 2022-01-01 RX ADMIN — PROPOFOL 50 MCG/KG/MIN: 10 INJECTION, EMULSION INTRAVENOUS at 12:00

## 2022-01-01 RX ADMIN — FUROSEMIDE 20 MG: 10 INJECTION, SOLUTION INTRAMUSCULAR; INTRAVENOUS at 18:07

## 2022-01-01 RX ADMIN — ANORECTAL OINTMENT: 15.7; .44; 24; 20.6 OINTMENT TOPICAL at 13:54

## 2022-01-01 RX ADMIN — ANORECTAL OINTMENT: 15.7; .44; 24; 20.6 OINTMENT TOPICAL at 08:40

## 2022-01-01 ASSESSMENT — ACTIVITIES OF DAILY LIVING (ADL)
DRESS: 0-->ASSISTANCE NEEDED (DEVELOPMENTALLY APPROPRIATE)
EATING: 0-->ASSISTANCE NEEDED (DEVELOPMENTALLY APPROPRIATE)
ADLS_ACUITY_SCORE: 35
ADLS_ACUITY_SCORE: 45
EQUIPMENT_CURRENTLY_USED_AT_HOME: WALKER, ROLLING
ADLS_ACUITY_SCORE: 46
TOILETING: 1-->ASSISTANCE (EQUIPMENT/PERSON) NEEDED (NOT DEVELOPMENTALLY APPROPRIATE)
ADLS_ACUITY_SCORE: 43
BATHING: 1-->ASSISTANCE NEEDED
ADLS_ACUITY_SCORE: 52
VISION_MANAGEMENT: GLASSES
NUMBER_OF_TIMES_PATIENT_HAS_FALLEN_WITHIN_LAST_SIX_MONTHS: 1
ADLS_ACUITY_SCORE: 49
TOILETING_ASSISTANCE: TOILETING DIFFICULTY, ASSISTANCE 1 PERSON
ADLS_ACUITY_SCORE: 49
ADLS_ACUITY_SCORE: 49
ADLS_ACUITY_SCORE: 52
ADLS_ACUITY_SCORE: 47
ADLS_ACUITY_SCORE: 35
TOILETING: 1-->ASSISTANCE (EQUIPMENT/PERSON) NEEDED
ADLS_ACUITY_SCORE: 47
ADLS_ACUITY_SCORE: 52
CONCENTRATING,_REMEMBERING_OR_MAKING_DECISIONS_DIFFICULTY: NO
ADLS_ACUITY_SCORE: 39
EATING: 0-->INDEPENDENT
ADLS_ACUITY_SCORE: 52
ADLS_ACUITY_SCORE: 52
ADLS_ACUITY_SCORE: 45
ADLS_ACUITY_SCORE: 45
ADLS_ACUITY_SCORE: 52
FALL_HISTORY_WITHIN_LAST_SIX_MONTHS: YES
DESCRIBE_HEARING_LOSS: BILATERAL HEARING LOSS
ADLS_ACUITY_SCORE: 47
ADLS_ACUITY_SCORE: 46
ADLS_ACUITY_SCORE: 49
ADLS_ACUITY_SCORE: 47
ADLS_ACUITY_SCORE: 52
USE_OF_HEARING_ASSISTIVE_DEVICES: BILATERAL HEARING AIDS
ADLS_ACUITY_SCORE: 47
ADLS_ACUITY_SCORE: 39
ADLS_ACUITY_SCORE: 54
WALKING_OR_CLIMBING_STAIRS: TRANSFERRING DIFFICULTY, REQUIRES EQUIPMENT
ADLS_ACUITY_SCORE: 35
WEAR_GLASSES_OR_BLIND: YES
WEAR_GLASSES_OR_BLIND: YES
ADLS_ACUITY_SCORE: 39
ADLS_ACUITY_SCORE: 52
ADLS_ACUITY_SCORE: 52
CHANGE_IN_FUNCTIONAL_STATUS_SINCE_ONSET_OF_CURRENT_ILLNESS/INJURY: NO
ADLS_ACUITY_SCORE: 52
ADLS_ACUITY_SCORE: 51
ADLS_ACUITY_SCORE: 47
ADLS_ACUITY_SCORE: 39
ADLS_ACUITY_SCORE: 49
CHANGE_IN_FUNCTIONAL_STATUS_SINCE_ONSET_OF_CURRENT_ILLNESS/INJURY: NO
ADLS_ACUITY_SCORE: 49
TOILETING_ISSUES: YES
ADLS_ACUITY_SCORE: 49
ADLS_ACUITY_SCORE: 49
TRANSFERRING: 0-->ASSISTANCE NEEDED (DEVELOPMENTALLY APPROPRIATE)
ADLS_ACUITY_SCORE: 49
ADLS_ACUITY_SCORE: 52
ADLS_ACUITY_SCORE: 37
ADLS_ACUITY_SCORE: 49
ADLS_ACUITY_SCORE: 47
ADLS_ACUITY_SCORE: 46
ADLS_ACUITY_SCORE: 35
ADLS_ACUITY_SCORE: 49
ADLS_ACUITY_SCORE: 49
ADLS_ACUITY_SCORE: 47
ADLS_ACUITY_SCORE: 49
ADLS_ACUITY_SCORE: 47
WERE_AUXILIARY_AIDS_OFFERED?: YES
ADLS_ACUITY_SCORE: 49
ADLS_ACUITY_SCORE: 47
TOILETING_ISSUES: NO
WALKING_OR_CLIMBING_STAIRS_DIFFICULTY: YES
ADLS_ACUITY_SCORE: 45
ADLS_ACUITY_SCORE: 47
ADLS_ACUITY_SCORE: 39
ADLS_ACUITY_SCORE: 46
ADLS_ACUITY_SCORE: 47
DRESSING/BATHING: BATHING DIFFICULTY, ASSISTANCE 1 PERSON
DRESSING/BATHING_DIFFICULTY: YES
WEAR_GLASSES_OR_BLIND: YES
ADLS_ACUITY_SCORE: 52
ADLS_ACUITY_SCORE: 49
DRESSING/BATHING: BATHING DIFFICULTY, ASSISTANCE 1 PERSON;DRESSING DIFFICULTY, ASSISTANCE 1 PERSON
ADLS_ACUITY_SCORE: 52
TOILETING_ISSUES: YES
ADLS_ACUITY_SCORE: 49
WALKING_OR_CLIMBING_STAIRS: TRANSFERRING DIFFICULTY, REQUIRES EQUIPMENT
ADLS_ACUITY_SCORE: 52
EATING/SWALLOWING: SWALLOWING LIQUIDS
ADLS_ACUITY_SCORE: 49
ADLS_ACUITY_SCORE: 46
ADLS_ACUITY_SCORE: 49
PREVIOUS_RESPONSIBILITIES: MEAL PREP;HOUSEKEEPING;LAUNDRY;MEDICATION MANAGEMENT;SHOPPING
ADLS_ACUITY_SCORE: 33
ADLS_ACUITY_SCORE: 39
DOING_ERRANDS_INDEPENDENTLY_DIFFICULTY: YES
ADLS_ACUITY_SCORE: 47
ADLS_ACUITY_SCORE: 45
PATIENT'S_PREFERRED_MEANS_OF_COMMUNICATION: VERBAL
NUMBER_OF_TIMES_PATIENT_HAS_FALLEN_WITHIN_LAST_SIX_MONTHS: 1
ADLS_ACUITY_SCORE: 52
EQUIPMENT_CURRENTLY_USED_AT_HOME: WALKER, ROLLING
DIFFICULTY_EATING/SWALLOWING: YES
DOING_ERRANDS_INDEPENDENTLY_DIFFICULTY: YES
ADLS_ACUITY_SCORE: 49
DRESSING/BATHING_DIFFICULTY: NO
ADLS_ACUITY_SCORE: 46
ADLS_ACUITY_SCORE: 45
FALL_HISTORY_WITHIN_LAST_SIX_MONTHS: YES
DIFFICULTY_COMMUNICATING: NO
ADLS_ACUITY_SCORE: 49
ADLS_ACUITY_SCORE: 49
ADLS_ACUITY_SCORE: 43
ADLS_ACUITY_SCORE: 52
ADLS_ACUITY_SCORE: 49
TOILETING_ASSISTANCE: TOILETING DIFFICULTY, ASSISTANCE 1 PERSON
ADLS_ACUITY_SCORE: 49
ADLS_ACUITY_SCORE: 35
ADLS_ACUITY_SCORE: 48
ADLS_ACUITY_SCORE: 49
CONCENTRATING,_REMEMBERING_OR_MAKING_DECISIONS_DIFFICULTY: NO
ADLS_ACUITY_SCORE: 46
ADLS_ACUITY_SCORE: 46
DRESS: 1-->ASSISTANCE (EQUIPMENT/PERSON) NEEDED
ADLS_ACUITY_SCORE: 52
ADLS_ACUITY_SCORE: 49
DRESSING/BATHING: BATHING DIFFICULTY, ASSISTANCE 1 PERSON
ADLS_ACUITY_SCORE: 49
ADLS_ACUITY_SCORE: 46
ADLS_ACUITY_SCORE: 47
WALKING_OR_CLIMBING_STAIRS_DIFFICULTY: YES
ADLS_ACUITY_SCORE: 49
DIFFICULTY_EATING/SWALLOWING: YES
TRANSFERRING: 0-->ASSISTANCE NEEDED (DEVELOPMENTALLY APPROPRIATE)
ADLS_ACUITY_SCORE: 46
WALKING_OR_CLIMBING_STAIRS_DIFFICULTY: YES
ADLS_ACUITY_SCORE: 49
ADLS_ACUITY_SCORE: 47
ADLS_ACUITY_SCORE: 54
ADLS_ACUITY_SCORE: 52
ADLS_ACUITY_SCORE: 52
ADLS_ACUITY_SCORE: 47
EATING/SWALLOWING: SWALLOWING LIQUIDS
ADLS_ACUITY_SCORE: 35
ADLS_ACUITY_SCORE: 48
ADLS_ACUITY_SCORE: 48
ADLS_ACUITY_SCORE: 47
BATHING: 1-->ASSISTANCE NEEDED
ADLS_ACUITY_SCORE: 49
ADLS_ACUITY_SCORE: 49
SWALLOWING: 2-->DIFFICULTY SWALLOWING LIQUIDS
ADLS_ACUITY_SCORE: 45
ADLS_ACUITY_SCORE: 52
ADLS_ACUITY_SCORE: 52
ADLS_ACUITY_SCORE: 49
ADLS_ACUITY_SCORE: 45
EATING/SWALLOWING: SWALLOWING LIQUIDS
ADLS_ACUITY_SCORE: 47
CONCENTRATING,_REMEMBERING_OR_MAKING_DECISIONS_DIFFICULTY: YES
WEAR_GLASSES_OR_BLIND: YES
ADLS_ACUITY_SCORE: 39
HEARING_DIFFICULTY_OR_DEAF: YES
TOILETING: 1-->ASSISTANCE (EQUIPMENT/PERSON) NEEDED (NOT DEVELOPMENTALLY APPROPRIATE)
ADLS_ACUITY_SCORE: 47
ADLS_ACUITY_SCORE: 47
DRESSING/BATHING_DIFFICULTY: YES
ADLS_ACUITY_SCORE: 54
ADLS_ACUITY_SCORE: 49
TRANSFERRING: 1-->ASSISTANCE (EQUIPMENT/PERSON) NEEDED
DOING_ERRANDS_INDEPENDENTLY_DIFFICULTY: YES
ADLS_ACUITY_SCORE: 49
ADLS_ACUITY_SCORE: 45
CONCENTRATING,_REMEMBERING_OR_MAKING_DECISIONS_DIFFICULTY: NO
SWALLOWING: 2-->DIFFICULTY SWALLOWING LIQUIDS
ADLS_ACUITY_SCORE: 49
ADLS_ACUITY_SCORE: 46
ADLS_ACUITY_SCORE: 52
FALL_HISTORY_WITHIN_LAST_SIX_MONTHS: NO
DIFFICULTY_EATING/SWALLOWING: YES
ADLS_ACUITY_SCORE: 49
ADLS_ACUITY_SCORE: 49
ADLS_ACUITY_SCORE: 43
ADLS_ACUITY_SCORE: 47
ADLS_ACUITY_SCORE: 49
ADLS_ACUITY_SCORE: 47
DRESS: 1-->ASSISTANCE (EQUIPMENT/PERSON) NEEDED
WALKING_OR_CLIMBING_STAIRS: AMBULATION DIFFICULTY, REQUIRES EQUIPMENT
VISION_MANAGEMENT: GLASSES
TOILETING: 1-->ASSISTANCE (EQUIPMENT/PERSON) NEEDED
THE_FOLLOWING_AIDS_WERE_PROVIDED;: PATIENT DECLINED OFFER OF AUXILIARY AIDS
ADLS_ACUITY_SCORE: 52
ADLS_ACUITY_SCORE: 35
ADLS_ACUITY_SCORE: 47
DRESS: 0-->ASSISTANCE NEEDED (DEVELOPMENTALLY APPROPRIATE)
ADLS_ACUITY_SCORE: 49
ADLS_ACUITY_SCORE: 46
ADLS_ACUITY_SCORE: 39
ADLS_ACUITY_SCORE: 39
ADLS_ACUITY_SCORE: 47
ADLS_ACUITY_SCORE: 52
ADLS_ACUITY_SCORE: 43
ADLS_ACUITY_SCORE: 52
VISION_MANAGEMENT: GLASSES
ADLS_ACUITY_SCORE: 45
ADLS_ACUITY_SCORE: 47
ADLS_ACUITY_SCORE: 52
ADLS_ACUITY_SCORE: 46
ADLS_ACUITY_SCORE: 49
ADLS_ACUITY_SCORE: 47
ADLS_ACUITY_SCORE: 52
ADLS_ACUITY_SCORE: 49
TRANSFERRING: 1-->ASSISTANCE (EQUIPMENT/PERSON) NEEDED
DIFFICULTY_EATING/SWALLOWING: NO

## 2022-01-01 ASSESSMENT — ENCOUNTER SYMPTOMS
VOMITING: 0
FEVER: 0
COLOR CHANGE: 0
ABDOMINAL DISTENTION: 0
FEVER: 0
SHORTNESS OF BREATH: 1
DIARRHEA: 0
FREQUENCY: 1
APPETITE CHANGE: 0
ACTIVITY CHANGE: 0
SHORTNESS OF BREATH: 0
VOMITING: 0
SORE THROAT: 0
VOMITING: 0
NAUSEA: 1
ABDOMINAL PAIN: 0
ANAL BLEEDING: 1
FATIGUE: 1
CHILLS: 0
COUGH: 0
ABDOMINAL PAIN: 0
SHORTNESS OF BREATH: 1
PALPITATIONS: 0
CHEST TIGHTNESS: 0
RHINORRHEA: 0
NAUSEA: 0
COUGH: 1
CONSTIPATION: 0
NAUSEA: 0
FEVER: 0
ABDOMINAL PAIN: 0

## 2022-03-04 NOTE — OP NOTE
Preoperative diagnosis: Retained hardware with impending open lesion from hardware  Postop diagnosis: Retained hardware with soft tissue injury and infection    Procedure: Removal of hardware, deep, right ankle  Irrigation and debridement, right ankle    Date of surgery: 3/4/2022    Anesthesia: MAC plus local    Surgeon: DO Gary  Assistant: Dai Chen PA-C.  Assistance required for patient positioning, soft tissue retraction, manipulation of the operative extremity.  A skilled assistant was essential to the completion of the case.    Specimens: Swab x1 right ankle    Complications: Identification of infection likely originating from a soft tissue wound from prominent hardware    Blood loss: Minimal    Description of procedure: Yun was identified in preoperative holding using 2 unique patient identifiers.  Risks were reviewed.  Consent was electronically signed.  We discussed wound healing problems, infection, residual discomfort, the need for additional hardware, neurovascular injury is potential complication.  She wished to proceed.    Patient was rolled to the operating.  She is placed supine on the operating table.  Anesthesia was induced.  Bump placed.  Tourniquet applied.  The patient was then prepped and draped in a sterile fashion.  After this all members the operative team performed a perioperative timeout and confirmed administration of antibiotics.    We then insufflated the incision with local anesthetic.  Following this limb was exsanguinated with with an Esmarch bandage and the tourniquet was inflated.  A lateral approach was made.  There was a skin hole in the area where the distal screw was backing out creating a direct tract down to the plate.  Unfortunately we did find an area of gross purulence in the soft tissues overlying the plate.  This was cultured.  We then exposed the plate in its entirety.  The screws were backed out, the plate was removed.  We dissected and identified the  leg screw which was similarly removed.  After debridement of devitalized and infected tissue we irrigated copiously with sterile saline.  Visual inspection did not reveal any evidence of residual infection.  We then closed the wound with nylon sutures.  The tourniquet was released.  Hemostasis noted.  Clean dry dressings were applied.    Plan:  Started on a course of antibiotics will change based on culture results if necessary  Weight-bear as tolerated  Back on DVT prophylaxis per baseline  Follow-up in 2 weeks.  Sooner if any problems arise.

## 2022-03-04 NOTE — ANESTHESIA CARE TRANSFER NOTE
Patient: Yun Rodgers    Procedure: Procedure(s):  RIGHT ANKLE REMOVAL OF HARDWARE       Diagnosis: Ankle fracture, right [S82.891A]  Diagnosis Additional Information: No value filed.    Anesthesia Type:   MAC     Note:    Oropharynx: oropharynx clear of all foreign objects  Level of Consciousness: drowsy  Oxygen Supplementation: room air    Independent Airway: airway patency satisfactory and stable  Dentition: dentition unchanged  Vital Signs Stable: post-procedure vital signs reviewed and stable  Report to RN Given: handoff report given  Patient transferred to: Phase II    Handoff Report: Identifed the Patient, Identified the Reponsible Provider, Reviewed the pertinent medical history, Discussed the surgical course, Reviewed Intra-OP anesthesia mangement and issues during anesthesia, Set expectations for post-procedure period and Allowed opportunity for questions and acknowledgement of understanding      Vitals:  Vitals Value Taken Time   /60 03/04/22 1245   Temp 36.7  C (98  F) 03/04/22 1244   Pulse 58 03/04/22 1246   Resp 18    SpO2 99 % 03/04/22 1246   Vitals shown include unvalidated device data.    Electronically Signed By: AKIKO MAXWELL CRNA  March 4, 2022  12:47 PM

## 2022-03-04 NOTE — PHARMACY-ADMISSION MEDICATION HISTORY
Pharmacy Note - Admission Medication History    Pertinent Provider Information: n/a   ______________________________________________________________________    Prior To Admission (PTA) med list completed and updated in EMR.       PTA Med List   Medication Sig Note Last Dose     acetaminophen (TYLENOL) 500 MG tablet Take 500-1,000 mg by mouth every 6 hours as needed for mild pain  Past Week at Unknown time     amLODIPine (NORVASC) 10 MG tablet [AMLODIPINE (NORVASC) 10 MG TABLET] TAKE 1 TABLET BY MOUTH EVERY DAY  3/4/2022 at am     aspirin 325 MG tablet [ASPIRIN 325 MG TABLET] Take 1 tablet (325 mg total) by mouth daily. 3/2/2022: LD 3/1 3/1/2022     cholecalciferol, vitamin D3, 1,000 unit tablet [CHOLECALCIFEROL, VITAMIN D3, 1,000 UNIT TABLET] Take 1,000 Units by mouth daily.  Past Week at Unknown time     docusate sodium (COLACE) 100 MG capsule Take 100 mg by mouth 2 times daily  3/3/2022 at Unknown time     esomeprazole (NEXIUM) 40 MG DR capsule Take 40 mg by mouth At Bedtime Take 30-60 minutes before eating. 3/4/2022: Pt takes both pantoprazole and esomeprazole  3/3/2022 at Unknown time     fexofenadine (ALLEGRA) 180 MG tablet Take 180 mg by mouth daily  3/4/2022 at am     glucosamine-chondroitin 500-400 mg tablet [GLUCOSAMINE-CHONDROITIN 500-400 MG TABLET] Take 2 tablets by mouth daily.  Past Week at Unknown time     isosorbide mononitrate (IMDUR) 120 MG 24 hr tablet [ISOSORBIDE MONONITRATE (IMDUR) 120 MG 24 HR TABLET] TAKE 1 TABLET BY MOUTH EVERY DAY **TAKE W/30 MG TABLET** - **PATIENT ONLY WANTS 1 MONTH PER FILL**  3/4/2022 at am     isosorbide mononitrate (IMDUR) 30 MG 24 hr tablet Take 30 mg by mouth daily  3/4/2022: Total of 150 mg QAM 3/4/2022 at Unknown time     metoprolol succinate ER (TOPROL-XL) 25 MG 24 hr tablet Take 75 mg by mouth daily   3/4/2022 at am     mirtazapine (REMERON) 15 MG tablet [MIRTAZAPINE (REMERON) 15 MG TABLET] Take 15 mg by mouth at bedtime. Indications: major depressive disorder   3/3/2022 at Unknown time     MULTIVITAMIN W-MINERALS/LUTEIN (CENTRUM SILVER ORAL) [MULTIVITAMIN W-MINERALS/LUTEIN (CENTRUM SILVER ORAL)] Take 1 tablet by mouth daily.  3/3/2022 at Unknown time     nitroglycerin (NITROSTAT) 0.4 MG SL tablet [NITROGLYCERIN (NITROSTAT) 0.4 MG SL TABLET] Place 0.4 mg under the tongue as needed for chest pain.  PRN     pantoprazole (PROTONIX) 40 MG tablet [PANTOPRAZOLE (PROTONIX) 40 MG TABLET] Take 40 mg by mouth 2 (two) times a day.  3/4/2022 at am     peg 400-propylene glycol (SYSTANE) 0.4-0.3 % Drop [-PROPYLENE GLYCOL (SYSTANE) 0.4-0.3 % DROP] Administer 1 drop to both eyes 4 (four) times a day.   3/4/2022 at has with     senna (SENOKOT) 8.6 MG tablet Take 1 tablet by mouth daily as needed for constipation  Past Week at Unknown time     triamcinolone (KENALOG) 0.025 % cream [TRIAMCINOLONE (KENALOG) 0.025 % CREAM] Apply 1 application topically 3 (three) times a day as needed.   PRN       Information source(s): [facility]    Method of interview communication: in-person    Patient was asked about OTC/herbal products specifically.  PTA med list reflects this.    Based on the pharmacist's assessment, the PTA med list information appears reliable    Allergies were reviewed, assessed, and updated with the patient.      Medications available for use during hospital stay: systane.      Thank you for the opportunity to participate in the care of this patient.      Tiffany Lee [facility]     3/4/2022     11:12 AM

## 2022-03-04 NOTE — ANESTHESIA POSTPROCEDURE EVALUATION
Patient: Yun Rodgers    Procedure: Procedure(s):  RIGHT ANKLE REMOVAL OF HARDWARE       Anesthesia Type:  MAC    Note:  Disposition: Outpatient   Postop Pain Control: Uneventful            Sign Out: Well controlled pain   PONV: No   Neuro/Psych: Uneventful            Sign Out: Acceptable/Baseline neuro status   Airway/Respiratory: Uneventful            Sign Out: Acceptable/Baseline resp. status   CV/Hemodynamics: Uneventful            Sign Out: Acceptable CV status; No obvious hypovolemia; No obvious fluid overload   Other NRE: NONE   DID A NON-ROUTINE EVENT OCCUR? No           Last vitals:  Vitals Value Taken Time   /72 03/04/22 1331   Temp 36.7  C (98  F) 03/04/22 1244   Pulse 72 03/04/22 1417   Resp     SpO2 100 % 03/04/22 1417   Vitals shown include unvalidated device data.    Electronically Signed By: Renaldo Stevenson MD  March 4, 2022  2:18 PM

## 2022-03-04 NOTE — DISCHARGE INSTRUCTIONS
Discharge Instructions: After Your Surgery  You ve just had surgery. During surgery, you were given medicine called anesthesia to keep you relaxed and free of pain. After surgery, you may have some pain or nausea. This is common. Here are some tips for feeling better and getting well after surgery.     Stay on schedule with your medicine.   Going home  Your healthcare provider will show you how to take care of yourself when you go home. He or she will also answer your questions. Have an adult family member or friend drive you home. For the first 24 hours after your surgery:    Don't drive or use heavy equipment.    Don't make important decisions or sign legal papers.    Don't drink alcohol.    Have someone stay with you. He or she can watch for problems and help keep you safe.  Be sure to go to all follow-up visits with your healthcare provider. And rest after your surgery for as long as your healthcare provider tells you to.  Coping with pain  If you have pain after surgery, pain medicine will help you feel better. Take it as told, before pain becomes severe. Also, ask your healthcare provider or pharmacist about other ways to control pain. This might be with heat, ice, or relaxation. And follow any other instructions your surgeon or nurse gives you.  Tips for taking pain medicine  To get the best relief possible, remember these points:    Pain medicines can upset your stomach. Taking them with a little food may help.    Most pain relievers taken by mouth need at least 20 to 30 minutes to start to work.    Don't wait till your pain becomes severe before you take your medicine. Try to time your medicine so that you can take it before starting an activity. This might be before you get dressed, go for a walk, or sit down for dinner.    Constipation is a common side effect of pain medicines. Call your healthcare provider before taking any medicines such as laxatives or stool softeners to help ease constipation. Also  ask if you should skip any foods. Drinking lots of fluids and eating foods such as fruits and vegetables that are high in fiber can also help. Remember, don't take laxatives unless your surgeon has prescribed them.    Drinking alcohol and taking pain medicine can cause dizziness and slow your breathing. It can even be deadly. Don't drink alcohol while taking pain medicine.    Pain medicine can make you react more slowly to things. Don't drive or run machinery while taking pain medicine.  Your healthcare provider may tell you to take acetaminophen to help ease your pain. Ask him or her how much you are supposed to take each day. Acetaminophen or other pain relievers may interact with your prescription medicines or other over-the-counter (OTC) medicines. Some prescription medicines have acetaminophen and other ingredients. Using both prescription and OTC acetaminophen for pain can cause you to overdose. Read the labels on your OTC medicines with care. This will help you to clearly know the list of ingredients, how much to take, and any warnings. It may also help you not take too much acetaminophen. If you have questions or don't understand the information, ask your pharmacist or healthcare provider to explain it to you before you take the OTC medicine.  Managing nausea  Some people have an upset stomach after surgery. This is often because of anesthesia, pain, or pain medicine, or the stress of surgery. These tips will help you handle nausea and eat healthy foods as you get better. If you were on a special food plan before surgery, ask your healthcare provider if you should follow it while you get better. These tips may help:    Don't push yourself to eat. Your body will tell you when to eat and how much.    Start off with clear liquids and soup. They are easier to digest.    Next try semi-solid foods, such as mashed potatoes, applesauce, and gelatin, as you feel ready.    Slowly move to solid foods. Don t eat fatty,  rich, or spicy foods at first.    Don't force yourself to have 3 large meals a day. Instead eat smaller amounts more often.    Take pain medicines with a small amount of solid food, such as crackers or toast, to prevent nausea.  When to call your healthcare provider  Call your healthcare provider if:    You still have intolerable pain an hour after taking medicine. The medicine may not be strong enough.    You feel too sleepy, dizzy, or groggy. The medicine may be too strong.    You have side effects such as nausea or vomiting, or skin changes such as rash, itching, or hives. Your healthcare provider may suggest other medicines to control side effects.  Rash, itching, or hives may mean you have an allergic reaction. Report this right away. If you have trouble breathing or facial swelling, call 911 right away.  If you have obstructive sleep apnea  You were given anesthesia medicine during surgery to keep you comfortable and free of pain. After surgery, you may have more apnea spells because of this medicine and other medicines you were given. The spells may last longer than usual.   At home:    Keep using the continuous positive airway pressure (CPAP) device when you sleep. Unless your healthcare provider tells you not to, use it when you sleep, day or night. CPAP is a common device used to treat obstructive sleep apnea.    Talk with your provider before taking any pain medicine, muscle relaxants, or sedatives. Your provider will tell you about the possible dangers of taking these medicines.  [employer] last reviewed this educational content on 3/1/2019    5728-3689 [employer]. All rights reserved. This information is not intended as a substitute for professional medical care. Always follow your healthcare professional's instructions.

## 2022-03-04 NOTE — BRIEF OP NOTE
[facility]    Brief Operative Note    Pre-operative diagnosis: Retained ankle hardware, right  Post-operative diagnosis Same as pre-operative diagnosis    Procedure: Procedure(s):  RIGHT ANKLE REMOVAL OF HARDWARE  Surgeon: Surgeon(s) and Role:     * Shawn Vega DO - Primary     * Dai Chen PA-C - Assisting  Anesthesia: Choice   Estimated Blood Loss: Minimal    Drains: None  Specimens:   ID Type Source Tests Collected by Time Destination   A : right ankle Swab Ankle, Right ANAEROBIC BACTERIAL CULTURE ROUTINE, GRAM STAIN, AEROBIC BACTERIAL CULTURE ROUTINE Shawn Vega DO 3/4/2022 12:22 PM      Findings:   Purulence .  Complications: None.  Implants: * No implants in log *

## 2022-03-07 NOTE — OR NURSING
Nine screws and one plate explanted from right ankle.  Explants discarded in OR according to hospital policy. No recall indicated.

## 2022-06-14 PROBLEM — K56.41 FECAL IMPACTION IN RECTUM (H): Status: ACTIVE | Noted: 2022-01-01

## 2022-06-14 PROBLEM — N17.9 ACUTE RENAL FAILURE, UNSPECIFIED ACUTE RENAL FAILURE TYPE (H): Status: ACTIVE | Noted: 2022-01-01

## 2022-06-14 PROBLEM — E87.6 HYPOKALEMIA: Status: ACTIVE | Noted: 2022-01-01

## 2022-06-14 PROBLEM — R33.9 URINARY RETENTION: Status: ACTIVE | Noted: 2022-01-01

## 2022-06-14 NOTE — ED NOTES
ED Provider In Triage Note  [facility]  Encounter Date: Jun 14, 2022    Chief Complaint   Patient presents with     Rectal Bleeding     Generalized Weakness       Brief HPI:   Yun Rodgers is a 87 year old female presenting to the Emergency Department with a chief complaint of weakness, BRBPR.  SOB.  Weak.  Has known hemorrhoid but unsure if that is what is bleeding.  No reported anticoagulation.    Brief Physical Exam:  /47   Pulse 57   Temp 100  F (37.8  C) (Oral)   Resp 24   Wt 42.6 kg (94 lb)   SpO2 100%   BMI 15.64 kg/m    General: Non-toxic appearing  HEENT: Atraumatic  Resp: No respiratory distress  Abdomen: Non-peritoneal  Neuro: Alert, oriented, answers questions appropriately  Psych: Behavior appropriate      Plan Initiated in Triage:  Orders Placed This Encounter   Procedures     CT Abdomen Pelvis w/o Contrast     INR     Comprehensive metabolic panel     Lactic acid whole blood     Troponin I     ECG 12-LEAD WITH MUSE (LHE)     Peripheral IV catheter     CBC with platelets differential     ABO/Rh type and screen       PIT Dispo:   Place patient in the next available ED bed    Caleb Frederick MD on 6/14/2022 at 5:21 PM    Patient was evaluated by the Physician in Triage due to a limitation of available rooms in the Emergency Department. A plan of care was discussed based on the information obtained on the initial evaluation and patient was consuled to return back to the Emergency Department lobby after this initial evalutaiton until results were obtained or a room became available in the Emergency Department. Patient was counseled not to leave prior to receiving the results of their workup.     Caleb Frederick MD  [facility] EMERGENCY DEPARTMENT  [address] [place] [id number] [phone]     Caleb Frederick MD  06/14/22 [id number]

## 2022-06-14 NOTE — ED TRIAGE NOTES
C/o generalized weakness and rectal bleeding x 1 week which worsened today. Feels weak after getting up and going to the bathroom and has to rest. Has been passing blood clots as well.

## 2022-06-15 NOTE — ED NOTES
Met some resistance while placing 16fr catheter, re-attempted with 14fr with success, urine returned

## 2022-06-15 NOTE — CONSULTS
Care Management Initial Consult    General Information  Assessment completed with: Patient, Children, pt and sonDagoberto  Type of CM/SW Visit: Initial Assessment    Primary Care Provider verified and updated as needed: Yes   Readmission within the last 30 days: no previous admission in last 30 days   Return Category: Progression of disease  Reason for Consult: discharge planning  Advance Care Planning: Advance Care Planning Reviewed: verified with patient, no concerns identified     General Information Comments: lives at the [facility] and needs Assisted lvg or LTC NH, son Kayode is helping secure a place and needs some time    Communication Assessment  Patient's communication style: spoken language (English or Bilingual)             Cognitive  Cognitive/Neuro/Behavioral: WDL     Arousal Level: opens eyes spontaneously  Orientation: oriented x 4  Mood/Behavior: calm, cooperative     Speech: clear    Living Environment:   People in home: alone     Current living Arrangements: apartment, independent living facility      Able to return to prior arrangements: other (see comments)  Living Arrangement Comments: needs more cares: TCU then Assisted lvg or LTC    Family/Social Support:  Care provided by: self, child(marely)  Provides care for: no one  Marital Status: Single  Children          Description of Support System: Supportive, Involved    Support Assessment: Adequate family and caregiver support, Adequate social supports    Current Resources:   Patient receiving home care services: No     Community Resources: DME  Equipment currently used at home: cane, straight, walker, standard  Supplies currently used at home: Incontinence Supplies    Employment/Financial:  Employment Status:          Financial Concerns: No concerns identified   Referral to Financial Worker: No       Lifestyle & Psychosocial Needs:  Social Determinants of Health     Tobacco Use: Low Risk      Smoking Tobacco Use: Never Smoker     Smokeless Tobacco  Use: Never Used   Alcohol Use: Not on file   Financial Resource Strain: Not on file   Food Insecurity: Not on file   Transportation Needs: Not on file   Physical Activity: Not on file   Stress: Not on file   Social Connections: Not on file   Intimate Partner Violence: Not on file   Depression: Not on file   Housing Stability: Not on file       Functional Status:  Prior to admission patient needed assistance:   Dependent ADLs:: Ambulation-walker, Transfers, Grooming, Dressing, Bathing, Toileting, Incontinence  Dependent IADLs:: Cooking, Cleaning, Laundry, Shopping, Meal Preparation, Transportation, Incontinence  Assesssment of Functional Status: Not at baseline with ADL Functioning, Not at baseline with mobility, Not at  functional baseline, Needs placement in a SNF/TCF for rehabilitation    Mental Health Status:  Mental Health Status: No Current Concerns       Chemical Dependency Status:                Values/Beliefs:  Spiritual, Cultural Beliefs, Advent Practices, Values that affect care:                 Additional Information:  Assessed, lives alone and needs to move to assisted [facility], rowena Arenas is working on finding a new place, and pt was doing fine til recently. CM to follow with rowena Arenas and referrals sent to [facility], the [facility] TCU, currently has apt at the [facility].      Jose Manuel Landers RN

## 2022-06-15 NOTE — PHARMACY-ADMISSION MEDICATION HISTORY
Pharmacy Note - Admission Medication History    Pertinent Provider Information: None     ______________________________________________________________________    Prior To Admission (PTA) med list completed and updated in EMR.       PTA Med List   Medication Sig Last Dose     acetaminophen (TYLENOL) 325 MG tablet Take 3 tablets (975 mg) by mouth every 8 hours 6/13/2022 at Unknown time     acetaminophen (TYLENOL) 500 MG tablet Take 500-1,000 mg by mouth every 6 hours as needed for mild pain      amLODIPine (NORVASC) 10 MG tablet [AMLODIPINE (NORVASC) 10 MG TABLET] TAKE 1 TABLET BY MOUTH EVERY DAY 6/13/2022 at Unknown time     esomeprazole (NEXIUM) 40 MG DR capsule Take 40 mg by mouth At Bedtime Take 30-60 minutes before eating. 6/13/2022 at Unknown time     fexofenadine (ALLEGRA) 180 MG tablet Take 180 mg by mouth daily 6/13/2022 at Unknown time     hydrocortisone, Perianal, (ANUSOL-HC) 2.5 % cream Place 1 applicator rectally 2 times daily as needed for hemorrhoids      isosorbide mononitrate (IMDUR) 120 MG 24 hr tablet [ISOSORBIDE MONONITRATE (IMDUR) 120 MG 24 HR TABLET] TAKE 1 TABLET BY MOUTH EVERY DAY **TAKE W/30 MG TABLET** - **PATIENT ONLY WANTS 1 MONTH PER FILL** 6/13/2022 at Unknown time     isosorbide mononitrate (IMDUR) 30 MG 24 hr tablet Take 30 mg by mouth daily  6/13/2022 at Unknown time     metoprolol succinate ER (TOPROL-XL) 25 MG 24 hr tablet Take 75 mg by mouth daily  6/13/2022 at Unknown time     mirtazapine (REMERON) 15 MG tablet [MIRTAZAPINE (REMERON) 15 MG TABLET] Take 15 mg by mouth at bedtime. Indications: major depressive disorder 6/13/2022 at Unknown time     MULTIVITAMIN W-MINERALS/LUTEIN (CENTRUM SILVER ORAL) [MULTIVITAMIN W-MINERALS/LUTEIN (CENTRUM SILVER ORAL)] Take 1 tablet by mouth daily. 6/13/2022 at Unknown time     nitroglycerin (NITROSTAT) 0.4 MG SL tablet [NITROGLYCERIN (NITROSTAT) 0.4 MG SL TABLET] Place 0.4 mg under the tongue as needed for chest pain.      peg 400-propylene  glycol (SYSTANE) 0.4-0.3 % Drop [-PROPYLENE GLYCOL (SYSTANE) 0.4-0.3 % DROP] Administer 1 drop to both eyes 4 (four) times a day.  6/13/2022 at Unknown time     senna (SENOKOT) 8.6 MG tablet Take 1 tablet by mouth daily as needed for constipation      triamcinolone (KENALOG) 0.025 % cream [TRIAMCINOLONE (KENALOG) 0.025 % CREAM] Apply 1 application topically 3 (three) times a day as needed.         Information source(s): Patient, Family member and [facility]  Method of interview communication: in-person    Summary of Changes to PTA Med List  New: Hydrocortisone  Discontinued: Aspirin, D3, oxycodone, docusate  Changed: None    Patient was asked about OTC/herbal products specifically.  PTA med list reflects this.    In the past week, patient estimated taking medication this percent of the time:  greater than 90%.    Allergies were reviewed, assessed, and updated with the patient.      Patient did not bring any medications to the hospital and can't retrieve from home. No multi-dose medications are available for use during hospital stay.     The information provided in this note is only as accurate as the sources available at the time of the update(s).    Thank you for the opportunity to participate in the care of this patient.    Sharmaine Rodriguez [facility]  6/14/2022 7:19 PM

## 2022-06-15 NOTE — PLAN OF CARE
Problem: Constipation  Goal: Effective Bowel Elimination  Outcome: Ongoing, Progressing   Goal Outcome Evaluation:      Pt had one medium incontinent bowel movement.  Pt coughs after drinking thin liquids.  SLP placed pt on thickened liquids.  Pt unable to finish miralax bowel prep as it is unable to be thickened.  RN notified Dr. Ann.  Pt drank a couple of sips from miralax prep.      Update 1500- RN paged Dr. Phillips or ADIEL Garcia about bowel prep as they ordered it.  RN spoke to ADIEL Garica about miralax being unable to be thickened.  Radha placed new orders.

## 2022-06-15 NOTE — PROGRESS NOTES
[facility]    Medicine Progress Note - Hospitalist Service    Date of Admission:  6/14/2022    Assessment & Plan          Yun Rodgers is a 87 year old female assisted-living resident with PMH of CKD 3, constipation, hemorrhoids, RTA, chronic metabolic acidosis, subacute cutaneous lupus, Sjogren's, CAD s/p post stent in 2007, arthritis, peripheral vascular disease status post right carotid enterectomy, hypokalemia admitted for weakness and hematochezia found to have hypokalemia, acute kidney injury, obstipation and urinary retention    Hematochezia with obstipation/fecal impaction  CT shows fecal impaction with large volume of stool in the distal sigmoid colon and stool ball in rectal vault with no evidence of obstruction, scattered diverticuli but no diverticulitis.  By report also has hemorrhoids  No abdominal pain, also urinary retention  Stool is soft, brown per ER report, pink lady enema was given on admission and repeat in 6/15.  MiraLAX/Gatorade prep for flex sig endoscopy, per GI.  6/15 pink lady enema given    Rectal bleeding-suspect bleeding is secondary to hemorrhoids versus constipation/diverticulosis  Start MiraLAX, senna, serial hemoglobin.  GI offered flex sig myeloscopy, patient declined.    Urinary retention, likely due to fecal impaction.  No hydronephrosis.  Check UA, Serrano catheter placed, treat constipation.  Trial of Serrano removal when constipation improves    Hypokalemia with metabolic acidosis  In the medical record this is been recurrent, previous bicarbonates have been 11.  Has never seen nephrology  Consult nephrology, replace potassium, defer bicarbonate treatment to nephrology tomorrow.  Follow UA/UC.  Monitor on telemetry.    Acute kidney injury with chronic kidney disease stage III  Baseline creatinine 1.2-1.5  Could be secondary to obstruction given urinary retention, check UA,  Serrano catheter in place    Coronary artery disease-  Status post LAD stent in  2007 with abnormal stress test 2018 that showed a small area of distal anterior septal ischemia with normal EF  Asymptomatic, continue Imdur, interestingly not on aspirin, we discussed with her tomorrow why she is not on aspirin.  We will hold for now since she is having hematochezia    GERD-continue PPI    Anemia-  Check serial hemoglobin.    Essential hypertension  Stable.  Continue amlodipine, Imdur, metoprolol with parameters    Failure to thrive-  Has mild intermittent dysphagia, poor appetite.  Nonfocal neurologic exam.  Could be secondary to Sjogren's versus constipation.  No nausea or vomiting.  No pulmonary symptoms.  Labs negative for B12, folate deficiencies.  Normal TSH.  Suspect dysphagia.  Evaluated by speech.  Recommended thickened fluids.  PT OT-patient son team hopes that patient could return to previous living arrangements, with increased services..    Diet:  Mechanical soft  DVT Prophylaxis: Enoxaparin (Lovenox) SQ  Serrano Catheter: PRESENT, indication:    Central Lines: None  Code Status:  DNR/DNI       Diet: Room Service  Clear Liquid Diet Mildly Thick (level 2) (NO STRAWS)    DVT Prophylaxis: Pneumatic Compression Devices  Serrano Catheter: PRESENT, indication: Retention  Central Lines: None  Cardiac Monitoring: ACTIVE order. Indication: Electrolyte Imbalance (24 hours)- Magnesium <1.3 mg/ml; Potassium < =2.8 or > 5.5 mg/ml  Code Status: No CPR- Do NOT Intubate      Disposition Plan   Expected Discharge: Anticipate discharge in 1 to 2 days, to current facility with increased services.     The patient's care was discussed with the Bedside Nurse, Care Coordinator/, Patient and Patient's Family.    Francy Ann MD  Hospitalist Service  [facility]  Securely message with the Vocera Web Console (learn more here)  Text page via [email or web address] Paging/Directory     Clinically Significant Risk Factors Present on Admission        # Hypokalemia: K = 2.8 mmol/L (Ref range: 3.5  - 5.0 mmol/L) on admission, will replace as needed      # Hypoalbuminemia: Albumin = 3.3 g/dL (Ref range: 3.5 - 5.0 g/dL) on admission, will monitor as appropriate   # Coagulation Defect: INR = 1.36 (Ref range: 0.85 - 1.15) and/or PTT = N/A on admission, will monitor for bleeding      ______________________________________________________________________    Interval History   Patient is new to me.  Chart reviewed.  Patient was seen and examined.    Data reviewed today: I reviewed all medications, new labs and imaging results over the last 24 hours. I personally reviewed    Physical Exam   Vital Signs: Temp: 97.5  F (36.4  C) Temp src: Oral BP: 131/60 Pulse: 58   Resp: 20 SpO2: 100 % O2 Device: None (Room air)    Weight: 87 lbs 14.4 oz  General: Alert and oriented x 2. Not in obvious distress.  Elderly, frail.  HEENT: NC, AT. Neck- supple, No JVP elevation, lymphadenopathy or thyromegaly. Trachea-central.  Chest: Clear to auscultation bilaterally.  Heart: S1S2 regular. No M/R/G.  Abdomen: Soft. NT, ND. No organomegaly. Bowel sounds- active.  Back: No spine tenderness. No CVA tenderness.  Extremities: No leg swelling. Peripheral pulses 2+ bilaterally.  Neuro: Cranial nerves 1-12 grossly normal. No focal neurological deficit    Data   Recent Labs   Lab 06/15/22  1452 06/15/22  0822 06/14/22  2318 06/14/22  1830   WBC  --  10.9  --  8.4   HGB 8.6* 9.3*  --  9.6*   MCV  --  102*  --  99   PLT  --  202  --  207   INR  --   --   --  1.36*   NA  --  142  --  140   POTASSIUM 2.8* 3.2* 2.7* 2.5*   CHLORIDE  --  125*  --  119*   CO2  --  11*  --  12*   BUN  --  23  --  28   CR  --  1.21*  --  1.95*   ANIONGAP  --  6  --  9   ARON  --  8.6  --  9.0   GLC  --  102  --  110   ALBUMIN  --   --   --  3.3*   PROTTOTAL  --   --   --  7.6   BILITOTAL  --   --   --  0.5   ALKPHOS  --   --   --  72   ALT  --   --   --  10   AST  --   --   --  15     Recent Results (from the past 24 hour(s))   CT Abdomen Pelvis w/o Contrast    Narrative     EXAM: CT ABDOMEN PELVIS W/O CONTRAST  LOCATION: [facility]  DATE/TIME: 6/14/2022 5:52 PM    INDICATION: Right red blood per rectum.  COMPARISON: CT abdomen and pelvis 6/12/2007  TECHNIQUE: CT scan of the abdomen and pelvis was performed without IV contrast. Multiplanar reformats were obtained. Dose reduction techniques were used.  CONTRAST: None.    FINDINGS:   LOWER CHEST: Mild edema. Tiny granuloma right middle lobe. Minimal scarring along the lateral pleural surface right lower lobe. No tracer effusions. Coronary artery calcifications. Atherosclerotic disease thoracic aorta.    HEPATOBILIARY: Normal.    PANCREAS: Normal.    SPLEEN: Normal.    ADRENAL GLANDS: Nodular configuration left adrenal gland measuring 2.2 x 1.5 cm slightly more prominent from the previous study.    KIDNEYS/BLADDER: Tiny hyperdense cyst left kidney and small right renal cyst. Vascular calcifications both renal ligia. No definite stones. Moderate bladder distention.    BOWEL: Fecal impaction with large volume of stool distal sigmoid colon and rectum. No evidence for obstruction. Surgical anastomosis descending colon.  Scattered diverticula throughout the colon.    LYMPH NODES: Normal.    VASCULATURE: Extensive atherosclerotic disease abdominal aorta and iliac arteries, with bilateral common iliac artery stents. Plaque at the origins of all of the mesenteric vessels and renal arteries.    PELVIC ORGANS: Normal.    MUSCULOSKELETAL: Osteopenia. Scoliosis. Advanced degenerative disc disease lumbar spine. Sclerotic changes both femoral heads.      Impression    IMPRESSION:   1.  Fecal impaction, with large volume of stool distal sigmoid colon and rectum. No current evidence for upstream colonic obstruction.  2.  Scattered diverticula throughout the colon, without evidence for diverticulitis.  3.  Severe atherosclerotic disease, with evaluation limited due to the lack of IV contrast.  4.  Left adrenal adenoma.     XR  Chest 2 Views    Narrative    EXAM: XR CHEST 2 VW  LOCATION: [facility]  DATE/TIME: 6/15/2022 8:38 AM    INDICATION: failure to thrive  COMPARISON: CT of the abdomen and pelvis which includes the lung bases 06/14/2022      Impression    IMPRESSION:     Cardiac silhouette is normal in size. Extensive aortic atheromatous calcifications. There is a stent in the proximal left common carotid artery. The vascular pedicle width is normal.    Symmetric lung inflation. No interstitial or alveolar opacities. No focal lung volume loss.    No pleural effusion or pneumothorax.    Thoracic vertebra are maintained in height.

## 2022-06-15 NOTE — CONSULTS
GI CONSULT NOTE      Name: Yun Rodgers  Medical Record #: [id number]  YOB: 1935  Date of Admission: 6/14/2022  Date/Time: 6/15/2022/9:17 AM     CHIEF COMPLAINT: rectal bleeding     HISTORY OF PRESENT ILLNESS: This is a 87 year old year old White patient seen at the request of Dr. Jefferson with a history of HTN, CKD, Sjogren's, CAD with stenting in 2007, arthritis, PVD.  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  Her last colonoscopy was about 20 years ago per her report.    She was previously taking senna and a stool softener daily for many years.  About a month ago she stopped these because she thought she didn't need them since she was having BMs daily.    For the past several days she had rectal bleeding, both spontaneous and with BMs.  She lives in independent living but apparently family is working on getting her more help.  She was brought to the ER because of rectal bleeding.  A rectal exam in the ER noted external hemorrhoids.  CT noted a large amount of distal colonic stool and bladder distention.  She was given a pink lady enema with unknown results, and has gotten 2 doses of MiraLax and 2 doses of senna with one BM today, no significant rectal bleeding reported per nursing.    The patient states she would see blood in her stool in small amount on rare occasion with wiping.  No abdominal pain prior to admission but today she had pain once which resolved.  No nausea/vomiting but appetite has been low.    PAST MEDICAL HISTORY:  Past Medical History:   Diagnosis Date     Antiplatelet or antithrombotic long-term use     aspirin     Hypertension      Stented coronary artery        FAMILY HISTORY:  Family History   Problem Relation Age of Onset     Cancer Mother      Hypertension Mother      No Known Problems Father        SOCIAL HISTORY:  Social History     Socioeconomic History     Marital status:      Spouse name: Not on file      Number of children: Not on file     Years of education: Not on file     Highest education level: Not on file   Occupational History     Not on file   Tobacco Use     Smoking status: Never Smoker     Smokeless tobacco: Never Used   Substance and Sexual Activity     Alcohol use: Never     Drug use: Never     Sexual activity: Not on file   Other Topics Concern     Not on file   Social History Narrative     Not on file     Social Determinants of Health     Financial Resource Strain: Not on file   Food Insecurity: Not on file   Transportation Needs: Not on file   Physical Activity: Not on file   Stress: Not on file   Social Connections: Not on file   Intimate Partner Violence: Not on file   Housing Stability: Not on file       MEDICATIONS PRIOR TO ADMISSION: (Not in a hospital admission)         ALLERGIES: Primaquine, Atorvastatin, Demeclocycline, Guaiacol, Iodinated contrast media [diagnostic x-ray materials], Metrizamide, Pravastatin, Robitussin [guaifenesin], Simvastatin, Tetracyclines, Hydroxychloroquine sulfate [hydroxychloroquine], and Preservision areds    REVIEW OF SYSTEMS (ROS): Complete review of systems negative other than listed in HPI.    PHYSICAL EXAM:    /59   Pulse 56   Temp 97.5  F (36.4  C)   Resp 20   Wt 42.6 kg (94 lb)   SpO2 100%   BMI 15.64 kg/m      GENERAL: Pleasant, no obvious distress, frail appearing    SKIN: No jaundice    NECK: Supple without adenopathy    EYES: No scleral icterus    LUNGS: Clear to auscultation bilaterally    HEART: Regular rate and rhythm, S1 and S2 present, no lower extremity edema    ABDOMEN: Soft, non-distended, mildly tender low abdomen, no guarding/rebound/mass, bowel sounds normal, no obvious organomegaly    MUSKULOSKELETAL:  Warm and well perfused, moves all extremities well    NEUROLOGIC: Alert and oriented    PSYCHIATRIC: Normal affect    LAB DATA:  Recent Labs   Lab Test 06/15/22  0822 06/14/22  1830 03/01/22  1612 02/26/18  0554 02/25/18  0529  02/24/18  1714   WBC 10.9 8.4 6.3   < > 5.0 4.5   HGB 9.3* 9.6* 9.5*   < > 8.6* 10.5*   * 99 101*   < > 94 93    207 185   < > 143 159   INR  --  1.36*  --   --  1.25* 1.06    < > = values in this interval not displayed.     Recent Labs   Lab Test 06/15/22  0822 06/14/22  2318 06/14/22  1830 03/01/22  1655     --  140 133*   POTASSIUM 3.2* 2.7* 2.5* 3.7   CHLORIDE 125*  --  119* 111*   CO2 11*  --  12* 11*   BUN 23  --  28 23   CR 1.21*  --  1.95* 1.47*   ANIONGAP 6  --  9 11   ARON 8.6  --  9.0 8.8     --  110 100     Recent Labs   Lab Test 06/14/22  1830 05/13/21  1700 08/26/20  1631   ALBUMIN 3.3* 3.3* 4.0   BILITOTAL 0.5 0.3 0.3   ALT 10 11 10   AST 15 16 21   ALKPHOS 72 101 96       IMAGING:  XR Chest 2 Views    Result Date: 6/15/2022  EXAM: XR CHEST 2 VW LOCATION: [facility] DATE/TIME: 6/15/2022 8:38 AM INDICATION: failure to thrive COMPARISON: CT of the abdomen and pelvis which includes the lung bases 06/14/2022     IMPRESSION: Cardiac silhouette is normal in size. Extensive aortic atheromatous calcifications. There is a stent in the proximal left common carotid artery. The vascular pedicle width is normal. Symmetric lung inflation. No interstitial or alveolar opacities. No focal lung volume loss. No pleural effusion or pneumothorax. Thoracic vertebra are maintained in height.    CT Abdomen Pelvis w/o Contrast    Result Date: 6/14/2022  EXAM: CT ABDOMEN PELVIS W/O CONTRAST LOCATION: [facility] DATE/TIME: 6/14/2022 5:52 PM INDICATION: Right red blood per rectum. COMPARISON: CT abdomen and pelvis 6/12/2007 TECHNIQUE: CT scan of the abdomen and pelvis was performed without IV contrast. Multiplanar reformats were obtained. Dose reduction techniques were used. CONTRAST: None. FINDINGS: LOWER CHEST: Mild edema. Tiny granuloma right middle lobe. Minimal scarring along the lateral pleural surface right lower lobe. No tracer effusions.  Coronary artery calcifications. Atherosclerotic disease thoracic aorta. HEPATOBILIARY: Normal. PANCREAS: Normal. SPLEEN: Normal. ADRENAL GLANDS: Nodular configuration left adrenal gland measuring 2.2 x 1.5 cm slightly more prominent from the previous study. KIDNEYS/BLADDER: Tiny hyperdense cyst left kidney and small right renal cyst. Vascular calcifications both renal ligia. No definite stones. Moderate bladder distention. BOWEL: Fecal impaction with large volume of stool distal sigmoid colon and rectum. No evidence for obstruction. Surgical anastomosis descending colon.  Scattered diverticula throughout the colon. LYMPH NODES: Normal. VASCULATURE: Extensive atherosclerotic disease abdominal aorta and iliac arteries, with bilateral common iliac artery stents. Plaque at the origins of all of the mesenteric vessels and renal arteries. PELVIC ORGANS: Normal. MUSCULOSKELETAL: Osteopenia. Scoliosis. Advanced degenerative disc disease lumbar spine. Sclerotic changes both femoral heads.     IMPRESSION: 1.  Fecal impaction, with large volume of stool distal sigmoid colon and rectum. No current evidence for upstream colonic obstruction. 2.  Scattered diverticula throughout the colon, without evidence for diverticulitis. 3.  Severe atherosclerotic disease, with evaluation limited due to the lack of IV contrast. 4.  Left adrenal adenoma.     ASSESSMENT:  1.  Rectal bleeding, suspect outlet bleeding related to large stool.  She has hemorrhoids on ER exam.  Hemoglobin is stable and she has not had significant bleeding today.  2.  Fecal impaction with large distal stool on CT.  She stopped senna and stool softener about a month ago.  We'll give additional enema and she can sip on a bowel prep until she's having results.  She'll need a good bowel regimen for maintenance.    Active Problems:    Acute renal failure, unspecified acute renal failure type (H)    Fecal impaction in rectum (H)    Urinary retention     Hypokalemia    PLAN:  Discussed with Dr. Phillips.  Approximately 50 minutes of total time was spent providing patient care, including patient evaluation, reviewing documentation/test results, and .    1.  Repeat pink lady enema today.  2.  Start MiraLax/Gatorade bowel prep for therapeutic purposes.  3.  We could offer flex sigmoidoscopy for diagnostic purposes, though patient indicates she will likely defer testing.  4.  Monitor hemoglobin and support as needed.  5.  GI following.      Radha Blankenship PA-C  [facility]  [phone]    CC: [facility], Ronaldo Grijalva    Agree with above note and examination by Radha Blankenship PA-C  87 F with HTN, CAD, hx of partial colectomy admitted with rectal bleeding. Small amounts with red blood present.   CT on admission shows very large stool ball in rectal vault  Patient with two pink lady enemas with improvement and moving stool   Physical exam shows 87 F A and Ox3, NAD, Chest/Pulm/Abd exam normal  A/P 87 F with rectal stool/obstipation and rectal bleeding  Obstipation- will treat with continued enemas. Bowel prep ordered but only until patient moving stool  Rectal bleeding- differential of hemorrhoids, stercoral ulceration, polyp, cancer, inflammation. Patient declines endoscopic assessment   Approximately 21 minutes of total time was spent providing patient care, including patient evaluation, reviewing documentation/test result, and .   Torito Phillips M.D.  [facility]  [phone]- business phone number (for scheduling)

## 2022-06-15 NOTE — PROGRESS NOTES
"BEDSIDE SWALLOW STUDY WITH SPEECH PATHOLOGY     06/15/22 1200   General Information   Onset of Illness/Injury or Date of Surgery 06/14/22   Referring Physician Dr. Jefferson   Pertinent History of Current Problem Per H&P note, \"Yun Rodgers is a 87 year old female with a past history of chronic kidney disease stage III, constipation, hemorrhoids, RTA, chronic metabolic acidosis, Subacute cutaneous lupus, Sjogren's, coronary artery disease status post stent in 2007, arthritis, peripheral vascular disease status post right carotid enterectomy, hypokalemia admitted for weakness and hematochezia found to have hypokalemia, acute kidney injury, obstipation and urinary retention.\" Note also states, \"Failure to thrive- Has mild intermittent dysphagia, poor appetite.  Nonfocal neurologic exam. Could be secondary to Sjogren's versus constipation. No nausea or vomiting. No pulmonary symptoms. Check TSH, B12, swallow evaluation, was formally a longtime smoker we will check chest x-ray though no symptoms.\"   General Observations Patient alert and cooperative. [place]. Wears bilateral hearing aids.   Past History of Dysphagia Patient endorses some coughing with liquids at baseline.   Type of Evaluation   Type of Evaluation Swallow Evaluation   Oral Motor   Oral Musculature generally intact   Dentition (Oral Motor)   Comment, Dentition (Oral Motor) Patient wears upper and lower dentures. She reports that the bottom one is loose-fitting. She holds it in place with denture paste. Top denture stays in place but seems large. This affects her resonance a bit.   Dentition (Oral Motor) dental appliance/dentures   Dental Appliance/Denture (Oral Motor) upper and lower;dentures, full   Facial Symmetry (Oral Motor)   Facial Symmetry (Oral Motor) WNL   Lip Function (Oral Motor)   Lip Range of Motion (Oral Motor) WNL   Tongue Function (Oral Motor)   Tongue ROM (Oral Motor) WNL   Jaw Function (Oral Motor)   Jaw Function (Oral Motor) WNL "   Cough/Swallow/Gag Reflex (Oral Motor)   Volitional Throat Clear/Cough (Oral Motor) WNL   Volitional Swallow (Oral Motor) WNL   Vocal Quality/Secretion Management (Oral Motor)   Vocal Quality (Oral Motor) WFL   Comment, Vocal Quality/Secretion Management (Oral Motor) Patient's vocal quality is clear. Resonance disturbance noted; thought to be related to upper denture.   General Swallowing Observations   Respiratory Support (General Swallowing Observations) none   Current Diet/Method of Nutritional Intake (General Swallowing Observations, NIS) clear liquid diet  (Per GI recommendations)   Swallowing Evaluation Clinical swallow evaluation   Clinical Swallow Evaluation   Feeding Assistance no assistance needed   Clinical Swallow Evaluation Textures Trialed thin liquids;mildly thick liquids   Clinical Swallow Eval: Thin Liquid Texture Trial   Mode of Presentation, Thin Liquids cup;straw;self-fed   Volume of Liquid or Food Presented >2 ounces   Oral Phase of Swallow WFL   Pharyngeal Phase of Swallow coughing/choking;other (see comments)  (Audible swallow sounds)   Diagnostic Statement Audible swallow sounds noted. Intermittent immediate cough noted after swallow. This occurred both with sips taken by straw and by cup.   Clinical Swallow Eval: Mildly Thick Liquids   Mode of Presentation cup   Volume Presented 6 ounces   Oral Phase WFL   Pharyngeal Phase other (see comments)  (No overt clinical s/s of aspiration)   Diagnostic Statement Subjectively, swallow response appeared timely. No overt clinical s/s of aspiration observed.   Esophageal Phase of Swallow   Patient reports or presents with symptoms of esophageal dysphagia No   Swallowing Recommendations   Diet Consistency Recommendations clear liquid diet;mildly thick liquids (level 2)   Supervision Level for Intake close supervision needed   Mode of Delivery Recommendations bolus size, small;no straws;slow rate of intake   Monitoring/Assistance Required  (Eating/Swallowing) stop eating activities when fatigue is present;monitor for cough or change in vocal quality with intake   Recommended Feeding/Eating Techniques (Swallow Eval) maintain upright sitting position for eating   Medication Administration Recommendations, Swallowing (SLP) Patient may take pills one at a time with mildly thick liquid as tolerated. If any difficulty is noted with this method, give pills one at a time, mixed in applesauce.   Instrumental Assessment Recommendations VFSS (videofluoroscopic swallowing study)  (Once cleared by GI)   General Therapy Interventions   Planned Therapy Interventions Dysphagia Treatment   Dysphagia treatment Modified diet education;Instruction of safe swallow strategies;Compensatory strategies for swallowing   Clinical Impression   Criteria for Skilled Therapeutic Interventions Met (SLP Eval) Yes, treatment indicated   SLP Diagnosis Dysphagia   Functional Limitations Related to Problem List (SLP) Modified diet   Clinical Impression Comments Bedside swallow evaluation completed per MD order. Eval was limited to clear liquids only due to GI issues. No oral dysphagia observed. Suspect at least mild pharyngeal dysphagia due to presence of clinical s/s of aspiration with thin liquid. Recommend that liquid consistency be downgraded to mildly thick. Also recommend instrumental evaluation of swallow (video swallow study) once cleared for advanced diet by GI.   SLP Discharge Planning   SLP Discharge Recommendation Transitional Care Facility   SLP Rationale for DC Rec Swallow function is below baseline.   SLP Brief overview of current status  Patient is on a clear liquid diet due to GI issues. She presents with clinical s/s of aspiration with thin liquid. Recommend that liquids be downgraged to mildly thick consistency at this time. Anticipate VFSS once cleared by GI.

## 2022-06-15 NOTE — CONSULTS
RENAL CONSULTATION:     Date of Consultation:  6/15/2022    Requesting Physician: Dr Jefferson  Reason for Consult:  GILBERTO, metabolic acidosis.     Assessment/ Recommendations:  1. GILBERTO on CKD stage 3b: Creatinine of 1.95 on admission. Baseline of 1.2-1.4 this year. Trending down with IVF. Obstruction on CT with distended bladder possible from constipation, henry placed. Noted metabolic acidosis acute on chronic as noted below. CKD since at least 2018  -Daily BMP  -UA, urine protein to creatinine  -henry- trial of void once constipation improved - may need urology input if not resolved.   -Cystatin c.   -MM screen,     2. Metabolic acidosis: acute on chronic. Bicarb of 11 on admission. Notes bicarb runs in 11-15 this year. Presents wince at least 2018. Could be component of CKD. GFR likely overestimated as noted above. Question RTA as patient does have hx of sjogrens. VBG with ph of 7.2.   -Isotonic bicarb  -Oral bicarb   -Urine anion gap. Urine K, Cl, Na.     3. Rectal bleeding/constipation: per GI.   4. HTN: Amlodipine, imdur, metoprolol.   5. Hypokalemia: Poor intake, replave and trend.   6. Urinary retention: henry, treat constipation. Trial of void after constipatin treated.   7. Anemia: check iron studies.     Eduard Espinoza,   [facility]  [phone] (off)       History of present illness:  Ms Rodgers is an 86 y/o female with PMH of HTN, CKD, Sjogrens, CAD, PVD. Patient presents with rectal bleeding. Patient notes hx of colectomy about 20 years ago. Patient was on chronic senna for many years but recently stopped. Now with several days of bleeding with stools. Patient eval in the ED showed large amout of distal colonic stool and bladder distension. Patient current reports longstanding CKD reported as stable per patient. No previous nephrology eval. No hx of stones. NSAIDs. NO family hx of CKD. Reports longstanding Sjogrens. Not on treatment. Denies diarrhea. Notes some chronic SOB  "but no recent changes. No other acute issues. Discussed above eval.     Past Medical History:   Diagnosis Date     Antiplatelet or antithrombotic long-term use     aspirin     Hypertension      Stented coronary artery        Drug and lactation database from the [place] [employer] of [employer]:  http://toxnet.nlm.nih.gov/cgi-bin/sis/htmlgen?LACT      Allergies   Allergen Reactions     Primaquine Rash     Atorvastatin Unknown     Demeclocycline Other (See Comments)     \"sore bottom\"     Guaiacol Hives     Robitussin D     Iodinated Contrast Media [Diagnostic X-Ray Materials] Unknown     Metrizamide Other (See Comments)     Chest tightness,sshakes     Pravastatin Unknown     Robitussin [Guaifenesin] Unknown     Simvastatin Other (See Comments)     Muscle aches      Tetracyclines Unknown     Hydroxychloroquine Sulfate [Hydroxychloroquine] Rash     Preservision Areds Rash     AREDs formula only caused rash.        Social History     Socioeconomic History     Marital status:      Spouse name: None     Number of children: None     Years of education: None     Highest education level: None   Tobacco Use     Smoking status: Never Smoker     Smokeless tobacco: Never Used   Substance and Sexual Activity     Alcohol use: Never     Drug use: Never       Family History   Problem Relation Age of Onset     Cancer Mother      Hypertension Mother      No Known Problems Father        Review of Systems: The remainder of 10 point review of systems is negative except as noted in HPI above.     /59   Pulse 56   Temp 97.5  F (36.4  C)   Resp 20   Wt 42.6 kg (94 lb)   SpO2 100%   BMI 15.64 kg/m      Intake/Output Summary (Last 24 hours) at 6/15/2022 0938  Last data filed at 6/15/2022 0545  Gross per 24 hour   Intake 1631.25 ml   Output 1650 ml   Net -18.75 ml     Physical Exam:   GENERAL: frail, NAD  EYES: No scleral icterus, conjunctiva clear  ENT: Hearing normal, Oral mucosa moist  RESP: Clear to " auscultation bilaterally with no respiratory distress, normal effort.  CV: RRR, no murmurs. no leg edema.    GI: Soft, NT/ND,  Musculoskeletal: decrease muscle bulk/ tone;  SKIN: No rash, warm/ dry  PSYCH:  Appropriate mood and affect  Lymph: No cervical/ inguinal adenopathy    LABS:  Most Recent 3 CBC's:Recent Labs   Lab Test 06/15/22  0822 06/14/22  1830 03/01/22  1612   WBC 10.9 8.4 6.3   HGB 9.3* 9.6* 9.5*   * 99 101*    207 185     Most Recent 3 BMP's:Recent Labs   Lab Test 06/15/22  0822 06/14/22  2318 06/14/22 1830 03/01/22  1655     --  140 133*   POTASSIUM 3.2* 2.7* 2.5* 3.7   CHLORIDE 125*  --  119* 111*   CO2 11*  --  12* 11*   BUN 23  --  28 23   CR 1.21*  --  1.95* 1.47*   ANIONGAP 6  --  9 11   ARON 8.6  --  9.0 8.8     --  110 100     Most Recent 2 LFT's:Recent Labs   Lab Test 06/14/22 1830 05/13/21  1700   AST 15 16   ALT 10 11   ALKPHOS 72 101   BILITOTAL 0.5 0.3     Most Recent 3 INR's:Recent Labs   Lab Test 06/14/22 1830 02/25/18  0529 02/24/18  1714   INR 1.36* 1.25* 1.06     Most Recent 3 Creatinines:Recent Labs   Lab Test 06/15/22  0822 06/14/22  1830 03/01/22  1655   CR 1.21* 1.95* 1.47*     Most Recent 3 Hemoglobins:Recent Labs   Lab Test 06/15/22  0822 06/14/22  1830 03/01/22  1612   HGB 9.3* 9.6* 9.5*     Most Recent 3 Troponin's:No lab results found.  Most Recent 3 BNP's:No lab results found.  Most Recent D-dimer:No lab results found.  Most Recent Cholesterol Panel:Recent Labs   Lab Test 08/26/20  1631   CHOL 150   LDL 90   HDL 37*   TRIG 117       All lab data was reviewed at 9:38 AM

## 2022-06-15 NOTE — UTILIZATION REVIEW
Admission Status; Secondary Review Determination       Under the authority of the Utilization Management Committee, the utilization review process indicated a secondary review on the above patient. The review outcome is based on review of the medical records, discussions with staff, and applying clinical experience noted on the date of the review.     (x) Inpatient Status Appropriate - This patient's medical care is consistent with medical management for inpatient care and reasonable inpatient medical practice.     RATIONALE FOR DETERMINATION     Ms. Rodgers is a 88 yo female with a PMH of Sjogren's, CAD, HTN and CKD who presents to the ED with several days of bloody stools.  Imaging reveals significant fecal impaction causing urinary retention; henry remains in place.  She had evidence of acute renal failure and metabolic acidosis.  She was started on IVF overnight with improvement of creat.  Nephrology consulted and has ordered isotonic IVF with bicarb.  She remains on clear liquids only.  GI consulted;  Enema and bowel prep ordered and flex sigmoidoscopy being considered.  She is requiring ongoing inpatient medical treatment and monitoring.    At the time of admission with the information available to the attending physician more than 2 nights Hospital complex care was anticipated, based on patient risk of adverse outcome if treated as outpatient and complex care required. Inpatient admission is appropriate based on the Medicare guidelines.     The information on this document is developed by the utilization review team in order for the business office to ensure compliance. This only denotes the appropriateness of proper admission status and does not reflect the quality of care rendered.   The definitions of Inpatient Status and Observation Status used in making the determination above are those provided in the CMS Coverage Manual, Chapter 1 and Chapter 6, section 70.4.         Sincerely,     Nikole  Zac, DO  Utilization Review  Physician Advisor  [facility].

## 2022-06-15 NOTE — ED NOTES
Bed: JNED-21  Expected date: 6/14/22  Expected time: 8:34 PM  Means of arrival: Walked  Comments:  RM 11

## 2022-06-15 NOTE — PROGRESS NOTES
06/15/22 1400   Quick Adds   Type of Visit Initial PT Evaluation   Living Environment   People in Home alone   Current Living Arrangements independent living facility   Home Accessibility no concerns   Self-Care   Equipment Currently Used at Home other (see comments)  (4WW)   Activity/Exercise/Self-Care Comment independent ADL's; recently only ambulating short distances and having to order meals   General Information   Onset of Illness/Injury or Date of Surgery 06/14/22   Referring Physician Dr. Ann   Patient/Family Therapy Goals Statement (PT) none stated   Pertinent History of Current Problem (include personal factors and/or comorbidities that impact the POC) CKD, constipation, urinary retention, failure to thrive; PMH of lupus, Sjogren's, CAD, PVD, R CEA   Existing Precautions/Restrictions fall   Cognition   Affect/Mental Status (Cognition) WFL   Orientation Status (Cognition) oriented x 4   Follows Commands (Cognition) WFL   Range of Motion (ROM)   Range of Motion ROM is WFL   Strength (Manual Muscle Testing)   Strength (Manual Muscle Testing) Deficits observed during functional mobility   Strength Comments generalized weakness BUE/LE   Bed Mobility   Bed Mobility supine-sit   Supine-Sit [place] (Bed Mobility) minimum assist (75% patient effort);verbal cues   Transfers   Transfers sit-stand transfer   Sit-Stand Transfer   Sit-Stand [place] (Transfers) minimum assist (75% patient effort);verbal cues   Assistive Device (Sit-Stand Transfers) walker, front-wheeled   Gait/Stairs (Locomotion)   [place] Level (Gait) minimum assist (75% patient effort);verbal cues   Assistive Device (Gait) walker, front-wheeled   Distance in Feet (Required for LE Total Joints) 8   Pattern (Gait) step-to   Deviations/Abnormal Patterns (Gait) yvonne decreased;festinating/shuffling;gait speed decreased;stride length decreased   Clinical Impression   Criteria for Skilled Therapeutic Intervention Yes, treatment  indicated   PT Diagnosis (PT) impaired functional mobility   Influenced by the following impairments decreased strength, balance, activity tolerance   Functional limitations due to impairments bed mobility, transfers, gait   Clinical Presentation (PT Evaluation Complexity) Stable/Uncomplicated   Clinical Presentation Rationale pt presents as medically diagnosed   Clinical Decision Making (Complexity) moderate complexity   Planned Therapy Interventions (PT) balance training;bed mobility training;gait training;home exercise program;strengthening;transfer training   Anticipated Equipment Needs at Discharge (PT) walker, rolling   Risk & Benefits of therapy have been explained evaluation/treatment results reviewed;patient   PT Discharge Planning   PT Discharge Recommendation (DC Rec) Transitional Care Facility;home with assist;home with home care physical therapy   PT Rationale for DC Rec assist of 1 for mobility   Plan of Care Review   Plan of Care Reviewed With patient   Physical Therapy Goals   PT Frequency 5x/week   PT Predicted Duration/Target Date for Goal Attainment 06/22/22   PT Goals Bed Mobility;Transfers;Gait   PT: Bed Mobility Supervision/stand-by assist;Supine to/from sit   PT: Transfers Supervision/stand-by assist;Sit to/from stand;Bed to/from chair;Assistive device   PT: Gait Supervision/stand-by assist;Rolling walker;50 feet

## 2022-06-16 NOTE — PROGRESS NOTES
[facility]    Medicine Progress Note - Hospitalist Service    Date of Admission:  6/14/2022    Assessment & Plan          Yun Rodgers is a 87 year old female assisted-living resident with PMH of CKD 3, constipation, hemorrhoids, RTA, chronic metabolic acidosis, subacute cutaneous lupus, Sjogren's, CAD s/p post stent in 2007, arthritis, peripheral vascular disease status post right carotid enterectomy, hypokalemia admitted for weakness and hematochezia found to have hypokalemia, acute kidney injury, obstipation and urinary retention    Hematochezia with obstipation/fecal impaction  CT shows fecal impaction with large volume of stool in the distal sigmoid colon and stool ball in rectal vault with no evidence of obstruction, scattered diverticuli but no diverticulitis.  By report also has hemorrhoids  No abdominal pain, also urinary retention  Stool is soft, brown per ER report, pink lady enema was given on admission and repeat in 6/15.  GI offered/sigmoidoscopy, patient declined.  Fecal impaction resolved after 2 pink lady enema's.  Bowel regimen added, per GI.    Rectal bleeding-suspect bleeding is secondary to hemorrhoids versus constipation/diverticulosis.  No recurrence.  GI offered flex sig myeloscopy, patient declined.  Bowel regimen.    Urinary retention, likely due to fecal impaction.  No hydronephrosis.  Check UA, Serrano catheter placed, treat constipation.    Serrano removed.    Hypokalemia with metabolic acidosis  In the medical record this is been recurrent, previous bicarbonates have been 11.  Has never seen nephrology  Consult nephrology, replace potassium, defer bicarbonate treatment to nephrology tomorrow.  Follow UA/UC.  Monitor on telemetry.    Acute kidney injury with chronic kidney disease stage III  Baseline creatinine 1.2-1.5  Could be secondary to obstruction given urinary retention.  UA not infected.  Serrano removed.    Coronary artery disease-  Status post LAD stent in  2007 with abnormal stress test 2018 that showed a small area of distal anterior septal ischemia with normal EF  Asymptomatic, continue Imdur, interestingly not on aspirin, we discussed with her tomorrow why she is not on aspirin.  We will hold for now since she is having hematochezia    GERD-continue PPI    Anemia of iron deficiency-hemoglobin alex 7.3 on 6/16 AM, on recheck 7.7.  - Iron supplementation started    Essential hypertension  Stable.  Continue amlodipine, Imdur, metoprolol with parameters    Failure to thrive-  Has mild intermittent dysphagia, poor appetite.  Nonfocal neurologic exam.  Could be secondary to Sjogren's versus constipation.  No nausea or vomiting.  No pulmonary symptoms.  Labs negative for B12, folate deficiencies.  Normal TSH.  Suspect dysphagia.  Evaluated by speech.  Recommended thickened fluids.  PT OT-patient son team hopes that patient could return to previous living arrangements, with increased services..    Diet:  Mechanical soft  DVT Prophylaxis: Enoxaparin (Lovenox) SQ  Serrano Catheter: PRESENT, indication:    Central Lines: None  Code Status:  DNR/DNI     Diet: Room Service  Advance Diet as Tolerated: Clear Liquid Diet    DVT Prophylaxis: Pneumatic Compression Devices  Serrano Catheter: PRESENT, indication: Retention  Central Lines: None  Cardiac Monitoring: ACTIVE order. Indication: Electrolyte Imbalance (24 hours)- Magnesium <1.3 mg/ml; Potassium < =2.8 or > 5.5 mg/ml  Code Status: No CPR- Do NOT Intubate      Disposition Plan   Expected Discharge: Anticipate discharge to previous living arrangements, with increased services, likely on 6/17.     The patient's care was discussed with the Bedside Nurse, Care Coordinator/, Patient and Patient's Family.    Francy Ann MD  Hospitalist Service  [facility]  Securely message with the Vocera Web Console (learn more here)  Text page via [employer] Paging/Directory     Clinically Significant Risk  Factors Present on Admission                ______________________________________________________________________    Interval History   Fetal infection resolved after couple of enemas given yesterday.  She has been stooling at night.  Abdominal x-ray showed small burden of stool.  Abdominal pain resolved.  Good appetite.  Renal function improved.    Data reviewed today: I reviewed all medications, new labs and imaging results over the last 24 hours. I personally reviewed    Physical Exam   Vital Signs: Temp: 98.3  F (36.8  C) Temp src: Oral BP: 103/51 Pulse: 62   Resp: 18 SpO2: 97 % O2 Device: None (Room air)    Weight: 89 lbs 6.4 oz  General: Alert and oriented x 2. Not in obvious distress.  Elderly, frail.  HEENT: NC, AT. Neck- supple, No JVP elevation, lymphadenopathy or thyromegaly. Trachea-central.  Chest: Clear to auscultation bilaterally.  Heart: S1S2 regular. No M/R/G.  Abdomen: Soft. NT, ND. No organomegaly. Bowel sounds- active.  Back: No spine tenderness. No CVA tenderness.  Extremities: No leg swelling. Peripheral pulses 2+ bilaterally.  Neuro: Cranial nerves 1-12 grossly normal. No focal neurological deficit    Data   Recent Labs   Lab 06/16/22  1347 06/16/22  1246 06/16/22  0352 06/15/22  2133 06/15/22  1452 06/15/22  0822 06/14/22  2318 06/14/22  1830   WBC  --   --  7.5  --   --  10.9  --  8.4   HGB 7.7*  --  7.3* 7.9*   < > 9.3*  --  9.6*   MCV  --   --  97  --   --  102*  --  99   PLT  --   --  149*  --   --  202  --  207   INR  --   --   --   --   --   --   --  1.36*   NA  --   --  143  --   --  142  --  140   POTASSIUM  --  3.6 3.4*  3.4* 3.0*   < > 3.2*   < > 2.5*   CHLORIDE  --   --  117*  --   --  125*  --  119*   CO2  --   --  20*  --   --  11*  --  12*   BUN  --   --  18  --   --  23  --  28   CR  --   --  1.15*  --   --  1.21*  --  1.95*   ANIONGAP  --   --  6  --   --  6  --  9   ARON  --   --  7.9*  --   --  8.6  --  9.0   GLC  --   --  160*  --   --  102  --  110   ALBUMIN  --   --   --    --   --   --   --  3.3*   PROTTOTAL  --   --   --   --   --   --   --  7.6   BILITOTAL  --   --   --   --   --   --   --  0.5   ALKPHOS  --   --   --   --   --   --   --  72   ALT  --   --   --   --   --   --   --  10   AST  --   --   --   --   --   --   --  15    < > = values in this interval not displayed.     Recent Results (from the past 24 hour(s))   XR Abdomen 2 Views    Narrative    EXAM: XR ABDOMEN 2VIEWS  LOCATION: [facility]  DATE/TIME: 6/16/2022 11:19 AM    INDICATION: obstipation, please comment on the amount of colonic stool  COMPARISON: CT 06/14/2022      Impression    IMPRESSION: Negative abdomen. Bowel gas pattern is normal. Nothing for obstruction or free air. No evidence for renal stones. Minimal stool burden, significantly decreased compared with CT 06/14/2022. Only a small amount of stool in the rectum.

## 2022-06-16 NOTE — PLAN OF CARE
Problem: Constipation  Goal: Effective Bowel Elimination  Outcome: Ongoing, Progressing   Patient had loose stool incontinence. Went for abdominal x-ray showing constipation resolved.    Problem: Oral Intake Inadequate (Acute Kidney Injury/Impairment)  Goal: Optimal Nutrition Intake  Outcome: Ongoing, Progressing   Goal Outcome Evaluation:     Patient was changed from thickened liquid to regular thin but still on clear liquid diet.

## 2022-06-16 NOTE — PROGRESS NOTES
"  '    RENAL ([facility]) progress note  CC: F/U GILBERTO  S: Since last visit, no sob currently. Did have previously with activity. Notes abdominal cramping yesterday. Discussed lab with patient and family.     A/P:   Active Problems:    Coronary Artery Disease    CRI (chronic renal insufficiency), stage 3 (moderate) (H)    Acute renal failure, unspecified acute renal failure type (H)    Fecal impaction in rectum (H)    Urinary retention    Hypokalemia    1. GILBERTO on CKD stage 3b: Creatinine of 1.95 on admission. Baseline of 1.2-1.4 this year. Trending down with IVF. Obstruction on CT with distended bladder possible from constipation, henry placed. Noted metabolic acidosis acute on chronic as noted below. CKD since at least 2018  -Daily BMP  -henry- trial of void once constipation improved - may need urology input if not resolved.   -Cystatin c.   -MM screen - pending.      2. Metabolic acidosis: acute on chronic. Bicarb of 11 on admission. Notes bicarb runs in 11-15 this year. Presents wince at least 2018. Could be component of CKD. GFR likely overestimated as noted above. Question RTA as patient does have hx of sjogrens. VBG with ph of 7.2.   -Isotonic bicarb - continue today.   -Oral bicarb   -Urine anion gap positive at 14 suggestive of RTA.      3. Rectal bleeding/constipation: per GI.   4. HTN: Amlodipine, imdur, metoprolol.   5. Hypokalemia: Poor intake, replave and trend.   6. Urinary retention: henry, treat constipation. Trial of void after constipatin treated.   7. Anemia: check iron studies.     Eduard Espinoza,   [facility]  [phone] (off)       No interval changes to past medical history, social history or family history to report.    /55 (BP Location: Left arm)   Pulse 79   Temp 97.3  F (36.3  C) (Oral)   Resp 18   Ht 1.638 m (5' 4.5\")   Wt 40.6 kg (89 lb 6.4 oz)   SpO2 100%   BMI 15.11 kg/m      I/O last 3 completed shifts:  In: 2254 [P.O.:470; I.V.:1784]  Out: 850 " [Urine:850]    Physical Exam:   GENERAL: frail, NAD  EYES: No scleral icterus, conjunctiva clear  ENT: Hearing normal, Oral mucosa moist  RESP: Clear to auscultation bilaterally with no respiratory distress, normal effort.  CV:RRR, no leg edema.    GI: Soft, NT/ND,  Musculoskeletal: decrease muscle bulk/ tone;  SKIN: No rash, warm/ dry  PSYCH:  Appropriate mood and affect    Recent Labs   Lab 06/16/22  0352 06/15/22  2133 06/15/22  1452 06/15/22  0822 06/14/22  2318 06/14/22  1830     --   --  142  --  140   POTASSIUM 3.4*  3.4* 3.0* 2.8* 3.2* 2.7* 2.5*   CHLORIDE 117*  --   --  125*  --  119*   CO2 20*  --   --  11*  --  12*   BUN 18  --   --  23  --  28   CR 1.15*  --   --  1.21*  --  1.95*   GFRESTIMATED 46*  --   --  43*  --  24*   ARON 7.9*  --   --  8.6  --  9.0   MAG 2.0  --   --   --  2.3  --    ALBUMIN  --   --   --   --   --  3.3*       Recent Labs   Lab 06/16/22  0352 06/15/22  2133 06/15/22  1452 06/15/22  0822 06/14/22  1830   WBC 7.5  --   --  10.9 8.4   HGB 7.3* 7.9* 8.6* 9.3* 9.6*   HCT 22.3*  --   --  29.7* 29.5*   MCV 97  --   --  102* 99   *  --   --  202 207             Current Facility-Administered Medications:      acetaminophen (TYLENOL) tablet 650 mg, 650 mg, Oral, Q6H PRN **OR** acetaminophen (TYLENOL) Suppository 650 mg, 650 mg, Rectal, Q6H PRN, Omid Jefferson MD     acetaminophen (TYLENOL) tablet 975 mg, 975 mg, Oral, Q8H, Omid Jefferson MD, 975 mg at 06/15/22 2034     amLODIPine (NORVASC) tablet 10 mg, 10 mg, Oral, Daily, Omid Jefferson MD, 10 mg at 06/16/22 0843     fexofenadine (ALLEGRA) tablet 180 mg, 180 mg, Oral, Daily, Omid Jefferson MD, 180 mg at 06/16/22 0845     isosorbide mononitrate (IMDUR) 24 hr tablet 120 mg, 120 mg, Oral, Daily, Omid Jefferson MD, 120 mg at 06/16/22 0844     isosorbide mononitrate (IMDUR) 24 hr tablet 30 mg, 30 mg, Oral, Daily, Omid Jefferson MD, 30 mg at 06/16/22 0845     lidocaine (LMX4) cream, , Topical, Q1H PRN, Li,  Omid VAZQUEZ MD     lidocaine 1 % 0.1-1 mL, 0.1-1 mL, Other, Q1H PRN, Omid Jefferson MD     melatonin tablet 1 mg, 1 mg, Oral, At Bedtime PRN, Omid Jefferson MD     menthol-zinc oxide (CALMOSEPTINE) 0.44-20.6 % ointment OINT, , Topical, 4x Daily PRN, Shawn Jaramillo MD, Given at 06/16/22 0639     metoprolol succinate ER (TOPROL XL) 24 hr tablet 75 mg, 75 mg, Oral, Daily, Omid Jefferson MD, 75 mg at 06/16/22 0848     mirtazapine (REMERON) tablet 15 mg, 15 mg, Oral, At Bedtime, Omid Jefferson MD, 15 mg at 06/15/22 2038     nitroGLYcerin (NITROSTAT) sublingual tablet 0.4 mg, 0.4 mg, Sublingual, Q5 Min PRN, Omid Jefferson MD     ondansetron (ZOFRAN ODT) ODT tab 4 mg, 4 mg, Oral, Q6H PRN **OR** ondansetron (ZOFRAN) injection 4 mg, 4 mg, Intravenous, Q6H PRN, Omid Jefferson MD     pantoprazole (PROTONIX) EC tablet 40 mg, 40 mg, Oral, BID, Omid Jefferson MD, 40 mg at 06/16/22 0845     polyethylene glycol (MIRALAX) Packet 17 g, 17 g, Oral, Daily, Omid Jefferson MD, 17 g at 06/16/22 0845     polyethylene glycol-propylene glycol (SYSTANE ULTRA) ophthalmic solution 1 drop, 1 drop, Both Eyes, 4x Daily, Omid Jefferson MD, 1 drop at 06/16/22 0842     sennosides (SENOKOT) tablet 8.6 mg, 8.6 mg, Oral, BID, Omid Jefferson MD, 8.6 mg at 06/16/22 0844     sodium bicarbonate tablet 1,300 mg, 1,300 mg, Oral, BID, Eduard Espinoza DO, 1,300 mg at 06/16/22 0843     sodium chloride (PF) 0.9% PF flush 3 mL, 3 mL, Intracatheter, Q8H, Omid Jefferson MD, 3 mL at 06/15/22 0039     sodium chloride (PF) 0.9% PF flush 3 mL, 3 mL, Intracatheter, q1 min prn, Omid Jefferson MD      Labs personally reviewed today during this evaluation at 10:10 AM

## 2022-06-16 NOTE — PROGRESS NOTES
"GI PROGRESS NOTE  6/16/2022  Yun Rodgers  1935  /-79    Subjective:  \"Leave me alone.\" She denies having cramps or complaints today.  Loose brown stools charted today.     Objective:    Patient Vitals for the past 24 hrs:   BP Temp Temp src Pulse Resp SpO2 Weight   06/16/22 1133 121/57 99.1  F (37.3  C) Oral 64 20 100 % --   06/16/22 0730 108/55 97.3  F (36.3  C) Oral 79 18 100 % --   06/16/22 0323 124/57 97.3  F (36.3  C) Oral 63 18 100 % 40.6 kg (89 lb 6.4 oz)   06/15/22 2345 110/56 98.3  F (36.8  C) Oral 63 18 100 % --   06/15/22 1924 124/58 98  F (36.7  C) Oral 65 20 99 % --   06/15/22 1515 131/60 97.5  F (36.4  C) Oral 58 20 100 % --     Body mass index is 15.11 kg/m .  Gen: NAD  GI: Non-distended, BS positive, soft, non-tender    Laboratory  Recent Labs   Lab Test 06/16/22  0352 06/15/22  2133 06/15/22  1452 06/15/22  0822 06/14/22  1830 02/26/18  0554 02/25/18  0529 02/24/18  1714   WBC 7.5  --   --  10.9 8.4   < > 5.0 4.5   HGB 7.3* 7.9* 8.6* 9.3* 9.6*   < > 8.6* 10.5*   MCV 97  --   --  102* 99   < > 94 93   *  --   --  202 207   < > 143 159   INR  --   --   --   --  1.36*  --  1.25* 1.06    < > = values in this interval not displayed.     Recent Labs   Lab Test 06/16/22  1246 06/16/22  0352 06/15/22  2133 06/15/22  1452 06/15/22  0822 06/14/22  2318 06/14/22  1830   NA  --  143  --   --  142  --  140   POTASSIUM 3.6 3.4*  3.4* 3.0*   < > 3.2*   < > 2.5*   CHLORIDE  --  117*  --   --  125*  --  119*   CO2  --  20*  --   --  11*  --  12*   BUN  --  18  --   --  23  --  28   CR  --  1.15*  --   --  1.21*  --  1.95*   ANIONGAP  --  6  --   --  6  --  9   ARON  --  7.9*  --   --  8.6  --  9.0   GLC  --  160*  --   --  102  --  110    < > = values in this interval not displayed.     Recent Labs   Lab Test 06/15/22  0924 06/14/22  1830 05/13/21  1700 08/26/20  1631   ALBUMIN  --  3.3* 3.3* 4.0   BILITOTAL  --  0.5 0.3 0.3   ALT  --  10 11 10   AST  --  15 16 21   ALKPHOS  --  72 101 96 "   PROTEIN 20 *  --   --   --        XR Chest 2 Views    Result Date: 6/15/2022  EXAM: XR CHEST 2 VW LOCATION: [facility] DATE/TIME: 6/15/2022 8:38 AM INDICATION: failure to thrive COMPARISON: CT of the abdomen and pelvis which includes the lung bases 06/14/2022     IMPRESSION: Cardiac silhouette is normal in size. Extensive aortic atheromatous calcifications. There is a stent in the proximal left common carotid artery. The vascular pedicle width is normal. Symmetric lung inflation. No interstitial or alveolar opacities. No focal lung volume loss. No pleural effusion or pneumothorax. Thoracic vertebra are maintained in height.    XR Abdomen 2 Views    Result Date: 6/16/2022  EXAM: XR ABDOMEN 2VIEWS LOCATION: [facility] DATE/TIME: 6/16/2022 11:19 AM INDICATION: obstipation, please comment on the amount of colonic stool COMPARISON: CT 06/14/2022     IMPRESSION: Negative abdomen. Bowel gas pattern is normal. Nothing for obstruction or free air. No evidence for renal stones. Minimal stool burden, significantly decreased compared with CT 06/14/2022. Only a small amount of stool in the rectum.     CT Abdomen Pelvis w/o Contrast    Result Date: 6/14/2022  EXAM: CT ABDOMEN PELVIS W/O CONTRAST LOCATION: [facility] DATE/TIME: 6/14/2022 5:52 PM INDICATION: Right red blood per rectum. COMPARISON: CT abdomen and pelvis 6/12/2007 TECHNIQUE: CT scan of the abdomen and pelvis was performed without IV contrast. Multiplanar reformats were obtained. Dose reduction techniques were used. CONTRAST: None. FINDINGS: LOWER CHEST: Mild edema. Tiny granuloma right middle lobe. Minimal scarring along the lateral pleural surface right lower lobe. No tracer effusions. Coronary artery calcifications. Atherosclerotic disease thoracic aorta. HEPATOBILIARY: Normal. PANCREAS: Normal. SPLEEN: Normal. ADRENAL GLANDS: Nodular configuration left adrenal gland measuring 2.2 x 1.5 cm  slightly more prominent from the previous study. KIDNEYS/BLADDER: Tiny hyperdense cyst left kidney and small right renal cyst. Vascular calcifications both renal ligia. No definite stones. Moderate bladder distention. BOWEL: Fecal impaction with large volume of stool distal sigmoid colon and rectum. No evidence for obstruction. Surgical anastomosis descending colon.  Scattered diverticula throughout the colon. LYMPH NODES: Normal. VASCULATURE: Extensive atherosclerotic disease abdominal aorta and iliac arteries, with bilateral common iliac artery stents. Plaque at the origins of all of the mesenteric vessels and renal arteries. PELVIC ORGANS: Normal. MUSCULOSKELETAL: Osteopenia. Scoliosis. Advanced degenerative disc disease lumbar spine. Sclerotic changes both femoral heads.     IMPRESSION: 1.  Fecal impaction, with large volume of stool distal sigmoid colon and rectum. No current evidence for upstream colonic obstruction. 2.  Scattered diverticula throughout the colon, without evidence for diverticulitis. 3.  Severe atherosclerotic disease, with evaluation limited due to the lack of IV contrast. 4.  Left adrenal adenoma.       Assessment:  Admitted with rectal bleeding which has resolved, and CT on admission noted a large amount of rectal stool.  After getting two enemas and some oral meds (a few doses of MiraLax, senna) she has passed multiple BMs and x-ray today looks improved.    Patient Active Problem List   Diagnosis     Mixed hyperlipidemia     Essential hypertension     Coronary Artery Disease     Carotid artery disease (H)     Peripheral Vascular Disease     Chronic Reflux Esophagitis     Chest pain, unspecified type     Angina pectoris (H)     Stable angina (H)     Sinus bradycardia     Dehydration     Cough     Malaise     Hyperkalemia     Gastroesophageal reflux disease without esophagitis     Atypical pneumonia     Nausea     Trimalleolar fracture     CRI (chronic renal insufficiency), stage 3 (moderate)  (H)     Anemia, unspecified type     Coronary artery disease of native artery of native heart with stable angina pectoris (H)     PVD (pulmonary valve disease)     Fracture dislocation of ankle, right, closed, initial encounter     Closed right ankle fracture     CRI (chronic renal insufficiency), stage 2 (mild)     Acute renal failure, unspecified acute renal failure type (H)     Fecal impaction in rectum (H)     Urinary retention     Hypokalemia        Plan:    1. Diet as tolerated.  2.  She'll need a good bowel regimen.  For now recommend colace BID, senna daily or every other daily, and MiraLax or suppository as needed.  3.  We'll sign off but call if there are questions.    Radha Blankenship PA-C  [facility]  [phone]

## 2022-06-16 NOTE — PROGRESS NOTES
Care Management Follow Up    Length of Stay (days): 2    Expected Discharge Date:       Concerns to be Addressed:     Discharge planning  Patient plan of care discussed at interdisciplinary rounds: Yes    Anticipated Discharge Disposition:  TCU     Anticipated Discharge Services:  transportation  Anticipated Discharge DME:      Patient/family educated on Medicare website which has current facility and service quality ratings:  yes  Education Provided on the Discharge Plan:  yes  Patient/Family in Agreement with the Plan:  yes    Referrals Placed by CM/SW:    Private pay costs discussed: private room/amenity fees    Additional Information:  SONYA contacted pt son Dagoberto over the phone, pt son will be here to visit this morning and will check in with SW. SW informed Dagoberto that Cris has private room fees. He is agreeable.   SONYA called admissions at :  [facility], [facility], and [facility]. Message left.  Cris- son visited for tour and wanted to have her admitted. Admissions will send to clinical team, SW offered to send over PT/Speech notes from 6/15.   Kelli from [facility] is able to accept for Friday afternoon if pt is ready to discharge.   SONYA updated son Dagoberto.    [id number]    SUSAN Bill

## 2022-06-16 NOTE — PLAN OF CARE
Problem: Constipation  Goal: Effective Bowel Elimination  Outcome: Ongoing, Progressing     Pt had three incontinent large liquid dark brown stools this shift.  Pt's rectal area reddened, very painful to incontinent care now. Calmoseptine cream ordered and applied after each incontinent episode.  Pt is continually leaking stool around hemorrhoid area.  No blood noted.    Problem: Fluid and Electrolyte Imbalance (Acute Kidney Injury/Impairment)  Goal: Fluid and Electrolyte Balance  Outcome: Ongoing, Progressing     Pt's creatinine improving-1.15 this AM.

## 2022-06-17 NOTE — PROGRESS NOTES
Discharged to [facility] TCU per w/c [place] transport with belongings. Paperwork sent with pt to care center.

## 2022-06-17 NOTE — PROGRESS NOTES
"  '    RENAL ([facility]) progress note  CC: F/U GILBERTO  S: Sitting up in bed. No acute issues. No sob. No pain. Discussed labs.     A/P:   Active Problems:    Coronary Artery Disease    CRI (chronic renal insufficiency), stage 3 (moderate) (H)    Acute renal failure, unspecified acute renal failure type (H)    Fecal impaction in rectum (H)    Urinary retention    Hypokalemia    1. GILBERTO on CKD stage 3b: Creatinine of 1.95 on admission. Baseline of 1.2-1.4 this year. Trending down with IVF. Obstruction on CT with distended bladder possible from constipation, henry placed. Noted metabolic acidosis acute on chronic as noted below. CKD since at least 2018  -Daily BMP  -henry- now removed.   -MM screen - pending.      2. Metabolic acidosis: acute on chronic. Bicarb of 11 on admission. Notes bicarb runs in 11-15 this year. Presents wince at least 2018. Could be component of CKD. GFR likely overestimated as noted above. Question RTA as patient does have hx of sjogrens. VBG with ph of 7.2.   -Isotonic bicarb - stop today  -Oral bicarb = 650 BID.   -Will need close follow up labs after discharge.    -Urine anion gap positive at 14 suggestive of RTA.      3. Rectal bleeding/constipation: per GI.   4. HTN: Amlodipine, imdur, metoprolol.   5. Hypokalemia: Poor intake, replave and trend.   6. Urinary retention: henry, treat constipation. Trial of void after constipatin treated.   7. Anemia: check iron studies.     Eduard Espinoza,   [facility]  [phone] (off)       No interval changes to past medical history, social history or family history to report.    /53 (BP Location: Left arm)   Pulse 63   Temp 99.4  F (37.4  C) (Oral)   Resp 18   Ht 1.638 m (5' 4.5\")   Wt 42.4 kg (93 lb 6.4 oz)   SpO2 92%   BMI 15.78 kg/m      I/O last 3 completed shifts:  In: 963.33 [P.O.:750; I.V.:213.33]  Out: 750 [Urine:750]    Physical Exam:   GENERAL: frail, NAD  EYES: No scleral icterus, conjunctiva clear  ENT: " Hearing normal, Oral mucosa moist  RESP: Clear to auscultation bilaterally with no respiratory distress, normal effort.  CV:RRR, no leg edema.    GI: Soft, NT/ND,  Musculoskeletal: decrease muscle bulk/ tone;  SKIN: No rash, warm/ dry  PSYCH:  Appropriate mood and affect    Recent Labs   Lab 06/17/22  0443 06/16/22  1246 06/16/22  0352 06/15/22  2133 06/15/22  1452 06/15/22  0822 06/14/22  2318 06/14/22  1830     --  143  --   --  142  --  140   POTASSIUM 3.2* 3.6 3.4*  3.4* 3.0* 2.8* 3.2* 2.7* 2.5*   CHLORIDE 107  --  117*  --   --  125*  --  119*   CO2 31  --  20*  --   --  11*  --  12*   BUN 13  --  18  --   --  23  --  28   CR 1.03  --  1.15*  --   --  1.21*  --  1.95*   GFRESTIMATED 52*  --  46*  --   --  43*  --  24*   ARON 7.5*  --  7.9*  --   --  8.6  --  9.0   MAG  --   --  2.0  --   --   --  2.3  --    ALBUMIN  --   --   --   --   --   --   --  3.3*       Recent Labs   Lab 06/16/22  1347 06/16/22  0352 06/15/22  2133 06/15/22  1452 06/15/22  0822 06/14/22  1830   WBC  --  7.5  --   --  10.9 8.4   HGB 7.7* 7.3* 7.9* 8.6* 9.3* 9.6*   HCT  --  22.3*  --   --  29.7* 29.5*   MCV  --  97  --   --  102* 99   PLT  --  149*  --   --  202 207             Current Facility-Administered Medications:      acetaminophen (TYLENOL) tablet 650 mg, 650 mg, Oral, Q6H PRN **OR** acetaminophen (TYLENOL) Suppository 650 mg, 650 mg, Rectal, Q6H PRN, Omid Jefferson MD     acetaminophen (TYLENOL) tablet 975 mg, 975 mg, Oral, Q8H, Omid Jefferson MD, 975 mg at 06/16/22 1949     amLODIPine (NORVASC) tablet 10 mg, 10 mg, Oral, Daily, Omid Jefferson MD, 10 mg at 06/17/22 0815     docusate sodium (COLACE) capsule 100 mg, 100 mg, Oral, BID, Radha Blankenship PA-C, 100 mg at 06/17/22 0813     fexofenadine (ALLEGRA) tablet 180 mg, 180 mg, Oral, Daily, Omid Jefferson MD, 180 mg at 06/17/22 0814     isosorbide mononitrate (IMDUR) 24 hr tablet 120 mg, 120 mg, Oral, Daily, Omid Jefferson MD, 120 mg at 06/17/22 0814      isosorbide mononitrate (IMDUR) 24 hr tablet 30 mg, 30 mg, Oral, Daily, Omid Jefferson MD, 30 mg at 06/17/22 0814     lidocaine (LMX4) cream, , Topical, Q1H PRN, Omid Jefferson MD     lidocaine 1 % 0.1-1 mL, 0.1-1 mL, Other, Q1H PRN, Omid Jefferson MD     melatonin tablet 1 mg, 1 mg, Oral, At Bedtime PRN, Omid Jefferson MD     menthol-zinc oxide (CALMOSEPTINE) 0.44-20.6 % ointment OINT, , Topical, 4x Daily PRN, Shawn Jaramillo MD, Given at 06/16/22 0639     metoprolol succinate ER (TOPROL XL) 24 hr tablet 75 mg, 75 mg, Oral, Daily, Omid Jefferson MD, 75 mg at 06/17/22 0813     mirtazapine (REMERON) tablet 15 mg, 15 mg, Oral, At Bedtime, Omid Jefferson MD, 15 mg at 06/16/22 2203     nitroGLYcerin (NITROSTAT) sublingual tablet 0.4 mg, 0.4 mg, Sublingual, Q5 Min PRN, Omid Jefferson MD     ondansetron (ZOFRAN ODT) ODT tab 4 mg, 4 mg, Oral, Q6H PRN **OR** ondansetron (ZOFRAN) injection 4 mg, 4 mg, Intravenous, Q6H PRN, Omid Jefferson MD     pantoprazole (PROTONIX) EC tablet 40 mg, 40 mg, Oral, BID, Omid Jefferson MD, 40 mg at 06/17/22 0815     polyethylene glycol (MIRALAX) Packet 17 g, 17 g, Oral, Daily PRN, Radha Blankenship PA-C     polyethylene glycol-propylene glycol (SYSTANE ULTRA) ophthalmic solution 1 drop, 1 drop, Both Eyes, 4x Daily, Omid Jefferson MD, 1 drop at 06/17/22 0813     sennosides (SENOKOT) tablet 8.6 mg, 8.6 mg, Oral, Daily, Radha Blankenship PA-C, 8.6 mg at 06/17/22 0815     sodium bicarbonate tablet 1,300 mg, 1,300 mg, Oral, BID, Eduard Espinoza DO, 1,300 mg at 06/17/22 0814     sodium chloride (PF) 0.9% PF flush 3 mL, 3 mL, Intracatheter, Q8H, Omid Jefferson MD, 3 mL at 06/16/22 1950     sodium chloride (PF) 0.9% PF flush 3 mL, 3 mL, Intracatheter, q1 min prn, Omid Jefferson MD      Labs personally reviewed today during this evaluation at 10:10 AM

## 2022-06-17 NOTE — PROGRESS NOTES
06/17/22 1040   Quick Adds   Type of Visit Initial Occupational Therapy Evaluation   Living Environment   People in Home alone   Current Living Arrangements independent living facility   Home Accessibility no concerns   Transportation Anticipated health plan transportation   Living Environment Comments pt states her son is looking for a facility that offers more services.  Pt agreeable to moving   Self-Care   Usual Activity Tolerance moderate   Current Activity Tolerance fair   Fall history within last six months no   Activity/Exercise/Self-Care Comment independent   Instrumental Activities of Daily Living (IADL)   IADL Comments pt reports services are ala cart.  She states it has been more difficult to take care of self and feels moving to a facility with more services would be needed   General Information   Onset of Illness/Injury or Date of Surgery 06/14/22   Referring Physician Dr. Jefferson   Patient/Family Therapy Goal Statement (OT) go to TCU to get stronger then move to assisted living   Existing Precautions/Restrictions fall   Limitations/Impairments hearing  (very [place])   Range of Motion Comprehensive   Comment, General Range of Motion WFL   Strength Comprehensive (MMT)   Comment, General Manual Muscle Testing (MMT) Assessment Functional yet weak. tires easily   Coordination   Upper Extremity Coordination No deficits were identified   Coordination Comments WFL   Transfers   Transfers bed-chair transfer;toilet transfer   Transfer Comments CGA chair and toilet transfers   Transfer Skill: Bed to Chair/Chair to Bed   Bed-Chair [place] (Transfers) contact guard   Assistive Device (Bed-Chair Transfers) rolling walker   Toilet Transfer   [place] Level (Toilet Transfer) contact guard   Assistive Device (Toilet Transfer) walker, standard   Activities of Daily Living   BADL Assessment/Intervention grooming;toileting   Grooming Assessment/Training   [place] Level (Grooming) supervision;contact guard  assist   Comment, (Grooming) washing hands standing at sink   Toileting   [place] Level (Toileting) supervision;minimum assist (75% patient effort)  (independent with pericare after cuing.  incontinent of BM in chair.  Requested assistance cleaning up.)   Clinical Impression   Criteria for Skilled Therapeutic Interventions Met (OT) Yes, treatment indicated   OT Diagnosis reduced ADL independence   Influenced by the following impairments per MD note: Coronary Artery Disease    CRI (chronic renal insufficiency), stage 3 (moderate) (H)    Acute renal failure, unspecified acute renal failure type (H)    Fecal impaction in rectum (H)    Urinary retention    Hypokalemia   OT Problem List-Impairments impacting ADL problems related to;balance;hearing;mobility   Assessment of Occupational Performance 1-3 Performance Deficits   Identified Performance Deficits weakness, endurance   Planned Therapy Interventions (OT) ADL retraining;strengthening;progressive activity/exercise;home program guidelines   Clinical Decision Making Complexity (OT) low complexity   Risk & Benefits of therapy have been explained evaluation/treatment results reviewed;care plan/treatment goals reviewed;risks/benefits reviewed;current/potential barriers reviewed;participants voiced agreement with care plan;participants included;patient   OT Discharge Planning   OT Discharge Recommendation (DC Rec) Transitional Care Facility   OT Rationale for DC Rec weak.  Pt reports she can't take care of herself at home.  Pt states son is looking for assisted living that can offer her more services.   Total Evaluation Time (Minutes)   Total Evaluation Time (Minutes) 18   OT Goals   Therapy Frequency (OT) Daily   OT Predicted Duration/Target Date for Goal Attainment 06/23/22   OT Goals Aerobic Activity;Lower Body Dressing;Transfers;Hygiene/Grooming   OT: Hygiene/Grooming supervision/stand-by assist;while standing   OT: Lower Body Dressing Supervision/stand-by  assist;using adaptive equipment   OT: Transfer Supervision/stand-by assist;with assistive device   OT: Perform aerobic activity with stable cardiovascular response 10 minutes;intermittent activity;ambulation  (arm exercises)

## 2022-06-17 NOTE — PLAN OF CARE
Goal Outcome Evaluation:    Plan of Care Reviewed With: patient     Overall Patient Progress: improving    Outcome Evaluation: Patient with no pain overnight. VSS on room air. henry catheter removed this morning. voiding trial today before potential discharge to TCU

## 2022-06-17 NOTE — PLAN OF CARE
Goal Outcome Evaluation:      Continues to be incontinent of loose/soft brown stools. Buttocks reddened, barrier oint applied prn. Depends on. Incontinent of urine as well. Pt stated she can't always tell when she has to go. Up in chair per therapy in am. Denied any pain. Diet increased to easy to chew foods/thin liquids. Appetite poor. Enc. PO intake. [facility]

## 2022-06-17 NOTE — DISCHARGE SUMMARY
[facility]  Hospitalist Discharge Summary      Date of Admission:  6/14/2022  Date of Discharge:  6/17/2022  Discharging Provider: Francy Ann MD  Discharge Service: Hospitalist Service    Discharge Diagnoses   Patient Active Problem List   Diagnosis     Mixed hyperlipidemia     Essential hypertension     Coronary Artery Disease     Carotid artery disease (H)     Peripheral vascular disease (H)     Gastroesophageal reflux disease with esophagitis     Chest pain, unspecified type     Angina concurrent with and due to arteriosclerosis of coronary artery (H)     Stable angina (H)     Sinus bradycardia     Dehydration     Cough     Malaise     Hyperkalemia     Gastroesophageal reflux disease without esophagitis     Atypical pneumonia     Nausea     Trimalleolar fracture     Anemia, unspecified type     Coronary artery disease of native artery of native heart with stable angina pectoris (H)     Pulmonary valve disorder     Fracture dislocation of ankle, right, closed, initial encounter     Closed right ankle fracture     Stage 3 chronic kidney disease (H)     Acute renal failure, unspecified acute renal failure type (H)     Fecal impaction in rectum (H)     Urinary retention     Hypokalemia     Sjogren's syndrome (H)     Poor appetite     Lesion of ulnar nerve     History of tobacco use     History of coronary artery bypass surgery     Headache     Grief     Constipation     Chronic fatigue syndrome     Burning mouth syndrome     Adult failure to thrive syndrome     Acquired trigger finger     Abnormal gait     Ulcer of external ear (H)     Follow-ups Needed After Discharge   Follow-up Appointments     Follow Up and recommended labs and tests      Follow up with senior care physician.  The following labs/tests are   recommended: BMP, Mg, CBC.       Unresulted Labs Ordered in the Past 30 Days of this Admission     Date and Time Order Name Status Description    6/15/2022 12:10 PM Protein  Electrophoresis, Serum In process       These results will be followed up by me    Discharge Disposition   Discharged to short-term care facility  Condition at discharge: Stable    Hospital Course   Yun Rodgers is a 87 year old female assisted-living resident with PMH of CKD 3, constipation, hemorrhoids, RTA, chronic metabolic acidosis, subacute cutaneous lupus, Sjogren's, CAD s/p post stent in 2007, arthritis, peripheral vascular disease status post right carotid enterectomy, hypokalemia admitted for weakness and hematochezia found to have hypokalemia, acute kidney injury, obstipation and urinary retention.     Hematochezia with obstipation/fecal impaction.  CT shows fecal impaction with large volume of stool in the distal sigmoid colon and stool ball in rectal vault with no evidence of obstruction, scattered diverticuli but no diverticulitis.  By report also has hemorrhoids.  No abdominal pain, also urinary retention.  Stool is soft, brown per ER report, pink lady enema was given on admission and repeat in 6/15.  GI offered/sigmoidoscopy, patient declined.  Fecal impaction resolved after 2 pink lady enema's.  Bowel regimen added, per GI.  Patient tolerated regular diet.     Rectal bleeding-suspect bleeding is secondary to hemorrhoids versus constipation/diverticulosis.  No recurrence.  GI offered flex sig myeloscopy, patient declined.  Bowel regimen.     Acute kidney injury/CKD stage IIIb.  Baseline creatinine 1.2-1.4.  On admission creatinine 1.95.  Improved with IVF and bladder decompression with Serrano.  Multiple myeloma screening obtained by nephrology, results pending.  Urinary retention, likely due to fecal impaction.  No hydronephrosis.  Check UA, Serrano catheter placed, treated constipation.    Serrano removed prior to discharge, and patient was urinating without problems.     Hypokalemia with metabolic acidosis.  In the medical record this is been recurrent, previous bicarbonates have been 11.  Has never  seen nephrology  Consulted nephrology.  Patient was started on bicarbonate drip, then added p.o. bicarbonate per nephrology.    Acute kidney injury with chronic kidney disease stage III  Baseline creatinine 1.2-1.5  Could be secondary to obstruction given urinary retention.  UA not infected.  Serrano removed.     Coronary artery disease-  Status post LAD stent in 2007 with abnormal stress test 2018 that showed a small area of distal anterior septal ischemia with normal EF  Continued PTA Imdur.  She is not on aspirin.  , interestingly not on aspirin, since she had intermittent rectal bleeding from hemorrhoids.     GERD-continue PPI     Anemia of iron deficiency-hemoglobin alex 7.3 on 6/16 AM, on recheck 7.7. Iron supplementation started.     Essential hypertension.  Stable on amlodipine, Imdur, metoprolol.     Failure to thrive-  Has mild intermittent dysphagia, poor appetite.  Nonfocal neurologic exam.  Could be secondary to Sjogren's versus constipation.  No nausea or vomiting.  No pulmonary symptoms.  Labs negative for B12, folate deficiencies.  Normal TSH.  Suspect dysphagia.  Evaluated by speech.  Recommended thickened fluids.  PT OT-elevated, recommend patient return to her assisted living with increased services.    Consultations This Hospital Stay   PHYSICAL THERAPY ADULT IP CONSULT  OCCUPATIONAL THERAPY ADULT IP CONSULT  GASTROENTEROLOGY IP CONSULT  NEPHROLOGY IP CONSULT  SPEECH LANGUAGE PATH ADULT IP CONSULT  CARE MANAGEMENT / SOCIAL WORK IP CONSULT  PHYSICAL THERAPY ADULT IP CONSULT  OCCUPATIONAL THERAPY ADULT IP CONSULT    Code Status   No CPR- Do NOT Intubate    Time Spent on this Encounter   I, Francy Ann MD, personally saw the patient today and spent greater than 30 minutes discharging this patient.     Francy Ann MD  [facility]  [address] [place] [id number]  Phone: [phone]  Fax:  [phone]  ______________________________________________________________________    Physical Exam   Vital Signs: Temp: 99.4  F (37.4  C) Temp src: Oral BP: 107/53 Pulse: 63   Resp: 18 SpO2: 92 % O2 Device: None (Room air)    Weight: 93 lbs 6.4 oz  General: Alert and oriented x 2. Not in obvious distress.  Elderly, frail.  HEENT: NC, AT. Neck- supple, No JVP elevation, lymphadenopathy or thyromegaly. Trachea-central.  Chest: Clear to auscultation bilaterally.  Heart: S1S2 regular. No M/R/G.  Abdomen: Soft. NT, ND. No organomegaly. Bowel sounds- active.  Back: No spine tenderness. No CVA tenderness.  Extremities: No leg swelling. Peripheral pulses 2+ bilaterally.  Neuro: Cranial nerves 1-12 grossly normal. No focal neurological deficit       Primary Care Physician   Ronaldo Grijalva    Discharge Orders      General info for SNF    Length of Stay Estimate: Short Term Care: Estimated # of Days <30  Condition at Discharge: Improving  Level of care:skilled   Rehabilitation Potential: Good  Admission H&P remains valid and up-to-date: Yes  Recent Chemotherapy: N/A  Use Nursing Home Standing Orders: Yes     Mantoux instructions    Give two-step Mantoux (PPD) Per Facility Policy Yes     Follow Up and recommended labs and tests    Follow up with [facility] physician.  The following labs/tests are recommended: BMP, Mg, CBC.     Reason for your hospital stay    Fecal impaction     Activity - Up with nursing assistance     No CPR- Do NOT Intubate     Physical Therapy Adult Consult    Evaluate and treat as clinically indicated.    Reason:  weakness     Occupational Therapy Adult Consult    Evaluate and treat as clinically indicated.    Reason:  weakness     Fall precautions     Diet    Follow this diet upon discharge: Orders Placed This Encounter      Room Service      Advance Diet as Tolerated: Clear Liquid Diet       Significant Results and Procedures   Results for orders placed or performed during the hospital encounter of  06/14/22   CT Abdomen Pelvis w/o Contrast    Narrative    EXAM: CT ABDOMEN PELVIS W/O CONTRAST  LOCATION: [facility]  DATE/TIME: 6/14/2022 5:52 PM    INDICATION: Right red blood per rectum.  COMPARISON: CT abdomen and pelvis 6/12/2007  TECHNIQUE: CT scan of the abdomen and pelvis was performed without IV contrast. Multiplanar reformats were obtained. Dose reduction techniques were used.  CONTRAST: None.    FINDINGS:   LOWER CHEST: Mild edema. Tiny granuloma right middle lobe. Minimal scarring along the lateral pleural surface right lower lobe. No tracer effusions. Coronary artery calcifications. Atherosclerotic disease thoracic aorta.    HEPATOBILIARY: Normal.    PANCREAS: Normal.    SPLEEN: Normal.    ADRENAL GLANDS: Nodular configuration left adrenal gland measuring 2.2 x 1.5 cm slightly more prominent from the previous study.    KIDNEYS/BLADDER: Tiny hyperdense cyst left kidney and small right renal cyst. Vascular calcifications both renal ligia. No definite stones. Moderate bladder distention.    BOWEL: Fecal impaction with large volume of stool distal sigmoid colon and rectum. No evidence for obstruction. Surgical anastomosis descending colon.  Scattered diverticula throughout the colon.    LYMPH NODES: Normal.    VASCULATURE: Extensive atherosclerotic disease abdominal aorta and iliac arteries, with bilateral common iliac artery stents. Plaque at the origins of all of the mesenteric vessels and renal arteries.    PELVIC ORGANS: Normal.    MUSCULOSKELETAL: Osteopenia. Scoliosis. Advanced degenerative disc disease lumbar spine. Sclerotic changes both femoral heads.      Impression    IMPRESSION:   1.  Fecal impaction, with large volume of stool distal sigmoid colon and rectum. No current evidence for upstream colonic obstruction.  2.  Scattered diverticula throughout the colon, without evidence for diverticulitis.  3.  Severe atherosclerotic disease, with evaluation limited due to the  lack of IV contrast.  4.  Left adrenal adenoma.     XR Chest 2 Views    Narrative    EXAM: XR CHEST 2 VW  LOCATION: [facility]  DATE/TIME: 6/15/2022 8:38 AM    INDICATION: failure to thrive  COMPARISON: CT of the abdomen and pelvis which includes the lung bases 06/14/2022      Impression    IMPRESSION:     Cardiac silhouette is normal in size. Extensive aortic atheromatous calcifications. There is a stent in the proximal left common carotid artery. The vascular pedicle width is normal.    Symmetric lung inflation. No interstitial or alveolar opacities. No focal lung volume loss.    No pleural effusion or pneumothorax.    Thoracic vertebra are maintained in height.   XR Abdomen 2 Views    Narrative    EXAM: XR ABDOMEN 2VIEWS  LOCATION: [facility]  DATE/TIME: 6/16/2022 11:19 AM    INDICATION: obstipation, please comment on the amount of colonic stool  COMPARISON: CT 06/14/2022      Impression    IMPRESSION: Negative abdomen. Bowel gas pattern is normal. Nothing for obstruction or free air. No evidence for renal stones. Minimal stool burden, significantly decreased compared with CT 06/14/2022. Only a small amount of stool in the rectum.        Discharge Medications   Current Discharge Medication List      START taking these medications    Details   ferrous gluconate (FERGON) 324 (38 Fe) MG tablet Take 1 tablet (324 mg) by mouth daily (with breakfast) for 30 days  Qty: 30 tablet, Refills: 0    Associated Diagnoses: Iron deficiency      melatonin 1 MG TABS tablet Take 3 tablets (3 mg) by mouth At Bedtime for 30 days  Qty: 90 tablet, Refills: 0    Associated Diagnoses: Primary insomnia      polyethylene glycol (MIRALAX) 17 GM/Dose powder Take 17 g by mouth daily for 30 days  Qty: 510 g, Refills: 0    Associated Diagnoses: Fecal impaction in rectum (H)      potassium chloride ER (K-TAB) 20 MEQ CR tablet Take 1 tablet (20 mEq) by mouth daily for 7 days  Qty: 7 tablet,  Refills: 0    Associated Diagnoses: Hypokalemia      !! senna (SENOKOT) 8.6 MG tablet Take 2 tablets by mouth At Bedtime  Qty: 60 tablet, Refills: 0    Associated Diagnoses: Fecal impaction in rectum (H)      sodium bicarbonate 650 MG tablet Take 1 tablet (650 mg) by mouth 2 times daily for 30 days  Qty: 60 tablet, Refills: 0    Associated Diagnoses: Acute renal failure, unspecified acute renal failure type (H)       !! - Potential duplicate medications found. Please discuss with provider.      CONTINUE these medications which have NOT CHANGED    Details   acetaminophen (TYLENOL) 500 MG tablet Take 500-1,000 mg by mouth every 6 hours as needed for mild pain      amLODIPine (NORVASC) 10 MG tablet [AMLODIPINE (NORVASC) 10 MG TABLET] TAKE 1 TABLET BY MOUTH EVERY DAY  Qty: 30 tablet, Refills: 2    Associated Diagnoses: Essential hypertension      esomeprazole (NEXIUM) 40 MG DR capsule Take 40 mg by mouth At Bedtime Take 30-60 minutes before eating.      fexofenadine (ALLEGRA) 180 MG tablet Take 180 mg by mouth daily      hydrocortisone, Perianal, (ANUSOL-HC) 2.5 % cream Place 1 applicator rectally 2 times daily as needed for hemorrhoids      !! isosorbide mononitrate (IMDUR) 120 MG 24 hr tablet [ISOSORBIDE MONONITRATE (IMDUR) 120 MG 24 HR TABLET] TAKE 1 TABLET BY MOUTH EVERY DAY **TAKE W/30 MG TABLET** - **PATIENT ONLY WANTS 1 MONTH PER FILL**  Qty: 30 tablet, Refills: 11    Associated Diagnoses: Coronary atherosclerosis      !! isosorbide mononitrate (IMDUR) 30 MG 24 hr tablet Take 30 mg by mouth daily       metoprolol succinate ER (TOPROL-XL) 25 MG 24 hr tablet Take 75 mg by mouth daily       mirtazapine (REMERON) 15 MG tablet [MIRTAZAPINE (REMERON) 15 MG TABLET] Take 15 mg by mouth at bedtime. Indications: major depressive disorder      MULTIVITAMIN W-MINERALS/LUTEIN (CENTRUM SILVER ORAL) [MULTIVITAMIN W-MINERALS/LUTEIN (CENTRUM SILVER ORAL)] Take 1 tablet by mouth daily.      nitroglycerin (NITROSTAT) 0.4 MG SL  "tablet [NITROGLYCERIN (NITROSTAT) 0.4 MG SL TABLET] Place 0.4 mg under the tongue as needed for chest pain.      peg 400-propylene glycol (SYSTANE) 0.4-0.3 % Drop [-PROPYLENE GLYCOL (SYSTANE) 0.4-0.3 % DROP] Administer 1 drop to both eyes 4 (four) times a day.       !! senna (SENOKOT) 8.6 MG tablet Take 1 tablet by mouth daily as needed for constipation      triamcinolone (KENALOG) 0.025 % cream [TRIAMCINOLONE (KENALOG) 0.025 % CREAM] Apply 1 application topically 3 (three) times a day as needed.       pantoprazole (PROTONIX) 40 MG tablet [PANTOPRAZOLE (PROTONIX) 40 MG TABLET] Take 40 mg by mouth 2 (two) times a day.       !! - Potential duplicate medications found. Please discuss with provider.        Allergies   Allergies   Allergen Reactions     Primaquine Rash     Atorvastatin Unknown     Demeclocycline Other (See Comments)     \"sore bottom\"     Guaiacol Hives     Robitussin D     Iodinated Contrast Media [Diagnostic X-Ray Materials] Unknown     Metrizamide Other (See Comments)     Chest tightness,sshakes     Pravastatin Unknown     Robitussin [Guaifenesin] Unknown     Simvastatin Other (See Comments)     Muscle aches      Tetracyclines Unknown     Hydroxychloroquine Sulfate [Hydroxychloroquine] Rash     Preservision Areds Rash     AREDs formula only caused rash.      "

## 2022-06-17 NOTE — PLAN OF CARE
"  Problem: Plan of Care - These are the overarching goals to be used throughout the patient stay.    Goal: Plan of Care Review/Shift Note  Description: The Plan of Care Review/Shift note should be completed every shift.  The Outcome Evaluation is a brief statement about your assessment that the patient is improving, declining, or no change.  This information will be displayed automatically on your shift note.  Outcome: Met  Goal: Patient-Specific Goal (Individualized)  Description: You can add care plan individualizations to a care plan. Examples of Individualization might be:  \"Parent requests to be called daily at 9am for status\", \"I have a hard time hearing out of my right ear\", or \"Do not touch me to wake me up as it startles me\".  Outcome: Met  Goal: Absence of Hospital-Acquired Illness or Injury  Outcome: Met  Intervention: Identify and Manage Fall Risk  Recent Flowsheet Documentation  Taken 6/17/2022 0800 by Cindy Link, RN  Safety Promotion/Fall Prevention:   activity supervised   bed alarm on   clutter free environment maintained   fall prevention program maintained   nonskid shoes/slippers when out of bed   patient and family education   room organization consistent   safety round/check completed   supervised activity  Intervention: Prevent Skin Injury  Recent Flowsheet Documentation  Taken 6/17/2022 0800 by Cindy Link, RN  Body Position: supine, head elevated  Intervention: Prevent and Manage VTE (Venous Thromboembolism) Risk  Recent Flowsheet Documentation  Taken 6/17/2022 1145 by Cindy Link, RN  Activity Management: up in chair  Taken 6/17/2022 0800 by Cindy Link, RN  Activity Management:   activity adjusted per tolerance   activity encouraged  Goal: Optimal Comfort and Wellbeing  Outcome: Met  Goal: Readiness for Transition of Care  Outcome: Met     Problem: Risk for Delirium  Goal: Optimal Coping  Outcome: Met  Goal: Improved Behavioral Control  Outcome: Met  Goal: " Improved Attention and Thought Clarity  Outcome: Met  Goal: Improved Sleep  Outcome: Met     Problem: Constipation  Goal: Effective Bowel Elimination  Outcome: Met     Problem: Fluid and Electrolyte Imbalance (Acute Kidney Injury/Impairment)  Goal: Fluid and Electrolyte Balance  Outcome: Met     Problem: Oral Intake Inadequate (Acute Kidney Injury/Impairment)  Goal: Optimal Nutrition Intake  Outcome: Met     Problem: Renal Function Impairment (Acute Kidney Injury/Impairment)  Goal: Effective Renal Function  Outcome: Met  Intervention: Monitor and Support Renal Function  Recent Flowsheet Documentation  Taken 6/17/2022 0800 by Cindy Link RN  Medication Review/Management: medications reviewed   Goal Outcome Evaluation:

## 2022-06-17 NOTE — PLAN OF CARE
Speech Language Therapy Discharge Summary    Reason for therapy discharge:    All goals and outcomes met, no further needs identified.    Progress towards therapy goal(s). See goals on Care Plan in [facility] electronic health record for goal details.  Goals met    Therapy recommendation(s):    No further therapy is recommended.    Meal observation with regular textures and thin liquids. Patient had general coughing before, during and after intake. This did not increase in frequency or severity with intake and did not often occur right after intake. Mastication was slow but functional, patient had appropriate rate of intake. Current medical plan noted to be ADAT, nurse anticipates advancing today. Patient has been on clear liquids for a few days without concerns for aspiration despite previous rec for mildly thick. Okay for regular diet and thin liquids, no straws, small sips, slow rate of intake. Monitor for s/s aspiration and notify SLP if they occur. No further ST at this time. Consider reflux if baseline cough persists.

## 2022-06-17 NOTE — PLAN OF CARE
Physical Therapy Discharge Summary    Reason for therapy discharge:    Discharged to transitional care facility.    Progress towards therapy goal(s). See goals on Care Plan in [facility] electronic health record for goal details.  Goals not met.  Barriers to achieving goals:   discharge from facility.    Therapy recommendation(s):    Continued therapy is recommended.  Rationale/Recommendations:  to improve functional mobilty.

## 2022-06-18 NOTE — PLAN OF CARE
Occupational Therapy Discharge Summary    Reason for therapy discharge:    Discharged to transitional care facility.    Progress towards therapy goal(s). See goals on Care Plan in [facility] electronic health record for goal details.  Goals partially met.  Barriers to achieving goals:   discharge from facility.    Therapy recommendation(s):    Continued therapy is recommended.  Rationale/Recommendations:  To improve overall ADL safety/independence .    Goal Outcome Evaluation:

## 2022-06-21 PROBLEM — K14.6 BURNING MOUTH SYNDROME: Status: ACTIVE | Noted: 2022-01-01

## 2022-06-21 PROBLEM — Z95.1 HISTORY OF CORONARY ARTERY BYPASS SURGERY: Status: ACTIVE | Noted: 2022-01-01

## 2022-06-21 PROBLEM — R62.7 ADULT FAILURE TO THRIVE SYNDROME: Status: ACTIVE | Noted: 2022-01-01

## 2022-06-21 PROBLEM — Z87.891 HISTORY OF TOBACCO USE: Status: ACTIVE | Noted: 2022-01-01

## 2022-06-21 PROBLEM — M35.00 SJOGREN'S SYNDROME (H): Status: ACTIVE | Noted: 2022-01-01

## 2022-06-21 PROBLEM — L98.499: Status: ACTIVE | Noted: 2022-01-01

## 2022-06-21 PROBLEM — K59.00 CONSTIPATION: Status: ACTIVE | Noted: 2022-01-01

## 2022-06-21 PROBLEM — M65.30 ACQUIRED TRIGGER FINGER: Status: ACTIVE | Noted: 2022-01-01

## 2022-06-21 PROBLEM — R26.9 ABNORMAL GAIT: Status: ACTIVE | Noted: 2022-01-01

## 2022-06-21 PROBLEM — R51.9 HEADACHE: Status: ACTIVE | Noted: 2022-01-01

## 2022-06-21 PROBLEM — N18.30 STAGE 3 CHRONIC KIDNEY DISEASE (H): Status: ACTIVE | Noted: 2018-03-29

## 2022-06-21 PROBLEM — F43.21 GRIEF: Status: ACTIVE | Noted: 2022-01-01

## 2022-06-21 PROBLEM — R63.0 POOR APPETITE: Status: ACTIVE | Noted: 2022-01-01

## 2022-06-21 PROBLEM — G93.32 CHRONIC FATIGUE SYNDROME: Status: ACTIVE | Noted: 2022-01-01

## 2022-06-21 PROBLEM — G56.20 LESION OF ULNAR NERVE: Status: ACTIVE | Noted: 2022-01-01

## 2022-06-21 NOTE — LETTER
6/21/2022        RE: Yun Rodegrs  [address]  [place] [address]        [facility]    Code Status:  DNR/DNI   Visit Type:   Chief Complaint   Patient presents with     Hospital F/U     TCU Admission     Facility:  [facility] ([facility]) [[id number]]           Transitional Care Course: Yun Rodgers is a 87 year old female who I am seeing today for admit to the TCU.  Patient recently hospitalized on 6/14/2022 secondary to rectal bleeding.  Past medical history includes CKD stage III, constipation, subacute cutaneous lupus, Sjogren's, CAD status post stent in 2007, arthritis, peripheral vascular disease status post right carotid endarterectomy and hyperkalemia.  Patient with rectal bleeding secondary to constipation versus diverticulosis.  She underwent a CT scan which showed fecal impaction with a large volume of stool in the distal sigmoid colon and rectal vault without evidence of obstruction.  She did have scattered diverticuli but no diverticulitis.  Patient positive for hemorrhoids.  Patient was given a pink lady enema on admit and repeated on 6/15.  She was seen by GI.  They did offer sigmoidoscopy however patient declined.  Stool softeners added.  Urinary retention likely due to fecal impaction.  There was no hydronephrosis.  UA was negative.  She did have a Serrano catheter which was placed and removed prior to discharge.  Hypokalemia with metabolic acidosis.  This is recurrent.  Bicarbonate has been as low as 11.  She continues on bicarb supplement.  She was seen by nephrology.  Potassium replaced.  Acute kidney injury on chronic kidney disease stage III.  This was felt due to obstruction.  UA negative.  She was treated with fluids.  CAD with history of LAD stent 2007 with abnormal stress test in 2018 which showed a small area of distal anterior septal ischemia with normal ejection fracture.  Patient not on aspirin.  GERD.  Continues on PPI.  Anemia with iron deficiency.   Hemoglobin on admit was 7.3.  No B12 or folate deficiency.  Recheck was 7.7.  Iron was supplemented.  Hypertension.  Failure to thrive.  Mild intermittent dysphagia with poor appetite.  This thought may be due to Sjogren's.  TSH normal.  Patient was evaluated by speech therapy and recommended thicken fluids.    On today's visit patient is lying in bed.  A CBC was attempted to be drawn however this did clot.  She did have electrolyte panel which showed a creatinine of 1.34.  Slightly elevated.  Magnesium 1.6.  Potassium 4.2.  Previous hemoglobin at time of discharge was 7.7.  ursing staff reported loose stools.  They denied any blood in her stools.  She continues with topical treatment for hemorrhoids.  Her senna and MiraLAX were held earlier this a.m.  Patient with very poor oral intake.  The dietitian did visit with her.  She is sitting up eating in bed today.  She denies any difficulty swallowing however continues on thickened liquids.  She reports she is emptying her bladder.  She denies any shortness of breath or chest pain.  Blood pressure appears satisfactory.    Active Ambulatory Problems     Diagnosis Date Noted     Mixed hyperlipidemia      Essential hypertension      Coronary Artery Disease      Carotid artery disease (H)      Peripheral vascular disease (H)      Gastroesophageal reflux disease with esophagitis      Chest pain, unspecified type      Angina concurrent with and due to arteriosclerosis of coronary artery (H) 01/09/2015     Stable angina (H) 01/15/2015     Sinus bradycardia 02/26/2016     Dehydration 11/12/2017     Cough      Malaise      Hyperkalemia      Gastroesophageal reflux disease without esophagitis      Atypical pneumonia 11/13/2017     Nausea 11/14/2017     Trimalleolar fracture 02/24/2018     Anemia, unspecified type      Coronary artery disease of native artery of native heart with stable angina pectoris (H)      Pulmonary valve disorder      Fracture dislocation of ankle, right,  closed, initial encounter 02/24/2018     Closed right ankle fracture 03/02/2018     Stage 3 chronic kidney disease (H) 03/29/2018     Acute renal failure, unspecified acute renal failure type (H) 06/14/2022     Fecal impaction in rectum (H) 06/14/2022     Urinary retention 06/14/2022     Hypokalemia 06/14/2022     Sjogren's syndrome (H) 06/21/2022     Poor appetite 06/21/2022     Lesion of ulnar nerve 06/21/2022     History of tobacco use 06/21/2022     History of coronary artery bypass surgery 06/21/2022     Headache 06/21/2022     Grief 06/21/2022     Constipation 06/21/2022     Chronic fatigue syndrome 06/21/2022     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     Adult failure to thrive syndrome 06/21/2022     Acquired trigger finger 06/21/2022     Abnormal gait 06/21/2022     Ulcer of external ear (H) 06/21/2022     Resolved Ambulatory Problems     Diagnosis Date Noted     CRI (chronic renal insufficiency), stage 3 (moderate) (H)      Past Medical History:   Diagnosis Date     Antiplatelet or antithrombotic long-term use      Hypertension      Stented coronary artery        Current Outpatient Medications:      acetaminophen (TYLENOL) 500 MG tablet, Take 500-1,000 mg by mouth every 6 hours as needed for mild pain, Disp: , Rfl:      amLODIPine (NORVASC) 10 MG tablet, [AMLODIPINE (NORVASC) 10 MG TABLET] TAKE 1 TABLET BY MOUTH EVERY DAY, Disp: 30 tablet, Rfl: 2     esomeprazole (NEXIUM) 40 MG DR capsule, Take 40 mg by mouth At Bedtime Take 30-60 minutes before eating., Disp: , Rfl:      ferrous gluconate (FERGON) 324 (38 Fe) MG tablet, Take 1 tablet (324 mg) by mouth daily (with breakfast) for 30 days, Disp: 30 tablet, Rfl: 0     fexofenadine (ALLEGRA) 180 MG tablet, Take 180 mg by mouth daily, Disp: , Rfl:      hydrocortisone (Perianal) (ANUSOL-HC) 2.5 % cream, Place 1 applicator rectally 2 times daily as needed for hemorrhoids, Disp: , Rfl:      isosorbide mononitrate (IMDUR) 120 MG 24 hr tablet, [ISOSORBIDE MONONITRATE  (IMDUR) 120 MG 24 HR TABLET] TAKE 1 TABLET BY MOUTH EVERY DAY **TAKE W/30 MG TABLET** - **PATIENT ONLY WANTS 1 MONTH PER FILL**, Disp: 30 tablet, Rfl: 11     isosorbide mononitrate (IMDUR) 30 MG 24 hr tablet, Take 30 mg by mouth daily , Disp: , Rfl:      melatonin 1 MG TABS tablet, Take 3 tablets (3 mg) by mouth At Bedtime for 30 days, Disp: 90 tablet, Rfl: 0     metoprolol succinate ER (TOPROL-XL) 25 MG 24 hr tablet, Take 75 mg by mouth daily , Disp: , Rfl:      mirtazapine (REMERON) 15 MG tablet, [MIRTAZAPINE (REMERON) 15 MG TABLET] Take 15 mg by mouth at bedtime. Indications: major depressive disorder, Disp: , Rfl:      MULTIVITAMIN W-MINERALS/LUTEIN (CENTRUM SILVER ORAL), [MULTIVITAMIN W-MINERALS/LUTEIN (CENTRUM SILVER ORAL)] Take 1 tablet by mouth daily., Disp: , Rfl:      nitroglycerin (NITROSTAT) 0.4 MG SL tablet, [NITROGLYCERIN (NITROSTAT) 0.4 MG SL TABLET] Place 0.4 mg under the tongue as needed for chest pain., Disp: , Rfl:      pantoprazole (PROTONIX) 40 MG tablet, [PANTOPRAZOLE (PROTONIX) 40 MG TABLET] Take 40 mg by mouth 2 (two) times a day., Disp: , Rfl:      peg 400-propylene glycol (SYSTANE) 0.4-0.3 % Drop, [-PROPYLENE GLYCOL (SYSTANE) 0.4-0.3 % DROP] Administer 1 drop to both eyes 4 (four) times a day. , Disp: , Rfl:      polyethylene glycol (MIRALAX) 17 GM/Dose powder, Take 17 g by mouth daily for 30 days, Disp: 510 g, Rfl: 0     potassium chloride ER (K-TAB) 20 MEQ CR tablet, Take 1 tablet (20 mEq) by mouth daily for 7 days, Disp: 7 tablet, Rfl: 0     senna (SENOKOT) 8.6 MG tablet, Take 2 tablets by mouth At Bedtime, Disp: 60 tablet, Rfl: 0     senna (SENOKOT) 8.6 MG tablet, Take 1 tablet by mouth daily as needed for constipation, Disp: , Rfl:      sodium bicarbonate 650 MG tablet, Take 1 tablet (650 mg) by mouth 2 times daily for 30 days, Disp: 60 tablet, Rfl: 0     triamcinolone (KENALOG) 0.025 % cream, [TRIAMCINOLONE (KENALOG) 0.025 % CREAM] Apply 1 application topically 3 (three) times  "a day as needed. , Disp: , Rfl:   Allergies   Allergen Reactions     Aminoquinolines Rash     Other reaction(s): rash     Primaquine Rash     Aloe      Other reaction(s): itchy skin     Atorvastatin Unknown     Other reaction(s): muscle aches     Demeclocycline Other (See Comments)     \"sore bottom\"     Diagnostic X-Ray Materials Unknown and Other (See Comments)     Chest tightness,sshakes     Guaiacol Hives     Robitussin D     Guaifenesin & Derivatives Hives     Robitussin D     Metrizamide Other (See Comments)     Chest tightness,sshakes     Pravastatin Unknown     Other reaction(s): muscle aches     Robitussin [Guaifenesin] Unknown     Simvastatin Other (See Comments)     Muscle aches   Other reaction(s): muscle aches     Tetracyclines Unknown and Other (See Comments)     Other reaction(s): Unknown  \"sore bottom\"       Hydroxychloroquine Sulfate [Hydroxychloroquine] Rash     Preservision Areds Rash     AREDs formula only caused rash.        All Meds and Allergies reviewed in the record at the facility and is the most up-to-date.    Post Discharge Medication Reconciliation Status: discharge medications reconciled and changed, per note/orders  Current Outpatient Medications   Medication Sig     acetaminophen (TYLENOL) 500 MG tablet Take 500-1,000 mg by mouth every 6 hours as needed for mild pain     amLODIPine (NORVASC) 10 MG tablet [AMLODIPINE (NORVASC) 10 MG TABLET] TAKE 1 TABLET BY MOUTH EVERY DAY     esomeprazole (NEXIUM) 40 MG DR capsule Take 40 mg by mouth At Bedtime Take 30-60 minutes before eating.     ferrous gluconate (FERGON) 324 (38 Fe) MG tablet Take 1 tablet (324 mg) by mouth daily (with breakfast) for 30 days     fexofenadine (ALLEGRA) 180 MG tablet Take 180 mg by mouth daily     hydrocortisone (Perianal) (ANUSOL-HC) 2.5 % cream Place 1 applicator rectally 2 times daily as needed for hemorrhoids     isosorbide mononitrate (IMDUR) 120 MG 24 hr tablet [ISOSORBIDE MONONITRATE (IMDUR) 120 MG 24 HR " TABLET] TAKE 1 TABLET BY MOUTH EVERY DAY **TAKE W/30 MG TABLET** - **PATIENT ONLY WANTS 1 MONTH PER FILL**     isosorbide mononitrate (IMDUR) 30 MG 24 hr tablet Take 30 mg by mouth daily      melatonin 1 MG TABS tablet Take 3 tablets (3 mg) by mouth At Bedtime for 30 days     metoprolol succinate ER (TOPROL-XL) 25 MG 24 hr tablet Take 75 mg by mouth daily      mirtazapine (REMERON) 15 MG tablet [MIRTAZAPINE (REMERON) 15 MG TABLET] Take 15 mg by mouth at bedtime. Indications: major depressive disorder     MULTIVITAMIN W-MINERALS/LUTEIN (CENTRUM SILVER ORAL) [MULTIVITAMIN W-MINERALS/LUTEIN (CENTRUM SILVER ORAL)] Take 1 tablet by mouth daily.     nitroglycerin (NITROSTAT) 0.4 MG SL tablet [NITROGLYCERIN (NITROSTAT) 0.4 MG SL TABLET] Place 0.4 mg under the tongue as needed for chest pain.     pantoprazole (PROTONIX) 40 MG tablet [PANTOPRAZOLE (PROTONIX) 40 MG TABLET] Take 40 mg by mouth 2 (two) times a day.     peg 400-propylene glycol (SYSTANE) 0.4-0.3 % Drop [-PROPYLENE GLYCOL (SYSTANE) 0.4-0.3 % DROP] Administer 1 drop to both eyes 4 (four) times a day.      polyethylene glycol (MIRALAX) 17 GM/Dose powder Take 17 g by mouth daily for 30 days     potassium chloride ER (K-TAB) 20 MEQ CR tablet Take 1 tablet (20 mEq) by mouth daily for 7 days     senna (SENOKOT) 8.6 MG tablet Take 2 tablets by mouth At Bedtime     senna (SENOKOT) 8.6 MG tablet Take 1 tablet by mouth daily as needed for constipation     sodium bicarbonate 650 MG tablet Take 1 tablet (650 mg) by mouth 2 times daily for 30 days     triamcinolone (KENALOG) 0.025 % cream [TRIAMCINOLONE (KENALOG) 0.025 % CREAM] Apply 1 application topically 3 (three) times a day as needed.      No current facility-administered medications for this visit.       REVIEW OF SYSTEMS:   Review of Systems  10 point review of systems reviewed and pertinent positives identified in the HPI otherwise negative.    PHYSICAL EXAMINATION:  Physical Exam     Vital signs: /65    "Pulse 65   Temp 97.3  F (36.3  C)   Resp 16   Ht 1.651 m (5' 5\")   Wt 42.3 kg (93 lb 3.2 oz)   SpO2 90%   BMI 15.51 kg/m    General: Awake, Alert, sitting up in bed eating, oriented x1, appropriately, follows simple commands  HEENT:Pink conjunctiva, anicteric sclerae, dry oral mucosa  NECK: Supple  CVS:  S1  S2, without murmur or gallop.   LUNG: Clear to auscultation, No wheezes, rales or rhonci.  BACK: No kyphosis of the thoracic spine  ABDOMEN: Soft, thin, nontender to palpation, with positive bowel sounds  EXTREMITIES: Moves both upper and lower extremities but generalized weakness, no pedal edema, no calf tenderness  SKIN: Warm and dry, hemorrhoids not visualized.  NEUROLOGIC: Intact, pulses palpable  PSYCHIATRIC: Pleasant affect.      Labs:  All labs reviewed in the nursing home record and eduPad   @  Lab Results   Component Value Date    WBC 8.0 06/22/2022     Lab Results   Component Value Date    RBC 2.21 06/22/2022     Lab Results   Component Value Date    HGB 6.9 06/22/2022     Lab Results   Component Value Date    HCT 22.3 06/22/2022     Lab Results   Component Value Date     06/22/2022     Lab Results   Component Value Date    MCH 31.2 06/22/2022     Lab Results   Component Value Date    MCHC 30.9 06/22/2022     Lab Results   Component Value Date    RDW 13.7 06/22/2022     Lab Results   Component Value Date     06/22/2022        @Last Comprehensive Metabolic Panel:  Sodium   Date Value Ref Range Status   06/20/2022 136 136 - 145 mmol/L Final     Potassium   Date Value Ref Range Status   06/20/2022 4.2 3.5 - 5.0 mmol/L Final     Chloride   Date Value Ref Range Status   06/20/2022 104 98 - 107 mmol/L Final     Carbon Dioxide (CO2)   Date Value Ref Range Status   06/20/2022 28 22 - 31 mmol/L Final     Anion Gap   Date Value Ref Range Status   06/20/2022 4 (L) 5 - 18 mmol/L Final     Glucose   Date Value Ref Range Status   06/20/2022 88 70 - 125 mg/dL Final     Urea Nitrogen   Date Value Ref " Range Status   06/20/2022 13 8 - 28 mg/dL Final     Creatinine   Date Value Ref Range Status   06/20/2022 1.34 (H) 0.60 - 1.10 mg/dL Final     GFR Estimate   Date Value Ref Range Status   06/20/2022 38 (L) >60 mL/min/1.73m2 Final     Comment:     Effective December 21, 2021 eGFRcr in adults is calculated using the 2021 CKD-EPI creatinine equation which includes age and gender (Romulo et al., [place], DOI: 10.[facility]6/[id number])   05/13/2021 32 (L) >60 mL/min/1.73m2 Final     Calcium   Date Value Ref Range Status   06/20/2022 8.0 (L) 8.5 - 10.5 mg/dL Final     Assessment/plan:    (K62.5) Rectal bleeding  (primary encounter diagnosis)  Comment: Patient with previous rectal bleeding during hospitalization thought to be due to hemorrhoids and constipation.  She was placed on bowel regimen.  GI offered a sigmoidoscopy however patient declined.  She was treated with 2 pink lady enemas during hospitalization.  Patient now having loose stools.  CBC clotted off today.  Last hemoglobin 7.7.  Plan: Hold senna and MiraLAX.  Redraw CBC in the a.m.    (K64.4) External hemorrhoids  Comment: Patient with rectal bleeding due to hemorrhoids.  Nursing staff deny any current bleeding.  Plan: Continue topical treatment.  Monitor for rectal bleeding.    (K59.00) Constipation, unspecified constipation type  Comment: Patient continues on senna 2 times daily and MiraLAX.  She did have frequent loose stools through the evening and earlier this a.m.  Nursing staff held her MiraLAX and senna.  Plan: Continue to hold this p.m.  Hold until his loose stools resolved.    (N17.9,  N18.9) Acute kidney injury superimposed on chronic kidney disease (H)  Comment: Acute kidney injury thought to be multifactorial including poor oral intake and metabolic acidosis.  Patient treated with fluids during hospitalization.  Plan: Today creatinine 1.34.  Previously 1.03.  Encourage fluids.  Repeat BMP on Thursday.    (I25.10) Coronary artery disease involving native  coronary artery of native heart without angina pectoris  Comment: Normal ejection fracture.  Patient currently on no aspirin.  However this is not suggested with recent rectal bleeding.  Patient denies any shortness of breath or chest pain.  Plan: Continue Imdur.    (I10) Benign essential hypertension  Comment: Blood pressure appears satisfactory.  Plan: Continue amlodipine, Imdur and metoprolol.    (D50.0) Iron deficiency anemia due to chronic blood loss  Comment: Hemoglobin upon admit to hospitalization was 7.3.  B12 and folate was normal.  Blood draw this morning clotted.  Plan: Repeat CBC in the a.m.  Patient continues on iron supplement once daily.    (R62.7) Failure to thrive in adult  Comment: Mild intermittent dysphagia.  Poor oral intake.  Patient's liquids were changed to thickened during hospitalization.  Plan: Encourage p.o. intake.  RD to consult.  Elisa for speech therapy to eval and treat.    Elisa for PT OT eval and treat.      35 minutes spent of which greater than 50% was face to face communication with the patient about anemia, rectal bleeding, nutrition, dysphagia as well as collaborating with nursing staff and therapies.    This note has been dictated using voice recognition software. Any grammatical or context distortions are unintentional and inherent to the software    Electronically signed by: Jacque Robles CNP           Sincerely,        Jacque Robles NP

## 2022-06-23 NOTE — ED NOTES
ED Provider In Triage Note  [facility]  Encounter Date: Jun 23, 2022    Chief Complaint   Patient presents with     Abnormal Labs     Low Hemoglobin       Brief HPI:   Yun Rodgers is a 87 year old female [facility] with [facility] chief complaint of anemia. Patient has been having bloody stools.  Patient reports sacral pain, but does report this as chronic in nature.      Brief Physical Exam:  /64   Pulse 64   Temp 98.8  F (37.1  C) (Oral)   Resp 18   SpO2 100%   General: Non-toxic appearing  HEENT: Atraumatic  Resp: No respiratory distress  Abdomen: Non-peritoneal  Neuro: Alert, oriented, answers questions appropriately  Psych: Behavior appropriate      Plan Initiated in Triage:  Orders Placed This Encounter   Procedures     Basic metabolic panel     [place] Draw     Saline Lock IV     CBC with platelets + differential     ABO/Rh type and screen         PIT Dispo:   Place patient in the next available ED bed    ASHLEY KAPADIA DO on 6/23/2022 at 3:18 PM    Patient was evaluated by the Physician in Triage due to a limitation of available rooms in the Emergency Department. A plan of care was discussed based on the information obtained on the initial evaluation and patient was consuled to return back to the Emergency Department lobby after this initial evalutaiton until results were obtained or a room became available in the Emergency Department. Patient was counseled not to leave prior to receiving the results of their workup.     ASHLEY KAPADIA DO  [facility] EMERGENCY DEPARTMENT  [address] [place] [id number]  [phone]       Ashley Kapadia DO  06/23/22 [id number]

## 2022-06-23 NOTE — DISCHARGE INSTRUCTIONS
Continue taking the stomach medication previously prescribed    Recommend the medical team recheck your hemoglobin sometime in the next 3 to 5 days.  Potentially sooner if bleeding continues    Return to the ER for any new/worsening symptoms or other concerns

## 2022-06-23 NOTE — LETTER
6/23/2022        RE: Yun Rodgers  [address]  [place] [address]        [facility]    Code Status:  DNR/DNI   Visit Type:   Chief Complaint   Patient presents with     Nursing Home Acute     TCU Follow up     Facility:  [facility] ([facility]) [[id number]]           Transitional Care Course: Yun Rodgers is a 87 year old female who I am seeing today for follow up on  the TCU.  Patient recently hospitalized on 6/14/2022 secondary to rectal bleeding.  Past medical history includes CKD stage III, constipation, subacute cutaneous lupus, Sjogren's, CAD status post stent in 2007, arthritis, peripheral vascular disease status post right carotid endarterectomy and hyperkalemia.  Patient with rectal bleeding secondary to constipation versus diverticulosis.  She underwent a CT scan which showed fecal impaction with a large volume of stool in the distal sigmoid colon and rectal vault without evidence of obstruction.  She did have scattered diverticuli but no diverticulitis.  Patient positive for hemorrhoids.  Patient was given a pink lady enema on admit and repeated on 6/15.  She was seen by GI.  They did offer sigmoidoscopy however patient declined.  Stool softeners added.  Urinary retention likely due to fecal impaction.  There was no hydronephrosis.  UA was negative.  She did have a Serrano catheter which was placed and removed prior to discharge.  Hypokalemia with metabolic acidosis.  This is recurrent.  Bicarbonate has been as low as 11.  She continues on bicarb supplement.  She was seen by nephrology.  Potassium replaced.  Acute kidney injury on chronic kidney disease stage III.  This was felt due to obstruction.  UA negative.  She was treated with fluids.  CAD with history of LAD stent 2007 with abnormal stress test in 2018 which showed a small area of distal anterior septal ischemia with normal ejection fracture.  Patient not on aspirin.  GERD.  Continues on PPI.  Anemia with iron  deficiency.  Hemoglobin on admit was 7.3.  No B12 or folate deficiency.  Recheck was 7.7.  Iron was supplemented.  Hypertension.  Failure to thrive.  Mild intermittent dysphagia with poor appetite.  This thought may be due to Sjogren's.  TSH normal.  Patient was evaluated by speech therapy and recommended thicken fluids.    On today's initial visit patient sitting up in wheelchair. Pt with previous rectal bleed. She has continued with some bleeding to her rectum d/t hemorrhoids. Initially bleeding about 2 tbsp of bright red blood. She denies any abdominal pain. She continues on PPI. Her appetite varies. Hgb yesterday 6.9. Initially asymptomatic however now with slight dizziness and bp on the soft side. Metoprolol, isosorbide and amlodipine held this am. No tachycardia. Today hgb 7.2. Yesterday pt have mx loose stools. Miralax and senna held. Prep H topically has been applied BID. Initially this stopped the bleeding. Pt scheduled for out pt blood transfusion on 6/28/22. I did speak with pt son Keon and informed him of above. If bleeding gets worse or pt become unstable pt will require hospitalization. Pt declined scoping during recent hospitalization.     After initial visit I was phoned by staff reporting went to toilet and had completely saturated her incontinent pad with bright red blood. Bp in the low 90s systolically.     Active Ambulatory Problems     Diagnosis Date Noted     Mixed hyperlipidemia      Essential hypertension      Coronary Artery Disease      Carotid artery disease (H)      Peripheral vascular disease (H)      Gastroesophageal reflux disease with esophagitis      Chest pain, unspecified type      Angina concurrent with and due to arteriosclerosis of coronary artery (H) 01/09/2015     Stable angina (H) 01/15/2015     Sinus bradycardia 02/26/2016     Dehydration 11/12/2017     Cough      Malaise      Hyperkalemia      Gastroesophageal reflux disease without esophagitis      Atypical pneumonia  "11/13/2017     Nausea 11/14/2017     Trimalleolar fracture 02/24/2018     Anemia, unspecified type      Coronary artery disease of native artery of native heart with stable angina pectoris (H)      Pulmonary valve disorder      Fracture dislocation of ankle, right, closed, initial encounter 02/24/2018     Closed right ankle fracture 03/02/2018     Stage 3 chronic kidney disease (H) 03/29/2018     Acute renal failure, unspecified acute renal failure type (H) 06/14/2022     Fecal impaction in rectum (H) 06/14/2022     Urinary retention 06/14/2022     Hypokalemia 06/14/2022     Sjogren's syndrome (H) 06/21/2022     Poor appetite 06/21/2022     Lesion of ulnar nerve 06/21/2022     History of tobacco use 06/21/2022     History of coronary artery bypass surgery 06/21/2022     Headache 06/21/2022     Grief 06/21/2022     Constipation 06/21/2022     Chronic fatigue syndrome 06/21/2022     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     Adult failure to thrive syndrome 06/21/2022     Acquired trigger finger 06/21/2022     Abnormal gait 06/21/2022     Ulcer of external ear (H) 06/21/2022     Resolved Ambulatory Problems     Diagnosis Date Noted     CRI (chronic renal insufficiency), stage 3 (moderate) (H)      Past Medical History:   Diagnosis Date     Antiplatelet or antithrombotic long-term use      Hypertension      Stented coronary artery        Allergies   Allergen Reactions     Aminoquinolines Rash     Other reaction(s): rash     Primaquine Rash     Aloe      Other reaction(s): itchy skin     Atorvastatin Unknown     Other reaction(s): muscle aches     Demeclocycline Other (See Comments)     \"sore bottom\"     Diagnostic X-Ray Materials Unknown and Other (See Comments)     Chest tightness,sshakes     Guaiacol Hives     Robitussin D     Guaifenesin & Derivatives Hives     Robitussin D     Metrizamide Other (See Comments)     Chest tightness,sshakes     Pravastatin Unknown     Other reaction(s): muscle aches     Robitussin " "[Guaifenesin] Unknown     Simvastatin Other (See Comments)     Muscle aches   Other reaction(s): muscle aches     Tetracyclines Unknown and Other (See Comments)     Other reaction(s): Unknown  \"sore bottom\"       Hydroxychloroquine Sulfate [Hydroxychloroquine] Rash     Preservision Areds Rash     AREDs formula only caused rash.        All Meds and Allergies reviewed in the record at the facility and is the most up-to-date.      No current facility-administered medications for this visit.     Current Outpatient Medications   Medication Sig     phenylephrine-cocoa butter (PREPARATION H) 0.25-88.44 % suppository Place 1 suppository rectally At Bedtime     acetaminophen (TYLENOL) 500 MG tablet Take 500-1,000 mg by mouth every 6 hours as needed for mild pain     amLODIPine (NORVASC) 10 MG tablet [AMLODIPINE (NORVASC) 10 MG TABLET] TAKE 1 TABLET BY MOUTH EVERY DAY     esomeprazole (NEXIUM) 40 MG DR capsule Take 40 mg by mouth At Bedtime Take 30-60 minutes before eating.     ferrous gluconate (FERGON) 324 (38 Fe) MG tablet Take 1 tablet (324 mg) by mouth daily (with breakfast) for 30 days     fexofenadine (ALLEGRA) 180 MG tablet Take 180 mg by mouth daily     hydrocortisone (Perianal) (ANUSOL-HC) 2.5 % cream Place 1 applicator rectally 2 times daily as needed for hemorrhoids     isosorbide mononitrate (IMDUR) 120 MG 24 hr tablet [ISOSORBIDE MONONITRATE (IMDUR) 120 MG 24 HR TABLET] TAKE 1 TABLET BY MOUTH EVERY DAY **TAKE W/30 MG TABLET** - **PATIENT ONLY WANTS 1 MONTH PER FILL**     isosorbide mononitrate (IMDUR) 30 MG 24 hr tablet Take 30 mg by mouth daily      melatonin 1 MG TABS tablet Take 3 tablets (3 mg) by mouth At Bedtime for 30 days     metoprolol succinate ER (TOPROL-XL) 25 MG 24 hr tablet Take 75 mg by mouth daily      mirtazapine (REMERON) 15 MG tablet [MIRTAZAPINE (REMERON) 15 MG TABLET] Take 15 mg by mouth at bedtime. Indications: major depressive disorder     MULTIVITAMIN W-MINERALS/LUTEIN (CENTRUM SILVER " "ORAL) [MULTIVITAMIN W-MINERALS/LUTEIN (CENTRUM SILVER ORAL)] Take 1 tablet by mouth daily.     nitroglycerin (NITROSTAT) 0.4 MG SL tablet [NITROGLYCERIN (NITROSTAT) 0.4 MG SL TABLET] Place 0.4 mg under the tongue as needed for chest pain.     pantoprazole (PROTONIX) 40 MG tablet [PANTOPRAZOLE (PROTONIX) 40 MG TABLET] Take 40 mg by mouth 2 (two) times a day.     peg 400-propylene glycol (SYSTANE) 0.4-0.3 % Drop [-PROPYLENE GLYCOL (SYSTANE) 0.4-0.3 % DROP] Administer 1 drop to both eyes 4 (four) times a day.      polyethylene glycol (MIRALAX) 17 GM/Dose powder Take 17 g by mouth daily for 30 days     potassium chloride ER (K-TAB) 20 MEQ CR tablet Take 1 tablet (20 mEq) by mouth daily for 7 days     senna (SENOKOT) 8.6 MG tablet Take 2 tablets by mouth At Bedtime     senna (SENOKOT) 8.6 MG tablet Take 1 tablet by mouth daily as needed for constipation     sodium bicarbonate 650 MG tablet Take 1 tablet (650 mg) by mouth 2 times daily for 30 days     triamcinolone (KENALOG) 0.025 % cream [TRIAMCINOLONE (KENALOG) 0.025 % CREAM] Apply 1 application topically 3 (three) times a day as needed.      Facility-Administered Medications Ordered in Other Visits   Medication     pantoprazole (PROTONIX) IV push injection 40 mg       REVIEW OF SYSTEMS:   Review of Systems  10 point review of systems reviewed and pertinent positives identified in the HPI otherwise negative.    PHYSICAL EXAMINATION:  Physical Exam     Vital signs: /47   Pulse 57   Temp 97.6  F (36.4  C)   Resp 14   Ht 1.651 m (5' 5\")   Wt 43.9 kg (96 lb 12.8 oz)   SpO2 100%   BMI 16.11 kg/m    General: Awake, Alert, sitting up in wheelchair. Oriented x1, appropriately, follows simple commands  HEENT:Pink conjunctiva, anicteric sclerae, dry oral mucosa  NECK: Supple  CVS:  S1  S2, without murmur or gallop.   LUNG: Clear to auscultation, No wheezes, rales or rhonci.  BACK: No kyphosis of the thoracic spine  ABDOMEN: Soft, thin, nontender to palpation, " with positive bowel sounds  EXTREMITIES: Moves both upper and lower extremities but generalized weakness, no pedal edema, no calf tenderness  SKIN: Hemorrhoids not visualized. Tegaderm to coccyx.   NEUROLOGIC: Intact, pulses palpable  PSYCHIATRIC: Cognitive impairment noted.       Labs:  All labs reviewed in the nursing home record and Epic   @  Lab Results   Component Value Date    WBC 8.0 06/22/2022     Lab Results   Component Value Date    RBC 2.21 06/22/2022     Lab Results   Component Value Date    HGB 6.9 06/22/2022     Lab Results   Component Value Date    HCT 22.3 06/22/2022     Lab Results   Component Value Date     06/22/2022     Lab Results   Component Value Date    MCH 31.2 06/22/2022     Lab Results   Component Value Date    MCHC 30.9 06/22/2022     Lab Results   Component Value Date    RDW 13.7 06/22/2022     Lab Results   Component Value Date     06/22/2022        @Last Comprehensive Metabolic Panel:  Sodium   Date Value Ref Range Status   06/20/2022 136 136 - 145 mmol/L Final     Potassium   Date Value Ref Range Status   06/20/2022 4.2 3.5 - 5.0 mmol/L Final     Chloride   Date Value Ref Range Status   06/20/2022 104 98 - 107 mmol/L Final     Carbon Dioxide (CO2)   Date Value Ref Range Status   06/20/2022 28 22 - 31 mmol/L Final     Anion Gap   Date Value Ref Range Status   06/20/2022 4 (L) 5 - 18 mmol/L Final     Glucose   Date Value Ref Range Status   06/20/2022 88 70 - 125 mg/dL Final     Urea Nitrogen   Date Value Ref Range Status   06/20/2022 13 8 - 28 mg/dL Final     Creatinine   Date Value Ref Range Status   06/20/2022 1.34 (H) 0.60 - 1.10 mg/dL Final     GFR Estimate   Date Value Ref Range Status   06/20/2022 38 (L) >60 mL/min/1.73m2 Final     Comment:     Effective December 21, 2021 eGFRcr in adults is calculated using the 2021 CKD-EPI creatinine equation which includes age and gender (Romulo barrera al., NEJ, DOI: 10.1056/QTPOmw1417111)   05/13/2021 32 (L) >60 mL/min/1.73m2 Final      Calcium   Date Value Ref Range Status   06/20/2022 8.0 (L) 8.5 - 10.5 mg/dL Final     Assessment/plan:    (K62.5) Rectal bleeding  (primary encounter diagnosis)  Comment: Patient with previous rectal bleeding during hospitalization thought to be due to hemorrhoids and constipation.  She was placed on bowel regimen. GI offered a sigmoidoscopy however patient declined.  She was treated with 2 pink lady enemas during hospitalization.  Patient now having loose stools-bowel meds held. Yesterday hgb 6.9. Pt scheduled for 2 units of PRBCs on 6/28/22 (first available in system). Today 7.2. Pt initially only have ~2 tablespoon of blood in stools now completely saturating pad.   Plan: Holding senna and MiraLAX. Planned transfusion for 6/28/22 initially however now with increased bleeding and low bp. Okay to send to ER for work up.     (K64.4) External hemorrhoids  Comment: Patient with rectal bleeding due to hemorrhoids. Now with increased bleeding. Continues with topical treatment.   Plan: Continue topical treatment and add Prep H rectal supp.     (K59.00) Constipation, unspecified constipation type  Comment: Patient continues on senna 2 times daily and MiraLAX.  She did have frequent loose stools through the evening and earlier this a.m.  Nursing staff held her MiraLAX and senna.  Plan: Held Miralax and senna this am due to loose stools. Resume when loose stools resolve.     (N17.9,  N18.9) Acute kidney injury superimposed on chronic kidney disease (H)  Comment: Acute kidney injury thought to be multifactorial including poor oral intake and metabolic acidosis.  Patient treated with fluids during hospitalization.  Plan: Recent creatinine 1.34.  Previously 1.03.  Encourage fluids.  Repeat BMP on Thursday.    (I25.10) Coronary artery disease involving native coronary artery of native heart without angina pectoris  Comment: Normal ejection fracture.  Patient currently on no aspirin.  However this is not suggested with  recent rectal bleeding.  Patient denies any shortness of breath or chest pain.  Plan: Held Imdur today 2/t low bp.     (I95.89) Hypotension due to hypovolemia   Comment: Blood pressure now on the soft side. Pt now with dizziness.   Plan: Hold  amlodipine, Imdur and metoprolol.    (D50.0) Iron deficiency anemia due to chronic blood loss  Comment: Hemoglobin upon admit to hospitalization was 7.3.  B12 and folate was normal.  Blood draw this morning clotted.  Plan: Hgb today 7.2.  Patient continues on iron supplement once daily.      45 minutes spent of which greater than 50% was face to face communication with the patient as well as speaking with son marly initially about blood transfusion, now sending to ER for work up.     This note has been dictated using voice recognition software. Any grammatical or context distortions are unintentional and inherent to the software    Electronically signed by: Jacque Robles CNP           Sincerely,        Jacque Robles NP

## 2022-06-23 NOTE — ED PROVIDER NOTES
EMERGENCY DEPARTMENT ENCOUNTER      NAME: Yun Rodgers  AGE: 87 year old female  YOB: 1935  MRN: [id number]  EVALUATION DATE & TIME: 2022  3:44 PM    PCP: Ronaldo Grijalva    ED PROVIDER: AKIKO Fox, CNP      Chief Complaint   Patient presents with     Abnormal Labs     Low Hemoglobin         FINAL IMPRESSION:  1. Rectal bleeding    2. Anemia          ED COURSE & MEDICAL DECISION MAKIN:29 PM I met with the patient, obtained history, performed an initial exam, and discussed options and plan for treatment here in the ED.  5:17 PM performed a rectal exam  5:55 PM rechecked patient. Signed consent for transfusion.    Pertinent Labs & Imaging studies reviewed. (See chart for details)  87 year old female presents to the Emergency Department for evaluation of anemia. Recently hospitalized for rectal bleeding. Did refuse lower GI diagnostics during her last hospitalization. Today had more bloody stool. Brown stool on rectal exam. Scant dried blood in her diaper. Here vitals are normal. HGB this morning 7.2. repeat here initially 8.4. rechecked and 7.8. was transfused with 1 Unit PRBC's. She again declined lower GI diagnostics or additional workup. Abdomen non tender. She was comfortable being discharged. Should have HGB rechecked in 3-5 days if she does not have any additional significant bleeding. Given return precautions.     At the conclusion of the encounter I discussed the results of all of the tests and the disposition. The questions were answered. The patient or family acknowledged understanding and was agreeable with the care plan.       MEDICATIONS GIVEN IN THE EMERGENCY:  Medications   pantoprazole (PROTONIX) IV push injection 40 mg (40 mg Intravenous Given 22 1740)       NEW PRESCRIPTIONS STARTED AT TODAY'S ER VISIT  Discharge Medication List as of 2022  8:40 PM      START taking these medications    Details   phenylephrine-cocoa butter (PREPARATION H) 0.25-88.44 %  suppository Place 1 suppository rectally At Bedtime, Historical                  =================================================================    HPI    Patient information was obtained from: patient, patient's son    Use of Intrepreter: N/A        Yun Rodgers is a 87 year old female with a history of rectal bleeding, hypertension, CAD, hyperlipidemia, GERD, anemia, stage III chronic kidney disease, Sjogren's syndrome, fecal impaction, rectal bleeding who presents for evaluation of continued rectal bleeding and anemia.  Patient comes from TCU.  Recently hospitalized for rectal bleeding.  Refused flex sig.  Was treated for obstipation and urinary retention.  Rectal bleeding was suspected to be secondary to hemorrhoids versus constipation/diverticulosis.  Did not have any recurrence and was stable and discharged from the hospital.  Hemoglobin was checked today at the TCU and was 6.9.  Had a BM today with bright red blood and BP in the low 90s systolic.  Was transferred here for ongoing care.  Here she notes some balance issues upon standing but the son states this is an ongoing issue and not new.  She notes some mild dyspnea but denies any chest pain, fever, or new weakness.  She has not had any additional hematochezia since earlier today.  Denies abdominal pain.  States her constipation is resolved.  Has no additional concerns      REVIEW OF SYSTEMS   Review of Systems   Constitutional: Positive for fatigue. Negative for fever.   Respiratory: Positive for shortness of breath.    Cardiovascular: Negative for chest pain.   Gastrointestinal: Positive for anal bleeding and nausea. Negative for abdominal pain and vomiting.   Genitourinary: Positive for frequency.   Neurological: Negative for syncope.        PAST MEDICAL HISTORY:  Past Medical History:   Diagnosis Date     Antiplatelet or antithrombotic long-term use     aspirin     Hypertension      Stented coronary artery        PAST SURGICAL HISTORY:  Past  "Surgical History:   Procedure Laterality Date     ABDOMEN SURGERY       APPENDECTOMY       COLOSTOMY CLOSURE       EYE SURGERY       HYSTERECTOMY       IR AORTIC ARCH 4 VESSEL ANGIOGRAM  6/7/2002     IR AORTIC ARCH 4 VESSEL ANGIOGRAM  12/27/2002     IR CAROTID ANGIOGRAM  12/27/2002     IR CAROTID ANGIOGRAM  12/27/2002     IR EXTREMITY ANGIOGRAM BILATERAL  11/27/2007     IR MISCELLANEOUS PROCEDURE  6/7/2002     IR MISCELLANEOUS PROCEDURE  12/27/2002     IR MISCELLANEOUS PROCEDURE  12/27/2002     IR MISCELLANEOUS PROCEDURE  12/27/2002     OPEN REDUCTION INTERNAL FIXATION ANKLE Right 3/2/2018    Procedure: OPEN REDUCTION INTERNAL FIXATION TRIMALLEOLAR FRACTURE RIGHT ANKLE;  Surgeon: Shawn Vega DO;  Location: [facility];  Service:      OTHER SURGICAL HISTORY      Resection of mesenteryper patient-- \"they took out my mesentery about 6 months after I had a baby\"     REMOVE HARDWARE LOWER EXTREMITY Right 3/4/2022    Procedure: RIGHT ANKLE REMOVAL OF HARDWARE;  Surgeon: Shawn Vega DO;  Location: [facility] APPENDECTOMY      Description: Appendectomy;  Recorded: 09/26/2014;     [facility] COLOSTOMY      Description: Colostomy;  Recorded: 09/26/2014;     [facility] THROMBOENDARTECTMY NECK,NECK INCIS      Description: Carotid Thromboendarterectomy;  Recorded: 09/26/2014;     [facility] TOTAL ABDOM HYSTERECTOMY      Description: Total Abdominal Hysterectomy;  Recorded: 09/26/2014;           CURRENT MEDICATIONS:    Prior to Admission Medications   Prescriptions Last Dose Informant Patient Reported? Taking?   MULTIVITAMIN W-MINERALS/LUTEIN (CENTRUM SILVER ORAL)   Yes No   Sig: [MULTIVITAMIN W-MINERALS/LUTEIN (CENTRUM SILVER ORAL)] Take 1 tablet by mouth daily.   acetaminophen (TYLENOL) 500 MG tablet   Yes No   Sig: Take 500-1,000 mg by mouth every 6 hours as needed for mild pain   amLODIPine (NORVASC) 10 MG tablet   No No   Sig: [AMLODIPINE (NORVASC) 10 MG TABLET] TAKE 1 TABLET BY MOUTH EVERY DAY "   esomeprazole (NEXIUM) 40 MG DR capsule   Yes No   Sig: Take 40 mg by mouth At Bedtime Take 30-60 minutes before eating.   ferrous gluconate (FERGON) 324 (38 Fe) MG tablet   No No   Sig: Take 1 tablet (324 mg) by mouth daily (with breakfast) for 30 days   fexofenadine (ALLEGRA) 180 MG tablet   Yes No   Sig: Take 180 mg by mouth daily   hydrocortisone (Perianal) (ANUSOL-HC) 2.5 % cream   Yes No   Sig: Place 1 applicator rectally 2 times daily as needed for hemorrhoids   isosorbide mononitrate (IMDUR) 120 MG 24 hr tablet   No No   Sig: [ISOSORBIDE MONONITRATE (IMDUR) 120 MG 24 HR TABLET] TAKE 1 TABLET BY MOUTH EVERY DAY **TAKE W/30 MG TABLET** - **PATIENT ONLY WANTS 1 MONTH PER FILL**   isosorbide mononitrate (IMDUR) 30 MG 24 hr tablet   Yes No   Sig: Take 30 mg by mouth daily    melatonin 1 MG TABS tablet   No No   Sig: Take 3 tablets (3 mg) by mouth At Bedtime for 30 days   metoprolol succinate ER (TOPROL-XL) 25 MG 24 hr tablet   Yes No   Sig: Take 75 mg by mouth daily    mirtazapine (REMERON) 15 MG tablet   Yes No   Sig: [MIRTAZAPINE (REMERON) 15 MG TABLET] Take 15 mg by mouth at bedtime. Indications: major depressive disorder   nitroglycerin (NITROSTAT) 0.4 MG SL tablet   Yes No   Sig: [NITROGLYCERIN (NITROSTAT) 0.4 MG SL TABLET] Place 0.4 mg under the tongue as needed for chest pain.   pantoprazole (PROTONIX) 40 MG tablet   Yes No   Sig: [PANTOPRAZOLE (PROTONIX) 40 MG TABLET] Take 40 mg by mouth 2 (two) times a day.   peg 400-propylene glycol (SYSTANE) 0.4-0.3 % Drop   Yes No   Sig: [-PROPYLENE GLYCOL (SYSTANE) 0.4-0.3 % DROP] Administer 1 drop to both eyes 4 (four) times a day.    phenylephrine-cocoa butter (PREPARATION H) 0.25-88.44 % suppository   Yes No   Sig: Place 1 suppository rectally At Bedtime   polyethylene glycol (MIRALAX) 17 GM/Dose powder   No No   Sig: Take 17 g by mouth daily for 30 days   potassium chloride ER (K-TAB) 20 MEQ CR tablet   No No   Sig: Take 1 tablet (20 mEq) by mouth daily  "for 7 days   senna (SENOKOT) 8.6 MG tablet   Yes No   Sig: Take 1 tablet by mouth daily as needed for constipation   senna (SENOKOT) 8.6 MG tablet   No No   Sig: Take 2 tablets by mouth At Bedtime   sodium bicarbonate 650 MG tablet   No No   Sig: Take 1 tablet (650 mg) by mouth 2 times daily for 30 days   triamcinolone (KENALOG) 0.025 % cream   Yes No   Sig: [TRIAMCINOLONE (KENALOG) 0.025 % CREAM] Apply 1 application topically 3 (three) times a day as needed.       Facility-Administered Medications: None           ALLERGIES:  Allergies   Allergen Reactions     Aminoquinolines Rash     Other reaction(s): rash     Primaquine Rash     Aloe      Other reaction(s): itchy skin     Atorvastatin Unknown     Other reaction(s): muscle aches     Demeclocycline Other (See Comments)     \"sore bottom\"     Diagnostic X-Ray Materials Unknown and Other (See Comments)     Chest tightness,sshakes     Guaiacol Hives     Robitussin D     Guaifenesin & Derivatives Hives     Robitussin D     Metrizamide Other (See Comments)     Chest tightness,sshakes     Pravastatin Unknown     Other reaction(s): muscle aches     Robitussin [Guaifenesin] Unknown     Simvastatin Other (See Comments)     Muscle aches   Other reaction(s): muscle aches     Tetracyclines Unknown and Other (See Comments)     Other reaction(s): Unknown  \"sore bottom\"       Hydroxychloroquine Sulfate [Hydroxychloroquine] Rash     Preservision Areds Rash     AREDs formula only caused rash.        FAMILY HISTORY:  Family History   Problem Relation Age of Onset     Cancer Mother      Hypertension Mother      No Known Problems Father        SOCIAL HISTORY:   Social History     Socioeconomic History     Marital status:    Tobacco Use     Smoking status: Never Smoker     Smokeless tobacco: Never Used   Substance and Sexual Activity     Alcohol use: Never     Drug use: Never         VITALS:  Patient Vitals for the past 24 hrs:   BP Temp Temp src Pulse Resp SpO2 Height Weight " "  06/23/22 2015 (!) 146/63 98.6  F (37  C) Oral 71 18 96 % -- --   06/23/22 1838 (!) 140/64 98.8  F (37.1  C) Oral 58 18 96 % -- --   06/23/22 1815 (!) 152/67 98.5  F (36.9  C) Oral 67 20 98 % -- --   06/23/22 1700 (!) 163/70 -- -- 61 -- 99 % -- --   06/23/22 1527 -- -- -- -- -- -- 1.626 m (5' 4\") 43.6 kg (96 lb 1.6 oz)   06/23/22 1523 -- -- -- -- -- 100 % -- --   06/23/22 1521 119/64 98.8  F (37.1  C) Oral 64 18 -- -- --       PHYSICAL EXAM    Constitutional: Alert, frail appearing elderly female.  No distress  EYES: Conjunctivae clear  HENT:  Atraumatic, normocephalic  Respiratory:  No respiratory distress, normal breath sounds  Cardiovascular:  Normal rate, normal rhythm, no murmurs  GI:  Soft, nondistended, nontender.  Large external hemorrhoid.  There are some dried blood on the patient's diaper.  On my glove there is brown stool but no blood noted.   Integument: Warm, Dry, No erythema, No rash.   Neurologic:  Alert & oriented x 3              LAB:  All pertinent labs reviewed and interpreted.  Labs Ordered and Resulted from Time of ED Arrival to Time of ED Departure   BASIC METABOLIC PANEL - Abnormal       Result Value    Sodium 132 (*)     Potassium 4.7      Chloride 104      Carbon Dioxide (CO2) 21 (*)     Anion Gap 7      Urea Nitrogen 13      Creatinine 1.35 (*)     Calcium 8.0 (*)     Glucose 96      GFR Estimate 38 (*)    CBC WITH PLATELETS AND DIFFERENTIAL - Abnormal    WBC Count 7.0      RBC Count 2.61 (*)     Hemoglobin 8.4 (*)     Hematocrit 27.1 (*)      (*)     MCH 32.2      MCHC 31.0 (*)     RDW 13.8      Platelet Count 185      % Neutrophils 72      % Lymphocytes 16      % Monocytes 10      % Eosinophils 1      % Basophils 0      % Immature Granulocytes 1      NRBCs per 100 WBC 0      Absolute Neutrophils 5.1      Absolute Lymphocytes 1.1      Absolute Monocytes 0.7      Absolute Eosinophils 0.1      Absolute Basophils 0.0      Absolute Immature Granulocytes 0.0      Absolute NRBCs 0.0   "   HEMOGLOBIN - Abnormal    Hemoglobin 7.8 (*)    TYPE AND SCREEN, ADULT    ABO/RH(D) A NEG      Antibody Screen Negative      SPECIMEN EXPIRATION DATE [id number]     PREPARE RED BLOOD CELLS (UNIT)    CROSSMATCH Compatible      UNIT ABO/RH A Neg      Unit Number [id number]      Unit Status Issued      Blood Component Type Red Blood Cells      Product Code [id number]      CODING SYSTEM [id number]      UNIT TYPE ISBT 0600      ISSUE DATE AND TIME [id number]     PREPARE RED BLOOD CELLS (UNIT)   TRANSFUSE RED BLOOD CELLS (UNIT)   ABO/RH TYPE AND SCREEN         RADIOLOGY:  Reviewed all pertinent imaging. Please see official radiology report.  No orders to display             PROCEDURES:   None      AKIKO Fox, CNP  Emergency Medicine  [facility] EMERGENCY DEPARTMENT  [facility] [facility] [address]  [phone]  Dept: [phone]         Alvin Wing APRN CNP  06/23/22 9161

## 2022-06-23 NOTE — ED NOTES
A&Ox4.  Currently denies dizziness and pain.  Up to commode with 1 person assist.  Initiating blood transfusion.

## 2022-06-23 NOTE — ED TRIAGE NOTES
Patient arrives to triage from [facility] TCU due to low hemoglobin.  Per EMS, blood pressure 134/43 and heart rate 64 bpm.  Alert and oriented x3.

## 2022-06-23 NOTE — PROGRESS NOTES
[facility] GERIATRIC SERVICES    Code Status:  DNR/DNI   Visit Type:   Chief Complaint   Patient presents with     Nursing Home Acute     TCU Follow up     Facility:  [facility] ([facility]) [[id number]]           Transitional Care Course: Yun Rodgers is a 87 year old female who I am seeing today for follow up on  the TCU.  Patient recently hospitalized on 6/14/2022 secondary to rectal bleeding.  Past medical history includes CKD stage III, constipation, subacute cutaneous lupus, Sjogren's, CAD status post stent in 2007, arthritis, peripheral vascular disease status post right carotid endarterectomy and hyperkalemia.  Patient with rectal bleeding secondary to constipation versus diverticulosis.  She underwent a CT scan which showed fecal impaction with a large volume of stool in the distal sigmoid colon and rectal vault without evidence of obstruction.  She did have scattered diverticuli but no diverticulitis.  Patient positive for hemorrhoids.  Patient was given a pink lady enema on admit and repeated on 6/15.  She was seen by GI.  They did offer sigmoidoscopy however patient declined.  Stool softeners added.  Urinary retention likely due to fecal impaction.  There was no hydronephrosis.  UA was negative.  She did have a Serrano catheter which was placed and removed prior to discharge.  Hypokalemia with metabolic acidosis.  This is recurrent.  Bicarbonate has been as low as 11.  She continues on bicarb supplement.  She was seen by nephrology.  Potassium replaced.  Acute kidney injury on chronic kidney disease stage III.  This was felt due to obstruction.  UA negative.  She was treated with fluids.  CAD with history of LAD stent 2007 with abnormal stress test in 2018 which showed a small area of distal anterior septal ischemia with normal ejection fracture.  Patient not on aspirin.  GERD.  Continues on PPI.  Anemia with iron deficiency.  Hemoglobin on admit was 7.3.  No B12 or folate deficiency.  Recheck was 7.7.   Iron was supplemented.  Hypertension.  Failure to thrive.  Mild intermittent dysphagia with poor appetite.  This thought may be due to Sjogren's.  TSH normal.  Patient was evaluated by speech therapy and recommended thicken fluids.    On today's initial visit patient sitting up in wheelchair. Pt with previous rectal bleed. She has continued with some bleeding to her rectum d/t hemorrhoids. Initially bleeding about 2 tbsp of bright red blood. She denies any abdominal pain. She continues on PPI. Her appetite varies. Hgb yesterday 6.9. Initially asymptomatic however now with slight dizziness and bp on the soft side. Metoprolol, isosorbide and amlodipine held this am. No tachycardia. Today hgb 7.2. Yesterday pt have mx loose stools. Miralax and senna held. Prep H topically has been applied BID. Initially this stopped the bleeding. Pt scheduled for out pt blood transfusion on 6/28/22. I did speak with pt son Keon and informed him of above. If bleeding gets worse or pt become unstable pt will require hospitalization. Pt declined scoping during recent hospitalization.     After initial visit I was phoned by staff reporting went to toilet and had completely saturated her incontinent pad with bright red blood. Bp in the low 90s systolically.     Active Ambulatory Problems     Diagnosis Date Noted     Mixed hyperlipidemia      Essential hypertension      Coronary Artery Disease      Carotid artery disease (H)      Peripheral vascular disease (H)      Gastroesophageal reflux disease with esophagitis      Chest pain, unspecified type      Angina concurrent with and due to arteriosclerosis of coronary artery (H) 01/09/2015     Stable angina (H) 01/15/2015     Sinus bradycardia 02/26/2016     Dehydration 11/12/2017     Cough      Malaise      Hyperkalemia      Gastroesophageal reflux disease without esophagitis      Atypical pneumonia 11/13/2017     Nausea 11/14/2017     Trimalleolar fracture 02/24/2018     Anemia, unspecified  "type      Coronary artery disease of native artery of native heart with stable angina pectoris (H)      Pulmonary valve disorder      Fracture dislocation of ankle, right, closed, initial encounter 02/24/2018     Closed right ankle fracture 03/02/2018     Stage 3 chronic kidney disease (H) 03/29/2018     Acute renal failure, unspecified acute renal failure type (H) 06/14/2022     Fecal impaction in rectum (H) 06/14/2022     Urinary retention 06/14/2022     Hypokalemia 06/14/2022     Sjogren's syndrome (H) 06/21/2022     Poor appetite 06/21/2022     Lesion of ulnar nerve 06/21/2022     History of tobacco use 06/21/2022     History of coronary artery bypass surgery 06/21/2022     Headache 06/21/2022     Grief 06/21/2022     Constipation 06/21/2022     Chronic fatigue syndrome 06/21/2022     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     Adult failure to thrive syndrome 06/21/2022     Acquired trigger finger 06/21/2022     Abnormal gait 06/21/2022     Ulcer of external ear (H) 06/21/2022     Resolved Ambulatory Problems     Diagnosis Date Noted     CRI (chronic renal insufficiency), stage 3 (moderate) (H)      Past Medical History:   Diagnosis Date     Antiplatelet or antithrombotic long-term use      Hypertension      Stented coronary artery        Allergies   Allergen Reactions     Aminoquinolines Rash     Other reaction(s): rash     Primaquine Rash     Aloe      Other reaction(s): itchy skin     Atorvastatin Unknown     Other reaction(s): muscle aches     Demeclocycline Other (See Comments)     \"sore bottom\"     Diagnostic X-Ray Materials Unknown and Other (See Comments)     Chest tightness,sshakes     Guaiacol Hives     Robitussin D     Guaifenesin & Derivatives Hives     Robitussin D     Metrizamide Other (See Comments)     Chest tightness,sshakes     Pravastatin Unknown     Other reaction(s): muscle aches     Robitussin [Guaifenesin] Unknown     Simvastatin Other (See Comments)     Muscle aches   Other reaction(s): muscle " "aches     Tetracyclines Unknown and Other (See Comments)     Other reaction(s): Unknown  \"sore bottom\"       Hydroxychloroquine Sulfate [Hydroxychloroquine] Rash     Preservision Areds Rash     AREDs formula only caused rash.        All Meds and Allergies reviewed in the record at the facility and is the most up-to-date.      No current facility-administered medications for this visit.     Current Outpatient Medications   Medication Sig     phenylephrine-cocoa butter (PREPARATION H) 0.25-88.44 % suppository Place 1 suppository rectally At Bedtime     acetaminophen (TYLENOL) 500 MG tablet Take 500-1,000 mg by mouth every 6 hours as needed for mild pain     amLODIPine (NORVASC) 10 MG tablet [AMLODIPINE (NORVASC) 10 MG TABLET] TAKE 1 TABLET BY MOUTH EVERY DAY     esomeprazole (NEXIUM) 40 MG DR capsule Take 40 mg by mouth At Bedtime Take 30-60 minutes before eating.     ferrous gluconate (FERGON) 324 (38 Fe) MG tablet Take 1 tablet (324 mg) by mouth daily (with breakfast) for 30 days     fexofenadine (ALLEGRA) 180 MG tablet Take 180 mg by mouth daily     hydrocortisone (Perianal) (ANUSOL-HC) 2.5 % cream Place 1 applicator rectally 2 times daily as needed for hemorrhoids     isosorbide mononitrate (IMDUR) 120 MG 24 hr tablet [ISOSORBIDE MONONITRATE (IMDUR) 120 MG 24 HR TABLET] TAKE 1 TABLET BY MOUTH EVERY DAY **TAKE W/30 MG TABLET** - **PATIENT ONLY WANTS 1 MONTH PER FILL**     isosorbide mononitrate (IMDUR) 30 MG 24 hr tablet Take 30 mg by mouth daily      melatonin 1 MG TABS tablet Take 3 tablets (3 mg) by mouth At Bedtime for 30 days     metoprolol succinate ER (TOPROL-XL) 25 MG 24 hr tablet Take 75 mg by mouth daily      mirtazapine (REMERON) 15 MG tablet [MIRTAZAPINE (REMERON) 15 MG TABLET] Take 15 mg by mouth at bedtime. Indications: major depressive disorder     MULTIVITAMIN W-MINERALS/LUTEIN (CENTRUM SILVER ORAL) [MULTIVITAMIN W-MINERALS/LUTEIN (CENTRUM SILVER ORAL)] Take 1 tablet by mouth daily.     " "nitroglycerin (NITROSTAT) 0.4 MG SL tablet [NITROGLYCERIN (NITROSTAT) 0.4 MG SL TABLET] Place 0.4 mg under the tongue as needed for chest pain.     pantoprazole (PROTONIX) 40 MG tablet [PANTOPRAZOLE (PROTONIX) 40 MG TABLET] Take 40 mg by mouth 2 (two) times a day.     peg 400-propylene glycol (SYSTANE) 0.4-0.3 % Drop [-PROPYLENE GLYCOL (SYSTANE) 0.4-0.3 % DROP] Administer 1 drop to both eyes 4 (four) times a day.      polyethylene glycol (MIRALAX) 17 GM/Dose powder Take 17 g by mouth daily for 30 days     potassium chloride ER (K-TAB) 20 MEQ CR tablet Take 1 tablet (20 mEq) by mouth daily for 7 days     senna (SENOKOT) 8.6 MG tablet Take 2 tablets by mouth At Bedtime     senna (SENOKOT) 8.6 MG tablet Take 1 tablet by mouth daily as needed for constipation     sodium bicarbonate 650 MG tablet Take 1 tablet (650 mg) by mouth 2 times daily for 30 days     triamcinolone (KENALOG) 0.025 % cream [TRIAMCINOLONE (KENALOG) 0.025 % CREAM] Apply 1 application topically 3 (three) times a day as needed.      Facility-Administered Medications Ordered in Other Visits   Medication     pantoprazole (PROTONIX) IV push injection 40 mg       REVIEW OF SYSTEMS:   Review of Systems  10 point review of systems reviewed and pertinent positives identified in the HPI otherwise negative.    PHYSICAL EXAMINATION:  Physical Exam     Vital signs: /47   Pulse 57   Temp 97.6  F (36.4  C)   Resp 14   Ht 1.651 m (5' 5\")   Wt 43.9 kg (96 lb 12.8 oz)   SpO2 100%   BMI 16.11 kg/m    General: Awake, Alert, sitting up in wheelchair. Oriented x1, appropriately, follows simple commands  HEENT:Pink conjunctiva, anicteric sclerae, dry oral mucosa  NECK: Supple  CVS:  S1  S2, without murmur or gallop.   LUNG: Clear to auscultation, No wheezes, rales or rhonci.  BACK: No kyphosis of the thoracic spine  ABDOMEN: Soft, thin, nontender to palpation, with positive bowel sounds  EXTREMITIES: Moves both upper and lower extremities but generalized " weakness, no pedal edema, no calf tenderness  SKIN: Hemorrhoids not visualized. Tegaderm to coccyx.   NEUROLOGIC: Intact, pulses palpable  PSYCHIATRIC: Cognitive impairment noted.       Labs:  All labs reviewed in the nursing home record and Epic   @  Lab Results   Component Value Date    WBC 8.0 06/22/2022     Lab Results   Component Value Date    RBC 2.21 06/22/2022     Lab Results   Component Value Date    HGB 6.9 06/22/2022     Lab Results   Component Value Date    HCT 22.3 06/22/2022     Lab Results   Component Value Date     06/22/2022     Lab Results   Component Value Date    MCH 31.2 06/22/2022     Lab Results   Component Value Date    MCHC 30.9 06/22/2022     Lab Results   Component Value Date    RDW 13.7 06/22/2022     Lab Results   Component Value Date     06/22/2022        @Last Comprehensive Metabolic Panel:  Sodium   Date Value Ref Range Status   06/20/2022 136 136 - 145 mmol/L Final     Potassium   Date Value Ref Range Status   06/20/2022 4.2 3.5 - 5.0 mmol/L Final     Chloride   Date Value Ref Range Status   06/20/2022 104 98 - 107 mmol/L Final     Carbon Dioxide (CO2)   Date Value Ref Range Status   06/20/2022 28 22 - 31 mmol/L Final     Anion Gap   Date Value Ref Range Status   06/20/2022 4 (L) 5 - 18 mmol/L Final     Glucose   Date Value Ref Range Status   06/20/2022 88 70 - 125 mg/dL Final     Urea Nitrogen   Date Value Ref Range Status   06/20/2022 13 8 - 28 mg/dL Final     Creatinine   Date Value Ref Range Status   06/20/2022 1.34 (H) 0.60 - 1.10 mg/dL Final     GFR Estimate   Date Value Ref Range Status   06/20/2022 38 (L) >60 mL/min/1.73m2 Final     Comment:     Effective December 21, 2021 eGFRcr in adults is calculated using the 2021 CKD-EPI creatinine equation which includes age and gender (Romulo barrera al., NEJM, DOI: 10.1056/XDHQuc0173766)   05/13/2021 32 (L) >60 mL/min/1.73m2 Final     Calcium   Date Value Ref Range Status   06/20/2022 8.0 (L) 8.5 - 10.5 mg/dL Final      Assessment/plan:    (K62.5) Rectal bleeding  (primary encounter diagnosis)  Comment: Patient with previous rectal bleeding during hospitalization thought to be due to hemorrhoids and constipation.  She was placed on bowel regimen. GI offered a sigmoidoscopy however patient declined.  She was treated with 2 pink lady enemas during hospitalization.  Patient now having loose stools-bowel meds held. Yesterday hgb 6.9. Pt scheduled for 2 units of PRBCs on 6/28/22 (first available in system). Today 7.2. Pt initially only have ~2 tablespoon of blood in stools now completely saturating pad.   Plan: Holding senna and MiraLAX. Planned transfusion for 6/28/22 initially however now with increased bleeding and low bp. Okay to send to ER for work up.     (K64.4) External hemorrhoids  Comment: Patient with rectal bleeding due to hemorrhoids. Now with increased bleeding. Continues with topical treatment.   Plan: Continue topical treatment and add Prep H rectal supp.     (K59.00) Constipation, unspecified constipation type  Comment: Patient continues on senna 2 times daily and MiraLAX.  She did have frequent loose stools through the evening and earlier this a.m.  Nursing staff held her MiraLAX and senna.  Plan: Held Miralax and senna this am due to loose stools. Resume when loose stools resolve.     (N17.9,  N18.9) Acute kidney injury superimposed on chronic kidney disease (H)  Comment: Acute kidney injury thought to be multifactorial including poor oral intake and metabolic acidosis.  Patient treated with fluids during hospitalization.  Plan: Recent creatinine 1.34.  Previously 1.03.  Encourage fluids.  Repeat BMP on Thursday.    (I25.10) Coronary artery disease involving native coronary artery of native heart without angina pectoris  Comment: Normal ejection fracture.  Patient currently on no aspirin.  However this is not suggested with recent rectal bleeding.  Patient denies any shortness of breath or chest pain.  Plan: Held  Imdur today 2/t low bp.     (I95.89) Hypotension due to hypovolemia   Comment: Blood pressure now on the soft side. Pt now with dizziness.   Plan: Hold  amlodipine, Imdur and metoprolol.    (D50.0) Iron deficiency anemia due to chronic blood loss  Comment: Hemoglobin upon admit to hospitalization was 7.3.  B12 and folate was normal.  Blood draw this morning clotted.  Plan: Hgb today 7.2.  Patient continues on iron supplement once daily.      45 minutes spent of which greater than 50% was face to face communication with the patient as well as speaking with son marly initially about blood transfusion, now sending to ER for work up.     This note has been dictated using voice recognition software. Any grammatical or context distortions are unintentional and inherent to the software    Electronically signed by: Jacque Robles CNP

## 2022-06-23 NOTE — PROGRESS NOTES
[facility] GERIATRIC SERVICES    Code Status:  DNR/DNI   Visit Type:   Chief Complaint   Patient presents with     Hospital F/U     TCU Admission     Facility:  [facility] ([facility]) [[id number]]           Transitional Care Course: Yun Rodgers is a 87 year old female who I am seeing today for admit to the TCU.  Patient recently hospitalized on 6/14/2022 secondary to rectal bleeding.  Past medical history includes CKD stage III, constipation, subacute cutaneous lupus, Sjogren's, CAD status post stent in 2007, arthritis, peripheral vascular disease status post right carotid endarterectomy and hyperkalemia.  Patient with rectal bleeding secondary to constipation versus diverticulosis.  She underwent a CT scan which showed fecal impaction with a large volume of stool in the distal sigmoid colon and rectal vault without evidence of obstruction.  She did have scattered diverticuli but no diverticulitis.  Patient positive for hemorrhoids.  Patient was given a pink lady enema on admit and repeated on 6/15.  She was seen by GI.  They did offer sigmoidoscopy however patient declined.  Stool softeners added.  Urinary retention likely due to fecal impaction.  There was no hydronephrosis.  UA was negative.  She did have a Serrano catheter which was placed and removed prior to discharge.  Hypokalemia with metabolic acidosis.  This is recurrent.  Bicarbonate has been as low as 11.  She continues on bicarb supplement.  She was seen by nephrology.  Potassium replaced.  Acute kidney injury on chronic kidney disease stage III.  This was felt due to obstruction.  UA negative.  She was treated with fluids.  CAD with history of LAD stent 2007 with abnormal stress test in 2018 which showed a small area of distal anterior septal ischemia with normal ejection fracture.  Patient not on aspirin.  GERD.  Continues on PPI.  Anemia with iron deficiency.  Hemoglobin on admit was 7.3.  No B12 or folate deficiency.  Recheck was 7.7.  Iron was  supplemented.  Hypertension.  Failure to thrive.  Mild intermittent dysphagia with poor appetite.  This thought may be due to Sjogren's.  TSH normal.  Patient was evaluated by speech therapy and recommended thicken fluids.    On today's visit patient is lying in bed.  A CBC was attempted to be drawn however this did clot.  She did have electrolyte panel which showed a creatinine of 1.34.  Slightly elevated.  Magnesium 1.6.  Potassium 4.2.  Previous hemoglobin at time of discharge was 7.7.  ursing staff reported loose stools.  They denied any blood in her stools.  She continues with topical treatment for hemorrhoids.  Her senna and MiraLAX were held earlier this a.m.  Patient with very poor oral intake.  The dietitian did visit with her.  She is sitting up eating in bed today.  She denies any difficulty swallowing however continues on thickened liquids.  She reports she is emptying her bladder.  She denies any shortness of breath or chest pain.  Blood pressure appears satisfactory.    Active Ambulatory Problems     Diagnosis Date Noted     Mixed hyperlipidemia      Essential hypertension      Coronary Artery Disease      Carotid artery disease (H)      Peripheral vascular disease (H)      Gastroesophageal reflux disease with esophagitis      Chest pain, unspecified type      Angina concurrent with and due to arteriosclerosis of coronary artery (H) 01/09/2015     Stable angina (H) 01/15/2015     Sinus bradycardia 02/26/2016     Dehydration 11/12/2017     Cough      Malaise      Hyperkalemia      Gastroesophageal reflux disease without esophagitis      Atypical pneumonia 11/13/2017     Nausea 11/14/2017     Trimalleolar fracture 02/24/2018     Anemia, unspecified type      Coronary artery disease of native artery of native heart with stable angina pectoris (H)      Pulmonary valve disorder      Fracture dislocation of ankle, right, closed, initial encounter 02/24/2018     Closed right ankle fracture 03/02/2018      Stage 3 chronic kidney disease (H) 03/29/2018     Acute renal failure, unspecified acute renal failure type (H) 06/14/2022     Fecal impaction in rectum (H) 06/14/2022     Urinary retention 06/14/2022     Hypokalemia 06/14/2022     Sjogren's syndrome (H) 06/21/2022     Poor appetite 06/21/2022     Lesion of ulnar nerve 06/21/2022     History of tobacco use 06/21/2022     History of coronary artery bypass surgery 06/21/2022     Headache 06/21/2022     Grief 06/21/2022     Constipation 06/21/2022     Chronic fatigue syndrome 06/21/2022     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     Adult failure to thrive syndrome 06/21/2022     Acquired trigger finger 06/21/2022     Abnormal gait 06/21/2022     Ulcer of external ear (H) 06/21/2022     Resolved Ambulatory Problems     Diagnosis Date Noted     CRI (chronic renal insufficiency), stage 3 (moderate) (H)      Past Medical History:   Diagnosis Date     Antiplatelet or antithrombotic long-term use      Hypertension      Stented coronary artery        Current Outpatient Medications:      acetaminophen (TYLENOL) 500 MG tablet, Take 500-1,000 mg by mouth every 6 hours as needed for mild pain, Disp: , Rfl:      amLODIPine (NORVASC) 10 MG tablet, [AMLODIPINE (NORVASC) 10 MG TABLET] TAKE 1 TABLET BY MOUTH EVERY DAY, Disp: 30 tablet, Rfl: 2     esomeprazole (NEXIUM) 40 MG DR capsule, Take 40 mg by mouth At Bedtime Take 30-60 minutes before eating., Disp: , Rfl:      ferrous gluconate (FERGON) 324 (38 Fe) MG tablet, Take 1 tablet (324 mg) by mouth daily (with breakfast) for 30 days, Disp: 30 tablet, Rfl: 0     fexofenadine (ALLEGRA) 180 MG tablet, Take 180 mg by mouth daily, Disp: , Rfl:      hydrocortisone (Perianal) (ANUSOL-HC) 2.5 % cream, Place 1 applicator rectally 2 times daily as needed for hemorrhoids, Disp: , Rfl:      isosorbide mononitrate (IMDUR) 120 MG 24 hr tablet, [ISOSORBIDE MONONITRATE (IMDUR) 120 MG 24 HR TABLET] TAKE 1 TABLET BY MOUTH EVERY DAY **TAKE W/30 MG TABLET**  - **PATIENT ONLY WANTS 1 MONTH PER FILL**, Disp: 30 tablet, Rfl: 11     isosorbide mononitrate (IMDUR) 30 MG 24 hr tablet, Take 30 mg by mouth daily , Disp: , Rfl:      melatonin 1 MG TABS tablet, Take 3 tablets (3 mg) by mouth At Bedtime for 30 days, Disp: 90 tablet, Rfl: 0     metoprolol succinate ER (TOPROL-XL) 25 MG 24 hr tablet, Take 75 mg by mouth daily , Disp: , Rfl:      mirtazapine (REMERON) 15 MG tablet, [MIRTAZAPINE (REMERON) 15 MG TABLET] Take 15 mg by mouth at bedtime. Indications: major depressive disorder, Disp: , Rfl:      MULTIVITAMIN W-MINERALS/LUTEIN (CENTRUM SILVER ORAL), [MULTIVITAMIN W-MINERALS/LUTEIN (CENTRUM SILVER ORAL)] Take 1 tablet by mouth daily., Disp: , Rfl:      nitroglycerin (NITROSTAT) 0.4 MG SL tablet, [NITROGLYCERIN (NITROSTAT) 0.4 MG SL TABLET] Place 0.4 mg under the tongue as needed for chest pain., Disp: , Rfl:      pantoprazole (PROTONIX) 40 MG tablet, [PANTOPRAZOLE (PROTONIX) 40 MG TABLET] Take 40 mg by mouth 2 (two) times a day., Disp: , Rfl:      peg 400-propylene glycol (SYSTANE) 0.4-0.3 % Drop, [-PROPYLENE GLYCOL (SYSTANE) 0.4-0.3 % DROP] Administer 1 drop to both eyes 4 (four) times a day. , Disp: , Rfl:      polyethylene glycol (MIRALAX) 17 GM/Dose powder, Take 17 g by mouth daily for 30 days, Disp: 510 g, Rfl: 0     potassium chloride ER (K-TAB) 20 MEQ CR tablet, Take 1 tablet (20 mEq) by mouth daily for 7 days, Disp: 7 tablet, Rfl: 0     senna (SENOKOT) 8.6 MG tablet, Take 2 tablets by mouth At Bedtime, Disp: 60 tablet, Rfl: 0     senna (SENOKOT) 8.6 MG tablet, Take 1 tablet by mouth daily as needed for constipation, Disp: , Rfl:      sodium bicarbonate 650 MG tablet, Take 1 tablet (650 mg) by mouth 2 times daily for 30 days, Disp: 60 tablet, Rfl: 0     triamcinolone (KENALOG) 0.025 % cream, [TRIAMCINOLONE (KENALOG) 0.025 % CREAM] Apply 1 application topically 3 (three) times a day as needed. , Disp: , Rfl:   Allergies   Allergen Reactions     Aminoquinolines  "Rash     Other reaction(s): rash     Primaquine Rash     Aloe      Other reaction(s): itchy skin     Atorvastatin Unknown     Other reaction(s): muscle aches     Demeclocycline Other (See Comments)     \"sore bottom\"     Diagnostic X-Ray Materials Unknown and Other (See Comments)     Chest tightness,sshakes     Guaiacol Hives     Robitussin D     Guaifenesin & Derivatives Hives     Robitussin D     Metrizamide Other (See Comments)     Chest tightness,sshakes     Pravastatin Unknown     Other reaction(s): muscle aches     Robitussin [Guaifenesin] Unknown     Simvastatin Other (See Comments)     Muscle aches   Other reaction(s): muscle aches     Tetracyclines Unknown and Other (See Comments)     Other reaction(s): Unknown  \"sore bottom\"       Hydroxychloroquine Sulfate [Hydroxychloroquine] Rash     Preservision Areds Rash     AREDs formula only caused rash.        All Meds and Allergies reviewed in the record at the facility and is the most up-to-date.    Post Discharge Medication Reconciliation Status: discharge medications reconciled and changed, per note/orders  Current Outpatient Medications   Medication Sig     acetaminophen (TYLENOL) 500 MG tablet Take 500-1,000 mg by mouth every 6 hours as needed for mild pain     amLODIPine (NORVASC) 10 MG tablet [AMLODIPINE (NORVASC) 10 MG TABLET] TAKE 1 TABLET BY MOUTH EVERY DAY     esomeprazole (NEXIUM) 40 MG DR capsule Take 40 mg by mouth At Bedtime Take 30-60 minutes before eating.     ferrous gluconate (FERGON) 324 (38 Fe) MG tablet Take 1 tablet (324 mg) by mouth daily (with breakfast) for 30 days     fexofenadine (ALLEGRA) 180 MG tablet Take 180 mg by mouth daily     hydrocortisone (Perianal) (ANUSOL-HC) 2.5 % cream Place 1 applicator rectally 2 times daily as needed for hemorrhoids     isosorbide mononitrate (IMDUR) 120 MG 24 hr tablet [ISOSORBIDE MONONITRATE (IMDUR) 120 MG 24 HR TABLET] TAKE 1 TABLET BY MOUTH EVERY DAY **TAKE W/30 MG TABLET** - **PATIENT ONLY WANTS 1 " "MONTH PER FILL**     isosorbide mononitrate (IMDUR) 30 MG 24 hr tablet Take 30 mg by mouth daily      melatonin 1 MG TABS tablet Take 3 tablets (3 mg) by mouth At Bedtime for 30 days     metoprolol succinate ER (TOPROL-XL) 25 MG 24 hr tablet Take 75 mg by mouth daily      mirtazapine (REMERON) 15 MG tablet [MIRTAZAPINE (REMERON) 15 MG TABLET] Take 15 mg by mouth at bedtime. Indications: major depressive disorder     MULTIVITAMIN W-MINERALS/LUTEIN (CENTRUM SILVER ORAL) [MULTIVITAMIN W-MINERALS/LUTEIN (CENTRUM SILVER ORAL)] Take 1 tablet by mouth daily.     nitroglycerin (NITROSTAT) 0.4 MG SL tablet [NITROGLYCERIN (NITROSTAT) 0.4 MG SL TABLET] Place 0.4 mg under the tongue as needed for chest pain.     pantoprazole (PROTONIX) 40 MG tablet [PANTOPRAZOLE (PROTONIX) 40 MG TABLET] Take 40 mg by mouth 2 (two) times a day.     peg 400-propylene glycol (SYSTANE) 0.4-0.3 % Drop [-PROPYLENE GLYCOL (SYSTANE) 0.4-0.3 % DROP] Administer 1 drop to both eyes 4 (four) times a day.      polyethylene glycol (MIRALAX) 17 GM/Dose powder Take 17 g by mouth daily for 30 days     potassium chloride ER (K-TAB) 20 MEQ CR tablet Take 1 tablet (20 mEq) by mouth daily for 7 days     senna (SENOKOT) 8.6 MG tablet Take 2 tablets by mouth At Bedtime     senna (SENOKOT) 8.6 MG tablet Take 1 tablet by mouth daily as needed for constipation     sodium bicarbonate 650 MG tablet Take 1 tablet (650 mg) by mouth 2 times daily for 30 days     triamcinolone (KENALOG) 0.025 % cream [TRIAMCINOLONE (KENALOG) 0.025 % CREAM] Apply 1 application topically 3 (three) times a day as needed.      No current facility-administered medications for this visit.       REVIEW OF SYSTEMS:   Review of Systems  10 point review of systems reviewed and pertinent positives identified in the HPI otherwise negative.    PHYSICAL EXAMINATION:  Physical Exam     Vital signs: /65   Pulse 65   Temp 97.3  F (36.3  C)   Resp 16   Ht 1.651 m (5' 5\")   Wt 42.3 kg (93 lb " 3.2 oz)   SpO2 90%   BMI 15.51 kg/m    General: Awake, Alert, sitting up in bed eating, oriented x1, appropriately, follows simple commands  HEENT:Pink conjunctiva, anicteric sclerae, dry oral mucosa  NECK: Supple  CVS:  S1  S2, without murmur or gallop.   LUNG: Clear to auscultation, No wheezes, rales or rhonci.  BACK: No kyphosis of the thoracic spine  ABDOMEN: Soft, thin, nontender to palpation, with positive bowel sounds  EXTREMITIES: Moves both upper and lower extremities but generalized weakness, no pedal edema, no calf tenderness  SKIN: Warm and dry, hemorrhoids not visualized.  NEUROLOGIC: Intact, pulses palpable  PSYCHIATRIC: Pleasant affect.      Labs:  All labs reviewed in the nursing home record and [employer]   @  Lab Results   Component Value Date    WBC 8.0 06/22/2022     Lab Results   Component Value Date    RBC 2.21 06/22/2022     Lab Results   Component Value Date    HGB 6.9 06/22/2022     Lab Results   Component Value Date    HCT 22.3 06/22/2022     Lab Results   Component Value Date     06/22/2022     Lab Results   Component Value Date    MCH 31.2 06/22/2022     Lab Results   Component Value Date    MCHC 30.9 06/22/2022     Lab Results   Component Value Date    RDW 13.7 06/22/2022     Lab Results   Component Value Date     06/22/2022        @Last Comprehensive Metabolic Panel:  Sodium   Date Value Ref Range Status   06/20/2022 136 136 - 145 mmol/L Final     Potassium   Date Value Ref Range Status   06/20/2022 4.2 3.5 - 5.0 mmol/L Final     Chloride   Date Value Ref Range Status   06/20/2022 104 98 - 107 mmol/L Final     Carbon Dioxide (CO2)   Date Value Ref Range Status   06/20/2022 28 22 - 31 mmol/L Final     Anion Gap   Date Value Ref Range Status   06/20/2022 4 (L) 5 - 18 mmol/L Final     Glucose   Date Value Ref Range Status   06/20/2022 88 70 - 125 mg/dL Final     Urea Nitrogen   Date Value Ref Range Status   06/20/2022 13 8 - 28 mg/dL Final     Creatinine   Date Value Ref Range  Status   06/20/2022 1.34 (H) 0.60 - 1.10 mg/dL Final     GFR Estimate   Date Value Ref Range Status   06/20/2022 38 (L) >60 mL/min/1.73m2 Final     Comment:     Effective December 21, 2021 eGFRcr in adults is calculated using the 2021 CKD-EPI creatinine equation which includes age and gender (Romulo barrera al., [facility], DOI: 10.1056/CQRJge1779668)   05/13/2021 32 (L) >60 mL/min/1.73m2 Final     Calcium   Date Value Ref Range Status   06/20/2022 8.0 (L) 8.5 - 10.5 mg/dL Final     Assessment/plan:    (K62.5) Rectal bleeding  (primary encounter diagnosis)  Comment: Patient with previous rectal bleeding during hospitalization thought to be due to hemorrhoids and constipation.  She was placed on bowel regimen.  GI offered a sigmoidoscopy however patient declined.  She was treated with 2 pink lady enemas during hospitalization.  Patient now having loose stools.  CBC clotted off today.  Last hemoglobin 7.7.  Plan: Hold senna and MiraLAX.  Redraw CBC in the a.m.    (K64.4) External hemorrhoids  Comment: Patient with rectal bleeding due to hemorrhoids.  Nursing staff deny any current bleeding.  Plan: Continue topical treatment.  Monitor for rectal bleeding.    (K59.00) Constipation, unspecified constipation type  Comment: Patient continues on senna 2 times daily and MiraLAX.  She did have frequent loose stools through the evening and earlier this a.m.  Nursing staff held her MiraLAX and senna.  Plan: Continue to hold this p.m.  Hold until his loose stools resolved.    (N17.9,  N18.9) Acute kidney injury superimposed on chronic kidney disease (H)  Comment: Acute kidney injury thought to be multifactorial including poor oral intake and metabolic acidosis.  Patient treated with fluids during hospitalization.  Plan: Today creatinine 1.34.  Previously 1.03.  Encourage fluids.  Repeat BMP on Thursday.    (I25.10) Coronary artery disease involving native coronary artery of native heart without angina pectoris  Comment: Normal ejection  fracture.  Patient currently on no aspirin.  However this is not suggested with recent rectal bleeding.  Patient denies any shortness of breath or chest pain.  Plan: Continue Imdur.    (I10) Benign essential hypertension  Comment: Blood pressure appears satisfactory.  Plan: Continue amlodipine, Imdur and metoprolol.    (D50.0) Iron deficiency anemia due to chronic blood loss  Comment: Hemoglobin upon admit to hospitalization was 7.3.  B12 and folate was normal.  Blood draw this morning clotted.  Plan: Repeat CBC in the a.m.  Patient continues on iron supplement once daily.    (R62.7) Failure to thrive in adult  Comment: Mild intermittent dysphagia.  Poor oral intake.  Patient's liquids were changed to thickened during hospitalization.  Plan: Encourage p.o. intake.  RD to consult.  Elisa for speech therapy to eval and treat.    Elisa for PT OT eval and treat.      35 minutes spent of which greater than 50% was face to face communication with the patient about anemia, rectal bleeding, nutrition, dysphagia as well as collaborating with nursing staff and therapies.    This note has been dictated using voice recognition software. Any grammatical or context distortions are unintentional and inherent to the software    Electronically signed by: Jacque Robles, CNP

## 2022-06-27 NOTE — LETTER
6/27/2022        RE: Yun Rodgers  [address]  [place] [address]        [facility]    Code Status:  DNR/DNI   Visit Type:   Chief Complaint   Patient presents with     RECHECK     Facility:  [facility] ([facility]) [[id number]]           Transitional Care Course: Yun Rodgers is a 87 year old female who I am seeing today for follow up on  the TCU.  Patient recently hospitalized on 6/14/2022 secondary to rectal bleeding.  Past medical history includes CKD stage III, constipation, subacute cutaneous lupus, Sjogren's, CAD status post stent in 2007, arthritis, peripheral vascular disease status post right carotid endarterectomy and hyperkalemia.  Patient with rectal bleeding secondary to constipation versus diverticulosis.  She underwent a CT scan which showed fecal impaction with a large volume of stool in the distal sigmoid colon and rectal vault without evidence of obstruction.  She did have scattered diverticuli but no diverticulitis.  Patient positive for hemorrhoids.  Patient was given a pink lady enema on admit and repeated on 6/15.  She was seen by GI.  They did offer sigmoidoscopy however patient declined.  Stool softeners added.  Urinary retention likely due to fecal impaction.  There was no hydronephrosis.  UA was negative.  She did have a Serrano catheter which was placed and removed prior to discharge.  Hypokalemia with metabolic acidosis.  This is recurrent.  Bicarbonate has been as low as 11.  She continues on bicarb supplement.  She was seen by nephrology.  Potassium replaced.  Acute kidney injury on chronic kidney disease stage III.  This was felt due to obstruction.  UA negative.  She was treated with fluids.  CAD with history of LAD stent 2007 with abnormal stress test in 2018 which showed a small area of distal anterior septal ischemia with normal ejection fracture.  Patient not on aspirin.  GERD.  Continues on PPI.  Anemia with iron deficiency.  Hemoglobin on admit was  7.3.  No B12 or folate deficiency.  Recheck was 7.7.  Iron was supplemented.  Hypertension.  Failure to thrive.  Mild intermittent dysphagia with poor appetite.  This thought may be due to Sjogren's.  TSH normal.  Patient was evaluated by speech therapy and recommended thicken fluids.    On today's initial visit patient lying in bed.  Patient seen late last week and was being worked up for continued acute blood loss anemia.  The soonest she can get in for blood transfusion was tomorrow 6/28/2022.  After seeing patient last week she developed a large amount of rectal bleeding.  She did have a history of hemorrhoids.  She was no longer experiencing constipation.  Blood pressure on the low side.  Hemoglobin 6.9.  Patient reported dizziness.  She was sent to the ER for further eval.  Once at the ER bleeding was noted from her rectum however stopped.  She has declined further work-up including scope.  She was given 1 unit of packed red blood cells.  Posttransfusion hemoglobin 8.1.  Patient was sent back to the facility.  Today hemoglobin 8.1.  Blood pressure appears satisfactory in the low side of normal.  She continues on PPI.  No further rectal bleeding.  Her MiraLAX was resumed during her ER visit.  Today she is reporting multiple loose stools.  She continues on iron supplement.  Appetite improving.  She does have a history of chronic anemia.      Active Ambulatory Problems     Diagnosis Date Noted     Mixed hyperlipidemia      Essential hypertension      Coronary Artery Disease      Carotid artery disease (H)      Peripheral vascular disease (H)      Gastroesophageal reflux disease with esophagitis      Chest pain, unspecified type      Angina concurrent with and due to arteriosclerosis of coronary artery (H) 01/09/2015     Stable angina (H) 01/15/2015     Sinus bradycardia 02/26/2016     Dehydration 11/12/2017     Cough      Malaise      Hyperkalemia      Gastroesophageal reflux disease without esophagitis       "Atypical pneumonia 11/13/2017     Nausea 11/14/2017     Trimalleolar fracture 02/24/2018     Anemia, unspecified type      Coronary artery disease of native artery of native heart with stable angina pectoris (H)      Pulmonary valve disorder      Fracture dislocation of ankle, right, closed, initial encounter 02/24/2018     Closed right ankle fracture 03/02/2018     Stage 3 chronic kidney disease (H) 03/29/2018     Acute renal failure, unspecified acute renal failure type (H) 06/14/2022     Fecal impaction in rectum (H) 06/14/2022     Urinary retention 06/14/2022     Hypokalemia 06/14/2022     Sjogren's syndrome (H) 06/21/2022     Poor appetite 06/21/2022     Lesion of ulnar nerve 06/21/2022     History of tobacco use 06/21/2022     History of coronary artery bypass surgery 06/21/2022     Headache 06/21/2022     Grief 06/21/2022     Constipation 06/21/2022     Chronic fatigue syndrome 06/21/2022     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     Adult failure to thrive syndrome 06/21/2022     Acquired trigger finger 06/21/2022     Abnormal gait 06/21/2022     Ulcer of external ear (H) 06/21/2022     Resolved Ambulatory Problems     Diagnosis Date Noted     CRI (chronic renal insufficiency), stage 3 (moderate) (H)      Past Medical History:   Diagnosis Date     Antiplatelet or antithrombotic long-term use      Hypertension      Stented coronary artery        Allergies   Allergen Reactions     Aminoquinolines Rash     Other reaction(s): rash     Primaquine Rash     Aloe      Other reaction(s): itchy skin     Atorvastatin Unknown     Other reaction(s): muscle aches     Demeclocycline Other (See Comments)     \"sore bottom\"     Diagnostic X-Ray Materials Unknown and Other (See Comments)     Chest tightness,sshakes     Guaiacol Hives     Robitussin D     Guaifenesin & Derivatives Hives     Robitussin D     Metrizamide Other (See Comments)     Chest tightness,sshakes     Pravastatin Unknown     Other reaction(s): muscle aches     " "Robitussin [Guaifenesin] Unknown     Simvastatin Other (See Comments)     Muscle aches   Other reaction(s): muscle aches     Tetracyclines Unknown and Other (See Comments)     Other reaction(s): Unknown  \"sore bottom\"       Hydroxychloroquine Sulfate [Hydroxychloroquine] Rash     Preservision Areds Rash     AREDs formula only caused rash.        All Meds and Allergies reviewed in the record at the facility and is the most up-to-date.      Current Outpatient Medications   Medication Sig     acetaminophen (TYLENOL) 500 MG tablet Take 500-1,000 mg by mouth every 6 hours as needed for mild pain     amLODIPine (NORVASC) 10 MG tablet [AMLODIPINE (NORVASC) 10 MG TABLET] TAKE 1 TABLET BY MOUTH EVERY DAY     esomeprazole (NEXIUM) 40 MG DR capsule Take 40 mg by mouth At Bedtime Take 30-60 minutes before eating.     ferrous gluconate (FERGON) 324 (38 Fe) MG tablet Take 1 tablet (324 mg) by mouth daily (with breakfast) for 30 days     fexofenadine (ALLEGRA) 180 MG tablet Take 180 mg by mouth daily     hydrocortisone (Perianal) (ANUSOL-HC) 2.5 % cream Place 1 applicator rectally 2 times daily as needed for hemorrhoids     isosorbide mononitrate (IMDUR) 120 MG 24 hr tablet [ISOSORBIDE MONONITRATE (IMDUR) 120 MG 24 HR TABLET] TAKE 1 TABLET BY MOUTH EVERY DAY **TAKE W/30 MG TABLET** - **PATIENT ONLY WANTS 1 MONTH PER FILL**     isosorbide mononitrate (IMDUR) 30 MG 24 hr tablet Take 30 mg by mouth daily      melatonin 1 MG TABS tablet Take 3 tablets (3 mg) by mouth At Bedtime for 30 days     metoprolol succinate ER (TOPROL-XL) 25 MG 24 hr tablet Take 75 mg by mouth daily      mirtazapine (REMERON) 15 MG tablet [MIRTAZAPINE (REMERON) 15 MG TABLET] Take 15 mg by mouth at bedtime. Indications: major depressive disorder     MULTIVITAMIN W-MINERALS/LUTEIN (CENTRUM SILVER ORAL) [MULTIVITAMIN W-MINERALS/LUTEIN (CENTRUM SILVER ORAL)] Take 1 tablet by mouth daily.     nitroglycerin (NITROSTAT) 0.4 MG SL tablet [NITROGLYCERIN (NITROSTAT) 0.4 " "MG SL TABLET] Place 0.4 mg under the tongue as needed for chest pain.     pantoprazole (PROTONIX) 40 MG tablet [PANTOPRAZOLE (PROTONIX) 40 MG TABLET] Take 40 mg by mouth 2 (two) times a day.     peg 400-propylene glycol (SYSTANE) 0.4-0.3 % Drop [-PROPYLENE GLYCOL (SYSTANE) 0.4-0.3 % DROP] Administer 1 drop to both eyes 4 (four) times a day.      phenylephrine-cocoa butter (PREPARATION H) 0.25-88.44 % suppository Place 1 suppository rectally At Bedtime     polyethylene glycol (MIRALAX) 17 GM/Dose powder Take 17 g by mouth daily for 30 days (Patient taking differently: Take 17 g by mouth daily as needed)     senna (SENOKOT) 8.6 MG tablet Take 2 tablets by mouth At Bedtime     senna (SENOKOT) 8.6 MG tablet Take 1 tablet by mouth daily as needed for constipation     sodium bicarbonate 650 MG tablet Take 1 tablet (650 mg) by mouth 2 times daily for 30 days     triamcinolone (KENALOG) 0.025 % cream [TRIAMCINOLONE (KENALOG) 0.025 % CREAM] Apply 1 application topically 3 (three) times a day as needed.      No current facility-administered medications for this visit.       REVIEW OF SYSTEMS:   Review of Systems  Today reviewed and updated.  10 point review of systems reviewed and pertinent positives identified in the HPI otherwise negative.    PHYSICAL EXAMINATION:  Physical Exam     Vital signs: /60   Pulse 62   Temp 99  F (37.2  C)   Resp 16   Ht 1.626 m (5' 4\")   Wt 43 kg (94 lb 11.2 oz)   SpO2 94%   BMI 16.26 kg/m    General: Awake, Alert, sitting up in wheelchair. Oriented x1, appropriately, follows simple commands  HEENT:Pink conjunctiva, anicteric sclerae, dry oral mucosa  NECK: Supple  CVS:  S1  S2, without murmur or gallop.   LUNG: Clear to auscultation, No wheezes, rales or rhonci.  BACK: No kyphosis of the thoracic spine  ABDOMEN: Soft, thin, nontender to palpation, with positive bowel sounds  EXTREMITIES: Moves both upper and lower extremities but generalized weakness  NEUROLOGIC: Intact, " pulses palpable  PSYCHIATRIC: Cognitive impairment       Labs:  All labs reviewed in the nursing home record and Epic   @  Lab Results   Component Value Date    WBC 8.0 06/22/2022     Lab Results   Component Value Date    RBC 2.21 06/22/2022     Lab Results   Component Value Date    HGB 6.9 06/22/2022     Lab Results   Component Value Date    HCT 22.3 06/22/2022     Lab Results   Component Value Date     06/22/2022     Lab Results   Component Value Date    MCH 31.2 06/22/2022     Lab Results   Component Value Date    MCHC 30.9 06/22/2022     Lab Results   Component Value Date    RDW 13.7 06/22/2022     Lab Results   Component Value Date     06/22/2022        @Last Comprehensive Metabolic Panel:  Sodium   Date Value Ref Range Status   06/23/2022 132 (L) 136 - 145 mmol/L Final     Potassium   Date Value Ref Range Status   06/23/2022 4.7 3.5 - 5.0 mmol/L Final     Chloride   Date Value Ref Range Status   06/23/2022 104 98 - 107 mmol/L Final     Carbon Dioxide (CO2)   Date Value Ref Range Status   06/23/2022 21 (L) 22 - 31 mmol/L Final     Anion Gap   Date Value Ref Range Status   06/23/2022 7 5 - 18 mmol/L Final     Glucose   Date Value Ref Range Status   06/23/2022 96 70 - 125 mg/dL Final     Urea Nitrogen   Date Value Ref Range Status   06/23/2022 13 8 - 28 mg/dL Final     Creatinine   Date Value Ref Range Status   06/23/2022 1.35 (H) 0.60 - 1.10 mg/dL Final     GFR Estimate   Date Value Ref Range Status   06/23/2022 38 (L) >60 mL/min/1.73m2 Final     Comment:     Effective December 21, 2021 eGFRcr in adults is calculated using the 2021 CKD-EPI creatinine equation which includes age and gender (Romulo et al., NEJM, DOI: 10.1056/SYMZwg4044705)   05/13/2021 32 (L) >60 mL/min/1.73m2 Final     Calcium   Date Value Ref Range Status   06/23/2022 8.0 (L) 8.5 - 10.5 mg/dL Final     Assessment/plan:    (K62.5) Rectal bleeding  (primary encounter diagnosis)  Comment: Patient with previous rectal bleeding during  hospitalization thought to be due to hemorrhoids and constipation.  She was placed on bowel regimen. GI offered a sigmoidoscopy however patient declined.  She was treated with 2 pink lady enemas during hospitalization. Today hemoglobin 8.1.  No further rectal bleeding.  She was seen in the ER and underwent 1 unit of packed red blood cells.  Plan: Continue with hemorrhoidal suppositories nightly.  Repeat hemoglobin on Thursday.    (K64.4) External hemorrhoids  Comment: Patient with rectal bleeding due to hemorrhoids.  Rectal bleeding subsided.  Hemorrhoids being managed with Preparation H suppository q. evening.  Plan: Continue topical treatment and add Prep H rectal supp.     (K59.00) Constipation, unspecified constipation type  Comment: Patient continues on senna and MiraLAX.  She is reporting multiple loose stools on today's visit.  Initially her MiraLAX have been changed to as needed however with recent ER visit it was changed back to daily.  Plan: Continue senna daily.  Change MiraLAX to as needed.    (D50.0) Iron deficiency anemia due to chronic blood loss  Comment: Hemoglobin upon admit to hospitalization was 7.3.  B12 and folate was normal.  Patient underwent 1 unit of packed red blood cells at the ER.  Hemoglobin now 8.1.  Plan: Continue iron supplement.  Follow-up hemoglobin on Thursday.          This note has been dictated using voice recognition software. Any grammatical or context distortions are unintentional and inherent to the software    Electronically signed by: Jacque Robles CNP           Sincerely,        Jacque Robles NP

## 2022-06-28 NOTE — PROGRESS NOTES
[facility] GERIATRIC SERVICES    Code Status:  DNR/DNI   Visit Type:   Chief Complaint   Patient presents with     RECHECK     Facility:  [facility] ([facility]) [[id number]]           Transitional Care Course: Yun Rodgers is a 87 year old female who I am seeing today for follow up on  the TCU.  Patient recently hospitalized on 6/14/2022 secondary to rectal bleeding.  Past medical history includes CKD stage III, constipation, subacute cutaneous lupus, Sjogren's, CAD status post stent in 2007, arthritis, peripheral vascular disease status post right carotid endarterectomy and hyperkalemia.  Patient with rectal bleeding secondary to constipation versus diverticulosis.  She underwent a CT scan which showed fecal impaction with a large volume of stool in the distal sigmoid colon and rectal vault without evidence of obstruction.  She did have scattered diverticuli but no diverticulitis.  Patient positive for hemorrhoids.  Patient was given a pink lady enema on admit and repeated on 6/15.  She was seen by GI.  They did offer sigmoidoscopy however patient declined.  Stool softeners added.  Urinary retention likely due to fecal impaction.  There was no hydronephrosis.  UA was negative.  She did have a Serrano catheter which was placed and removed prior to discharge.  Hypokalemia with metabolic acidosis.  This is recurrent.  Bicarbonate has been as low as 11.  She continues on bicarb supplement.  She was seen by nephrology.  Potassium replaced.  Acute kidney injury on chronic kidney disease stage III.  This was felt due to obstruction.  UA negative.  She was treated with fluids.  CAD with history of LAD stent 2007 with abnormal stress test in 2018 which showed a small area of distal anterior septal ischemia with normal ejection fracture.  Patient not on aspirin.  GERD.  Continues on PPI.  Anemia with iron deficiency.  Hemoglobin on admit was 7.3.  No B12 or folate deficiency.  Recheck was 7.7.  Iron was supplemented.   Hypertension.  Failure to thrive.  Mild intermittent dysphagia with poor appetite.  This thought may be due to Sjogren's.  TSH normal.  Patient was evaluated by speech therapy and recommended thicken fluids.    On today's initial visit patient lying in bed.  Patient seen late last week and was being worked up for continued acute blood loss anemia.  The soonest she can get in for blood transfusion was tomorrow 6/28/2022.  After seeing patient last week she developed a large amount of rectal bleeding.  She did have a history of hemorrhoids.  She was no longer experiencing constipation.  Blood pressure on the low side.  Hemoglobin 6.9.  Patient reported dizziness.  She was sent to the ER for further eval.  Once at the ER bleeding was noted from her rectum however stopped.  She has declined further work-up including scope.  She was given 1 unit of packed red blood cells.  Posttransfusion hemoglobin 8.1.  Patient was sent back to the facility.  Today hemoglobin 8.1.  Blood pressure appears satisfactory in the low side of normal.  She continues on PPI.  No further rectal bleeding.  Her MiraLAX was resumed during her ER visit.  Today she is reporting multiple loose stools.  She continues on iron supplement.  Appetite improving.  She does have a history of chronic anemia.      Active Ambulatory Problems     Diagnosis Date Noted     Mixed hyperlipidemia      Essential hypertension      Coronary Artery Disease      Carotid artery disease (H)      Peripheral vascular disease (H)      Gastroesophageal reflux disease with esophagitis      Chest pain, unspecified type      Angina concurrent with and due to arteriosclerosis of coronary artery (H) 01/09/2015     Stable angina (H) 01/15/2015     Sinus bradycardia 02/26/2016     Dehydration 11/12/2017     Cough      Malaise      Hyperkalemia      Gastroesophageal reflux disease without esophagitis      Atypical pneumonia 11/13/2017     Nausea 11/14/2017     Trimalleolar fracture  "02/24/2018     Anemia, unspecified type      Coronary artery disease of native artery of native heart with stable angina pectoris (H)      Pulmonary valve disorder      Fracture dislocation of ankle, right, closed, initial encounter 02/24/2018     Closed right ankle fracture 03/02/2018     Stage 3 chronic kidney disease (H) 03/29/2018     Acute renal failure, unspecified acute renal failure type (H) 06/14/2022     Fecal impaction in rectum (H) 06/14/2022     Urinary retention 06/14/2022     Hypokalemia 06/14/2022     Sjogren's syndrome (H) 06/21/2022     Poor appetite 06/21/2022     Lesion of ulnar nerve 06/21/2022     History of tobacco use 06/21/2022     History of coronary artery bypass surgery 06/21/2022     Headache 06/21/2022     Grief 06/21/2022     Constipation 06/21/2022     Chronic fatigue syndrome 06/21/2022     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     Adult failure to thrive syndrome 06/21/2022     Acquired trigger finger 06/21/2022     Abnormal gait 06/21/2022     Ulcer of external ear (H) 06/21/2022     Resolved Ambulatory Problems     Diagnosis Date Noted     CRI (chronic renal insufficiency), stage 3 (moderate) (H)      Past Medical History:   Diagnosis Date     Antiplatelet or antithrombotic long-term use      Hypertension      Stented coronary artery        Allergies   Allergen Reactions     Aminoquinolines Rash     Other reaction(s): rash     Primaquine Rash     Aloe      Other reaction(s): itchy skin     Atorvastatin Unknown     Other reaction(s): muscle aches     Demeclocycline Other (See Comments)     \"sore bottom\"     Diagnostic X-Ray Materials Unknown and Other (See Comments)     Chest tightness,sshakes     Guaiacol Hives     Robitussin D     Guaifenesin & Derivatives Hives     Robitussin D     Metrizamide Other (See Comments)     Chest tightness,sshakes     Pravastatin Unknown     Other reaction(s): muscle aches     Robitussin [Guaifenesin] Unknown     Simvastatin Other (See Comments)     " "Muscle aches   Other reaction(s): muscle aches     Tetracyclines Unknown and Other (See Comments)     Other reaction(s): Unknown  \"sore bottom\"       Hydroxychloroquine Sulfate [Hydroxychloroquine] Rash     Preservision Areds Rash     AREDs formula only caused rash.        All Meds and Allergies reviewed in the record at the facility and is the most up-to-date.      Current Outpatient Medications   Medication Sig     acetaminophen (TYLENOL) 500 MG tablet Take 500-1,000 mg by mouth every 6 hours as needed for mild pain     amLODIPine (NORVASC) 10 MG tablet [AMLODIPINE (NORVASC) 10 MG TABLET] TAKE 1 TABLET BY MOUTH EVERY DAY     esomeprazole (NEXIUM) 40 MG DR capsule Take 40 mg by mouth At Bedtime Take 30-60 minutes before eating.     ferrous gluconate (FERGON) 324 (38 Fe) MG tablet Take 1 tablet (324 mg) by mouth daily (with breakfast) for 30 days     fexofenadine (ALLEGRA) 180 MG tablet Take 180 mg by mouth daily     hydrocortisone (Perianal) (ANUSOL-HC) 2.5 % cream Place 1 applicator rectally 2 times daily as needed for hemorrhoids     isosorbide mononitrate (IMDUR) 120 MG 24 hr tablet [ISOSORBIDE MONONITRATE (IMDUR) 120 MG 24 HR TABLET] TAKE 1 TABLET BY MOUTH EVERY DAY **TAKE W/30 MG TABLET** - **PATIENT ONLY WANTS 1 MONTH PER FILL**     isosorbide mononitrate (IMDUR) 30 MG 24 hr tablet Take 30 mg by mouth daily      melatonin 1 MG TABS tablet Take 3 tablets (3 mg) by mouth At Bedtime for 30 days     metoprolol succinate ER (TOPROL-XL) 25 MG 24 hr tablet Take 75 mg by mouth daily      mirtazapine (REMERON) 15 MG tablet [MIRTAZAPINE (REMERON) 15 MG TABLET] Take 15 mg by mouth at bedtime. Indications: major depressive disorder     MULTIVITAMIN W-MINERALS/LUTEIN (CENTRUM SILVER ORAL) [MULTIVITAMIN W-MINERALS/LUTEIN (CENTRUM SILVER ORAL)] Take 1 tablet by mouth daily.     nitroglycerin (NITROSTAT) 0.4 MG SL tablet [NITROGLYCERIN (NITROSTAT) 0.4 MG SL TABLET] Place 0.4 mg under the tongue as needed for chest pain.     " "pantoprazole (PROTONIX) 40 MG tablet [PANTOPRAZOLE (PROTONIX) 40 MG TABLET] Take 40 mg by mouth 2 (two) times a day.     peg 400-propylene glycol (SYSTANE) 0.4-0.3 % Drop [-PROPYLENE GLYCOL (SYSTANE) 0.4-0.3 % DROP] Administer 1 drop to both eyes 4 (four) times a day.      phenylephrine-cocoa butter (PREPARATION H) 0.25-88.44 % suppository Place 1 suppository rectally At Bedtime     polyethylene glycol (MIRALAX) 17 GM/Dose powder Take 17 g by mouth daily for 30 days (Patient taking differently: Take 17 g by mouth daily as needed)     senna (SENOKOT) 8.6 MG tablet Take 2 tablets by mouth At Bedtime     senna (SENOKOT) 8.6 MG tablet Take 1 tablet by mouth daily as needed for constipation     sodium bicarbonate 650 MG tablet Take 1 tablet (650 mg) by mouth 2 times daily for 30 days     triamcinolone (KENALOG) 0.025 % cream [TRIAMCINOLONE (KENALOG) 0.025 % CREAM] Apply 1 application topically 3 (three) times a day as needed.      No current facility-administered medications for this visit.       REVIEW OF SYSTEMS:   Review of Systems  Today reviewed and updated.  10 point review of systems reviewed and pertinent positives identified in the HPI otherwise negative.    PHYSICAL EXAMINATION:  Physical Exam     Vital signs: /60   Pulse 62   Temp 99  F (37.2  C)   Resp 16   Ht 1.626 m (5' 4\")   Wt 43 kg (94 lb 11.2 oz)   SpO2 94%   BMI 16.26 kg/m    General: Awake, Alert, sitting up in wheelchair. Oriented x1, appropriately, follows simple commands  HEENT:Pink conjunctiva, anicteric sclerae, dry oral mucosa  NECK: Supple  CVS:  S1  S2, without murmur or gallop.   LUNG: Clear to auscultation, No wheezes, rales or rhonci.  BACK: No kyphosis of the thoracic spine  ABDOMEN: Soft, thin, nontender to palpation, with positive bowel sounds  EXTREMITIES: Moves both upper and lower extremities but generalized weakness  NEUROLOGIC: Intact, pulses palpable  PSYCHIATRIC: Cognitive impairment       Labs:  All labs " reviewed in the nursing home record and [facility]   @  Lab Results   Component Value Date    WBC 8.0 06/22/2022     Lab Results   Component Value Date    RBC 2.21 06/22/2022     Lab Results   Component Value Date    HGB 6.9 06/22/2022     Lab Results   Component Value Date    HCT 22.3 06/22/2022     Lab Results   Component Value Date     06/22/2022     Lab Results   Component Value Date    MCH 31.2 06/22/2022     Lab Results   Component Value Date    MCHC 30.9 06/22/2022     Lab Results   Component Value Date    RDW 13.7 06/22/2022     Lab Results   Component Value Date     06/22/2022        @Last Comprehensive Metabolic Panel:  Sodium   Date Value Ref Range Status   06/23/2022 132 (L) 136 - 145 mmol/L Final     Potassium   Date Value Ref Range Status   06/23/2022 4.7 3.5 - 5.0 mmol/L Final     Chloride   Date Value Ref Range Status   06/23/2022 104 98 - 107 mmol/L Final     Carbon Dioxide (CO2)   Date Value Ref Range Status   06/23/2022 21 (L) 22 - 31 mmol/L Final     Anion Gap   Date Value Ref Range Status   06/23/2022 7 5 - 18 mmol/L Final     Glucose   Date Value Ref Range Status   06/23/2022 96 70 - 125 mg/dL Final     Urea Nitrogen   Date Value Ref Range Status   06/23/2022 13 8 - 28 mg/dL Final     Creatinine   Date Value Ref Range Status   06/23/2022 1.35 (H) 0.60 - 1.10 mg/dL Final     GFR Estimate   Date Value Ref Range Status   06/23/2022 38 (L) >60 mL/min/1.73m2 Final     Comment:     Effective December 21, 2021 eGFRcr in adults is calculated using the 2021 CKD-EPI creatinine equation which includes age and gender (Romulo et al., NEJM, DOI: 10.1056/EOVKou0815436)   05/13/2021 32 (L) >60 mL/min/1.73m2 Final     Calcium   Date Value Ref Range Status   06/23/2022 8.0 (L) 8.5 - 10.5 mg/dL Final     Assessment/plan:    (K62.5) Rectal bleeding  (primary encounter diagnosis)  Comment: Patient with previous rectal bleeding during hospitalization thought to be due to hemorrhoids and constipation.  She was  placed on bowel regimen. GI offered a sigmoidoscopy however patient declined.  She was treated with 2 pink lady enemas during hospitalization. Today hemoglobin 8.1.  No further rectal bleeding.  She was seen in the ER and underwent 1 unit of packed red blood cells.  Plan: Continue with hemorrhoidal suppositories nightly.  Repeat hemoglobin on Thursday.    (K64.4) External hemorrhoids  Comment: Patient with rectal bleeding due to hemorrhoids.  Rectal bleeding subsided.  Hemorrhoids being managed with Preparation H suppository q. evening.  Plan: Continue topical treatment and add Prep H rectal supp.     (K59.00) Constipation, unspecified constipation type  Comment: Patient continues on senna and MiraLAX.  She is reporting multiple loose stools on today's visit.  Initially her MiraLAX have been changed to as needed however with recent ER visit it was changed back to daily.  Plan: Continue senna daily.  Change MiraLAX to as needed.    (D50.0) Iron deficiency anemia due to chronic blood loss  Comment: Hemoglobin upon admit to hospitalization was 7.3.  B12 and folate was normal.  Patient underwent 1 unit of packed red blood cells at the ER.  Hemoglobin now 8.1.  Plan: Continue iron supplement.  Follow-up hemoglobin on Thursday.          This note has been dictated using voice recognition software. Any grammatical or context distortions are unintentional and inherent to the software    Electronically signed by: Jacque Robles CNP

## 2022-06-30 NOTE — LETTER
6/30/2022        RE: Yun Rodgers  [address]  [place] [address]        Preceptor/Student Attestation    I was present with the student who participated in the service and in the documentation of the note, which I have reviewed and identified.       Martina Omalley CNP        Code Status:  DNR/DNI  Visit Type: RECHECK     Facility:   [facility]) [[id number]]         History of Present Illness: Yun Rodgers is a 87 year old female with a PMH of rectal bleeding, hypertension, CAD post stent 2007, peripheral vascular disease s/p right carotid endarterectomy, hyperlipidemia, GERD, anemia, stage III chronic kidney disease, Sjogren's syndrome, and fecal impaction. She was previously hospitalized 6/14-6/17/22 for rectal bleeding secondary to constipation vs diverticulitis; positive for hemorrhoids. Pt declined further GI workup at the time.     Pt seen again in ED on 6/23/22 for rectal bleeding. Hgb 7.2; given 1 unit PRBCs. Pt declining lower GI diagnostics or further workup. Plan was to recheck hgb in 3-5 days if she doesn't have any additonal bleeding.     Today, pt is sitting up in her room. She denies seeing any blood in her stools recently. Stools are loose. She reports feeling SOB at times.     Hgb 7.6, /62 this morning. Repeat /57 around noon.  Facility staff report noticing some small amounts of blood in stools they attribute to hemorrhoids.        Current Outpatient Medications   Medication Sig Dispense Refill     acetaminophen (TYLENOL) 500 MG tablet Take 500-1,000 mg by mouth every 6 hours as needed for mild pain       amLODIPine (NORVASC) 10 MG tablet [AMLODIPINE (NORVASC) 10 MG TABLET] TAKE 1 TABLET BY MOUTH EVERY DAY 30 tablet 2     esomeprazole (NEXIUM) 40 MG DR capsule Take 40 mg by mouth At Bedtime Take 30-60 minutes before eating.       ferrous gluconate (FERGON) 324 (38 Fe) MG tablet Take 1 tablet (324 mg) by mouth daily (with breakfast) for 30 days 30 tablet 0      fexofenadine (ALLEGRA) 180 MG tablet Take 180 mg by mouth daily       hydrocortisone (Perianal) (ANUSOL-HC) 2.5 % cream Place 1 applicator rectally 2 times daily as needed for hemorrhoids       isosorbide mononitrate (IMDUR) 120 MG 24 hr tablet [ISOSORBIDE MONONITRATE (IMDUR) 120 MG 24 HR TABLET] TAKE 1 TABLET BY MOUTH EVERY DAY **TAKE W/30 MG TABLET** - **PATIENT ONLY WANTS 1 MONTH PER FILL** 30 tablet 11     isosorbide mononitrate (IMDUR) 30 MG 24 hr tablet Take 30 mg by mouth daily        melatonin 1 MG TABS tablet Take 3 tablets (3 mg) by mouth At Bedtime for 30 days 90 tablet 0     metoprolol succinate ER (TOPROL-XL) 25 MG 24 hr tablet Take 75 mg by mouth daily        mirtazapine (REMERON) 15 MG tablet [MIRTAZAPINE (REMERON) 15 MG TABLET] Take 15 mg by mouth at bedtime. Indications: major depressive disorder       MULTIVITAMIN W-MINERALS/LUTEIN (CENTRUM SILVER ORAL) [MULTIVITAMIN W-MINERALS/LUTEIN (CENTRUM SILVER ORAL)] Take 1 tablet by mouth daily.       nitroglycerin (NITROSTAT) 0.4 MG SL tablet [NITROGLYCERIN (NITROSTAT) 0.4 MG SL TABLET] Place 0.4 mg under the tongue as needed for chest pain.       pantoprazole (PROTONIX) 40 MG tablet [PANTOPRAZOLE (PROTONIX) 40 MG TABLET] Take 40 mg by mouth 2 (two) times a day.       peg 400-propylene glycol (SYSTANE) 0.4-0.3 % Drop [-PROPYLENE GLYCOL (SYSTANE) 0.4-0.3 % DROP] Administer 1 drop to both eyes 4 (four) times a day.        phenylephrine-cocoa butter (PREPARATION H) 0.25-88.44 % suppository Place 1 suppository rectally At Bedtime       polyethylene glycol (MIRALAX) 17 GM/Dose powder Take 17 g by mouth daily for 30 days (Patient taking differently: Take 17 g by mouth daily as needed) 510 g 0     senna (SENOKOT) 8.6 MG tablet Take 2 tablets by mouth At Bedtime 60 tablet 0     senna (SENOKOT) 8.6 MG tablet Take 1 tablet by mouth daily as needed for constipation       sodium bicarbonate 650 MG tablet Take 1 tablet (650 mg) by mouth 2 times daily for 30 days  60 tablet 0     triamcinolone (KENALOG) 0.025 % cream [TRIAMCINOLONE (KENALOG) 0.025 % CREAM] Apply 1 application topically 3 (three) times a day as needed.          Review of Systems   Patient denies fever, chills, headache, lightheadedness, dizziness, rhinorrhea, cough, congestion, chest pain, palpitations, abdominal pain, n/v, diarrhea, constipation, change in appetite, change in sleep pattern, dysuria, frequency, burning or pain with urination. Other than stated in HPI all other review of systems is negative.         Physical Exam  Vital signs:BP (!) 145/57   Pulse 65   Temp 97.3  F (36.3  C)   Resp 16   Wt 42.6 kg (94 lb)   SpO2 92%   BMI 16.14 kg/m     GENERAL APPEARANCE: Well developed, well nourished, in no acute distress.  HEENT: normocephalic, atraumatic. Sclerae anicteric, conjunctivae clear and moist  NECK: Supple and symmetric.  LUNGS: Lung sounds CTA, no adventitious sounds, respiratory effort normal. No SOB on exam.  CARD: RRR, S1, S2, without murmurs, gallops, rubs  ABD: Soft, nondistended and nontender with active bowel sounds.   MSK: Muscle strength and tone were equal bilaterally. Moves all extremities easily and intentionally.   EXTREMITIES: No cyanosis, clubbing or edema.  NEURO: Alert with cognitive impairment. Normal affect. Face is symmetric.  SKIN: Inspection of the skin reveals no rashes, ulcerations or petechiae.  PSYCH: euthymic          Labs:     Hemoglobin  Component Ref Range & Units  5:56 AM   (6/30/22) 3 d ago   (6/27/22) 7 d ago   (6/23/22) 7 d ago   (6/23/22) 7 d ago   (6/23/22) 8 d ago   (6/22/22) 2 wk ago   (6/16/22)    Hemoglobin 7.6 8.1   7.8 8.4 7.2 6.9 7.7           Assessment/Plan:  (K62.5) Rectal bleeding  (primary encounter diagnosis)  Comment: Hgb 7.6 today. Pt reporting some SOB. Staff reporting small amount of blood in stools. VSS. Pt is stable today, but high suspicion that pt is bleeding. Will monitor for now.   Plan:   -Recheck hgb on 7/5/22  -nursing to set up  outpatient transfusion to Give 1 unit PRBC on 7/6/22. Pt will likely need to go to ER for blood transfusion d/t difficulty getting an appointment in infusion center during a holiday week  -Continue esomeprazole and pantoprazole  -Continue monitoring for rectal bleeding and regular BP checks    (K64.4) External hemorrhoids  (K59.00) Constipation, unspecified constipation type  Comment: Staff reporting small amount of blood in stools they attribute to hemorrhoids. Stools have been loose, hopefully allowing for some healing of hemorrhoids.   Plan:   -Continue topical treatment with preparation H and preparation H suppository  -Continue daily senna and polyethylene glycol PRN    (D50.0) Iron deficiency anemia due to chronic blood loss  Comment: Hgb 7.6b today  Plan:   -Follow-up hgb check on 7/5/22  -Continue iron supplement        Electronically signed by: Alvina Gatica RN (FNP student, [facility])                Sincerely,        Martina Omalley, NP

## 2022-06-30 NOTE — PROGRESS NOTES
Preceptor/Student Attestation    I was present with the student who participated in the service and in the documentation of the note, which I have reviewed and identified.       Martina Omalley CNP        Code Status:  DNR/DNI  Visit Type: RECHECK     Facility:   [facility]) [[id number]]         History of Present Illness: Yun Rodgers is a 87 year old female with a PMH of rectal bleeding, hypertension, CAD post stent 2007, peripheral vascular disease s/p right carotid endarterectomy, hyperlipidemia, GERD, anemia, stage III chronic kidney disease, Sjogren's syndrome, and fecal impaction. She was previously hospitalized 6/14-6/17/22 for rectal bleeding secondary to constipation vs diverticulitis; positive for hemorrhoids. Pt declined further GI workup at the time.     Pt seen again in ED on 6/23/22 for rectal bleeding. Hgb 7.2; given 1 unit PRBCs. Pt declining lower GI diagnostics or further workup. Plan was to recheck hgb in 3-5 days if she doesn't have any additonal bleeding.     Today, pt is sitting up in her room. She denies seeing any blood in her stools recently. Stools are loose. She reports feeling SOB at times.     Hgb 7.6, /62 this morning. Repeat /57 around noon.  Facility staff report noticing some small amounts of blood in stools they attribute to hemorrhoids.        Current Outpatient Medications   Medication Sig Dispense Refill     acetaminophen (TYLENOL) 500 MG tablet Take 500-1,000 mg by mouth every 6 hours as needed for mild pain       amLODIPine (NORVASC) 10 MG tablet [AMLODIPINE (NORVASC) 10 MG TABLET] TAKE 1 TABLET BY MOUTH EVERY DAY 30 tablet 2     esomeprazole (NEXIUM) 40 MG DR capsule Take 40 mg by mouth At Bedtime Take 30-60 minutes before eating.       ferrous gluconate (FERGON) 324 (38 Fe) MG tablet Take 1 tablet (324 mg) by mouth daily (with breakfast) for 30 days 30 tablet 0     fexofenadine (ALLEGRA) 180 MG tablet Take 180 mg by mouth daily       hydrocortisone  (Perianal) (ANUSOL-HC) 2.5 % cream Place 1 applicator rectally 2 times daily as needed for hemorrhoids       isosorbide mononitrate (IMDUR) 120 MG 24 hr tablet [ISOSORBIDE MONONITRATE (IMDUR) 120 MG 24 HR TABLET] TAKE 1 TABLET BY MOUTH EVERY DAY **TAKE W/30 MG TABLET** - **PATIENT ONLY WANTS 1 MONTH PER FILL** 30 tablet 11     isosorbide mononitrate (IMDUR) 30 MG 24 hr tablet Take 30 mg by mouth daily        melatonin 1 MG TABS tablet Take 3 tablets (3 mg) by mouth At Bedtime for 30 days 90 tablet 0     metoprolol succinate ER (TOPROL-XL) 25 MG 24 hr tablet Take 75 mg by mouth daily        mirtazapine (REMERON) 15 MG tablet [MIRTAZAPINE (REMERON) 15 MG TABLET] Take 15 mg by mouth at bedtime. Indications: major depressive disorder       MULTIVITAMIN W-MINERALS/LUTEIN (CENTRUM SILVER ORAL) [MULTIVITAMIN W-MINERALS/LUTEIN (CENTRUM SILVER ORAL)] Take 1 tablet by mouth daily.       nitroglycerin (NITROSTAT) 0.4 MG SL tablet [NITROGLYCERIN (NITROSTAT) 0.4 MG SL TABLET] Place 0.4 mg under the tongue as needed for chest pain.       pantoprazole (PROTONIX) 40 MG tablet [PANTOPRAZOLE (PROTONIX) 40 MG TABLET] Take 40 mg by mouth 2 (two) times a day.       peg 400-propylene glycol (SYSTANE) 0.4-0.3 % Drop [-PROPYLENE GLYCOL (SYSTANE) 0.4-0.3 % DROP] Administer 1 drop to both eyes 4 (four) times a day.        phenylephrine-cocoa butter (PREPARATION H) 0.25-88.44 % suppository Place 1 suppository rectally At Bedtime       polyethylene glycol (MIRALAX) 17 GM/Dose powder Take 17 g by mouth daily for 30 days (Patient taking differently: Take 17 g by mouth daily as needed) 510 g 0     senna (SENOKOT) 8.6 MG tablet Take 2 tablets by mouth At Bedtime 60 tablet 0     senna (SENOKOT) 8.6 MG tablet Take 1 tablet by mouth daily as needed for constipation       sodium bicarbonate 650 MG tablet Take 1 tablet (650 mg) by mouth 2 times daily for 30 days 60 tablet 0     triamcinolone (KENALOG) 0.025 % cream [TRIAMCINOLONE (KENALOG) 0.025  % CREAM] Apply 1 application topically 3 (three) times a day as needed.          Review of Systems   Patient denies fever, chills, headache, lightheadedness, dizziness, rhinorrhea, cough, congestion, chest pain, palpitations, abdominal pain, n/v, diarrhea, constipation, change in appetite, change in sleep pattern, dysuria, frequency, burning or pain with urination. Other than stated in HPI all other review of systems is negative.         Physical Exam  Vital signs:BP (!) 145/57   Pulse 65   Temp 97.3  F (36.3  C)   Resp 16   Wt 42.6 kg (94 lb)   SpO2 92%   BMI 16.14 kg/m     GENERAL APPEARANCE: Well developed, well nourished, in no acute distress.  HEENT: normocephalic, atraumatic. Sclerae anicteric, conjunctivae clear and moist  NECK: Supple and symmetric.  LUNGS: Lung sounds CTA, no adventitious sounds, respiratory effort normal. No SOB on exam.  CARD: RRR, S1, S2, without murmurs, gallops, rubs  ABD: Soft, nondistended and nontender with active bowel sounds.   MSK: Muscle strength and tone were equal bilaterally. Moves all extremities easily and intentionally.   EXTREMITIES: No cyanosis, clubbing or edema.  NEURO: Alert with cognitive impairment. Normal affect. Face is symmetric.  SKIN: Inspection of the skin reveals no rashes, ulcerations or petechiae.  PSYCH: euthymic          Labs:     Hemoglobin  Component Ref Range & Units  5:56 AM   (6/30/22) 3 d ago   (6/27/22) 7 d ago   (6/23/22) 7 d ago   (6/23/22) 7 d ago   (6/23/22) 8 d ago   (6/22/22) 2 wk ago   (6/16/22)    Hemoglobin 7.6 8.1   7.8 8.4 7.2 6.9 7.7           Assessment/Plan:  (K62.5) Rectal bleeding  (primary encounter diagnosis)  Comment: Hgb 7.6 today. Pt reporting some SOB. Staff reporting small amount of blood in stools. VSS. Pt is stable today, but high suspicion that pt is bleeding. Will monitor for now.   Plan:   -Recheck hgb on 7/5/22  -nursing to set up outpatient transfusion to Give 1 unit PRBC on 7/6/22. Pt will likely need to go to  ER for blood transfusion d/t difficulty getting an appointment in infusion center during a holiday week  -Continue esomeprazole and pantoprazole  -Continue monitoring for rectal bleeding and regular BP checks    (K64.4) External hemorrhoids  (K59.00) Constipation, unspecified constipation type  Comment: Staff reporting small amount of blood in stools they attribute to hemorrhoids. Stools have been loose, hopefully allowing for some healing of hemorrhoids.   Plan:   -Continue topical treatment with preparation H and preparation H suppository  -Continue daily senna and polyethylene glycol PRN    (D50.0) Iron deficiency anemia due to chronic blood loss  Comment: Hgb 7.6b today  Plan:   -Follow-up hgb check on 7/5/22  -Continue iron supplement        Electronically signed by: Alvina Gatica RN (FNP student, [facility])

## 2022-07-05 NOTE — LETTER
7/5/2022        RE: Yun Rodgers  [address]  [place] [address]        [facility]    Code Status:  DNR/DNI   Visit Type:   Chief Complaint   Patient presents with     Discharge Summary Nursing Home     Facility:  [facility] ([facility]) [[id number]]           Transitional Care Course: Yun Rodgers is a 87 year old female who I am seeing today for follow up on  the TCU.  Patient recently hospitalized on 6/14/2022 secondary to rectal bleeding.  Past medical history includes CKD stage III, constipation, subacute cutaneous lupus, Sjogren's, CAD status post stent in 2007, arthritis, peripheral vascular disease status post right carotid endarterectomy and hyperkalemia.  Patient with rectal bleeding secondary to constipation versus diverticulosis.  She underwent a CT scan which showed fecal impaction with a large volume of stool in the distal sigmoid colon and rectal vault without evidence of obstruction.  She did have scattered diverticuli but no diverticulitis.  Patient positive for hemorrhoids.  Patient was given a pink lady enema on admit and repeated on 6/15.  She was seen by GI.  They did offer sigmoidoscopy however patient declined.  Stool softeners added.  Urinary retention likely due to fecal impaction.  There was no hydronephrosis.  UA was negative.  She did have a Serrano catheter which was placed and removed prior to discharge.  Hypokalemia with metabolic acidosis.  This is recurrent.  Bicarbonate has been as low as 11.  She continues on bicarb supplement.  She was seen by nephrology.  Potassium replaced.  Acute kidney injury on chronic kidney disease stage III.  This was felt due to obstruction.  UA negative.  She was treated with fluids.  CAD with history of LAD stent 2007 with abnormal stress test in 2018 which showed a small area of distal anterior septal ischemia with normal ejection fracture.  Patient not on aspirin.  GERD.  Continues on PPI.  Anemia with iron deficiency.   Hemoglobin on admit was 7.3.  No B12 or folate deficiency.  Recheck was 7.7.  Iron was supplemented.  Hypertension.  Failure to thrive.  Mild intermittent dysphagia with poor appetite.  This thought may be due to Sjogren's.  TSH normal.  Patient was evaluated by speech therapy and recommended thicken fluids.    On today's visit pt sitting up in bedside chair.  Patient with continued rectal bleeding.  She was rehospitalized and discharged back to facility on increased bowel meds.  She continued to have loose stools.  She continues on MiraLAX.  Today she reported bright red bleeding of small amount after having bowel movement.  No visual hemorrhoids.  She continues on Preparation H suppository.  During hospitalization patient received 1 unit of packed red blood cells for hemoglobin of 6.9.  Post transfusion hemoglobin improved to 8.2.  She is continued to trend downward and now at 7.3.  Patient hemodynamically stable.  She will undergo outpatient transfusion for 2 units of packed red blood cells in the a.m. at [facility].  She denies any dizziness.  Blood pressure stable.  In the past patient declined further work-up.  Today we discussed this again and patient is willing to follow-up with GI post transfusion.  Patient denies any abdominal pain or discomfort.  She does have some slight swelling to the lower extremity secondary to recent transfusion.  Patient reports chronic shortness of breath no increase.  Acute kidney injury on chronic kidney disease.  Now with hyponatremia.  Sodium 132.  Creatinine 1.34.       Active Ambulatory Problems     Diagnosis Date Noted     Mixed hyperlipidemia      Essential hypertension      Coronary Artery Disease      Carotid artery disease (H)      Peripheral vascular disease (H)      Gastroesophageal reflux disease with esophagitis      Chest pain, unspecified type      Angina concurrent with and due to arteriosclerosis of coronary artery (H) 01/09/2015     Stable angina  "(H) 01/15/2015     Sinus bradycardia 02/26/2016     Dehydration 11/12/2017     Cough      Malaise      Hyperkalemia      Gastroesophageal reflux disease without esophagitis      Atypical pneumonia 11/13/2017     Nausea 11/14/2017     Trimalleolar fracture 02/24/2018     Anemia, unspecified type      Coronary artery disease of native artery of native heart with stable angina pectoris (H)      Pulmonary valve disorder      Fracture dislocation of ankle, right, closed, initial encounter 02/24/2018     Closed right ankle fracture 03/02/2018     Stage 3 chronic kidney disease (H) 03/29/2018     Acute renal failure, unspecified acute renal failure type (H) 06/14/2022     Fecal impaction in rectum (H) 06/14/2022     Urinary retention 06/14/2022     Hypokalemia 06/14/2022     Sjogren's syndrome (H) 06/21/2022     Poor appetite 06/21/2022     Lesion of ulnar nerve 06/21/2022     History of tobacco use 06/21/2022     History of coronary artery bypass surgery 06/21/2022     Headache 06/21/2022     Grief 06/21/2022     Constipation 06/21/2022     Chronic fatigue syndrome 06/21/2022     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     Adult failure to thrive syndrome 06/21/2022     Acquired trigger finger 06/21/2022     Abnormal gait 06/21/2022     Ulcer of external ear (H) 06/21/2022     Resolved Ambulatory Problems     Diagnosis Date Noted     CRI (chronic renal insufficiency), stage 3 (moderate) (H)      Past Medical History:   Diagnosis Date     Antiplatelet or antithrombotic long-term use      Hypertension      Stented coronary artery        Allergies   Allergen Reactions     Aminoquinolines Rash     Other reaction(s): rash     Primaquine Rash     Aloe      Other reaction(s): itchy skin     Atorvastatin Unknown     Other reaction(s): muscle aches     Demeclocycline Other (See Comments)     \"sore bottom\"     Diagnostic X-Ray Materials Unknown and Other (See Comments)     Chest tightness,sshakes     Guaiacol Hives     Robitussin D     " "Guaifenesin & Derivatives Hives     Robitussin D     Metrizamide Other (See Comments)     Chest tightness,sshakes     Pravastatin Unknown     Other reaction(s): muscle aches     Robitussin [Guaifenesin] Unknown     Simvastatin Other (See Comments)     Muscle aches   Other reaction(s): muscle aches     Tetracyclines Unknown and Other (See Comments)     Other reaction(s): Unknown  \"sore bottom\"       Hydroxychloroquine Sulfate [Hydroxychloroquine] Rash     Preservision Areds Rash     AREDs formula only caused rash.        All Meds and Allergies reviewed in the record at the facility and is the most up-to-date.      Current Outpatient Medications   Medication Sig     acetaminophen (TYLENOL) 500 MG tablet Take 500-1,000 mg by mouth every 6 hours as needed for mild pain     amLODIPine (NORVASC) 10 MG tablet [AMLODIPINE (NORVASC) 10 MG TABLET] TAKE 1 TABLET BY MOUTH EVERY DAY     esomeprazole (NEXIUM) 40 MG DR capsule Take 40 mg by mouth At Bedtime Take 30-60 minutes before eating.     ferrous gluconate (FERGON) 324 (38 Fe) MG tablet Take 1 tablet (324 mg) by mouth daily (with breakfast) for 30 days     fexofenadine (ALLEGRA) 180 MG tablet Take 180 mg by mouth daily     hydrocortisone (Perianal) (ANUSOL-HC) 2.5 % cream Place 1 applicator rectally 2 times daily as needed for hemorrhoids     isosorbide mononitrate (IMDUR) 120 MG 24 hr tablet [ISOSORBIDE MONONITRATE (IMDUR) 120 MG 24 HR TABLET] TAKE 1 TABLET BY MOUTH EVERY DAY **TAKE W/30 MG TABLET** - **PATIENT ONLY WANTS 1 MONTH PER FILL**     isosorbide mononitrate (IMDUR) 30 MG 24 hr tablet Take 30 mg by mouth daily      melatonin 1 MG TABS tablet Take 3 tablets (3 mg) by mouth At Bedtime for 30 days     metoprolol succinate ER (TOPROL-XL) 25 MG 24 hr tablet Take 75 mg by mouth daily      mirtazapine (REMERON) 15 MG tablet [MIRTAZAPINE (REMERON) 15 MG TABLET] Take 15 mg by mouth at bedtime. Indications: major depressive disorder     MULTIVITAMIN W-MINERALS/LUTEIN " "(CENTRUM SILVER ORAL) [MULTIVITAMIN W-MINERALS/LUTEIN (CENTRUM SILVER ORAL)] Take 1 tablet by mouth daily.     nitroglycerin (NITROSTAT) 0.4 MG SL tablet [NITROGLYCERIN (NITROSTAT) 0.4 MG SL TABLET] Place 0.4 mg under the tongue as needed for chest pain.     pantoprazole (PROTONIX) 40 MG tablet [PANTOPRAZOLE (PROTONIX) 40 MG TABLET] Take 40 mg by mouth 2 (two) times a day.     peg 400-propylene glycol (SYSTANE) 0.4-0.3 % Drop [-PROPYLENE GLYCOL (SYSTANE) 0.4-0.3 % DROP] Administer 1 drop to both eyes 4 (four) times a day.      phenylephrine-cocoa butter (PREPARATION H) 0.25-88.44 % suppository Place 1 suppository rectally At Bedtime     polyethylene glycol (MIRALAX) 17 GM/Dose powder Take 17 g by mouth daily for 30 days (Patient taking differently: Take 17 g by mouth daily as needed)     senna (SENOKOT) 8.6 MG tablet Take 2 tablets by mouth At Bedtime     senna (SENOKOT) 8.6 MG tablet Take 1 tablet by mouth daily as needed for constipation     sodium bicarbonate 650 MG tablet Take 1 tablet (650 mg) by mouth 2 times daily for 30 days     triamcinolone (KENALOG) 0.025 % cream [TRIAMCINOLONE (KENALOG) 0.025 % CREAM] Apply 1 application topically 3 (three) times a day as needed.      No current facility-administered medications for this visit.       REVIEW OF SYSTEMS:   Review of Systems  10 point review of systems reviewed and pertinent positives identified in the HPI.     PHYSICAL EXAMINATION:  Physical Exam     Vital signs: BP (!) 153/76   Pulse 70   Temp 98.7  F (37.1  C)   Resp 14   Ht 1.626 m (5' 4\")   Wt 44 kg (97 lb)   SpO2 97%   BMI 16.65 kg/m    General: Awake, Alert, sitting up in bedside chair.  Conversant  HEENT:Pink conjunctiva, anicteric sclerae, moist oral mucosa  NECK: Supple  CVS:  S1  S2, without murmur or gallop.   LUNG: Clear to auscultation, No wheezes, rales or rhonci.  BACK: No kyphosis of the thoracic spine  ABDOMEN: Soft, thin, nontender to palpation, with positive bowel sounds. "   EXTREMITIES: Moves both upper and lower extremities edema 1+..   NEUROLOGIC: Intact  PSYCHIATRIC: Pleasant affect.      Labs:  All labs reviewed in the nursing home record and Epic   @  Lab Results   Component Value Date    WBC 8.0 06/22/2022     Lab Results   Component Value Date    RBC 2.21 06/22/2022     Lab Results   Component Value Date    HGB 6.9 06/22/2022     Lab Results   Component Value Date    HCT 22.3 06/22/2022     Lab Results   Component Value Date     06/22/2022     Lab Results   Component Value Date    MCH 31.2 06/22/2022     Lab Results   Component Value Date    MCHC 30.9 06/22/2022     Lab Results   Component Value Date    RDW 13.7 06/22/2022     Lab Results   Component Value Date     06/22/2022        @Last Comprehensive Metabolic Panel:  Sodium   Date Value Ref Range Status   06/23/2022 132 (L) 136 - 145 mmol/L Final     Potassium   Date Value Ref Range Status   06/23/2022 4.7 3.5 - 5.0 mmol/L Final     Chloride   Date Value Ref Range Status   06/23/2022 104 98 - 107 mmol/L Final     Carbon Dioxide (CO2)   Date Value Ref Range Status   06/23/2022 21 (L) 22 - 31 mmol/L Final     Anion Gap   Date Value Ref Range Status   06/23/2022 7 5 - 18 mmol/L Final     Glucose   Date Value Ref Range Status   06/23/2022 96 70 - 125 mg/dL Final     Urea Nitrogen   Date Value Ref Range Status   06/23/2022 13 8 - 28 mg/dL Final     Creatinine   Date Value Ref Range Status   06/23/2022 1.35 (H) 0.60 - 1.10 mg/dL Final     GFR Estimate   Date Value Ref Range Status   06/23/2022 38 (L) >60 mL/min/1.73m2 Final     Comment:     Effective December 21, 2021 eGFRcr in adults is calculated using the 2021 CKD-EPI creatinine equation which includes age and gender (Romulo barrera al., NEJ, DOI: 10.1056/ARGOpj6623375)   05/13/2021 32 (L) >60 mL/min/1.73m2 Final     Calcium   Date Value Ref Range Status   06/23/2022 8.0 (L) 8.5 - 10.5 mg/dL Final     Assessment/plan:    (D50.0) Iron deficiency anemia due to chronic  blood loss  Comment: Patient continues on iron supplement.  Patient recently rehospitalized.  She underwent 1 unit of packed red blood cells.  Hemoglobin improved to 8.2.  Now trending downward at 7.3.  Weight x3 negative.  Hemodynamically stable.  Plan: Patient will undergo 2 units of packed red blood cells at [facility] outpatient transfusion center.  Repeat hemoglobin the following day.    (K62.5) Rectal bleeding  Comment: Patient continues with some slight rectal bleeding.  She declined work-up in the past however is open to this after her blood transfusion.  No visible hemorrhoids.  Plan: Continues on hemorrhoidal suppository.  Continue to monitor.    (K64.4) External hemorrhoids  Comment: No external hemorrhoids visible.  Continues with hemorrhoidal suppository nightly.  Patient continues on MiraLAX daily.  Plan: Continue above treatment    (N17.9,  N18.9) Acute kidney injury superimposed on chronic kidney disease (H)  Comment: Patient appears somewhat dry.  She will undergo fluids with her blood transfusion.  Previous creatinine 1.34.  Plan: Repeat BMP on Thursday.      (E87.1) Hyponatremia  (primary encounter diagnosis)  Comment: Sodium 132.  Plan: Repeat BMP on Thursday.    Total time spent for this visit was 35 minutes with greater than 50% time spent face-to-face patient reviewing risk factors associated with blood transfusion as well as obtaining consent and collaborating with nursing staff and outpatient transfusion center.      This note has been dictated using voice recognition software. Any grammatical or context distortions are unintentional and inherent to the software    Electronically signed by: Jacque Robles CNP           Sincerely,        Jacque Robles NP

## 2022-07-07 NOTE — PROGRESS NOTES
[facility] GERIATRIC SERVICES    Code Status:  DNR/DNI   Visit Type:   Chief Complaint   Patient presents with     Nursing Home Acute     TCU Follow up     Facility:  [facility] ([facility]) [[id number]]           Transitional Care Course: Yun Rodgers is a 87 year old female who I am seeing today for follow up on  the TCU.  Patient recently hospitalized on 6/14/2022 secondary to rectal bleeding.  Past medical history includes CKD stage III, constipation, subacute cutaneous lupus, Sjogren's, CAD status post stent in 2007, arthritis, peripheral vascular disease status post right carotid endarterectomy and hyperkalemia.  Patient with rectal bleeding secondary to constipation versus diverticulosis.  She underwent a CT scan which showed fecal impaction with a large volume of stool in the distal sigmoid colon and rectal vault without evidence of obstruction.  She did have scattered diverticuli but no diverticulitis.  Patient positive for hemorrhoids.  Patient was given a pink lady enema on admit and repeated on 6/15.  She was seen by GI.  They did offer sigmoidoscopy however patient declined.  Stool softeners added.  Urinary retention likely due to fecal impaction.  There was no hydronephrosis.  UA was negative.  She did have a Serrano catheter which was placed and removed prior to discharge.  Hypokalemia with metabolic acidosis.  This is recurrent.  Bicarbonate has been as low as 11.  She continues on bicarb supplement.  She was seen by nephrology.  Potassium replaced.  Acute kidney injury on chronic kidney disease stage III.  This was felt due to obstruction.  UA negative.  She was treated with fluids.  CAD with history of LAD stent 2007 with abnormal stress test in 2018 which showed a small area of distal anterior septal ischemia with normal ejection fracture.  Patient not on aspirin.  GERD.  Continues on PPI.  Anemia with iron deficiency.  Hemoglobin on admit was 7.3.  No B12 or folate deficiency.  Recheck was 7.7.   Iron was supplemented.  Hypertension.  Failure to thrive.  Mild intermittent dysphagia with poor appetite.  This thought may be due to Sjogren's.  TSH normal.  Patient was evaluated by speech therapy and recommended thicken fluids.    On today's visit pt sitting up in bedside chair.  Patient with history of rectal bleeding.  No visual hemorrhoids.  She continues with Preparation H suppository at bedtime.  Constipation.  She continues on MiraLAX.  She is having bowel movements regularly.  She denies any abdominal pain or discomfort.  She continues to have a drop in her hemoglobin.  She is having a small amount of bleeding with each bowel movement.  She declined further work-up during hospitalization however is now consenting to follow-up.  Yesterday she went for a blood transfusion of 1 unit of packed red blood cell.  Post hemoglobin 8.2.  Hemoglobin pending today.  Patient hemodynamically stable bowl.  Blood pressure satisfactory.  Previous low sodium.  Sodium 132.  This has been about her baseline.        Active Ambulatory Problems     Diagnosis Date Noted     Mixed hyperlipidemia      Essential hypertension      Coronary Artery Disease      Carotid artery disease (H)      Peripheral vascular disease (H)      Gastroesophageal reflux disease with esophagitis      Chest pain, unspecified type      Angina concurrent with and due to arteriosclerosis of coronary artery (H) 01/09/2015     Stable angina (H) 01/15/2015     Sinus bradycardia 02/26/2016     Dehydration 11/12/2017     Cough      Malaise      Hyperkalemia      Gastroesophageal reflux disease without esophagitis      Atypical pneumonia 11/13/2017     Nausea 11/14/2017     Trimalleolar fracture 02/24/2018     Anemia, unspecified type      Coronary artery disease of native artery of native heart with stable angina pectoris (H)      Pulmonary valve disorder      Fracture dislocation of ankle, right, closed, initial encounter 02/24/2018     Closed right ankle  "fracture 03/02/2018     Stage 3 chronic kidney disease (H) 03/29/2018     Acute renal failure, unspecified acute renal failure type (H) 06/14/2022     Fecal impaction in rectum (H) 06/14/2022     Urinary retention 06/14/2022     Hypokalemia 06/14/2022     Sjogren's syndrome (H) 06/21/2022     Poor appetite 06/21/2022     Lesion of ulnar nerve 06/21/2022     History of tobacco use 06/21/2022     History of coronary artery bypass surgery 06/21/2022     Headache 06/21/2022     Grief 06/21/2022     Constipation 06/21/2022     Chronic fatigue syndrome 06/21/2022     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     Adult failure to thrive syndrome 06/21/2022     Acquired trigger finger 06/21/2022     Abnormal gait 06/21/2022     Ulcer of external ear (H) 06/21/2022     Resolved Ambulatory Problems     Diagnosis Date Noted     CRI (chronic renal insufficiency), stage 3 (moderate) (H)      Past Medical History:   Diagnosis Date     Antiplatelet or antithrombotic long-term use      Hypertension      Stented coronary artery        Allergies   Allergen Reactions     Aminoquinolines Rash     Other reaction(s): rash     Primaquine Rash     Aloe      Other reaction(s): itchy skin     Atorvastatin Unknown     Other reaction(s): muscle aches     Demeclocycline Other (See Comments)     \"sore bottom\"     Diagnostic X-Ray Materials Unknown and Other (See Comments)     Chest tightness,sshakes     Guaiacol Hives     Robitussin D     Guaifenesin & Derivatives Hives     Robitussin D     Metrizamide Other (See Comments)     Chest tightness,sshakes     Pravastatin Unknown     Other reaction(s): muscle aches     Robitussin [Guaifenesin] Unknown     Simvastatin Other (See Comments)     Muscle aches   Other reaction(s): muscle aches     Tetracyclines Unknown and Other (See Comments)     Other reaction(s): Unknown  \"sore bottom\"       Hydroxychloroquine Sulfate [Hydroxychloroquine] Rash     Preservision Areds Rash     AREDs formula only caused rash.  "       All Meds and Allergies reviewed in the record at the facility and is the most up-to-date.      Current Outpatient Medications   Medication Sig     acetaminophen (TYLENOL) 500 MG tablet Take 500-1,000 mg by mouth every 6 hours as needed for mild pain     amLODIPine (NORVASC) 10 MG tablet [AMLODIPINE (NORVASC) 10 MG TABLET] TAKE 1 TABLET BY MOUTH EVERY DAY     esomeprazole (NEXIUM) 40 MG DR capsule Take 40 mg by mouth At Bedtime Take 30-60 minutes before eating.     ferrous gluconate (FERGON) 324 (38 Fe) MG tablet Take 1 tablet (324 mg) by mouth daily (with breakfast) for 30 days     fexofenadine (ALLEGRA) 180 MG tablet Take 180 mg by mouth daily     hydrocortisone (Perianal) (ANUSOL-HC) 2.5 % cream Place 1 applicator rectally 2 times daily as needed for hemorrhoids     isosorbide mononitrate (IMDUR) 120 MG 24 hr tablet [ISOSORBIDE MONONITRATE (IMDUR) 120 MG 24 HR TABLET] TAKE 1 TABLET BY MOUTH EVERY DAY **TAKE W/30 MG TABLET** - **PATIENT ONLY WANTS 1 MONTH PER FILL**     isosorbide mononitrate (IMDUR) 30 MG 24 hr tablet Take 30 mg by mouth daily      melatonin 1 MG TABS tablet Take 3 tablets (3 mg) by mouth At Bedtime for 30 days     metoprolol succinate ER (TOPROL-XL) 25 MG 24 hr tablet Take 75 mg by mouth daily      mirtazapine (REMERON) 15 MG tablet [MIRTAZAPINE (REMERON) 15 MG TABLET] Take 15 mg by mouth at bedtime. Indications: major depressive disorder     MULTIVITAMIN W-MINERALS/LUTEIN (CENTRUM SILVER ORAL) [MULTIVITAMIN W-MINERALS/LUTEIN (CENTRUM SILVER ORAL)] Take 1 tablet by mouth daily.     nitroglycerin (NITROSTAT) 0.4 MG SL tablet [NITROGLYCERIN (NITROSTAT) 0.4 MG SL TABLET] Place 0.4 mg under the tongue as needed for chest pain.     pantoprazole (PROTONIX) 40 MG tablet [PANTOPRAZOLE (PROTONIX) 40 MG TABLET] Take 40 mg by mouth 2 (two) times a day.     peg 400-propylene glycol (SYSTANE) 0.4-0.3 % Drop [-PROPYLENE GLYCOL (SYSTANE) 0.4-0.3 % DROP] Administer 1 drop to both eyes 4 (four) times  "a day.      phenylephrine-cocoa butter (PREPARATION H) 0.25-88.44 % suppository Place 1 suppository rectally At Bedtime     polyethylene glycol (MIRALAX) 17 GM/Dose powder Take 17 g by mouth daily for 30 days (Patient taking differently: Take 17 g by mouth daily as needed)     senna (SENOKOT) 8.6 MG tablet Take 2 tablets by mouth At Bedtime     senna (SENOKOT) 8.6 MG tablet Take 1 tablet by mouth daily as needed for constipation     sodium bicarbonate 650 MG tablet Take 1 tablet (650 mg) by mouth 2 times daily for 30 days     triamcinolone (KENALOG) 0.025 % cream [TRIAMCINOLONE (KENALOG) 0.025 % CREAM] Apply 1 application topically 3 (three) times a day as needed.      No current facility-administered medications for this visit.       REVIEW OF SYSTEMS:   Review of Systems  10 point review of systems reviewed and pertinent positives identified in the HPI.     PHYSICAL EXAMINATION: Reviewed with changes.  Physical Exam     Vital signs: /54   Pulse 68   Temp 97.9  F (36.6  C)   Resp 14   Ht 1.651 m (5' 5\")   Wt 44.1 kg (97 lb 3.2 oz)   SpO2 100%   BMI 16.17 kg/m    General: Awake, Alert, sitting up in bedside chair.  Conversant  HEENT:Pink conjunctiva, anicteric sclerae, moist oral mucosa  NECK: Supple  CVS:  S1  S2, without murmur or gallop.   LUNG: Clear to auscultation, No wheezes, rales or rhonci.  BACK: No kyphosis of the thoracic spine  ABDOMEN: Soft, thin, nontender to palpation, with positive bowel sounds.   EXTREMITIES: Moves both upper and lower extremities.  1+ lower extremity edema.  NEUROLOGIC: Intact  PSYCHIATRIC: Pleasant affect.      Labs:  All labs reviewed in the nursing home record and Epic   @  Lab Results   Component Value Date    WBC 8.0 06/22/2022     Lab Results   Component Value Date    RBC 2.21 06/22/2022     Lab Results   Component Value Date    HGB 6.9 06/22/2022     Lab Results   Component Value Date    HCT 22.3 06/22/2022     Lab Results   Component Value Date     " 06/22/2022     Lab Results   Component Value Date    MCH 31.2 06/22/2022     Lab Results   Component Value Date    MCHC 30.9 06/22/2022     Lab Results   Component Value Date    RDW 13.7 06/22/2022     Lab Results   Component Value Date     06/22/2022        @Last Comprehensive Metabolic Panel:  Sodium   Date Value Ref Range Status   06/23/2022 132 (L) 136 - 145 mmol/L Final     Potassium   Date Value Ref Range Status   06/23/2022 4.7 3.5 - 5.0 mmol/L Final     Chloride   Date Value Ref Range Status   06/23/2022 104 98 - 107 mmol/L Final     Carbon Dioxide (CO2)   Date Value Ref Range Status   06/23/2022 21 (L) 22 - 31 mmol/L Final     Anion Gap   Date Value Ref Range Status   06/23/2022 7 5 - 18 mmol/L Final     Glucose   Date Value Ref Range Status   06/23/2022 96 70 - 125 mg/dL Final     Urea Nitrogen   Date Value Ref Range Status   06/23/2022 13 8 - 28 mg/dL Final     Creatinine   Date Value Ref Range Status   06/23/2022 1.35 (H) 0.60 - 1.10 mg/dL Final     GFR Estimate   Date Value Ref Range Status   06/23/2022 38 (L) >60 mL/min/1.73m2 Final     Comment:     Effective December 21, 2021 eGFRcr in adults is calculated using the 2021 CKD-EPI creatinine equation which includes age and gender (Romulo et al., [place], DOI: 10.1056/DHFOmt7533172)   05/13/2021 32 (L) >60 mL/min/1.73m2 Final     Calcium   Date Value Ref Range Status   06/23/2022 8.0 (L) 8.5 - 10.5 mg/dL Final     Assessment/plan:      ICD-10-CM    1. Iron deficiency anemia due to chronic blood loss  D50.0  Patient continues on iron supplement.  She went for blood transfusion for 1 unit of packed red blood cells yesterday.  Hemoglobin pending today.  Previously 8.2.  Patient currently hemodynamically stable.  Follow-up hemoglobin on Monday.   2. Rectal bleeding  K62.5  Patient continues with a small amount of bright red blood with each bowel movement.  She declined further work-up during hospitalization however is now consenting.  Follow-up with GI.    3. External hemorrhoids  K64.4  Continue Preparation H suppository.  No visual external hemorrhoids noted.   4. Constipation, unspecified constipation type  K59.00  continue MiraLAX.   5. Hyponatremia  E87.1  Sodium 132.  Follow-up BMP on Monday.         This note has been dictated using voice recognition software. Any grammatical or context distortions are unintentional and inherent to the software    Electronically signed by: Jacque Robles CNP      [Follow Up] : a follow up visit [Patient] : patient [Mother] : mother [FreeTextEntry1] : Right patella dislocation

## 2022-07-07 NOTE — LETTER
7/7/2022        RE: Yun Rodgers  [address]  [place] [address]        [facility]    Code Status:  DNR/DNI   Visit Type:   Chief Complaint   Patient presents with     Nursing Home Acute     TCU Follow up     Facility:  [facility] ([facility]) [[id number]]           Transitional Care Course: Yun Rodgers is a 87 year old female who I am seeing today for follow up on  the TCU.  Patient recently hospitalized on 6/14/2022 secondary to rectal bleeding.  Past medical history includes CKD stage III, constipation, subacute cutaneous lupus, Sjogren's, CAD status post stent in 2007, arthritis, peripheral vascular disease status post right carotid endarterectomy and hyperkalemia.  Patient with rectal bleeding secondary to constipation versus diverticulosis.  She underwent a CT scan which showed fecal impaction with a large volume of stool in the distal sigmoid colon and rectal vault without evidence of obstruction.  She did have scattered diverticuli but no diverticulitis.  Patient positive for hemorrhoids.  Patient was given a pink lady enema on admit and repeated on 6/15.  She was seen by GI.  They did offer sigmoidoscopy however patient declined.  Stool softeners added.  Urinary retention likely due to fecal impaction.  There was no hydronephrosis.  UA was negative.  She did have a Serrano catheter which was placed and removed prior to discharge.  Hypokalemia with metabolic acidosis.  This is recurrent.  Bicarbonate has been as low as 11.  She continues on bicarb supplement.  She was seen by nephrology.  Potassium replaced.  Acute kidney injury on chronic kidney disease stage III.  This was felt due to obstruction.  UA negative.  She was treated with fluids.  CAD with history of LAD stent 2007 with abnormal stress test in 2018 which showed a small area of distal anterior septal ischemia with normal ejection fracture.  Patient not on aspirin.  GERD.  Continues on PPI.  Anemia with iron  deficiency.  Hemoglobin on admit was 7.3.  No B12 or folate deficiency.  Recheck was 7.7.  Iron was supplemented.  Hypertension.  Failure to thrive.  Mild intermittent dysphagia with poor appetite.  This thought may be due to Sjogren's.  TSH normal.  Patient was evaluated by speech therapy and recommended thicken fluids.    On today's visit pt sitting up in bedside chair.  Patient with history of rectal bleeding.  No visual hemorrhoids.  She continues with Preparation H suppository at bedtime.  Constipation.  She continues on MiraLAX.  She is having bowel movements regularly.  She denies any abdominal pain or discomfort.  She continues to have a drop in her hemoglobin.  She is having a small amount of bleeding with each bowel movement.  She declined further work-up during hospitalization however is now consenting to follow-up.  Yesterday she went for a blood transfusion of 1 unit of packed red blood cell.  Post hemoglobin 8.2.  Hemoglobin pending today.  Patient hemodynamically stable bowl.  Blood pressure satisfactory.  Previous low sodium.  Sodium 132.  This has been about her baseline.        Active Ambulatory Problems     Diagnosis Date Noted     Mixed hyperlipidemia      Essential hypertension      Coronary Artery Disease      Carotid artery disease (H)      Peripheral vascular disease (H)      Gastroesophageal reflux disease with esophagitis      Chest pain, unspecified type      Angina concurrent with and due to arteriosclerosis of coronary artery (H) 01/09/2015     Stable angina (H) 01/15/2015     Sinus bradycardia 02/26/2016     Dehydration 11/12/2017     Cough      Malaise      Hyperkalemia      Gastroesophageal reflux disease without esophagitis      Atypical pneumonia 11/13/2017     Nausea 11/14/2017     Trimalleolar fracture 02/24/2018     Anemia, unspecified type      Coronary artery disease of native artery of native heart with stable angina pectoris (H)      Pulmonary valve disorder      Fracture  "dislocation of ankle, right, closed, initial encounter 02/24/2018     Closed right ankle fracture 03/02/2018     Stage 3 chronic kidney disease (H) 03/29/2018     Acute renal failure, unspecified acute renal failure type (H) 06/14/2022     Fecal impaction in rectum (H) 06/14/2022     Urinary retention 06/14/2022     Hypokalemia 06/14/2022     Sjogren's syndrome (H) 06/21/2022     Poor appetite 06/21/2022     Lesion of ulnar nerve 06/21/2022     History of tobacco use 06/21/2022     History of coronary artery bypass surgery 06/21/2022     Headache 06/21/2022     Grief 06/21/2022     Constipation 06/21/2022     Chronic fatigue syndrome 06/21/2022     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     Adult failure to thrive syndrome 06/21/2022     Acquired trigger finger 06/21/2022     Abnormal gait 06/21/2022     Ulcer of external ear (H) 06/21/2022     Resolved Ambulatory Problems     Diagnosis Date Noted     CRI (chronic renal insufficiency), stage 3 (moderate) (H)      Past Medical History:   Diagnosis Date     Antiplatelet or antithrombotic long-term use      Hypertension      Stented coronary artery        Allergies   Allergen Reactions     Aminoquinolines Rash     Other reaction(s): rash     Primaquine Rash     Aloe      Other reaction(s): itchy skin     Atorvastatin Unknown     Other reaction(s): muscle aches     Demeclocycline Other (See Comments)     \"sore bottom\"     Diagnostic X-Ray Materials Unknown and Other (See Comments)     Chest tightness,sshakes     Guaiacol Hives     Robitussin D     Guaifenesin & Derivatives Hives     Robitussin D     Metrizamide Other (See Comments)     Chest tightness,sshakes     Pravastatin Unknown     Other reaction(s): muscle aches     Robitussin [Guaifenesin] Unknown     Simvastatin Other (See Comments)     Muscle aches   Other reaction(s): muscle aches     Tetracyclines Unknown and Other (See Comments)     Other reaction(s): Unknown  \"sore bottom\"       Hydroxychloroquine Sulfate " [Hydroxychloroquine] Rash     Preservision Areds Rash     AREDs formula only caused rash.        All Meds and Allergies reviewed in the record at the facility and is the most up-to-date.      Current Outpatient Medications   Medication Sig     acetaminophen (TYLENOL) 500 MG tablet Take 500-1,000 mg by mouth every 6 hours as needed for mild pain     amLODIPine (NORVASC) 10 MG tablet [AMLODIPINE (NORVASC) 10 MG TABLET] TAKE 1 TABLET BY MOUTH EVERY DAY     esomeprazole (NEXIUM) 40 MG DR capsule Take 40 mg by mouth At Bedtime Take 30-60 minutes before eating.     ferrous gluconate (FERGON) 324 (38 Fe) MG tablet Take 1 tablet (324 mg) by mouth daily (with breakfast) for 30 days     fexofenadine (ALLEGRA) 180 MG tablet Take 180 mg by mouth daily     hydrocortisone (Perianal) (ANUSOL-HC) 2.5 % cream Place 1 applicator rectally 2 times daily as needed for hemorrhoids     isosorbide mononitrate (IMDUR) 120 MG 24 hr tablet [ISOSORBIDE MONONITRATE (IMDUR) 120 MG 24 HR TABLET] TAKE 1 TABLET BY MOUTH EVERY DAY **TAKE W/30 MG TABLET** - **PATIENT ONLY WANTS 1 MONTH PER FILL**     isosorbide mononitrate (IMDUR) 30 MG 24 hr tablet Take 30 mg by mouth daily      melatonin 1 MG TABS tablet Take 3 tablets (3 mg) by mouth At Bedtime for 30 days     metoprolol succinate ER (TOPROL-XL) 25 MG 24 hr tablet Take 75 mg by mouth daily      mirtazapine (REMERON) 15 MG tablet [MIRTAZAPINE (REMERON) 15 MG TABLET] Take 15 mg by mouth at bedtime. Indications: major depressive disorder     MULTIVITAMIN W-MINERALS/LUTEIN (CENTRUM SILVER ORAL) [MULTIVITAMIN W-MINERALS/LUTEIN (CENTRUM SILVER ORAL)] Take 1 tablet by mouth daily.     nitroglycerin (NITROSTAT) 0.4 MG SL tablet [NITROGLYCERIN (NITROSTAT) 0.4 MG SL TABLET] Place 0.4 mg under the tongue as needed for chest pain.     pantoprazole (PROTONIX) 40 MG tablet [PANTOPRAZOLE (PROTONIX) 40 MG TABLET] Take 40 mg by mouth 2 (two) times a day.     peg 400-propylene glycol (SYSTANE) 0.4-0.3 % Drop [PEG  "400-PROPYLENE GLYCOL (SYSTANE) 0.4-0.3 % DROP] Administer 1 drop to both eyes 4 (four) times a day.      phenylephrine-cocoa butter (PREPARATION H) 0.25-88.44 % suppository Place 1 suppository rectally At Bedtime     polyethylene glycol (MIRALAX) 17 GM/Dose powder Take 17 g by mouth daily for 30 days (Patient taking differently: Take 17 g by mouth daily as needed)     senna (SENOKOT) 8.6 MG tablet Take 2 tablets by mouth At Bedtime     senna (SENOKOT) 8.6 MG tablet Take 1 tablet by mouth daily as needed for constipation     sodium bicarbonate 650 MG tablet Take 1 tablet (650 mg) by mouth 2 times daily for 30 days     triamcinolone (KENALOG) 0.025 % cream [TRIAMCINOLONE (KENALOG) 0.025 % CREAM] Apply 1 application topically 3 (three) times a day as needed.      No current facility-administered medications for this visit.       REVIEW OF SYSTEMS:   Review of Systems  10 point review of systems reviewed and pertinent positives identified in the HPI.     PHYSICAL EXAMINATION: Reviewed with changes.  Physical Exam     Vital signs: /54   Pulse 68   Temp 97.9  F (36.6  C)   Resp 14   Ht 1.651 m (5' 5\")   Wt 44.1 kg (97 lb 3.2 oz)   SpO2 100%   BMI 16.17 kg/m    General: Awake, Alert, sitting up in bedside chair.  Conversant  HEENT:Pink conjunctiva, anicteric sclerae, moist oral mucosa  NECK: Supple  CVS:  S1  S2, without murmur or gallop.   LUNG: Clear to auscultation, No wheezes, rales or rhonci.  BACK: No kyphosis of the thoracic spine  ABDOMEN: Soft, thin, nontender to palpation, with positive bowel sounds.   EXTREMITIES: Moves both upper and lower extremities.  1+ lower extremity edema.  NEUROLOGIC: Intact  PSYCHIATRIC: Pleasant affect.      Labs:  All labs reviewed in the nursing home record and Epic   @  Lab Results   Component Value Date    WBC 8.0 06/22/2022     Lab Results   Component Value Date    RBC 2.21 06/22/2022     Lab Results   Component Value Date    HGB 6.9 06/22/2022     Lab Results "   Component Value Date    HCT 22.3 06/22/2022     Lab Results   Component Value Date     06/22/2022     Lab Results   Component Value Date    MCH 31.2 06/22/2022     Lab Results   Component Value Date    MCHC 30.9 06/22/2022     Lab Results   Component Value Date    RDW 13.7 06/22/2022     Lab Results   Component Value Date     06/22/2022        @Last Comprehensive Metabolic Panel:  Sodium   Date Value Ref Range Status   06/23/2022 132 (L) 136 - 145 mmol/L Final     Potassium   Date Value Ref Range Status   06/23/2022 4.7 3.5 - 5.0 mmol/L Final     Chloride   Date Value Ref Range Status   06/23/2022 104 98 - 107 mmol/L Final     Carbon Dioxide (CO2)   Date Value Ref Range Status   06/23/2022 21 (L) 22 - 31 mmol/L Final     Anion Gap   Date Value Ref Range Status   06/23/2022 7 5 - 18 mmol/L Final     Glucose   Date Value Ref Range Status   06/23/2022 96 70 - 125 mg/dL Final     Urea Nitrogen   Date Value Ref Range Status   06/23/2022 13 8 - 28 mg/dL Final     Creatinine   Date Value Ref Range Status   06/23/2022 1.35 (H) 0.60 - 1.10 mg/dL Final     GFR Estimate   Date Value Ref Range Status   06/23/2022 38 (L) >60 mL/min/1.73m2 Final     Comment:     Effective December 21, 2021 eGFRcr in adults is calculated using the 2021 CKD-EPI creatinine equation which includes age and gender (Romulo barrera al., NEJ, DOI: 10.1056/XTZBcv4091980)   05/13/2021 32 (L) >60 mL/min/1.73m2 Final     Calcium   Date Value Ref Range Status   06/23/2022 8.0 (L) 8.5 - 10.5 mg/dL Final     Assessment/plan:      ICD-10-CM    1. Iron deficiency anemia due to chronic blood loss  D50.0  Patient continues on iron supplement.  She went for blood transfusion for 1 unit of packed red blood cells yesterday.  Hemoglobin pending today.  Previously 8.2.  Patient currently hemodynamically stable.  Follow-up hemoglobin on Monday.   2. Rectal bleeding  K62.5  Patient continues with a small amount of bright red blood with each bowel movement.  She  declined further work-up during hospitalization however is now consenting.  Follow-up with GI.   3. External hemorrhoids  K64.4  Continue Preparation H suppository.  No visual external hemorrhoids noted.   4. Constipation, unspecified constipation type  K59.00  continue MiraLAX.   5. Hyponatremia  E87.1  Sodium 132.  Follow-up BMP on Monday.         This note has been dictated using voice recognition software. Any grammatical or context distortions are unintentional and inherent to the software    Electronically signed by: Jacque Robles CNP           Sincerely,        Jacque Robles, NP

## 2022-07-12 NOTE — PROGRESS NOTES
[facility] GERIATRIC SERVICES    Code Status:  DNR/DNI   Visit Type:   Chief Complaint   Patient presents with     Discharge Summary Nursing Home     Facility:  [facility] ([facility]) [[id number]]           Transitional Care Course: Yun Rodgers is a 87 year old female who I am seeing today for discharge from the TCU.  Patient recently hospitalized on 6/14/2022 secondary to rectal bleeding.  Past medical history includes CKD stage III, constipation, subacute cutaneous lupus, Sjogren's, CAD status post stent in 2007, arthritis, peripheral vascular disease status post right carotid endarterectomy and hyperkalemia.  Patient with rectal bleeding secondary to constipation versus diverticulosis.  She underwent a CT scan which showed fecal impaction with a large volume of stool in the distal sigmoid colon and rectal vault without evidence of obstruction.  She did have scattered diverticuli but no diverticulitis.  Patient positive for hemorrhoids.  Patient was given a pink lady enema on admit and repeated on 6/15.  She was seen by GI.  They did offer sigmoidoscopy however patient declined.  Stool softeners added.  Urinary retention likely due to fecal impaction.  There was no hydronephrosis.  UA was negative.  She did have a Serrano catheter which was placed and removed prior to discharge.  Hypokalemia with metabolic acidosis.  This is recurrent.  Bicarbonate has been as low as 11.  She continues on bicarb supplement.  She was seen by nephrology.  Potassium replaced.  Acute kidney injury on chronic kidney disease stage III.  This was felt due to obstruction.  UA negative.  She was treated with fluids.  CAD with history of LAD stent 2007 with abnormal stress test in 2018 which showed a small area of distal anterior septal ischemia with normal ejection fracture.  Patient not on aspirin.  GERD.  Continues on PPI.  Anemia with iron deficiency.  Hemoglobin on admit was 7.3.  No B12 or folate deficiency.  Recheck was 7.7.  Iron  was supplemented.  Hypertension.  Failure to thrive.  Mild intermittent dysphagia with poor appetite.  This thought may be due to Sjogren's.  TSH normal.  Patient was evaluated by speech therapy and recommended thicken fluids.    On today's visit pt sitting up in bedside chair.  Patient with recent rectal bleeding.  During hospitalization was thought to be due to hemorrhoids.  She was treated for constipation with bowel meds.  She is also receiving Preparation H suppositories at bedtime's.  No visual external hemorrhoids.  She continues with some bright red bleeding with BMs a little amount.  I have encouraged her to follow-up with GI outpatient for further work-up.  She declined this during hospitalization.  Hemoglobin continue to trend downward.  She did undergo outpatient blood transfusion last week of 1 unit.  Hemoglobin has rebounded at 10.7.  Hypertension.  Blood pressure satisfactory.  On today's visit patient is reporting loose stool.  She had soft stool x4.  Her bowel meds was held.  She was given 1 dose of Imodium.  Hyponatremia.  Sodium low at 132.    Active Ambulatory Problems     Diagnosis Date Noted     Mixed hyperlipidemia      Essential hypertension      Coronary Artery Disease      Carotid artery disease (H)      Peripheral vascular disease (H)      Gastroesophageal reflux disease with esophagitis      Chest pain, unspecified type      Angina concurrent with and due to arteriosclerosis of coronary artery (H) 01/09/2015     Stable angina (H) 01/15/2015     Sinus bradycardia 02/26/2016     Dehydration 11/12/2017     Cough      Malaise      Hyperkalemia      Gastroesophageal reflux disease without esophagitis      Atypical pneumonia 11/13/2017     Nausea 11/14/2017     Trimalleolar fracture 02/24/2018     Anemia, unspecified type      Coronary artery disease of native artery of native heart with stable angina pectoris (H)      Pulmonary valve disorder      Fracture dislocation of ankle, right, closed,  "initial encounter 02/24/2018     Closed right ankle fracture 03/02/2018     Stage 3 chronic kidney disease (H) 03/29/2018     Acute renal failure, unspecified acute renal failure type (H) 06/14/2022     Fecal impaction in rectum (H) 06/14/2022     Urinary retention 06/14/2022     Hypokalemia 06/14/2022     Sjogren's syndrome (H) 06/21/2022     Poor appetite 06/21/2022     Lesion of ulnar nerve 06/21/2022     History of tobacco use 06/21/2022     History of coronary artery bypass surgery 06/21/2022     Headache 06/21/2022     Grief 06/21/2022     Constipation 06/21/2022     Chronic fatigue syndrome 06/21/2022     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     Adult failure to thrive syndrome 06/21/2022     Acquired trigger finger 06/21/2022     Abnormal gait 06/21/2022     Ulcer of external ear (H) 06/21/2022     Resolved Ambulatory Problems     Diagnosis Date Noted     CRI (chronic renal insufficiency), stage 3 (moderate) (H)      Past Medical History:   Diagnosis Date     Antiplatelet or antithrombotic long-term use      Hypertension      Stented coronary artery        Allergies   Allergen Reactions     Aminoquinolines Rash     Other reaction(s): rash     Primaquine Rash     Aloe      Other reaction(s): itchy skin     Atorvastatin Unknown     Other reaction(s): muscle aches     Demeclocycline Other (See Comments)     \"sore bottom\"     Diagnostic X-Ray Materials Unknown and Other (See Comments)     Chest tightness,sshakes     Guaiacol Hives     Robitussin D     Guaifenesin & Derivatives Hives     Robitussin D     Metrizamide Other (See Comments)     Chest tightness,sshakes     Pravastatin Unknown     Other reaction(s): muscle aches     Robitussin [Guaifenesin] Unknown     Simvastatin Other (See Comments)     Muscle aches   Other reaction(s): muscle aches     Tetracyclines Unknown and Other (See Comments)     Other reaction(s): Unknown  \"sore bottom\"       Hydroxychloroquine Sulfate [Hydroxychloroquine] Rash     Preservision " Areds Rash     AREDs formula only caused rash.        All Meds and Allergies reviewed in the record at the facility and is the most up-to-date.      Current Outpatient Medications   Medication Sig     ciprofloxacin (CIPRO) 500 MG tablet Take 500 mg by mouth 2 times daily     acetaminophen (TYLENOL) 500 MG tablet Take 500-1,000 mg by mouth every 6 hours as needed for mild pain     amLODIPine (NORVASC) 10 MG tablet [AMLODIPINE (NORVASC) 10 MG TABLET] TAKE 1 TABLET BY MOUTH EVERY DAY     esomeprazole (NEXIUM) 40 MG DR capsule Take 40 mg by mouth At Bedtime Take 30-60 minutes before eating.     ferrous gluconate (FERGON) 324 (38 Fe) MG tablet Take 1 tablet (324 mg) by mouth daily (with breakfast) for 30 days     fexofenadine (ALLEGRA) 180 MG tablet Take 180 mg by mouth daily     hydrocortisone (Perianal) (ANUSOL-HC) 2.5 % cream Place 1 applicator rectally 2 times daily as needed for hemorrhoids     isosorbide mononitrate (IMDUR) 120 MG 24 hr tablet [ISOSORBIDE MONONITRATE (IMDUR) 120 MG 24 HR TABLET] TAKE 1 TABLET BY MOUTH EVERY DAY **TAKE W/30 MG TABLET** - **PATIENT ONLY WANTS 1 MONTH PER FILL**     isosorbide mononitrate (IMDUR) 30 MG 24 hr tablet Take 30 mg by mouth daily      melatonin 1 MG TABS tablet Take 3 tablets (3 mg) by mouth At Bedtime for 30 days     metoprolol succinate ER (TOPROL-XL) 25 MG 24 hr tablet Take 75 mg by mouth daily      mirtazapine (REMERON) 15 MG tablet [MIRTAZAPINE (REMERON) 15 MG TABLET] Take 15 mg by mouth at bedtime. Indications: major depressive disorder     MULTIVITAMIN W-MINERALS/LUTEIN (CENTRUM SILVER ORAL) [MULTIVITAMIN W-MINERALS/LUTEIN (CENTRUM SILVER ORAL)] Take 1 tablet by mouth daily.     nitroglycerin (NITROSTAT) 0.4 MG SL tablet [NITROGLYCERIN (NITROSTAT) 0.4 MG SL TABLET] Place 0.4 mg under the tongue as needed for chest pain.     pantoprazole (PROTONIX) 40 MG tablet [PANTOPRAZOLE (PROTONIX) 40 MG TABLET] Take 40 mg by mouth 2 (two) times a day.     peg 400-propylene glycol  "(SYSTANE) 0.4-0.3 % Drop [-PROPYLENE GLYCOL (SYSTANE) 0.4-0.3 % DROP] Administer 1 drop to both eyes 4 (four) times a day.      phenylephrine-cocoa butter (PREPARATION H) 0.25-88.44 % suppository Place 1 suppository rectally At Bedtime     polyethylene glycol (MIRALAX) 17 GM/Dose powder Take 17 g by mouth daily for 30 days (Patient taking differently: Take 17 g by mouth daily as needed)     senna (SENOKOT) 8.6 MG tablet Take 2 tablets by mouth At Bedtime     senna (SENOKOT) 8.6 MG tablet Take 1 tablet by mouth daily as needed for constipation     sodium bicarbonate 650 MG tablet Take 1 tablet (650 mg) by mouth 2 times daily for 30 days     triamcinolone (KENALOG) 0.025 % cream [TRIAMCINOLONE (KENALOG) 0.025 % CREAM] Apply 1 application topically 3 (three) times a day as needed.      No current facility-administered medications for this visit.       REVIEW OF SYSTEMS:   Review of Systems  10 point review of systems reviewed and pertinent positives identified in the HPI.     PHYSICAL EXAMINATION: Reviewed without changes.  Physical Exam     Vital signs: /68   Pulse 60   Temp 98.2  F (36.8  C)   Resp 20   Ht 1.651 m (5' 5\")   Wt 44.1 kg (97 lb 3.2 oz)   SpO2 99%   BMI 16.17 kg/m    General: Awake, Alert, sitting up in bedside chair.  Conversant  HEENT:Pink conjunctiva, anicteric sclerae, moist oral mucosa  NECK: Supple  CVS:  S1  S2, without murmur or gallop.   LUNG: Clear to auscultation, No wheezes, rales or rhonci.  BACK: No kyphosis of the thoracic spine  ABDOMEN: Soft, thin, slightly tender, with positive bowel sounds.   EXTREMITIES: Moves both upper and lower extremities.  1+ lower extremity edema.  NEUROLOGIC: Intact  PSYCHIATRIC: Pleasant affect.      Labs:  All labs reviewed in the nursing home record and Epic   @  Lab Results   Component Value Date    WBC 8.0 06/22/2022     Lab Results   Component Value Date    RBC 2.21 06/22/2022     Lab Results   Component Value Date    HGB 6.9 " 06/22/2022     Lab Results   Component Value Date    HCT 22.3 06/22/2022     Lab Results   Component Value Date     06/22/2022     Lab Results   Component Value Date    MCH 31.2 06/22/2022     Lab Results   Component Value Date    MCHC 30.9 06/22/2022     Lab Results   Component Value Date    RDW 13.7 06/22/2022     Lab Results   Component Value Date     06/22/2022        @Last Comprehensive Metabolic Panel:  Sodium   Date Value Ref Range Status   06/23/2022 132 (L) 136 - 145 mmol/L Final     Potassium   Date Value Ref Range Status   06/23/2022 4.7 3.5 - 5.0 mmol/L Final     Chloride   Date Value Ref Range Status   06/23/2022 104 98 - 107 mmol/L Final     Carbon Dioxide (CO2)   Date Value Ref Range Status   06/23/2022 21 (L) 22 - 31 mmol/L Final     Anion Gap   Date Value Ref Range Status   06/23/2022 7 5 - 18 mmol/L Final     Glucose   Date Value Ref Range Status   06/23/2022 96 70 - 125 mg/dL Final     Urea Nitrogen   Date Value Ref Range Status   06/23/2022 13 8 - 28 mg/dL Final     Creatinine   Date Value Ref Range Status   06/23/2022 1.35 (H) 0.60 - 1.10 mg/dL Final     GFR Estimate   Date Value Ref Range Status   06/23/2022 38 (L) >60 mL/min/1.73m2 Final     Comment:     Effective December 21, 2021 eGFRcr in adults is calculated using the 2021 CKD-EPI creatinine equation which includes age and gender (Romulo et al., NEJ, DOI: 10.1056/TMZBgn7224090)   05/13/2021 32 (L) >60 mL/min/1.73m2 Final     Calcium   Date Value Ref Range Status   06/23/2022 8.0 (L) 8.5 - 10.5 mg/dL Final     Assessment/plan:      ICD-10-CM    1. Iron deficiency anemia due to chronic blood loss  D50.0  Patient continues on iron supplement.  She went for blood transfusion for 1 unit of packed red blood cells on 7/6/2022.    Follow-up hemoglobin 10.6.   Repeat hemoglobin in a.m.  Continue iron supplement.   2. Rectal bleeding  K62.5  Patient continues with a small amount of bright red blood with each bowel movement.  She  declined further work-up during hospitalization however is now consenting.  Follow-up with GI outpatient.   3. External hemorrhoids  K64.4  Continue Preparation H suppository.  No visual external hemorrhoids noted.   4.   Acute cystitis without hematuria N30.00  UA with positive leukocytes, bacteria and mucus.  Patient reporting difficulty urinating.  No urinary retention noted.  UC was mixture of urogenital organisms however we will treat due to symptoms.  Cipro 500 mg twice daily x3 days.   4. Constipation, unspecified constipation type  K59.00  patient with loose stools today.  MiraLAX held.  1 dose of Imodium given.   5. Hyponatremia  E87.1  Sodium 132.  Repeat sodium in the a.m.     Okay to DC to [facility] with current meds and treatments.  Home PT, OT, home health aide and RN for medication management.  Follow-up with primary care provider in 1 week.  Follow-up outpatient with GI.    DISCHARGE PLAN/FACE TO FACE:  I certify that this patient is under my care and that I, or a nurse practitioner or physician's assistant working with me, had a face-to-face encounter that meets the physician face-to-face encounter requirements with this patient.       I certify that, based on my findings, the following services are medically necessary home health services.    My clinical findings support the need for the above skilled services.    This patient is homebound because: Recent rectal bleeding with repeat requiring transfusion.    The patient is, or has been, under my care and I have initiated the establishment of the plan of care. This patient will be followed by a physician who will periodically review the plan of care.    Total time for this visit was 35 minutes greater than 50% time spent face-to-face with patient reviewing discharge medications, home care services and follow-ups.    This note has been dictated using voice recognition software. Any grammatical or context distortions are unintentional and inherent to the  software    Electronically signed by: Jacque Robles, CNP

## 2022-07-29 PROBLEM — K92.2 LOWER GI BLEED: Status: ACTIVE | Noted: 2022-01-01

## 2022-07-29 PROBLEM — K62.89 RECTAL MASS: Status: ACTIVE | Noted: 2022-01-01

## 2022-07-29 NOTE — ED NOTES
"[facility] ED Handoff Report    ED Chief Complaint: Abnormal labs rectal bleeding    ED Diagnosis:  (D64.9) Anemia, unspecified type  Comment: She has had rectal bleeding for about a month now. Has gradually gotten worse.  Plan: Transfusion    (K92.2) Lower GI bleed  Comment: See above  Plan: See provider notes    (K62.89) Rectal mass  Comment: See provider note and CT  Plan: See provider notes       PMH:    Past Medical History:   Diagnosis Date     Antiplatelet or antithrombotic long-term use     aspirin     Hypertension      Stented coronary artery         Code Status:  Prior     Falls Risk: Yes Band: Applied    Current Living Situation/Residence: lives in an assisted living facility     Elimination Status: Continent: Yes     Activity Level: SBA w/ walker    Patients Preferred Language:  English     Needed: No    Vital Signs:  BP (!) 164/72   Pulse 72   Temp 98.2  F (36.8  C) (Oral)   Resp 16   Ht 1.645 m (5' 4.75\")   Wt 46.7 kg (103 lb)   SpO2 100%   BMI 17.27 kg/m       Cardiac Rhythm: Sinus    Pain Score: 2/10    Is the Patient Confused:  No    Last Food or Drink: 07/29/22    Focused Assessment:  She has been having rectal bleeding for about a month now. Getting worse over time. Had her blood checked at her apartment and was discovered to have low hemoglobin. She was told to come to the ED. She has had bleeding with stools. Rectal exam demonstrated blood. She has slight lower abdominal pain. Denies SOB, chest pain, nausea or vomiting. She is able to walk with a walker all the time. She is pretty stable with it but a stand-by would be advised.    Tests Performed: Done: Labs and Imaging    Treatments Provided:  Blood    Family Dynamics/Concerns: No    Family Updated On Visitor Policy: Yes    Plan of Care Communicated to Family: Yes    Who Was Updated about Plan of Care: Son who is with her.    Belongings Checklist Done and Signed by Patient: No    Medications sent with patient: " NA    Covid: asymptomatic , negative    Additional Information: Needs potasium still.    RN: Tao  7/29/2022 5:13 PM

## 2022-07-29 NOTE — ED TRIAGE NOTES
Rectal bleeding and hgb of 7.0. Active bleeding mostly noted when urinating. SOB for several months. Denies pain.      Triage Assessment     Row Name 07/29/22 2922       Triage Assessment (Adult)    Airway WDL WDL       Respiratory WDL    Respiratory WDL rhythm/pattern    Rhythm/Pattern, Respiratory shortness of breath       Skin Circulation/Temperature WDL    Skin Circulation/Temperature WDL WDL       Cardiac WDL    Cardiac WDL WDL       Peripheral/Neurovascular WDL    Peripheral Neurovascular WDL WDL    Capillary Refill, General less than/equal to 3 secs       Cognitive/Neuro/Behavioral WDL    Cognitive/Neuro/Behavioral WDL WDL       Christina Coma Scale    Best Eye Response 4-->(E4) spontaneous    Best Motor Response 6-->(M6) obeys commands    Best Verbal Response 5-->(V5) oriented    [place] Coma Scale Score 15

## 2022-07-29 NOTE — CONSULTS
"Care Management Initial Consult    General Information  Assessment completed with: Children, son Dagoberto via phone  Type of CM/SW Visit: Initial Assessment    Primary Care Provider verified and updated as needed: Yes   Readmission within the last 30 days: no previous admission in last 30 days (just over 30 days)      Reason for Consult: discharge planning  Advance Care Planning: Advance Care Planning Reviewed: no concerns identified          Communication Assessment  Patient's communication style: spoken language (English or Bilingual)                          Living Environment:   People in home: facility resident     Current living Arrangements: assisted living  Name of Facility: [facility]   Able to return to prior arrangements: yes       Family/Social Support:  Care provided by: self, child(marely), other (see comments) (facility staff)  Provides care for: no one  Marital Status:   Facility resident(s)/Staff, Children          Description of Support System: Supportive, Involved    Support Assessment: Adequate family and caregiver support, Adequate social supports, Patient communicates needs well met    Current Resources:   Patient receiving home care services: Yes  Skilled Home Care Services: Physicial Therapy, Occupational Therapy (\"I think they are staff coming from [facility] and not a different agency\".)  Community Resources[place] [facility] [place]  Equipment currently used at home: walker, rolling (\"4WW\")  Supplies currently used at home: Incontinence Supplies    Employment/Financial:  Employment Status: retired        Financial Concerns:     Referral to Financial Worker: No       Lifestyle & Psychosocial Needs:  Social Determinants of Health     Tobacco Use: Low Risk      Smoking Tobacco Use: Never Smoker     Smokeless Tobacco Use: Never Used   Alcohol Use: Not on file   Financial Resource " "Strain: Not on file   Food Insecurity: Not on file   Transportation Needs: Not on file   Physical Activity: Not on file   Stress: Not on file   Social Connections: Not on file   Intimate Partner Violence: Not on file   Depression: Not on file   Housing Stability: Not on file       Functional Status:  Prior to admission patient needed assistance:   Dependent ADLs:: Ambulation-walker, Transfers, Grooming, Dressing, Bathing, Toileting, Incontinence  Dependent IADLs:: Cooking, Cleaning, Laundry, Shopping, Meal Preparation, Transportation, Incontinence, Medication Management  Assesssment of Functional Status: At functional baseline    Mental Health Status:          Chemical Dependency Status:                Values/Beliefs:  Spiritual, Cultural Beliefs, Latter-day Practices, Values that affect care:                 Additional Information:  Yun lives at [facility] in [place]. She moved in there on 7/11/22 when she discharged from her TCU stay at [facility] on 6/17/22 - 7/11/22.    She has Home Care PT/OT help from \"staff from [facility]\" per son. He \"doesn't think it is a different agency doing the Home Care PT and OT\". I was unable to verify this after hours.    She uses a walker for mobility.    Unknown discharge needs at this time.    Son to transport at discharge.    Radha Zamora RN    "

## 2022-07-29 NOTE — PHARMACY-ADMISSION MEDICATION HISTORY
Pharmacy Note - Admission Medication History    Pertinent Provider Information: Patient is scheduled to take Bisacodyl 2 tablets at 1200 and Miralax-gatorade prep at 1600 on 8/2/22 for colonoscopy prep.      ______________________________________________________________________    Prior To Admission (PTA) med list completed and updated in EMR.       PTA Med List   Medication Sig Last Dose     acetaminophen (TYLENOL) 500 MG tablet Take 1,000 mg by mouth 2 times daily as needed for pain Unknown at PRN     CALMOSEPTINE 0.44-20.6 % OINT ointment Apply topically 3 times daily Apply to coccyx every shift at 0800, 1600, & 2200 7/29/2022 at 0800     esomeprazole (NEXIUM) 40 MG DR capsule Take 40 mg by mouth At Bedtime Take 30-60 minutes before eating. 7/28/2022 at 2030     Ferrous Gluconate 324 (37.5 Fe) MG TABS Take 324 mg by mouth 2 times daily Unknown at On HOLD     fexofenadine (ALLEGRA) 180 MG tablet Take 180 mg by mouth daily 7/29/2022 at 0830     hydrocortisone (Perianal) (ANUSOL-HC) 2.5 % cream Place 1 applicator rectally 2 times daily as needed for hemorrhoids Unknown at PRN     isosorbide mononitrate (IMDUR) 120 MG 24 hr tablet [ISOSORBIDE MONONITRATE (IMDUR) 120 MG 24 HR TABLET] TAKE 1 TABLET BY MOUTH EVERY DAY **TAKE W/30 MG TABLET** - **PATIENT ONLY WANTS 1 MONTH PER FILL** 7/29/2022 at 0830     isosorbide mononitrate (IMDUR) 30 MG 24 hr tablet Take 30 mg by mouth daily  7/29/2022 at 0830     loperamide (IMODIUM) 2 MG capsule Take 2 mg by mouth 4 times daily as needed for diarrhea Unknown at PRN     melatonin 3 MG tablet Take 3 mg by mouth At Bedtime 7/28/2022 at 2030     metoprolol tartrate (LOPRESSOR) 25 MG tablet Take 25 mg by mouth 2 times daily 7/29/2022 at 0830     mirtazapine (REMERON) 15 MG tablet [MIRTAZAPINE (REMERON) 15 MG TABLET] Take 15 mg by mouth at bedtime. Indications: major depressive disorder 7/28/2022 at 2030     MULTIVITAMIN W-MINERALS/LUTEIN (CENTRUM SILVER ORAL) [MULTIVITAMIN  W-MINERALS/LUTEIN (CENTRUM SILVER ORAL)] Take 1 tablet by mouth daily. Unknown at On HOLD     nitroglycerin (NITROSTAT) 0.4 MG SL tablet [NITROGLYCERIN (NITROSTAT) 0.4 MG SL TABLET] Place 0.4 mg under the tongue as needed for chest pain. Unknown at PRN     nystatin (MYCOSTATIN) 868416 UNIT/GM external powder Apply topically 2 times daily as needed for rash Under breasts Unknown at PRN     pantoprazole (PROTONIX) 40 MG tablet Take 40 mg by mouth 2 times daily (before meals) 7/29/2022 at 0830     peg 400-propylene glycol (SYSTANE) 0.4-0.3 % Drop Place 1 drop into both eyes 4 times daily At 0830, 1230, 1600, & 2030. And 4 times daily as needed 7/29/2022 at 0830     phenylephrine-cocoa butter (PREPARATION H) 0.25-88.44 % suppository Place 1 suppository rectally At Bedtime 7/28/2022 at 2030     polyethylene glycol (MIRALAX) 17 g packet Take 1 packet by mouth daily as needed for constipation Unknown at PRN     PREPARATION H 0.25-14-74.9 % rectal ointment Place rectally 4 times daily as needed for hemorrhoids Unknown at PRN     senna (SENOKOT) 8.6 MG tablet Take 2 tablets by mouth At Bedtime 7/28/2022 at 2030     senna (SENOKOT) 8.6 MG tablet Take 1 tablet by mouth daily as needed for constipation Unknown at PRN     triamcinolone (KENALOG) 0.025 % cream Apply 1 Application topically 3 times daily as needed for irritation (to rectum) Unknown at PRN       Information source(s): Facility (U/NH/) medication list/MAR and CareEverywhere/SureScripts. Med list from [facility] in [place], [place].   Method of interview communication: in-person. Confirmed with patient that she had her AM meds today.     Summary of Changes to PTA Med List  New: Miralax, iron, Immodium, melatonin, Calmoseptine, Nystatin powder, Preparation H ointment  Discontinued: amlodipine  Changed: metoprolol 75mg ER daily to metoprolol tartrate 25mg BID    Patient was asked about OTC/herbal products specifically.  PTA med list reflects  this.    In the past week, patient estimated taking medication this percent of the time:  greater than 90%.    Allergies were reviewed, assessed, and updated with the patient.      Patient did not bring any medications to the hospital and can't retrieve from home. No multi-dose medications are available for use during hospital stay.     The information provided in this note is only as accurate as the sources available at the time of the update(s).    Thank you for the opportunity to participate in the care of this patient.    Cathy Rose [facility]  7/29/2022 4:59 PM

## 2022-07-29 NOTE — ED PROVIDER NOTES
Emergency Department Encounter      NAME: Yun Rodgers  AGE: 87 year old female  YOB: 1935  MRN: [id number]  EVALUATION DATE & TIME: 2022  1:41 PM    PCP: Ronaldo Grijalva    ED PROVIDER: Armond Hernandez M.D.      Chief Complaint   Patient presents with     Abnormal Labs     Rectal Bleeding         FINAL IMPRESSION:  1. Anemia, unspecified type    2. Lower GI bleed    3. Rectal mass        MEDICAL DECISION MAKIN:14 PM I met with the patient, obtained history, performed an initial exam, and discussed options and plan for diagnostics and treatment here in the ED.     Yun Rodgers is a 87 year old female with a past medical history of coronary artery disease  who is sent into the ER because of a hemoglobin of 7 which was drawn 2 days ago.  The patient lives in a nursing home and has been being seen for GI bleeding and has a colonoscopy planned on 3 August.  They were thinking that the bleeding was due to hemorrhoids.  The patient was hospitalized on  for GI bleeding.  On exam the patient had a 2 cm tender firm mass at the perirectal region.  It is a shallow ulceration.  Did not appear to be a abscess.  There was tenderness on the internal exam as well which limited the exam.  There was maroon appearing blood on the glove but there was no active bleeding at the time.  The patient's hemoglobin was rechecked in the ER and it was 6.8.  Additionally I was called by the lab as her potassium was 2.1.  I started replacing her potassium with 10 mEq IV.  The patient's transfusion was initiated in the ER as well.  She will likely need additional blood and potassium in the hospital.  I admitted the patient to Dr. Vega and I spoke to him about my concern for the rectal mass.  She will likely need to have this evaluated by surgery or colorectal surgery to see if it needs biopsying or banding.    Pertinent Labs & Imaging studies reviewed. (See chart for details)    Critical care time 35  minutes    MEDICATIONS GIVEN IN THE EMERGENCY:  Medications   0.9% sodium chloride BOLUS (has no administration in time range)   potassium chloride 10 mEq in 100 mL sterile water intermittent infusion (premix) (has no administration in time range)       NEW PRESCRIPTIONS STARTED AT TODAY'S ER VISIT:  Current Discharge Medication List             =================================================================    HPI     Yun Rodgers is a 87 year old female with a past medical history of coronary artery disease  who is sent into the ER because of a hemoglobin of 7 which was drawn 2 days ago.  The patient lives in a nursing home and has been being seen for GI bleeding and has a colonoscopy planned on 3 August.  They were thinking that the bleeding was due to hemorrhoids.  The patient was hospitalized on 14 June for GI bleeding.  Patient says that she has been feeling a little lightheaded, weak and short of breath.  She is not vomiting up any blood or bleeding anywhere else.  She is not on blood thinners.  She does not have any abdominal pain.  No syncope.  No chest pain.      REVIEW OF SYSTEMS   Review of Systems   Constitutional: Negative for activity change, appetite change, chills and fever.   HENT: Negative for congestion, rhinorrhea and sore throat.    Respiratory: Negative for cough, chest tightness and shortness of breath.    Cardiovascular: Negative for chest pain, palpitations and leg swelling.   Gastrointestinal: Negative for abdominal distention, abdominal pain, constipation, diarrhea, nausea and vomiting.   Skin: Positive for pallor. Negative for color change and rash.        PAST MEDICAL HISTORY:  Past Medical History:   Diagnosis Date     Antiplatelet or antithrombotic long-term use     aspirin     Hypertension      Stented coronary artery        PAST SURGICAL HISTORY:  Past Surgical History:   Procedure Laterality Date     ABDOMEN SURGERY       APPENDECTOMY       COLOSTOMY CLOSURE       EYE SURGERY    "    HYSTERECTOMY       IR AORTIC ARCH 4 VESSEL ANGIOGRAM  6/7/2002     IR AORTIC ARCH 4 VESSEL ANGIOGRAM  12/27/2002     IR CAROTID ANGIOGRAM  12/27/2002     IR CAROTID ANGIOGRAM  12/27/2002     IR EXTREMITY ANGIOGRAM BILATERAL  11/27/2007     IR MISCELLANEOUS PROCEDURE  6/7/2002     IR MISCELLANEOUS PROCEDURE  12/27/2002     IR MISCELLANEOUS PROCEDURE  12/27/2002     IR MISCELLANEOUS PROCEDURE  12/27/2002     OPEN REDUCTION INTERNAL FIXATION ANKLE Right 3/2/2018    Procedure: OPEN REDUCTION INTERNAL FIXATION TRIMALLEOLAR FRACTURE RIGHT ANKLE;  Surgeon: Shawn Vega DO;  Location: [facility];  Service:      OTHER SURGICAL HISTORY      Resection of mesenteryper patient-- \"they took out my mesentery about 6 months after I had a baby\"     REMOVE HARDWARE LOWER EXTREMITY Right 3/4/2022    Procedure: RIGHT ANKLE REMOVAL OF HARDWARE;  Surgeon: Shawn Vega DO;  Location: [facility] APPENDECTOMY      Description: Appendectomy;  Recorded: 09/26/2014;     [facility] COLOSTOMY      Description: Colostomy;  Recorded: 09/26/2014;     [facility] THROMBOENDARTECTMY NECK,NECK INCIS      Description: Carotid Thromboendarterectomy;  Recorded: 09/26/2014;     [facility] TOTAL ABDOM HYSTERECTOMY      Description: Total Abdominal Hysterectomy;  Recorded: 09/26/2014;       CURRENT MEDICATIONS:      Current Facility-Administered Medications:      0.9% sodium chloride BOLUS, 1-250 mL, Intravenous, Q1H PRN, Armond Hernandez MD     potassium chloride 10 mEq in 100 mL sterile water intermittent infusion (premix), 10 mEq, Intravenous, Once, Armond Hernandez MD    ALLERGIES:  Allergies   Allergen Reactions     Aminoquinolines Rash     Other reaction(s): rash     Primaquine Rash     Aloe      Other reaction(s): itchy skin     Atorvastatin Unknown     Other reaction(s): muscle aches     Cilostazol      Demeclocycline Other (See Comments)     \"sore bottom\"     Dextromethorphan      Diagnostic X-Ray Materials Unknown " "and Other (See Comments)     Chest tightness,sshakes     Guaifenesin & Derivatives Hives     Robitussin D     Metrizamide Other (See Comments)     Chest tightness,sshakes     Pravastatin Unknown     Other reaction(s): muscle aches     Simvastatin Other (See Comments)     Muscle aches   Other reaction(s): muscle aches     Tetracyclines Unknown and Other (See Comments)     Other reaction(s): Unknown  \"sore bottom\"       Tomato      Hydroxychloroquine Sulfate [Hydroxychloroquine] Rash     Preservision Areds Rash     AREDs formula only caused rash.        FAMILY HISTORY:  Family History   Problem Relation Age of Onset     Cancer Mother      Hypertension Mother      No Known Problems Father        SOCIAL HISTORY:   Social History     Socioeconomic History     Marital status:    Tobacco Use     Smoking status: Never Smoker     Smokeless tobacco: Never Used   Substance and Sexual Activity     Alcohol use: Never     Drug use: Never       PHYSICAL EXAM:    Vitals: BP (!) 164/73   Pulse 74   Temp 98.6  F (37  C) (Oral)   Resp 18   Ht 1.645 m (5' 4.75\")   Wt 46.7 kg (103 lb)   SpO2 100%   BMI 17.27 kg/m     Constitutional: Well developed, well nourished.  Pale elderly female who appears comfortable and is not in respiratory distress.  Her grandson is present and assists with the history.  HEAD:Normocephalic, atraumatic,   Eyes: PERRLA,  conjunctiva clear, no discharge  ENT: mucous membranes moist, nose normal.   Neck- Supple, gross ROM intact.  No JVD.  No palpable nodes.  Pulmonary: Clear to auscultation bilaterally, no respiratory distress, no wheezing, speaks full sentences easily.  Chest: No chest wall tenderness  Cardiovascular: Normal heart rate, regular rhythm, no murmurs. No lower extremity edema, 2+ DP pulses.   GI: Soft, no tenderness to deep palpation in all quadrants,  no masses.  No hepatosplenomegaly.  Rectal: There is a 2 cm firm tender perirectal mass that is tender and has a shallow medial " ulceration.  The internal exam was limited due to the patient's pain and rectal spasm.  There was a small amount of maroon blood on the glove which tested Hemoccult positive.  Musculoskeletal: Moving all 4 extremities intentionally and without pain. No obvious deformity.  Back: No CVA tenderness  Skin: Warm, dry, no rash.  Neurologic: Alert & oriented x 3, speech clear, moving all extremities spontaneously   Psychiatric: Affect normal, cooperative.     LAB:  All pertinent labs reviewed and interpreted.  Labs Ordered and Resulted from Time of ED Arrival to Time of ED Departure   INR - Abnormal       Result Value    INR 1.22 (*)    BASIC METABOLIC PANEL - Abnormal    Sodium 135 (*)     Potassium 2.1 (*)     Chloride 109 (*)     Carbon Dioxide (CO2) 17 (*)     Anion Gap 9      Urea Nitrogen 12      Creatinine 0.95      Calcium 7.6 (*)     Glucose 105      GFR Estimate 58 (*)    CBC WITH PLATELETS AND DIFFERENTIAL - Abnormal    WBC Count 7.8      RBC Count 2.21 (*)     Hemoglobin 6.8 (*)     Hematocrit 21.0 (*)     MCV 95      MCH 30.8      MCHC 32.4      RDW 15.5 (*)     Platelet Count 212      % Neutrophils 71      % Lymphocytes 14      % Monocytes 13      % Eosinophils 1      % Basophils 0      % Immature Granulocytes 1      NRBCs per 100 WBC 0      Absolute Neutrophils 5.7      Absolute Lymphocytes 1.1      Absolute Monocytes 1.0      Absolute Eosinophils 0.1      Absolute Basophils 0.0      Absolute Immature Granulocytes 0.0      Absolute NRBCs 0.0     OCCULT BLOOD STOOL - Abnormal    Occult Blood Positive (*)    PARTIAL THROMBOPLASTIN TIME - Normal    aPTT 32     COVID-19 VIRUS (CORONAVIRUS) BY PCR   TYPE AND SCREEN, ADULT    ABO/RH(D) A NEG      Antibody Screen Negative      SPECIMEN EXPIRATION DATE [id number]     PREPARE RED BLOOD CELLS (UNIT)    CROSSMATCH Compatible      UNIT ABO/RH A Neg      Unit Number [id number]      Unit Status Issued      Blood Component Type Red Blood Cells      Product Code  [id number]      CODING SYSTEM [id number]      UNIT TYPE ISBT 0600      ISSUE DATE AND TIME [id number]     PREPARE RED BLOOD CELLS (UNIT)   TRANSFUSE RED BLOOD CELLS (UNIT)   ABO/RH TYPE AND SCREEN       RADIOLOGY:  No orders to display       EKG:     I have independently reviewed and interpreted the EKG(s) documented above.         Armond Hernandez M.D.  Emergency Medicine  [facility] EMERGENCY DEPARTMENT  [facility] [facility] [address]  [phone]  Dept: [phone]     Armond Hernandez MD  07/29/22 1916       Armond Hernandez MD  07/29/22 1918

## 2022-07-30 NOTE — SIGNIFICANT EVENT
Significant Event Note    Time of event: 12:30 AM July 30, 2022    Description of event:  Paged by nurse regarding critically low potassium of 2.2. Patient is currently on RN potassium replacement protocol, but has not improved despite this. Patient has a history of GI bleed and CKD, in the past has been placed on bicarb drip due to hypokalemia. Nephrology has been consulted for this admission. Reached out to on-call nephrologist and discussed current situation. Given no current bicarb level obtained on this admission, recommends maintaining current replacement protocol and considering additional of bicarb drip in the morning if not improved.     This was communicated back to the RN staff, who voices understanding.     Plan:  Continue RN potassium replacement protocol     Lala Foote MD

## 2022-07-30 NOTE — PLAN OF CARE
Problem: Plan of Care - These are the overarching goals to be used throughout the patient stay.    Goal: Absence of Hospital-Acquired Illness or Injury  Intervention: Identify and Manage Fall Risk  Recent Flowsheet Documentation  Taken 7/29/2022 1800 by Susan Lund RN  Safety Promotion/Fall Prevention:    activity supervised    assistive device/personal items within reach    bed alarm on    nonskid shoes/slippers when out of bed    fall prevention program maintained    clutter free environment maintained  Intervention: Prevent and Manage VTE (Venous Thromboembolism) Risk  Recent Flowsheet Documentation  Taken 7/29/2022 1800 by Susan Lund, RN  Activity Management: activity adjusted per tolerance     Problem: Anemia  Goal: Anemia Symptom Improvement  Intervention: Monitor and Manage Anemia  Recent Flowsheet Documentation  Taken 7/29/2022 1800 by Susan Lund, RN  Safety Promotion/Fall Prevention:    activity supervised    assistive device/personal items within reach    bed alarm on    nonskid shoes/slippers when out of bed    fall prevention program maintained    clutter free environment maintained   Goal Outcome Evaluation:     Patient came to unit with PRBC infusing for Hgb 6.8.  Unit completed.  No s/s of transfusion reaction.  I.V. Potassium ordered in ER, infused on unit.    Patient is alert and oriented.  She denied abdominal discomfort.  Had loose 2 loose bloody to blood-tinged stools.  Purewick placed with 800ccs  Yellow urine.     Awaiting lab results for Hgb., Magnesium and Potassium.

## 2022-07-30 NOTE — CONSULTS
7/30/2022 July 30, 2022  9:37 AM    Impression and Plan:     1. Acute Kidney Injury- Baseline creatinine is 1 to 1.4.  She presented with serum creatinine of 0.95, this has decreased to 0.80.  Her urine output has been good as reported by patient.  Denies NSAID use, not on ACE/ARB, and no hypotension.  CKD since 2018.      Screening for MM was negative during last admission in July of 2022.   Serum creatinine likely not entirely showing true picture of renal status, will check cystatin C.        Labs:  To further evaluate, we will check urinalysis with microscopy to screen for active urinary sediment/ for completeness.  Check urine protein creatinine ratio to screen for significant proteinuria.       Plan is to avoid any further renal insult by renally dosing all medications and avoiding nephrotoxic medications.  No ACE inhibitors/ARB, Aminoglycosides/ IV constrast/  NSAID if possible        2. Hypokalemia: was hypokalemic also during admission in June of 2022.    -Likely due to ongoing loose stool in setting of rectal bleeding, will need aggressive replace potassium using potassium bicarb 25 mg bid, may increase to 50 mg if k is still low.        -mag at 1.8, will dose with IV mag once today with hope of getting her mag around 2.        3. Blood Pressure/Volume:  Patient with long standing history of hypertension. Blood pressure mildly elevated, will avoid aggressive treatment especially with fluid loss in stool and history of CKD       4. Anemia: Admitted with concern for rectal bleeding after her hgb at her pcp clinic came back at 7.  She was due to complete an outpatient colonoscopy on August 3 due to a recent admission here last month after a bleeding hemorrhoid.  During her last admission for GIB last month she declined sigmoidoscopy and was treated with enemas and bowel regimen; she discharged after her Hgb was stable.   -  Was also started on oral iron due to iron deficiency anemia.                 -  Check iron studies in AM.              -Has needed series of transfusion, will have colonoscopy on 8/2/2022   - Monitor CBC     5. NAGMA: bicarb is at 18.  Likely due to CKD, as stated in #1, Scr is likely not true picture of her renal function.   -Monitor closely, may start oral bicarb if trending down.   -Prefer K replacement with potassium bicarb, I have started her on a bid scheduled dosing for a total of 6 dose, this can be increased from 25 to 50 mg if k is still low.             CC: GILBERTO on CKD    HPI: Dr. Vega asked me to consult on this pleasant 87 year old female.  Mrs Rodgers past medical history is significant for hypertension, CKD, Sjogrens, CAD, PVD and anemia.  Patient has history of colectomy about 20 years ago.  She presented to the ED on 7/29/2022 after a hemoglobin drawn 2 days earlier came back at 7.  She has been living at a nursing home and has had history of GIB with colonoscopy planned for 3 of August, the thought was her GIB was due to a bleeding hemorrhoids, this was last month.  She was treated with enema and bowel regimen for constipation, declined sigmoidoscopy.          Her hgb in the ED here was 6.8 and her potassium was 2.1, this is in setting of GIB and loose stool.  She is on potassium replacement protocol.  Nephrology was consulted to help with hypokalemia and also with her history of CKD.             Of note, patient was recently admitted here with rectal bleeding last month, she was seen by nephrology.  She discharged after her bleeding was thought to be from a hemorrhoid.      Upon evaluation today, the patient was in bed resting.  Alert, interactive, and pleasant.  Denies nausea, vomitin, infection, recent antibiotic use, NSAID, contrast dye, dry mouth, , edema, ascites, change of taste, SOB, cough, chest pain, insomnia,, color change in toes and fingers, fever and chills.  Admits to ongoing loose stool about 4 times daily.          Medical History:   Past Medical History:  "  Diagnosis Date     Antiplatelet or antithrombotic long-term use     aspirin     Hypertension      Stented coronary artery        Surgical History:   Past Surgical History:   Procedure Laterality Date     ABDOMEN SURGERY       APPENDECTOMY       COLOSTOMY CLOSURE       EYE SURGERY       HYSTERECTOMY       IR AORTIC ARCH 4 VESSEL ANGIOGRAM  6/7/2002     IR AORTIC ARCH 4 VESSEL ANGIOGRAM  12/27/2002     IR CAROTID ANGIOGRAM  12/27/2002     IR CAROTID ANGIOGRAM  12/27/2002     IR EXTREMITY ANGIOGRAM BILATERAL  11/27/2007     IR MISCELLANEOUS PROCEDURE  6/7/2002     IR MISCELLANEOUS PROCEDURE  12/27/2002     IR MISCELLANEOUS PROCEDURE  12/27/2002     IR MISCELLANEOUS PROCEDURE  12/27/2002     OPEN REDUCTION INTERNAL FIXATION ANKLE Right 3/2/2018    Procedure: OPEN REDUCTION INTERNAL FIXATION TRIMALLEOLAR FRACTURE RIGHT ANKLE;  Surgeon: Shawn Vega DO;  Location: [facility];  Service:      OTHER SURGICAL HISTORY      Resection of mesenteryper patient-- \"they took out my mesentery about 6 months after I had a baby\"     REMOVE HARDWARE LOWER EXTREMITY Right 3/4/2022    Procedure: RIGHT ANKLE REMOVAL OF HARDWARE;  Surgeon: Shawn Vega DO;  Location: [facility] APPENDECTOMY      Description: Appendectomy;  Recorded: 09/26/2014;     RUST COLOSTOMY      Description: Colostomy;  Recorded: 09/26/2014;     RUST THROMBOENDARTECTMY NECK,NECK INCIS      Description: Carotid Thromboendarterectomy;  Recorded: 09/26/2014;     RUST TOTAL ABDOM HYSTERECTOMY      Description: Total Abdominal Hysterectomy;  Recorded: 09/26/2014;       Medications:    Prior to admission:   Medications Prior to Admission   Medication Sig Dispense Refill Last Dose     acetaminophen (TYLENOL) 500 MG tablet Take 1,000 mg by mouth 2 times daily as needed for pain   Unknown at PRN     CALMOSEPTINE 0.44-20.6 % OINT ointment Apply topically 3 times daily Apply to coccyx every shift at 0800, 1600, & 2200   7/29/2022 at 0800 "     esomeprazole (NEXIUM) 40 MG DR capsule Take 40 mg by mouth At Bedtime Take 30-60 minutes before eating.   7/28/2022 at 2030     Ferrous Gluconate 324 (37.5 Fe) MG TABS Take 324 mg by mouth 2 times daily   Unknown at On HOLD     fexofenadine (ALLEGRA) 180 MG tablet Take 180 mg by mouth daily   7/29/2022 at 0830     hydrocortisone (Perianal) (ANUSOL-HC) 2.5 % cream Place 1 applicator rectally 2 times daily as needed for hemorrhoids   Unknown at PRN     isosorbide mononitrate (IMDUR) 120 MG 24 hr tablet [ISOSORBIDE MONONITRATE (IMDUR) 120 MG 24 HR TABLET] TAKE 1 TABLET BY MOUTH EVERY DAY **TAKE W/30 MG TABLET** - **PATIENT ONLY WANTS 1 MONTH PER FILL** 30 tablet 11 7/29/2022 at 0830     isosorbide mononitrate (IMDUR) 30 MG 24 hr tablet Take 30 mg by mouth daily    7/29/2022 at 0830     loperamide (IMODIUM) 2 MG capsule Take 2 mg by mouth 4 times daily as needed for diarrhea   Unknown at PRN     melatonin 3 MG tablet Take 3 mg by mouth At Bedtime   7/28/2022 at 2030     metoprolol tartrate (LOPRESSOR) 25 MG tablet Take 25 mg by mouth 2 times daily   7/29/2022 at 0830     mirtazapine (REMERON) 15 MG tablet [MIRTAZAPINE (REMERON) 15 MG TABLET] Take 15 mg by mouth at bedtime. Indications: major depressive disorder   7/28/2022 at 2030     MULTIVITAMIN W-MINERALS/LUTEIN (CENTRUM SILVER ORAL) [MULTIVITAMIN W-MINERALS/LUTEIN (CENTRUM SILVER ORAL)] Take 1 tablet by mouth daily.   Unknown at On HOLD     nitroglycerin (NITROSTAT) 0.4 MG SL tablet [NITROGLYCERIN (NITROSTAT) 0.4 MG SL TABLET] Place 0.4 mg under the tongue as needed for chest pain.   Unknown at PRN     nystatin (MYCOSTATIN) 087530 UNIT/GM external powder Apply topically 2 times daily as needed for rash Under breasts   Unknown at PRN     pantoprazole (PROTONIX) 40 MG tablet Take 40 mg by mouth 2 times daily (before meals)   7/29/2022 at 0830     peg 400-propylene glycol (SYSTANE) 0.4-0.3 % Drop Place 1 drop into both eyes 4 times daily At 0830, 1230, 1600, &  2030. And 4 times daily as needed   7/29/2022 at 0830     phenylephrine-cocoa butter (PREPARATION H) 0.25-88.44 % suppository Place 1 suppository rectally At Bedtime   7/28/2022 at 2030     polyethylene glycol (MIRALAX) 17 g packet Take 1 packet by mouth daily as needed for constipation   Unknown at PRN     PREPARATION H 0.25-14-74.9 % rectal ointment Place rectally 4 times daily as needed for hemorrhoids   Unknown at PRN     senna (SENOKOT) 8.6 MG tablet Take 2 tablets by mouth At Bedtime 60 tablet 0 7/28/2022 at 2030     senna (SENOKOT) 8.6 MG tablet Take 1 tablet by mouth daily as needed for constipation   Unknown at PRN     triamcinolone (KENALOG) 0.025 % cream Apply 1 Application topically 3 times daily as needed for irritation (to rectum)   Unknown at PRN      Current:   Current Facility-Administered Medications:      0.9% sodium chloride BOLUS, 1-250 mL, Intravenous, Q1H PRN, Armond Hernandez MD, Last Rate: 100 mL/hr at 07/29/22 1852, 50 mL at 07/29/22 1852     acetaminophen (TYLENOL) tablet 1,000 mg, 1,000 mg, Oral, BID PRN, Rocky Vega MD     [Held by provider] Centravites 50 Plus TABS, , Oral, Daily, Rocky Vega MD     [Held by provider] Ferrous Gluconate TABS 324 mg, 324 mg, Oral, BID, Rocky Vega MD     fexofenadine (ALLEGRA) tablet 180 mg, 180 mg, Oral, Daily, Rocky Vega MD, 180 mg at 07/30/22 0812     isosorbide mononitrate (IMDUR) 24 hr tablet 30 mg, 30 mg, Oral, Daily, Rocky Vega MD, 30 mg at 07/30/22 0811     lidocaine (LMX4) cream, , Topical, Q1H PRN, Rocky Vega MD     lidocaine 1 % 0.1-1 mL, 0.1-1 mL, Other, Q1H PRN, Rocky Vega MD     loperamide (IMODIUM) capsule 2 mg, 2 mg, Oral, 4x Daily PRN, Rocky Vega MD     melatonin tablet 1 mg, 1 mg, Oral, At Bedtime PRN, Rocky Vega MD, 1 mg at 07/30/22 0103     menthol-zinc oxide (CALMOSEPTINE) 0.44-20.6 % ointment OINT, , Topical, TID, Gary  Rocky Higgins MD, Given at 07/30/22 0812     metoprolol tartrate (LOPRESSOR) tablet 25 mg, 25 mg, Oral, BID, Rocky Vega MD, 25 mg at 07/30/22 0812     mirtazapine (REMERON) tablet 15 mg, 15 mg, Oral, At Bedtime, Rocky Vega MD, 15 mg at 07/29/22 2233     nitroGLYcerin (NITROSTAT) sublingual tablet 0.4 mg, 0.4 mg, Sublingual, Q5 Min PRN, Rocky Vega MD     pantoprazole (PROTONIX) EC tablet 40 mg, 40 mg, Oral, BID AC, Rocky Vega MD, 40 mg at 07/30/22 0812     pantoprazole (PROTONIX) EC tablet 40 mg, 40 mg, Oral, At Bedtime, Rocky Vega MD, 40 mg at 07/29/22 2233     phenylephrine-cocoa butter (PREPARATION H) per suppository 1 suppository, 1 suppository, Rectal, At Bedtime, Rocky Vega MD     phenylephrine-mineral oil-petrolatum (PREPARATION H) 0.25-14-74.9 % rectal ointment, , Rectal, 4x Daily PRN, Rocky Vega MD     polyethylene glycol (MIRALAX) Packet 17 g, 17 g, Oral, Daily PRN, Rocky Vega MD     polyethylene glycol-propylene glycol (SYSTANE ULTRA) ophthalmic solution 1 drop, 1 drop, Both Eyes, 4x Daily, Rocky Vega MD, 1 drop at 07/30/22 0813     potassium chloride 10 mEq in 100 mL sterile water intermittent infusion (premix), 10 mEq, Intravenous, Q1H, Rocky Vega MD, Last Rate: 100 mL/hr at 07/30/22 0903, 10 mEq at 07/30/22 0903     sennosides (SENOKOT) tablet 1 tablet, 1 tablet, Oral, Daily PRN, Rocky Vega MD     sennosides (SENOKOT) tablet 2 tablet, 2 tablet, Oral, At Bedtime, Rocky Vega MD     sodium chloride (PF) 0.9% PF flush 3 mL, 3 mL, Intracatheter, Q8H, Rocky Vega MD, 3 mL at 07/30/22 0812     sodium chloride (PF) 0.9% PF flush 3 mL, 3 mL, Intracatheter, q1 min prn, Rocky Vega MD     triamcinolone (KENALOG) 0.025 % cream, , Topical, TID PRN, Rocky Vega MD    Allergies: Aminoquinolines, Primaquine, Aloe, Atorvastatin, Cilostazol,  "Demeclocycline, Dextromethorphan, Diagnostic x-ray materials, Guaifenesin & derivatives, Metrizamide, Pravastatin, Simvastatin, Tetracyclines, Tomato, Hydroxychloroquine sulfate [hydroxychloroquine], and Preservision areds    Social History:   Social History     Socioeconomic History     Marital status:    Tobacco Use     Smoking status: Never Smoker     Smokeless tobacco: Never Used   Substance and Sexual Activity     Alcohol use: Never     Drug use: Never       Family History:   Family History   Problem Relation Age of Onset     Cancer Mother      Hypertension Mother      No Known Problems Father        Review of Systems: :   ROS: Comprehensive ROS was negative other than as noted in the HPI.      Physical Exam:   BP (!) 140/63 (BP Location: Left arm)   Pulse 64   Temp 98.1  F (36.7  C) (Oral)   Resp 16   Ht 1.645 m (5' 4.75\")   Wt 46.9 kg (103 lb 4.8 oz)   SpO2 99%   BMI 17.32 kg/m    In general this is a 87 year old female in no acute distress.   General: Calm & comfortable   Eyes: Pupils equal, sclerae not icteric   ENT: Mucus membranes moist, no lesions noted   Resp: CTA bilaterally, no distress, normal effort   CV: RRR without murmur, no hip/leg edema   GI: Soft NT NG   Psych: A&Ox3, not depressed   Neuro: Moves all extremities.     Skin: No rash noted     Labs and XRays: Reviewed    Recent Labs   Lab Test 07/30/22  0659 07/29/22  2249 07/29/22  2040 07/29/22  1540 06/23/22  1631   POTASSIUM 2.5* 2.2* 2.2* 2.1* 4.7   CHLORIDE 112*  --   --  109* 104   ANIONGAP 7  --   --  9 7       Recent Labs   Lab Test 07/30/22  0659 07/29/22  1540 07/28/22  0959   WBC 6.4 7.8 6.5   RBC 2.73* 2.21* 2.25*   MCV 91 95 98   MCH 30.4 30.8 31.1   RDW 15.9* 15.5* 15.7*       Recent Labs   Lab Test 07/29/22  1540   INR 1.22*       Thank you for allowing me to participate in Mrs. Rodgers s care.    Stephanie Granger, DNP, CNP, RON  [facility]  Office # [phone]      "

## 2022-07-30 NOTE — PLAN OF CARE
Goal Outcome Evaluation:      Pt. Ate well for dinner, K+ this evening 3.8 will replace and recheck in am, denies pain, pure wick in place, will cont to monitor.

## 2022-07-30 NOTE — PROGRESS NOTES
"Care Management Follow Up    Length of Stay (days): 1    Expected Discharge Date: 08/02/2022     Concerns to be Addressed: colonoscopy prep, renal status        Patient plan of care discussed at interdisciplinary rounds: Yes    Anticipated Discharge Disposition:  TBD     Anticipated Discharge Services:  TBD  Anticipated Discharge DME:  none       Additional Information:  Patient admitted for Acute on chronic blood loss anemia, Chronic kidney disease stage IIIb, Hypokalemia. Planning start of extended colonoscopy prep.     Request for PT/OT eval when appropriate.    Social history per initial CM consult:  Yun lives at [facility] in [place]. She moved in there on 7/11/22 when she discharged from her TCU stay at [facility] on 6/17/22 - 7/11/22. She has Home Care PT/OT help from \"staff from [facility]\" per son. He \"doesn't think it is a different agency doing the Home Care PT and OT\". I was unable to verify this after hours. She uses a walker for mobility. Son to transport at discharge.    Final discharge plan pending progression and recommendations.          Lizbet Villarreal RN        "

## 2022-07-30 NOTE — PROGRESS NOTES
Daily Progress Note    Assessment/Plan:  Yun Rodgers is a 87 year old female  with PMH of CKD 3, constipation, hemorrhoids, RTA, chronic metabolic acidosis, subacute cutaneous lupus, Sjogren's, CAD s/p post stent in 2007, arthritis, peripheral vascular disease status post right carotid enterectomy admitted with anemia, Hemoccult positive stool, hypokalemia and suspected rectal mass.     1.  Acute on chronic blood loss anemia: Hemoglobin ton admission 6.8, 1 unit of packed red blood cells ordered.  GI consulted.  Stool was positive for occult blood, ER physician reports on rectal exam concerns for a mass.  Admitted in June with fecal impaction and she declined sigmoidoscopy at that time.    Treated with with enemas and bowel regimen during previous admission.  Had hematochezia at that time which was felt secondary to hemorrhoids.  Has been diagnosed with iron deficiency anemia and was started on iron supplementation last admission.  Hemoglobin stable today between 8 and 9.  Will monitor.  Seen by GI and will undergo colonoscopy on August 2 with extended prep.  Okay for regular diet today.     2.  Hypokalemia with history of metabolic acidosis:   Was seen by nephrology last admission and at that time started on a bicarb drip then added oral bicarb.    Seen by nephrology today and started on potassium bicarb.  Will monitor closely.  Magnesium is 1.8 and an additional dose was given today with a goal of getting her magnesium around 2.     3.  Chronic kidney disease stage IIIb: Creatinine currently normal.  Baseline creatinine around 1.2-1.5.  Screening for multiple myeloma was negative during last admission.  Nephrology feels serum creatinine not entirely showing true picture of renal status and checking size statin C.  Urinalysis with microscopy also ordered along with urine protein creatinine ratio to screen for significant proteinuria.  Avoid nephrotoxic medications, no ACE inhibitor/ARB, aminoglycosides, IV  "contrast or NSAIDs if possible.      4. Coronary artery disease-  Status post LAD stent in 2007 with abnormal stress test 2018 that showed a small area of distal anterior septal ischemia with normal EF  Continued PTA Imdur.  She is not on aspirin, since she had intermittent rectal bleeding thought to be from hemorrhoids.  .   5.Failure to thrive-  Has mild intermittent dysphagia, poor appetite.   Previous labs labs negative for B12, folate deficiencies.  Normal TSH.    Evaluated by speech last admission and recommended thickened fluids.  -We will reconsult speech therapy.     6.  History of urinary retention: Had Serrano last time, had a large episode of urinary incontinence after admission, will order bladder protocol and place Serrano if indicated        Barriers to discharge: Hypokalemia, anemia requiring transfusion, GI bleeding-colonoscopy    Code status:No CPR- Do NOT Intubate      Disposition: Anticipate multi day stay    Subjective:  Yun Navarro has no new complaints today and states that she feels \"pretty good\".  Her main complaint is she feels hungry.  She denies any passage of dark or tarry stool or diarrhea or bloody stool overnight.  No current abdominal pain, no nausea or vomiting.  No chest pain or shortness of breath or fever.        Current Medications Reviewed via EHR List    Objective:  Vital signs in last 24 hours:  [unfilled]  .prog  Weight:   @THISENCWEIGHTS(1)@  Weight change:   Body mass index is 17.32 kg/m .    Intake/Output last 3 shifts:  I/O last 3 completed shifts:  In: 240 [P.O.:240]  Out: 1700 [Urine:1700]  Intake/Output this shift:  I/O this shift:  In: -   Out: 400 [Urine:400]    Physical Exam:  General: NAD  CV:rrr  Lungs:CTA  Abdomen: Soft, nontender        Imaging:  Personally Reviewed.  No results found.    Lab Results:  Personally Reviewed.   Fingerstick Blood Glucose: @GAKWLED14SMV(POCGLUFGR:10)@    Last Hbg A1C: No results found for: HGBA1C   Lab Results   Component Value Date    " INR 1.22 (H) 07/29/2022    INR 1.36 (H) 06/14/2022    INR 1.25 (H) 02/25/2018     Recent Results (from the past 24 hour(s))   INR    Collection Time: 07/29/22  3:40 PM   Result Value Ref Range    INR 1.22 (H) 0.85 - 1.15   Partial thromboplastin time    Collection Time: 07/29/22  3:40 PM   Result Value Ref Range    aPTT 32 22 - 38 Seconds   Basic metabolic panel    Collection Time: 07/29/22  3:40 PM   Result Value Ref Range    Sodium 135 (L) 136 - 145 mmol/L    Potassium 2.1 (LL) 3.5 - 5.0 mmol/L    Chloride 109 (H) 98 - 107 mmol/L    Carbon Dioxide (CO2) 17 (L) 22 - 31 mmol/L    Anion Gap 9 5 - 18 mmol/L    Urea Nitrogen 12 8 - 28 mg/dL    Creatinine 0.95 0.60 - 1.10 mg/dL    Calcium 7.6 (L) 8.5 - 10.5 mg/dL    Glucose 105 70 - 125 mg/dL    GFR Estimate 58 (L) >60 mL/min/1.73m2   Adult Type and Screen    Collection Time: 07/29/22  3:40 PM   Result Value Ref Range    ABO/RH(D) A NEG     Antibody Screen Negative Negative    SPECIMEN EXPIRATION DATE [id number]    CBC with platelets and differential    Collection Time: 07/29/22  3:40 PM   Result Value Ref Range    WBC Count 7.8 4.0 - 11.0 10e3/uL    RBC Count 2.21 (L) 3.80 - 5.20 10e6/uL    Hemoglobin 6.8 (LL) 11.7 - 15.7 g/dL    Hematocrit 21.0 (L) 35.0 - 47.0 %    MCV 95 78 - 100 fL    MCH 30.8 26.5 - 33.0 pg    MCHC 32.4 31.5 - 36.5 g/dL    RDW 15.5 (H) 10.0 - 15.0 %    Platelet Count 212 150 - 450 10e3/uL    % Neutrophils 71 %    % Lymphocytes 14 %    % Monocytes 13 %    % Eosinophils 1 %    % Basophils 0 %    % Immature Granulocytes 1 %    NRBCs per 100 WBC 0 <1 /100    Absolute Neutrophils 5.7 1.6 - 8.3 10e3/uL    Absolute Lymphocytes 1.1 0.8 - 5.3 10e3/uL    Absolute Monocytes 1.0 0.0 - 1.3 10e3/uL    Absolute Eosinophils 0.1 0.0 - 0.7 10e3/uL    Absolute Basophils 0.0 0.0 - 0.2 10e3/uL    Absolute Immature Granulocytes 0.0 <=0.4 10e3/uL    Absolute NRBCs 0.0 10e3/uL   Extra Red Top Tube    Collection Time: 07/29/22  3:42 PM   Result Value Ref Range    Hold  Specimen [facility]    Occult blood stool    Collection Time: 07/29/22  3:43 PM   Result Value Ref Range    Occult Blood Positive (A) Negative   Prepare red blood cells (unit)    Collection Time: 07/29/22  4:30 PM   Result Value Ref Range    CROSSMATCH Compatible     UNIT ABO/RH A Neg     Unit Number [id number]     Unit Status Issued     Blood Component Type Red Blood Cells     Product Code [id number]     CODING SYSTEM [id number]     UNIT TYPE ISBT 0600     ISSUE DATE AND TIME [id number]    Asymptomatic COVID-19 Virus (Coronavirus) by PCR Nasopharyngeal    Collection Time: 07/29/22  4:50 PM    Specimen: Nasopharyngeal; Swab   Result Value Ref Range    SARS CoV2 PCR Negative Negative   Potassium    Collection Time: 07/29/22  8:40 PM   Result Value Ref Range    Potassium 2.2 (LL) 3.5 - 5.0 mmol/L   Potassium    Collection Time: 07/29/22 10:49 PM   Result Value Ref Range    Potassium 2.2 (LL) 3.5 - 5.0 mmol/L   Hemoglobin    Collection Time: 07/29/22 10:49 PM   Result Value Ref Range    Hemoglobin 8.8 (L) 11.7 - 15.7 g/dL   Magnesium    Collection Time: 07/29/22 10:49 PM   Result Value Ref Range    Magnesium 1.8 1.8 - 2.6 mg/dL   Comprehensive metabolic panel    Collection Time: 07/30/22  6:59 AM   Result Value Ref Range    Sodium 137 136 - 145 mmol/L    Potassium 2.5 (LL) 3.5 - 5.0 mmol/L    Chloride 112 (H) 98 - 107 mmol/L    Carbon Dioxide (CO2) 18 (L) 22 - 31 mmol/L    Anion Gap 7 5 - 18 mmol/L    Urea Nitrogen 10 8 - 28 mg/dL    Creatinine 0.80 0.60 - 1.10 mg/dL    Calcium 7.4 (L) 8.5 - 10.5 mg/dL    Glucose 86 70 - 125 mg/dL    Alkaline Phosphatase 72 45 - 120 U/L    AST 17 0 - 40 U/L    ALT 11 0 - 45 U/L    Protein Total 5.5 (L) 6.0 - 8.0 g/dL    Albumin 2.1 (L) 3.5 - 5.0 g/dL    Bilirubin Total 0.8 0.0 - 1.0 mg/dL    GFR Estimate 71 >60 mL/min/1.73m2   CBC with platelets    Collection Time: 07/30/22  6:59 AM   Result Value Ref Range    WBC Count 6.4 4.0 - 11.0 10e3/uL    RBC Count 2.73 (L) 3.80 - 5.20 10e6/uL     Hemoglobin 8.3 (L) 11.7 - 15.7 g/dL    Hematocrit 24.9 (L) 35.0 - 47.0 %    MCV 91 78 - 100 fL    MCH 30.4 26.5 - 33.0 pg    MCHC 33.3 31.5 - 36.5 g/dL    RDW 15.9 (H) 10.0 - 15.0 %    Platelet Count 191 150 - 450 10e3/uL           Advanced Care Planning    Time > 35 minutes with greater than 50% of time spent in counseling and coordination of care.    Roberto Vega MD  Date: 7/30/2022  Time: 11:16 AM  [facility]

## 2022-07-30 NOTE — H&P
ADMISSION HISTORY & PHYSICAL      Ronaldo Grijalva, [phone]  ASSESSMENT AND PLAN:  Yun Rodgers is a 87 year old female  with PMH of CKD 3, constipation, hemorrhoids, RTA, chronic metabolic acidosis, subacute cutaneous lupus, Sjogren's, CAD s/p post stent in 2007, arthritis, peripheral vascular disease status post right carotid enterectomy admitted with anemia, Hemoccult positive stool, hypokalemia and suspected rectal mass.    1.  Acute on chronic blood loss anemia: Hemoglobin today 6.8, 1 unit of packed red blood cells ordered.  GI consulted.  Stool was positive for occult blood, ER physician reports on rectal exam concerns for a mass.  Admitted in June with fecal impaction and she declined sigmoidoscopy at that time.  However, now has a colonoscopy scheduled for August 3.  Treated with with enemas and bowel regimen during previous admission.  Had hematochezia at that time which was felt secondary to hemorrhoids.  Has been diagnosed with iron deficiency anemia and was started on iron supplementation last admission    2.  Hypokalemia with history of metabolic acidosis: Replacement started in the ED, will repeat now and initiate further replacement.  Chart review reveals this has been recurrent, previous bicarbs have been 11.  Was seen by nephrology last admission and at that time started on a bicarb drip then added oral bicarb.  She does not appear to be on bicarb currently.  Will reconsult nephrology.    3.  Chronic kidney disease stage IIIb: Creatinine currently normal.  Baseline creatinine around 1.2-1.5.     4. Coronary artery disease-  Status post LAD stent in 2007 with abnormal stress test 2018 that showed a small area of distal anterior septal ischemia with normal EF  Continued PTA Imdur.  She is not on aspirin.   interestingly not on aspirin, since she had intermittent rectal bleeding thought to be from hemorrhoids.  .   5.Failure to thrive-  Has mild intermittent dysphagia, poor appetite.   Previous  "labs labs negative for B12, folate deficiencies.  Normal TSH.    Evaluated by speech last admission and recommended thickened fluids.    6.  History of urinary retention: Had Serrano last time, had a large episode of urinary incontinence after admission, will order bladder protocol and place Serrano if indicated       Barriers to discharge: Hypokalemia, anemia requiring transfusion, GI bleeding      CHIEF COMPLAINT:  Anemia    HISTORY OF PRESENTING ILLNESS:  Yun Rodgers is a 87 year old female who was sent to the ED today due to low hemoglobin which was checked at her care facility.  She did receive a blood transfusion in the ED and reports she feels much better, much less fatigue.  Her main complaint currently is an episode of urinary incontinence.  She denies any chest pain but states that she is chronically short of breath particularly with activity and that this has been going on for \"a long time\".  No fevers or chills or abdominal pain.    PMH/PSH:  Patient Active Problem List   Diagnosis     Mixed hyperlipidemia     Essential hypertension     Coronary Artery Disease     Carotid artery disease (H)     Peripheral vascular disease (H)     Gastroesophageal reflux disease with esophagitis     Chest pain, unspecified type     Angina concurrent with and due to arteriosclerosis of coronary artery (H)     Stable angina (H)     Sinus bradycardia     Dehydration     Cough     Malaise     Hyperkalemia     Gastroesophageal reflux disease without esophagitis     Atypical pneumonia     Nausea     Trimalleolar fracture     Anemia, unspecified type     Coronary artery disease of native artery of native heart with stable angina pectoris (H)     Pulmonary valve disorder     Fracture dislocation of ankle, right, closed, initial encounter     Closed right ankle fracture     Stage 3 chronic kidney disease (H)     Acute renal failure, unspecified acute renal failure type (H)     Fecal impaction in rectum (H)     Urinary retention     " "Hypokalemia     Sjogren's syndrome (H)     Poor appetite     Lesion of ulnar nerve     History of tobacco use     History of coronary artery bypass surgery     Headache     Grief     Constipation     Chronic fatigue syndrome     Burning mouth syndrome     Adult failure to thrive syndrome     Acquired trigger finger     Abnormal gait     Ulcer of external ear (H)     Lower GI bleed     Rectal mass       ALLERGIES:  Allergies   Allergen Reactions     Aminoquinolines Rash     Other reaction(s): rash     Primaquine Rash     Aloe      Other reaction(s): itchy skin     Atorvastatin Unknown     Other reaction(s): muscle aches     Cilostazol      Demeclocycline Other (See Comments)     \"sore bottom\"     Dextromethorphan      Diagnostic X-Ray Materials Unknown and Other (See Comments)     Chest tightness,sshakes     Guaifenesin & Derivatives Hives     Robitussin D     Metrizamide Other (See Comments)     Chest tightness,sshakes     Pravastatin Unknown     Other reaction(s): muscle aches     Simvastatin Other (See Comments)     Muscle aches   Other reaction(s): muscle aches     Tetracyclines Unknown and Other (See Comments)     Other reaction(s): Unknown  \"sore bottom\"       Tomato      Hydroxychloroquine Sulfate [Hydroxychloroquine] Rash     Preservision Areds Rash     AREDs formula only caused rash.        MEDICATIONS:  Reviewed.  Current Facility-Administered Medications   Medication     0.9% sodium chloride BOLUS     potassium chloride 10 mEq in 100 mL sterile water intermittent infusion (premix)       SOCIAL HISTORY:  Social History     Socioeconomic History     Marital status:      Spouse name: Not on file     Number of children: Not on file     Years of education: Not on file     Highest education level: Not on file   Occupational History     Not on file   Tobacco Use     Smoking status: Never Smoker     Smokeless tobacco: Never Used   Substance and Sexual Activity     Alcohol use: Never     Drug use: Never     " "Sexual activity: Not on file   Other Topics Concern     Not on file   Social History Narrative     Not on file     Social Determinants of Health     Financial Resource Strain: Not on file   Food Insecurity: Not on file   Transportation Needs: Not on file   Physical Activity: Not on file   Stress: Not on file   Social Connections: Not on file   Intimate Partner Violence: Not on file   Housing Stability: Not on file       FAMILY HISTORY:  Family History   Problem Relation Age of Onset     Cancer Mother      Hypertension Mother      No Known Problems Father        ROS:  12 point ROS was performed and found to be negative except for the pertinent positives mentioned in the HPI.      PHYSICAL EXAM:  BP (!) 160/74   Pulse 73   Temp 98.2  F (36.8  C) (Oral)   Resp (!) 6   Ht 1.645 m (5' 4.75\")   Wt 46.9 kg (103 lb 4.8 oz)   SpO2 99%   BMI 17.32 kg/m    No intake/output data recorded.  No intake/output data recorded.  CONSTITUTIONAL: No apparent distress  HEENT:       Sclera- anicteric      Mucous membrane- moist and pink  LUNGS: Clear to auscultation bilaterally  CARDIOVASCULAR: S1S2 regular. No murmurs, rubs or gallops,no pedal edema  GI: Soft. Non-tender, non-distended. No organomegaly. No guarding or rigidity. Bowel sounds- active  NEURO: Cranial nerves II-XII grossly intact. No focal neurological deficit. No involuntary movements  INTGM: Pale no cyanosis or clubbing  LYMPH: no adenopathy  MSK: no joint swelling or tenderness  PSYCH: alert and oriented, no agitation      DIAGNOSTIC DATA:  Recent Results (from the past 24 hour(s))   INR    Collection Time: 07/29/22  3:40 PM   Result Value Ref Range    INR 1.22 (H) 0.85 - 1.15   Partial thromboplastin time    Collection Time: 07/29/22  3:40 PM   Result Value Ref Range    aPTT 32 22 - 38 Seconds   Basic metabolic panel    Collection Time: 07/29/22  3:40 PM   Result Value Ref Range    Sodium 135 (L) 136 - 145 mmol/L    Potassium 2.1 (LL) 3.5 - 5.0 mmol/L    Chloride " 109 (H) 98 - 107 mmol/L    Carbon Dioxide (CO2) 17 (L) 22 - 31 mmol/L    Anion Gap 9 5 - 18 mmol/L    Urea Nitrogen 12 8 - 28 mg/dL    Creatinine 0.95 0.60 - 1.10 mg/dL    Calcium 7.6 (L) 8.5 - 10.5 mg/dL    Glucose 105 70 - 125 mg/dL    GFR Estimate 58 (L) >60 mL/min/1.73m2   Adult Type and Screen    Collection Time: 07/29/22  3:40 PM   Result Value Ref Range    ABO/RH(D) A NEG     Antibody Screen Negative Negative    SPECIMEN EXPIRATION DATE [id number]    CBC with platelets and differential    Collection Time: 07/29/22  3:40 PM   Result Value Ref Range    WBC Count 7.8 4.0 - 11.0 10e3/uL    RBC Count 2.21 (L) 3.80 - 5.20 10e6/uL    Hemoglobin 6.8 (LL) 11.7 - 15.7 g/dL    Hematocrit 21.0 (L) 35.0 - 47.0 %    MCV 95 78 - 100 fL    MCH 30.8 26.5 - 33.0 pg    MCHC 32.4 31.5 - 36.5 g/dL    RDW 15.5 (H) 10.0 - 15.0 %    Platelet Count 212 150 - 450 10e3/uL    % Neutrophils 71 %    % Lymphocytes 14 %    % Monocytes 13 %    % Eosinophils 1 %    % Basophils 0 %    % Immature Granulocytes 1 %    NRBCs per 100 WBC 0 <1 /100    Absolute Neutrophils 5.7 1.6 - 8.3 10e3/uL    Absolute Lymphocytes 1.1 0.8 - 5.3 10e3/uL    Absolute Monocytes 1.0 0.0 - 1.3 10e3/uL    Absolute Eosinophils 0.1 0.0 - 0.7 10e3/uL    Absolute Basophils 0.0 0.0 - 0.2 10e3/uL    Absolute Immature Granulocytes 0.0 <=0.4 10e3/uL    Absolute NRBCs 0.0 10e3/uL   Extra Red Top Tube    Collection Time: 07/29/22  3:42 PM   Result Value Ref Range    Hold Specimen [facility]    Occult blood stool    Collection Time: 07/29/22  3:43 PM   Result Value Ref Range    Occult Blood Positive (A) Negative   Prepare red blood cells (unit)    Collection Time: 07/29/22  4:30 PM   Result Value Ref Range    CROSSMATCH Compatible     UNIT ABO/RH A Neg     Unit Number [id number]     Unit Status Issued     Blood Component Type Red Blood Cells     Product Code [id number]     CODING SYSTEM [id number]     UNIT TYPE ISBT 0600     ISSUE DATE AND TIME [id number]    Asymptomatic COVID-19  Virus (Coronavirus) by PCR Nasopharyngeal    Collection Time: 07/29/22  4:50 PM    Specimen: Nasopharyngeal; Swab   Result Value Ref Range    SARS CoV2 PCR Negative Negative     All lab studies reviewed personally    No results found.  Radiology report reviewed.        Roberto Vega MD  [facility]

## 2022-07-30 NOTE — PLAN OF CARE
Problem: Anemia  Goal: Anemia Symptom Improvement  Outcome: Ongoing, Progressing  Intervention: Monitor and Manage Anemia     Problem: Bleeding (Gastrointestinal Bleeding)  Goal: Hemostasis  Outcome: Ongoing, Progressing     Pt's hgb recheck 8.8, K recheck 2.2.  MD updated - continue to replace K per protocol.  Pt denies dizziness/lightheadedness.  Purewick in place overnight, voiding.  Scant red blood noted with wayne-cares, no bms overnight.  Tolerating clear liquid diet.

## 2022-07-30 NOTE — CONSULTS
[facility] Consult       Name: Yun Rodgers    Medical Record #: [id number]    YOB: 1935    Date/Time: 7/30/2022/1:02 PM    Reason for Consultation: Rocky Vega MD has asked me to evaluate Yun Rodgers regarding anemia, rectal bleeding.    HPI: Patient is 88 yo F w/ pmh sf CAD s/p stent 2007, h/o partial colectomy 2000 for reportedly benign lesion, sjogrens, constipation, not on blood thinners, admitted with hgb 6.8 MCV 98/RDW 16 with history worsening rectal bleeding. She was seen by [facility] while inpatient 6/15/22 for rectal bleeding and fecal impaction; she declined sigmoidoscopy during that admission and is scheduled for outpatient colonoscopy at [facility] on 8/3/22 but has had increased rectal bleeding in the last month and would like her colonoscopy done prior to discharge.ER physician reported concern for rectal mass on exam, she was noted to have external hemorrhoids last admission. Labs today notable for K 2.5, hgb 8.3 s/p 1 uprbc. She reports previous colonoscopy last was perhaps 20 years ago. She has hstory of constipation and was admitted with fecal impaction in June.     Patient Active Problem List   Diagnosis     Mixed hyperlipidemia     Essential hypertension     Coronary Artery Disease     Carotid artery disease (H)     Peripheral vascular disease (H)     Gastroesophageal reflux disease with esophagitis     Chest pain, unspecified type     Angina concurrent with and due to arteriosclerosis of coronary artery (H)     Stable angina (H)     Sinus bradycardia     Dehydration     Cough     Malaise     Hyperkalemia     Gastroesophageal reflux disease without esophagitis     Atypical pneumonia     Nausea     Trimalleolar fracture     Anemia, unspecified type     Coronary artery disease of native artery of native heart with stable angina pectoris (H)     Pulmonary valve disorder     Fracture dislocation of ankle, right, closed, initial encounter     Closed right ankle  fracture     Stage 3 chronic kidney disease (H)     Acute renal failure, unspecified acute renal failure type (H)     Fecal impaction in rectum (H)     Urinary retention     Hypokalemia     Sjogren's syndrome (H)     Poor appetite     Lesion of ulnar nerve     History of tobacco use     History of coronary artery bypass surgery     Headache     Grief     Constipation     Chronic fatigue syndrome     Burning mouth syndrome     Adult failure to thrive syndrome     Acquired trigger finger     Abnormal gait     Ulcer of external ear (H)     Lower GI bleed     Rectal mass          Review of Systems (ROS): Pertinent items are noted in HPI.    Past Medical History:  Past Medical History:   Diagnosis Date     Antiplatelet or antithrombotic long-term use     aspirin     Hypertension      Stented coronary artery        Medications:   No current outpatient medications on file.       Allergies: Aminoquinolines, Primaquine, Aloe, Atorvastatin, Cilostazol, Demeclocycline, Dextromethorphan, Diagnostic x-ray materials, Guaifenesin & derivatives, Metrizamide, Pravastatin, Simvastatin, Tetracyclines, Tomato, Hydroxychloroquine sulfate [hydroxychloroquine], and Preservision areds    Family History:  Family History   Problem Relation Age of Onset     Cancer Mother      Hypertension Mother      No Known Problems Father        Social History:  Social History     Socioeconomic History     Marital status:      Spouse name: Not on file     Number of children: Not on file     Years of education: Not on file     Highest education level: Not on file   Occupational History     Not on file   Tobacco Use     Smoking status: Never Smoker     Smokeless tobacco: Never Used   Substance and Sexual Activity     Alcohol use: Never     Drug use: Never     Sexual activity: Not on file   Other Topics Concern     Not on file   Social History Narrative     Not on file     Social Determinants of Health     Financial Resource Strain: Not on file   Food  "Insecurity: Not on file   Transportation Needs: Not on file   Physical Activity: Not on file   Stress: Not on file   Social Connections: Not on file   Intimate Partner Violence: Not on file   Housing Stability: Not on file       PHYSICAL EXAMINATION:  BP (!) 140/63 (BP Location: Left arm)   Pulse 64   Temp 98.1  F (36.7  C) (Oral)   Resp 16   Ht 1.645 m (5' 4.75\")   Wt 46.9 kg (103 lb 4.8 oz)   SpO2 99%   BMI 17.32 kg/m   Body mass index is 17.32 kg/m .  General:  NAD, thin  HEENT: Sclera non-icteric.  Moist mucous membranes. Oropharynx clear.   Lymph: No cervical lymphadenopathy.  Pulm: Lungs clear to ausculation bilaterally.  Cardio: Regular rate and rhythm   Gastrointestinal: soft, nt, nd  Musculoskeletal: Extremities are warm, no lower extremity edema.  Neuro: Alert and oriented.  No gross focal abnormalities.  Psych: Mood and affect normal.  Skin: No suspicious lesions or rashes.    I have reviewed recent laboratory studies:    LABORATORY DATA:  Recent Labs   Lab 07/30/22  0659 07/29/22  2249 07/29/22  1540 07/28/22  0959   WBC 6.4  --  7.8 6.5   RBC 2.73*  --  2.21* 2.25*   HGB 8.3* 8.8* 6.8* 7.0*   HCT 24.9*  --  21.0* 22.1*   MCV 91  --  95 98   MCH 30.4  --  30.8 31.1   MCHC 33.3  --  32.4 31.7   RDW 15.9*  --  15.5* 15.7*     --  212 226      Recent Labs   Lab 07/30/22  0659 07/29/22  1540    135*   CO2 18* 17*   BUN 10 12     Recent Labs   Lab 07/30/22  0659   ALKPHOS 72   AST 17   ALT 11     Lab Results   Component Value Date    INR 1.22 (H) 07/29/2022    INR 1.36 (H) 06/14/2022    INR 1.25 (H) 02/25/2018       Radiology:       Impression:   1. Anemia - follow hgb, xfuse as per primary  2. Rectal bleeding -symptoms for over a year but patient reports increased bleeding in last month; plan for colonoscopy for further eval; possible etiologies include hemorrhoidal/outlet, polyp, mass  3. Noted concern for rectal mass on ED MD exam - hemorrhoids noted last admission;  colonoscopy for " further eval  4. Hypokalemia - K 2.5  5. History of remote partial colectomy for reportedly benign lesion    Recommendation:   1. Follow hgb, xfuse as per primary  2. Correct electrolytes as per primary  3. Once electrolytes are corrected recommend double prep given history constipation for colonoscopy next week (likely start bowel prep tomorrow if electrolytes are within acceptable limits)  4. PO as tolerated today     Approximately 60 minutes of total time was spent providing patient care, including patient evaluation, reviewing documentation/tests, and .    Noreen Estevez MD  7/30/2022/1:02 PM  [facility]

## 2022-07-30 NOTE — PROGRESS NOTES
"Speech-Language Pathology: Bedside Swallow Evaluation       07/30/22 1400   General Information   Onset of Illness/Injury or Date of Surgery 07/29/22   Pertinent History of Current Problem Per EMR, patient is a \"87 year old female  with PMH of CKD 3, constipation, hemorrhoids, RTA, chronic metabolic acidosis, subacute cutaneous lupus, Sjogren's, CAD s/p post stent in 2007, arthritis, peripheral vascular disease status post right carotid enterectomy admitted with anemia, Hemoccult positive stool, hypokalemia and suspected rectal mass.\"  Patient reports intermittent dysphagia, some coughing with liquids (~1x per meal).  She was evaluated by speech therapy in June with recommendations for mildly thick liquids.  Patient denies drinking thickend liquids at home, but does recall recommendations including fully upright, no straws, and small sips.   Past History of Dysphagia Yes, see above   Type of Evaluation   Type of Evaluation Swallow Evaluation   Oral Motor   Oral Musculature generally intact   Structural Abnormalities none present   Dentition (Oral Motor)   Dentition (Oral Motor) dental appliance/dentures   Facial Symmetry (Oral Motor)   Facial Symmetry (Oral Motor) WNL   Lip Function (Oral Motor)   Lip Range of Motion (Oral Motor) WNL   Tongue Function (Oral Motor)   Tongue ROM (Oral Motor) WNL   General Swallowing Observations   Respiratory Support (General Swallowing Observations) none   Current Diet/Method of Nutritional Intake (General Swallowing Observations, NIS) regular diet;thin liquids (level 0)   Swallowing Evaluation Clinical swallow evaluation   Clinical Swallow Evaluation   Feeding Assistance set up only required   Clinical Swallow Evaluation Textures Trialed solid foods;thin liquids   Clinical Swallow Eval: Thin Liquid Texture Trial   Mode of Presentation, Thin Liquids cup;self-fed   Volume of Liquid or Food Presented 6oz   Oral Phase of Swallow WFL   Pharyngeal Phase of Swallow intact   Diagnostic " Statement Patient with no s/s aspiration with small or large consecutive drinks.   Clinical Swallow Evaluation: Solid Food Texture Trial   Mode of Presentation self-fed   Volume Presented 1/2 hamburger cut into small pieces   Oral Phase WFL  (slow but functional mastication)   Pharyngeal Phase coughing/choking  (x1)   Diagnostic Statement Patient with cough x1 when talking/eating.  No further s/s aspiration with intake.   Swallowing Recommendations   Diet Consistency Recommendations thin liquids (level 0);regular diet  (Patient reports she prefers soft foods)   Supervision Level for Intake distant supervision needed   Mode of Delivery Recommendations bolus size, small;slow rate of intake   Monitoring/Assistance Required (Eating/Swallowing) stop eating activities when fatigue is present;monitor for cough or change in vocal quality with intake   Recommended Feeding/Eating Techniques (Swallow Eval) maintain upright sitting position for eating   Medication Administration Recommendations, Swallowing (SLP) per patient preference   Instrumental Assessment Recommendations instrumental evaluation not recommended at this time   General Therapy Interventions   Planned Therapy Interventions Dysphagia Treatment   Dysphagia treatment Instruction of safe swallow strategies;Compensatory strategies for swallowing   Clinical Impression   Criteria for Skilled Therapeutic Interventions Met (SLP Eval) Yes, treatment indicated   Risks & Benefits of therapy have been explained evaluation/treatment results reviewed;care plan/treatment goals reviewed;risks/benefits reviewed;current/potential barriers reviewed;participants voiced agreement with care plan;participants included;patient   Clinical Impression Comments Patient presents with x1 cough with solids while eating/drinking, no further s/s aspiration on today's date.  Patient endorses continued coughing with liquids, ~1x per meal.  Patient demonstrates good recall of strategies  previously trained; however, would benefit from x1 diet follow-up and review of ongoing intake strategies.   SLP Discharge Planning   SLP Discharge Recommendation home with assist  (defer to OT/PT)   SLP Rationale for DC Rec near baseline for swallow   SLP Brief overview of current status  Appropriate to continue regular solids (prefers soft foods) and thin liquids with strategy use.    Total Evaluation Time   Total Evaluation Time (Minutes) 20   SLP Goals   Therapy Frequency (SLP Eval) 3 times/wk   SLP Predicted Duration/Target Date for Goal Attainment 07/30/22   SLP Goals Swallow   SLP: Safely tolerate diet without signs/symptoms of aspiration Regular diet;Thin liquids;Independently;With use of compensatory swallow strategies   Psychosocial Support   Trust Relationship/Rapport care explained;choices provided;emotional support provided;empathic listening provided;questions answered

## 2022-07-31 NOTE — PROGRESS NOTES
Daily Progress Note    Assessment/Plan:  Yun Navarro is a 87 year old female  with PMH of CKD 3, constipation, hemorrhoids, RTA, chronic metabolic acidosis, subacute cutaneous lupus, Sjogren's, CAD s/p post stent in 2007, arthritis, peripheral vascular disease status post right carotid enterectomy admitted with anemia, Hemoccult positive stool, hypokalemia and suspected rectal mass.     1.  Acute on chronic blood loss anemia: Hemoglobin ton admission 6.8, 1 unit of packed red blood cells ordered.  GI consulted.  Stool was positive for occult blood, ER physician reports on rectal exam concerns for a mass.  Admitted in June with fecal impaction and she declined sigmoidoscopy at that time.    Treated with with enemas and bowel regimen during previous admission.  Had hematochezia at that time which was felt secondary to hemorrhoids.  Has been diagnosed with iron deficiency anemia and was started on iron supplementation last admission.  Hemoglobin stable now between 8 and 9.  Will monitor.  Seen by GI and will undergo colonoscopy on August 2 with extended prep.    Started on clear liquids today by GI.     2.  Hypokalemia with history of metabolic acidosis:   Potassium was 2.1 on admission, nephrology consulted.  Potassium is 3.5. Now on potassium bicarb.  Will monitor closely.  Magnesium is 1.9 today.     3.  Chronic kidney disease stage IIIb: Creatinine currently normal.  Baseline creatinine around 1.2-1.5.  Screening for multiple myeloma was negative during last admission.  Nephrology feels serum creatinine not entirely showing true picture of renal status and checking size statin C.  Urinalysis with microscopy also ordered along with urine protein creatinine ratio to screen for significant proteinuria.  Avoid nephrotoxic medications, no ACE inhibitor/ARB, aminoglycosides, IV contrast or NSAIDs if possible.      4. Coronary artery disease-  Status post LAD stent in 2007 with abnormal stress test 2018 that showed a small  area of distal anterior septal ischemia with normal EF  Continued PTA Imdur.  She is not on aspirin, since she had intermittent rectal bleeding thought to be from hemorrhoids.  .   5.Failure to thrive-  Has mild intermittent dysphagia, poor appetite.   Previous labs labs negative for B12, folate deficiencies.  Normal TSH.    Evaluated by speech last admission and recommended thickened fluids.  -We will reconsult speech therapy.     6.  History of urinary retention: Had Serrano last time, had a large episode of urinary incontinence after admission, will order bladder protocol and place Serrano if indicated        Barriers to discharge: Electrolyte abnormalities, GI bleeding-colonoscopy     Code status:No CPR- Do NOT Intubate        Disposition: Anticipate discharge in 2 to 3 days, PT/OT ordered for disposition recommendations    Subjective:  Yun Navarro has no complaints today.  Denies any diarrhea or gross blood in her stool or melena.  No abdominal pain, no fevers or chills or nausea or vomiting.  No chest pain or shortness of breath.  I asked if she wanted me to update her son but she declined and states she has been talking with him daily.        Current Medications Reviewed via EHR List    Objective:  Vital signs in last 24 hours:  [unfilled]  .prog  Weight:   @THISENCWEIGHTS(1)@  Weight change:   Body mass index is 17.32 kg/m .    Intake/Output last 3 shifts:  I/O last 3 completed shifts:  In: 1080 [P.O.:1080]  Out: 700 [Urine:700]  Intake/Output this shift:  No intake/output data recorded.    Physical Exam:  General: No apparent distress, hard of hearing without her hearing aid in  CV: Regular rate and rhythm  Lungs: Clear to auscultation  Abdomen: Soft, nontender        Imaging:  Personally Reviewed.  No results found.    Lab Results:  Personally Reviewed.   Fingerstick Blood Glucose: @[id number](POCGLUFGR:10)@    Last Hbg A1C: No results found for: HGBA1C   Lab Results   Component Value Date    INR 1.22 (H)  07/29/2022    INR 1.36 (H) 06/14/2022    INR 1.25 (H) 02/25/2018     Recent Results (from the past 24 hour(s))   Protein  random urine    Collection Time: 07/30/22 11:31 AM   Result Value Ref Range    Total Protein Urine mg/dL 21.5 mg/dL    Total Protein UR MG/MG CR 2.15 mg/mg Cr    Creatinine Urine mg/dL 10 mg/dL   Urinalysis Macroscopic    Collection Time: 07/30/22 11:31 AM   Result Value Ref Range    Color Urine Colorless Colorless, Straw, Light Yellow, Yellow    Appearance Urine Clear Clear    Glucose Urine Negative Negative mg/dL    Bilirubin Urine Negative Negative    Ketones Urine Negative Negative mg/dL    Specific Gravity Urine 1.007 1.001 - 1.030    Blood Urine 1.0 mg/dL (A) Negative    pH Urine 7.0 5.0 - 7.0    Protein Albumin Urine 10  (A) Negative mg/dL    Urobilinogen Urine <2.0 <2.0 mg/dL    Nitrite Urine Negative Negative    Leukocyte Esterase Urine Negative Negative   Potassium    Collection Time: 07/30/22  4:47 PM   Result Value Ref Range    Potassium 3.8 3.5 - 5.0 mmol/L   Extra Purple Top Tube    Collection Time: 07/30/22  4:47 PM   Result Value Ref Range    Hold Specimen JIC    Magnesium    Collection Time: 07/31/22  5:38 AM   Result Value Ref Range    Magnesium 1.9 1.8 - 2.6 mg/dL   CBC with platelets    Collection Time: 07/31/22  5:38 AM   Result Value Ref Range    WBC Count 6.2 4.0 - 11.0 10e3/uL    RBC Count 2.72 (L) 3.80 - 5.20 10e6/uL    Hemoglobin 8.4 (L) 11.7 - 15.7 g/dL    Hematocrit 25.1 (L) 35.0 - 47.0 %    MCV 92 78 - 100 fL    MCH 30.9 26.5 - 33.0 pg    MCHC 33.5 31.5 - 36.5 g/dL    RDW 16.2 (H) 10.0 - 15.0 %    Platelet Count 183 150 - 450 10e3/uL   Basic metabolic panel    Collection Time: 07/31/22  5:38 AM   Result Value Ref Range    Sodium 134 (L) 136 - 145 mmol/L    Potassium 3.5 3.5 - 5.0 mmol/L    Chloride 111 (H) 98 - 107 mmol/L    Carbon Dioxide (CO2) 17 (L) 22 - 31 mmol/L    Anion Gap 6 5 - 18 mmol/L    Urea Nitrogen 11 8 - 28 mg/dL    Creatinine 0.92 0.60 - 1.10 mg/dL     Calcium 7.6 (L) 8.5 - 10.5 mg/dL    Glucose 83 70 - 125 mg/dL    GFR Estimate 60 (L) >60 mL/min/1.73m2           Advanced Care Planning    Time > 35 minutes with greater than 50% of time spent in counseling and coordination of care.     Roberto Vega MD  Date: 7/31/2022  Time: 7:49 AM  [facility]

## 2022-07-31 NOTE — PLAN OF CARE
Problem: Plan of Care  Goal: Optimal Comfort and Wellbeing  Outcome: Ongoing, Progressing     Problem: Anemia  Goal: Anemia Symptom Improvement  Outcome: Ongoing, Progressing  Intervention: Monitor and Manage Anemia     Problem: Bleeding (Gastrointestinal Bleeding)  Goal: Hemostasis  Outcome: Ongoing, Progressing     No bms overnight, no sx bleeding.  Pt denies lightheadedness or dizziness.  Received tylenol for generalized aches.  Purewick in place with adequate output.  Hgb recheck this am.

## 2022-07-31 NOTE — PLAN OF CARE
Problem: Plan of Care - These are the overarching goals to be used throughout the patient stay.    Goal: Plan of Care Review/Shift Note  Description:  Ongoing, Progressing   Problem: Risk for Delirium  Goal: Optimal Coping  Outcome: Ongoing, Progressing     Problem: Adjustment to Illness (Gastrointestinal Bleeding)  Goal: Optimal Coping with Acute Illness  Outcome: Ongoing, Progressing   Goal Outcome Evaluation:    [id number].... Pt is a/o x4, assist of 1 with walker and gait belt, denied pain, ambulated multiple times today, up to the bathroom and voided. Pt is scheduled for colonoscopy on 8/2, bowel prep starts today. Will continue to monitor.

## 2022-07-31 NOTE — PROGRESS NOTES
RENAL PROGRESS NOTE  CC: AKID/CKD    S: Since last seen, patient potassium is much better, creatinine stable, and bicarb at 17.  Hgb at 8.4.  Patient denies chest pain, SOB, muscle cramp, N/V, abdominal pain, weakness, and fever.  Reports still having loose stool but much better.  Seen today with her niece by the bedside, she participated in ROS with patient agreement.       Impression and Plan:      1. Acute Kidney Injury/CKD- Baseline creatinine is 1 to 1.4.  She presented with serum creatinine of 0.95, still normal today.  Cystatin C at 1.7, GFR calculation base on this is 32 (7/30/2021), Scr was 0.80 at that time .  Her urine output has been good as reported by patient.  Denies NSAID use, not on ACE/ARB, and no hypotension.  CKD since 2018.       Screening for MM was negative during last admission in July of 2022.   -UA negative for UTI, trace protein and blood.      PTH normal     Plan is to avoid any further renal insult by renally dosing all medications and avoiding nephrotoxic medications.  No ACE inhibitors/ARB, Aminoglycosides/ IV constrast/  NSAID if possible         2. Hypokalemia: was hypokalemic also during admission in June of 2022.             -Likely due to ongoing loose stool in setting of rectal bleeding, potassium is now normal with potassium bicarb, mag was at 1.8, replaced to get up to 2 yesterday.    -On protocol.         3. Blood Pressure/Volume:  Patient with long standing history of hypertension. Blood pressure mildly elevated, will avoid aggressive treatment especially with fluid loss in stool and history of CKD         4. Anemia: Admitted with concern for rectal bleeding after her hgb at her pcp clinic came back at 7.  She was due to complete an outpatient colonoscopy on August 3 due to a recent admission here last month after a bleeding hemorrhoid.  During her last admission for GIB, she declined sigmoidoscopy and was treated with enemas and bowel regimen; she discharged after her Hgb  "was stable.            -  Was also started on oral iron due to iron deficiency anemia.                   -Has needed series of transfusion, will have colonoscopy on 8/2/2022.            - Hgb stable today.     5. NAGMA: bicarb is at 17.  Likely due to CKD, as stated in #1.            -Will start sodium bicarb 650 mg tab bid today.              AKIKO Perez CNP  [facility]  Pager: [phone]      Physical Exam  BP (!) 160/69 (BP Location: Left arm)   Pulse 64   Temp 98.4  F (36.9  C) (Oral)   Resp 18   Ht 1.645 m (5' 4.75\")   Wt 46.9 kg (103 lb 4.8 oz)   SpO2 96%   BMI 17.32 kg/m     Patient Vitals for the past 96 hrs:   Weight   07/29/22 1725 46.9 kg (103 lb 4.8 oz)   07/29/22 1333 46.7 kg (103 lb)       Intake/Output Summary (Last 24 hours) at 7/31/2022 0922  Last data filed at 7/30/2022 1823  Gross per 24 hour   Intake 840 ml   Output 300 ml   Net 540 ml       Physical Exam:   BP (!) 160/69 (BP Location: Left arm)   Pulse 64   Temp 98.4  F (36.9  C) (Oral)   Resp 18   Ht 1.645 m (5' 4.75\")   Wt 46.9 kg (103 lb 4.8 oz)   SpO2 96%   BMI 17.32 kg/m    In general this is a 87 year old female in no acute distress.   General: Calm & comfortable   Eyes: Pupils equal, sclerae not icteric   ENT: Mucus membranes moist, no lesions noted   Resp: CTA bilaterally, no distress, normal effort   CV: RRR without murmur, no hip/leg edema   GI: Soft NT NG   Psych: A&Ox3, not depressed   Neuro: Moves all extremities.     Skin: No rash noted       Recent Labs   Lab Test 07/31/22  0538 07/30/22  1647 07/30/22  0659 07/29/22  2040 07/29/22  1540   POTASSIUM 3.5 3.8 2.5*   < > 2.1*   CHLORIDE 111*  --  112*  --  109*   ANIONGAP 6  --  7  --  9    < > = values in this interval not displayed.     Recent Labs   Lab Test 07/31/22  0538 07/30/22  0659 07/29/22  1540   WBC 6.2 6.4 7.8   RBC 2.72* 2.73* 2.21*   MCV 92 91 95   MCH 30.9 30.4 30.8   RDW 16.2* 15.9* 15.5*               I personally reviewed " these labs today

## 2022-07-31 NOTE — PROGRESS NOTES
07/31/22 0910   Quick Adds   Type of Visit Initial PT Evaluation   Living Environment   People in Home alone   Current Living Arrangements assisted living   Home Accessibility no concerns   Self-Care   Usual Activity Tolerance moderate   Current Activity Tolerance fair   Equipment Currently Used at Home walker, rolling  (4WW)   Activity/Exercise/Self-Care Comment just moved into [facility] following [facility] stay. Pt is independent with ADLs normally, gets help with med mangement, and can have daily checks as needed. Meal plans provided for pt.   General Information   Onset of Illness/Injury or Date of Surgery 07/29/22   Referring Physician Rocky Vega MD   Patient/Family Therapy Goals Statement (PT) return to [facility]   Pertinent History of Current Problem (include personal factors and/or comorbidities that impact the POC) PMH of CKD 3, constipation, hemorrhoids, RTA, chronic metabolic acidosis, subacute cutaneous lupus, Sjogren's, CAD s/p post stent in 2007, arthritis, peripheral vascular disease status post right carotid enterectomy admitted with anemia, Hemoccult positive stool, hypokalemia and suspected rectal mass   Existing Precautions/Restrictions fall   Cognition   Affect/Mental Status (Cognition) WFL   Orientation Status (Cognition) oriented x 4   Pain Assessment   Patient Currently in Pain No  (mild soreness on buttocks)   Range of Motion (ROM)   Range of Motion ROM is WFL   Strength (Manual Muscle Testing)   Strength (Manual Muscle Testing) strength is WFL   Strength Comments generalized weakness but overall functional   Bed Mobility   Bed Mobility sit-supine   Sit-Supine Viking (Bed Mobility) contact guard   Assistive Device (Bed Mobility) bed rails   Comment, (Bed Mobility) HOB flat   Transfers   Transfers sit-stand transfer   Sit-Stand Transfer   Sit-Stand Viking (Transfers) contact guard   Assistive Device (Sit-Stand Transfers) walker, 4-wheeled   Gait/Stairs (Locomotion)   Viking  Level (Gait) supervision;contact guard   Assistive Device (Gait) walker, 4-wheeled   Distance in Feet (Required for LE Total Joints) 100'   Pattern (Gait) step-through   Deviations/Abnormal Patterns (Gait) yvonne decreased   Balance   Balance no deficits were identified   Balance Comments static standing x 1 minute no AD SBA-CGA   Clinical Impression   Criteria for Skilled Therapeutic Intervention Yes, treatment indicated   PT Diagnosis (PT) impaired functional mobility, gait abnormality   Influenced by the following impairments decreased strength, decreased endurance, decreased balance   Functional limitations due to impairments bed mobility, gait, transfers   Clinical Presentation (PT Evaluation Complexity) Stable/Uncomplicated   Clinical Presentation Rationale pt presents as medically diagnosed   Clinical Decision Making (Complexity) low complexity   Planned Therapy Interventions (PT) balance training;bed mobility training;gait training;home exercise program;neuromuscular re-education;patient/family education;strengthening;transfer training   Anticipated Equipment Needs at Discharge (PT) walker, rolling  (4WW)   Risk & Benefits of therapy have been explained evaluation/treatment results reviewed;patient   PT Discharge Planning   PT Discharge Recommendation (DC Rec) home with assist;home with home care physical therapy   PT Rationale for DC Rec daily checks, continue with home care PT for endurance and strengthening. assist from staff as needed once home   Total Evaluation Time   Total Evaluation Time (Minutes) 10   Physical Therapy Goals   PT Frequency Daily   PT Predicted Duration/Target Date for Goal Attainment 08/07/22   PT Goals Bed Mobility;Transfers;Gait;PT Goal 1   PT: Bed Mobility Independent;Supine to/from sit   PT: Transfers Modified independent;Sit to/from stand;Bed to/from chair;Assistive device   PT: Gait Modified independent;Assistive device;Rolling walker;Greater than 200 feet  (4WW)   PT: Goal 1  SBA BLE ex to improve strength and endurance needed for functional mobility

## 2022-07-31 NOTE — PROGRESS NOTES
"[facility] Progress Note    Subjective: had additional stool with bright red blood; hgb 8.4    Objective:  BP (!) 167/73 (BP Location: Left arm)   Pulse 55   Temp 98.6  F (37  C) (Oral)   Resp 18   Ht 1.645 m (5' 4.75\")   Wt 46.9 kg (103 lb 4.8 oz)   SpO2 96%   BMI 17.32 kg/m     Gen: Awake, no acute distress  Pulmonary: Lungs clear to ausculation bilaterally  Cardiovascular: Regular  Gastrointestinal: soft, nt, nd      Patient Active Problem List   Diagnosis     Mixed hyperlipidemia     Essential hypertension     Coronary Artery Disease     Carotid artery disease (H)     Peripheral vascular disease (H)     Gastroesophageal reflux disease with esophagitis     Chest pain, unspecified type     Angina concurrent with and due to arteriosclerosis of coronary artery (H)     Stable angina (H)     Sinus bradycardia     Dehydration     Cough     Malaise     Hyperkalemia     Gastroesophageal reflux disease without esophagitis     Atypical pneumonia     Nausea     Trimalleolar fracture     Anemia, unspecified type     Coronary artery disease of native artery of native heart with stable angina pectoris (H)     Pulmonary valve disorder     Fracture dislocation of ankle, right, closed, initial encounter     Closed right ankle fracture     Stage 3 chronic kidney disease (H)     Acute renal failure, unspecified acute renal failure type (H)     Fecal impaction in rectum (H)     Urinary retention     Hypokalemia     Sjogren's syndrome (H)     Poor appetite     Lesion of ulnar nerve     History of tobacco use     History of coronary artery bypass surgery     Headache     Grief     Constipation     Chronic fatigue syndrome     Burning mouth syndrome     Adult failure to thrive syndrome     Acquired trigger finger     Abnormal gait     Ulcer of external ear (H)     Lower GI bleed     Rectal mass       Labs:  Recent Labs   Lab 07/31/22  0538 07/30/22  0659 07/29/22  2249 07/29/22  1540   WBC 6.2 6.4  --  7.8   RBC 2.72* " 2.73*  --  2.21*   HGB 8.4* 8.3* 8.8* 6.8*   HCT 25.1* 24.9*  --  21.0*   MCV 92 91  --  95   MCH 30.9 30.4  --  30.8   MCHC 33.5 33.3  --  32.4   RDW 16.2* 15.9*  --  15.5*    191  --  212      Recent Labs   Lab 07/31/22  0538 07/30/22  0659 07/29/22  1540   * 137 135*   CO2 17* 18* 17*   BUN 11 10 12     Recent Labs   Lab 07/30/22  0659   ALKPHOS 72   AST 17   ALT 11     Lab Results   Component Value Date    INR 1.22 (H) 07/29/2022    INR 1.36 (H) 06/14/2022    INR 1.25 (H) 02/25/2018       Image(s):        Assessment:   1. Anemia - follow hgb, xfuse as per primary  2. Rectal bleeding -symptoms for over a year but patient reports increased bleeding in last month; plan for colonoscopy following double prep (history of constipation) for further eval; possible etiologies include hemorrhoidal/outlet, polyp, mass  3. Noted concern for rectal mass on ED MD exam - hemorrhoids noted last admission;  colonoscopy for further eval  4. Hypokalemia - K 2.5, resolved  5. History of remote partial colectomy for reportedly benign lesion    Plan:   1. Follow hgb, xfuse as per primary  2. Clear liquid diet  3. Double prep for planned colonoscopy on 8/2/22     Approximately 20 minutes of total time was spent providing patient care, including patient evaluation, reviewing documentation/tests, and .    Noreen Estevez MD  7/31/2022 2:20 PM  [facility]

## 2022-08-01 NOTE — PROGRESS NOTES
RENAL PROGRESS NOTE  CC: AKID/CKD    S:  Sitting up in bed. No pain. No sob. No swelling. Plan for colonoscopy tomorrow.      Impression and Plan:      1. CKD stage 3- Baseline creatinine is 1 to 1.4.  She presented with serum creatinine of 0.95,  Cystatin C at 1.7, GFR calculation base on this is 32 (7/30/2021), Scr was 0.80 at that time .  Her urine output has been good as reported by patient.  Denies NSAID use, not on ACE/ARB, and no hypotension.  CKD since 2018.       Screening for MM was negative during last admission in July of 2022.   -UA negative for UTI, trace protein and blood.      PTH normal     Plan is to avoid any further renal insult by renally dosing all medications and avoiding nephrotoxic medications.  No ACE inhibitors/ARB, Aminoglycosides/ IV constrast/  NSAID if possible         2. Hypokalemia: was hypokalemic also during admission in June of 2022.             -Likely due to ongoing loose stool in setting of rectal bleeding, potassium bicarb,   -On protocol.         3. Blood Pressure/Volume:  Patient with long standing history of hypertension. Blood pressure mildly elevated, will avoid aggressive treatment especially with fluid loss in stool and history of CKD         4. Anemia: Admitted with concern for rectal bleeding after her hgb at her pcp clinic came back at 7.  She was due to complete an outpatient colonoscopy on August 3 due to a recent admission here last month after a bleeding hemorrhoid.  During her last admission for GIB, she declined sigmoidoscopy and was treated with enemas and bowel regimen; she discharged after her Hgb was stable.            -  Was also started on oral iron due to iron deficiency anemia.                   -Has needed series of transfusion, will have colonoscopy on 8/2/2022.            - Hgb stable today.     5. NAGMA: bicarb is at 20.  Likely due to CKD, as stated in #1.            -Will start sodium bicarb 650 mg tab bid today.              Eduard Espinoza,  "DO  [facility]      Physical Exam  /65 (BP Location: Left arm)   Pulse 73   Temp 98.6  F (37  C) (Oral)   Resp 16   Ht 1.645 m (5' 4.75\")   Wt 46.9 kg (103 lb 4.8 oz)   SpO2 95%   BMI 17.32 kg/m     Patient Vitals for the past 96 hrs:   Weight   07/29/22 1725 46.9 kg (103 lb 4.8 oz)   07/29/22 1333 46.7 kg (103 lb)       Intake/Output Summary (Last 24 hours) at 7/31/2022 0922  Last data filed at 7/30/2022 1823  Gross per 24 hour   Intake 840 ml   Output 300 ml   Net 540 ml       Physical Exam:   /65 (BP Location: Left arm)   Pulse 73   Temp 98.6  F (37  C) (Oral)   Resp 16   Ht 1.645 m (5' 4.75\")   Wt 46.9 kg (103 lb 4.8 oz)   SpO2 95%   BMI 17.32 kg/m    In general this is a 87 year old female in no acute distress.   General: Calm & comfortable   Eyes: Pupils equal, sclerae not icteric   ENT: Mucus membranes moist, no lesions noted   Resp: no distress, normal effort   CV: no hip/leg edema   GI: Soft NT NG   Psych: A&Ox3, not depressed   Neuro: Moves all extremities.     Skin: No rash noted       Recent Labs   Lab Test 07/31/22  0538 07/30/22  1647 07/30/22  0659 07/29/22  2040 07/29/22  1540   POTASSIUM 3.5 3.8 2.5*   < > 2.1*   CHLORIDE 111*  --  112*  --  109*   ANIONGAP 6  --  7  --  9    < > = values in this interval not displayed.     Recent Labs   Lab Test 07/31/22  0538 07/30/22  0659 07/29/22  1540   WBC 6.2 6.4 7.8   RBC 2.72* 2.73* 2.21*   MCV 92 91 95   MCH 30.9 30.4 30.8   RDW 16.2* 15.9* 15.5*               I personally reviewed these labs today    "

## 2022-08-01 NOTE — PROGRESS NOTES
08/01/22 0900   Quick Adds   Type of Visit Initial Occupational Therapy Evaluation   Living Environment   People in Home alone   Current Living Arrangements assisted living   Home Accessibility no concerns   Self-Care   Usual Activity Tolerance moderate   Equipment Currently Used at Home walker, rolling;grab bar, toilet;raised toilet seat;shower chair  (grab bars in walk in shower)   Fall history within last six months yes   Number of times patient has fallen within last six months   (recalls one fall about a week ago)   Activity/Exercise/Self-Care Comment independent with self cares,makes bed,   Instrumental Activities of Daily Living (IADL)   Previous Responsibilities meal prep;housekeeping;laundry;medication management;shopping   General Information   Onset of Illness/Injury or Date of Surgery 08/29/22   Cognitive Status Examination   Orientation Status place;person  (states date as Aug. 2nd)   Behavioral Issues   (states depressed about having to do colonoscopy prep)   Follows Commands WNL   Visual Perception   Visual Impairment/Limitations corrective lenses full-time  (takes off to see TV)   Pain Assessment   Patient Currently in Pain No   Posture   Posture not impaired   Range of Motion Comprehensive   General Range of Motion no range of motion deficits identified   Strength Comprehensive (MMT)   General Manual Muscle Testing (MMT) Assessment upper extremity strength deficits identified   Upper Extremity (Manual Muscle Testing)   Upper Extremity: Manual Muscle Testing (MMT) left shoulder strength deficit;right shoulder strength deficit;left elbow/forearm strength deficit;right elbow/forearm strength deficit   Bed Mobility   Bed Mobility No deficits identified   Transfers   Transfers sit-stand transfer;toilet transfer   Sit-Stand Transfer   Sit-Stand [place] (Transfers) supervision   Assistive Device (Sit-Stand Transfers) walker, front-wheeled   Toilet Transfer   [place] Level (Toilet Transfer)  supervision   Assistive Device (Toilet Transfer) raised toilet seat;grab bars/safety frame   Balance   Balance Assessment standing balance: dynamic;sitting balance: dynamic   Balance Comments sitting balance good, standing balance fair, working with PT on balance   Activities of Daily Living   BADL Assessment/Intervention grooming;toileting;lower body dressing   Lower Body Dressing Assessment/Training   Position (Lower Body Dressing)   (on toilet)   [place] Level (Lower Body Dressing) set up   Grooming Assessment/Training   Position (Grooming)   (standing)   [place] Level (Grooming) supervision;verbal cues   Toileting   Assistive Devices (Toileting) raised toilet seat;grab bar, toilet   [place] Level (Toileting) supervision;modified independence   Clinical Impression   Criteria for Skilled Therapeutic Interventions Met (OT) Yes, treatment indicated   OT Diagnosis decreased strength due to GI bleed   OT Problem List-Impairments impacting ADL strength;balance;mobility   Assessment of Occupational Performance 1-3 Performance Deficits   Identified Performance Deficits U/E strength   Planned Therapy Interventions (OT) strengthening   Clinical Decision Making Complexity (OT) low complexity   Risk & Benefits of therapy have been explained evaluation/treatment results reviewed;patient   Clinical Impression Comments Pt. demonstrates baseline ADL status but does demonstrate weakness in U/B so exercise program given with mod cues   OT Discharge Planning   OT Discharge Recommendation (DC Rec) (S)  home with assist   Total Evaluation Time (Minutes)   Total Evaluation Time (Minutes) 40   OT Goals   Therapy Frequency (OT) Daily   OT Goals OT Goal 1   OT: Goal 1 Pt. will demonstrate independent U/E exercise program

## 2022-08-01 NOTE — PROGRESS NOTES
SPIRITUAL HEALTH SERVICES NOTE  [facility]/    SPIRITUAL CARE NOTE  Saw Yun Navarro due to admission screening response. She shares that she expects to have a colonoscopy tomorrow. She welcomes a visit from our hospital  tomorrow. She is connected to [facility]. Prayer shared.     Visit Length: 15 minutes    Plan of Care: Will remain available for further support as patient/family needs/desires.    Cathy Gee M.Div.      Office: [phone] (for non-urgent requests)  Please Vocera or page through [facility] for time-sensitive requests

## 2022-08-01 NOTE — PROGRESS NOTES
[facility] progress Note    Subjective: Patient getting the prep for colonoscopy which is scheduled for tomorrow.  Agreeable to take more today.    Objective:  Vital signs in last 24 hours  Temp:  [97.4  F (36.3  C)-98.7  F (37.1  C)] 98.6  F (37  C)  Pulse:  [55-73] 73  Resp:  [16-18] 16  BP: (139-188)/(65-81) 139/65  SpO2:  [95 %-97 %] 95 %     Gen: Awake, no acute distress  Abdomen: Soft, mildly tender in the left lower quadrant, no rebound tenderness.      Patient Active Problem List   Diagnosis     Mixed hyperlipidemia     Essential hypertension     Coronary Artery Disease     Carotid artery disease (H)     Peripheral vascular disease (H)     Gastroesophageal reflux disease with esophagitis     Chest pain, unspecified type     Angina concurrent with and due to arteriosclerosis of coronary artery (H)     Stable angina (H)     Sinus bradycardia     Dehydration     Cough     Malaise     Hyperkalemia     Gastroesophageal reflux disease without esophagitis     Atypical pneumonia     Nausea     Trimalleolar fracture     Anemia, unspecified type     Coronary artery disease of native artery of native heart with stable angina pectoris (H)     Pulmonary valve disorder     Fracture dislocation of ankle, right, closed, initial encounter     Closed right ankle fracture     Stage 3 chronic kidney disease (H)     Acute renal failure, unspecified acute renal failure type (H)     Fecal impaction in rectum (H)     Urinary retention     Hypokalemia     Sjogren's syndrome (H)     Poor appetite     Lesion of ulnar nerve     History of tobacco use     History of coronary artery bypass surgery     Headache     Grief     Constipation     Chronic fatigue syndrome     Burning mouth syndrome     Adult failure to thrive syndrome     Acquired trigger finger     Abnormal gait     Ulcer of external ear (H)     Lower GI bleed     Rectal mass       Labs:  CMP Results:   Recent Labs   Lab Test 08/01/22  0643 07/30/22  0048  07/30/22  0659   *   < > 137   POTASSIUM 3.4*   < > 2.5*   CHLORIDE 108*   < > 112*   CO2 20*   < > 18*   ANIONGAP 7   < > 7   GLC 82   < > 86   BUN 9   < > 10   CR 0.88   < > 0.80   BILITOTAL  --   --  0.8   ALKPHOS  --   --  72   ALT  --   --  11   AST  --   --  17    < > = values in this interval not displayed.      CBC  Recent Labs   Lab 08/01/22  0643 07/31/22  0538 07/30/22  0659 07/29/22  2249 07/29/22  1540 07/28/22  0959   WBC  --  6.2 6.4  --  7.8 6.5   RBC  --  2.72* 2.73*  --  2.21* 2.25*   HGB 9.1* 8.4* 8.3* 8.8* 6.8* 7.0*   HCT  --  25.1* 24.9*  --  21.0* 22.1*   MCV  --  92 91  --  95 98   MCH  --  30.9 30.4  --  30.8 31.1   MCHC  --  33.5 33.3  --  32.4 31.7   RDW  --  16.2* 15.9*  --  15.5* 15.7*   PLT  --  183 191  --  212 226     INR  Recent Labs   Lab 07/29/22  1540   INR 1.22*      No results found for: LIPASE  Recent Labs   Lab 07/30/22  0659   AST 17   ALT 11   ALKPHOS 72   BILITOTAL 0.8        Assessment: 86 yo F w/ pmh sf CAD s/p stent 2007, h/o partial colectomy 2000 for reportedly benign lesion, sjogrens, constipation, not on blood thinners, admitted with hgb 6.8 MCV 98/RDW 16 with history worsening rectal bleeding.  Getting prep for colonoscopy which is scheduled for tomorrow.  Rectal bleeding.  Likely exit bleeding.  Has history of hemorrhoids.  Concern for rectal mass ED exam.  Hypokalemia.  Being corrected, recent potassium 3.4.    Plan:   Agree with supportive care  Continue with the colonoscopy prep.  Colonoscopy scheduled for tomorrow.  Discussed with the patient that she has to finish at least three fourths of the prep today and make sure that bowel movements are clear.  Updated the nursing staff.  Approximately 20 minutes of total time was spent providing patient care, including patient evaluation, reviewing documentation/test results, and                                              Santana Posey MD  Thank you for the opportunity to participate in the care  of this patient.   Please feel free to call me with any questions or concerns.  Phone number [phone].

## 2022-08-01 NOTE — PLAN OF CARE
Problem: Plan of Care - These are the overarching goals to be used throughout the patient stay.    Goal: Optimal Comfort and Wellbeing  Outcome: Ongoing, Progressing     Problem: Anemia  Goal: Anemia Symptom Improvement  Outcome: Ongoing, Progressing  Intervention: Monitor and Manage Anemia  Recent Flowsheet Documentation  Taken 8/1/2022 1206 by Ligia Hernandez RN  Safety Promotion/Fall Prevention: activity supervised     Problem: Bleeding (Gastrointestinal Bleeding)  Goal: Hemostasis  Outcome: Ongoing, Progressing   Goal Outcome Evaluation:        HGB-9.1    No bleeding noted. New bottle of Colon Prep started this morning. GI spoke with patient, who agreed to drink as much as possible. Otherwise denies pain.   Tolerating clear liquids.

## 2022-08-01 NOTE — PROGRESS NOTES
[facility]    Medicine Progress Note - Hospitalist Service    Date of Admission:  7/29/2022    Assessment & Plan        Yun Navarro is a 87 year old female  with PMH of CKD 3, constipation, hemorrhoids, RTA, chronic metabolic acidosis, subacute cutaneous lupus, Sjogren's, CAD s/p post stent in 2007, arthritis, peripheral vascular disease status post right carotid enterectomy admitted with anemia, Hemoccult positive stool, hypokalemia and suspected rectal mass.     Acute on chronic blood loss anemia due to GI bleeding  Rectal mass  -hemoglobin ton admission 6.8, 1 unit of packed red blood cells ordered.   -Stool was positive for occult blood, ER physician reports on rectal exam concerns for a mass.    -Admitted in June with fecal impaction and she declined sigmoidoscopy at that time.Treated with with enemas and bowel regimen during previous admission.  Had hematochezia at that time which was felt secondary to hemorrhoids.    -Has been diagnosed with iron deficiency anemia and was started on iron supplementation last admission.    -Hemoglobin stable now between 8 and 9.    -Will monitor.    -Seen by GI and will undergo colonoscopy on August 2 with extended prep.     Hypokalemia-3.4 this AM   Metabolic acidosis- NAGMA    -Potassium was 2.1 on admission, nephrology consulted.   -Now on potassium bicarb.    -Will monitor closely.    Hypomagnesemia-1.7  Monitoring and replacing as needed with nurse replacement protocol,      Chronic kidney disease stage IIIb: Creatinine currently normal.  Baseline creatinine around 1.2-1.5.  Screening for multiple myeloma was negative during last admission.  Nephrology feels serum creatinine not entirely showing true picture of renal status. Cystatin C 1.7.  Urinalysis with microscopy also ordered along with urine protein creatinine ratio to screen for significant proteinuria.  Avoid nephrotoxic medications, no ACE inhibitor/ARB, aminoglycosides, IV contrast or NSAIDs  "if possible.     Coronary artery disease-  Status post LAD stent in 2007 with abnormal stress test 2018 that showed a small area of distal anterior septal ischemia with normal EF  Continued PTA Imdur.  She is not on aspirin, since she had intermittent rectal bleeding thought to be from hemorrhoids.    Failure to thrive  Has mild intermittent dysphagia, poor appetite.  BMI 17  Previous labs labs negative for B12, folate deficiencies.  Normal TSH.    Evaluated by speech last admission and recommended thickened fluids.  -We will reconsult speech therapy.     History of urinary retention: Had Serrano last time, had a large episode of urinary incontinence after admission, will order bladder protocol and place Serrano if indicated     Diet: Clear Liquid Diet    DVT Prophylaxis: Pneumatic Compression Devices  Serrano Catheter: Not present  Central Lines: None  Cardiac Monitoring: None  Code Status: No CPR- Do NOT Intubate      Disposition Plan      Expected Discharge Date: 08/02/2022    Discharge Delays: Other (Add Comment)  Destination: nursing home  Discharge Comments: monitoring labs, colonoscopy 8/2, starting extended prep        The patient's care was discussed with the Patient and Patient's Family.    Daija Marc MD  Hospitalist Service  [facility]  Securely message with the Vocera Web Console (learn more here)  Text page via MVER[employer] Paging/Directory         Clinically Significant Risk Factors Present on Admission               # Cachexia: Estimated body mass index is 17.32 kg/m  as calculated from the following:    Height as of this encounter: 1.645 m (5' 4.75\").    Weight as of this encounter: 46.9 kg (103 lb 4.8 oz).        ______________________________________________________________________    Interval History   Ms. Rodgers was upset about having to take so much prep as she felt like she was not warned of this.  She was fine with doing the prep after explained to her the reason why she " was having it done.  She was drinking a cup of the PEG solution as I spoke with her.  She is having loose watery stools as to be expected.  She will have her colonoscopy tomorrow and then likely go home with her home health care agency that is already set up for her outpatient.  We will see how she does after the procedure if she is feeling good and not confused then she can go home.  Discussed all of this with her son who is her power of .    Data reviewed today: I reviewed all medications, new labs and imaging results over the last 24 hours. I personally reviewed no images or EKG's today.    Physical Exam   Vital Signs: Temp: 98.8  F (37.1  C) Temp src: Oral BP: (!) 141/65 Pulse: 82   Resp: 18 SpO2: 100 % O2 Device: None (Room air)    Weight: 103 lbs 4.8 oz  General Appearance: Awake, alert, in no acute distress, frail  Respiratory: CTAB, no wheeze  Cardiovascular: RRR, no murmur noted  GI: soft, nontender, non distended, normal bowel sounds  Skin: no jaundice, no rash      Data   Recent Labs   Lab 08/01/22  1304 08/01/22  0643 07/31/22  0538 07/30/22  1647 07/30/22  0659 07/29/22  2040 07/29/22  1540   WBC  --   --  6.2  --  6.4  --  7.8   HGB  --  9.1* 8.4*  --  8.3*   < > 6.8*   MCV  --   --  92  --  91  --  95   PLT  --   --  183  --  191  --  212   INR  --   --   --   --   --   --  1.22*   NA  --  135* 134*  --  137  --  135*   POTASSIUM 4.1 3.4* 3.5   < > 2.5*   < > 2.1*   CHLORIDE  --  108* 111*  --  112*  --  109*   CO2  --  20* 17*  --  18*  --  17*   BUN  --  9 11  --  10  --  12   CR  --  0.88 0.92  --  0.80  --  0.95   ANIONGAP  --  7 6  --  7  --  9   ARON  --  7.8* 7.6*  --  7.4*  --  7.6*   GLC  --  82 83  --  86  --  105   ALBUMIN  --   --   --   --  2.1*  --   --    PROTTOTAL  --   --   --   --  5.5*  --   --    BILITOTAL  --   --   --   --  0.8  --   --    ALKPHOS  --   --   --   --  72  --   --    ALT  --   --   --   --  11  --   --    AST  --   --   --   --  17  --   --     < > = values  in this interval not displayed.     Medications       [Held by provider] Centravites 50 Plus   Oral Daily     [Held by provider] Ferrous Gluconate  324 mg Oral BID     fexofenadine  180 mg Oral Daily     isosorbide mononitrate  120 mg Oral Daily     isosorbide mononitrate  30 mg Oral Daily     menthol-zinc oxide   Topical TID     metoprolol tartrate  25 mg Oral BID     mirtazapine  15 mg Oral At Bedtime     pantoprazole  40 mg Oral BID AC     pantoprazole  40 mg Oral At Bedtime     phenylephrine-cocoa butter  1 suppository Rectal At Bedtime     polyethylene glycol-propylene glycol  1 drop Both Eyes 4x Daily     sennosides  2 tablet Oral At Bedtime     sodium bicarbonate  650 mg Oral BID     sodium chloride (PF)  3 mL Intracatheter Q8H

## 2022-08-01 NOTE — PROGRESS NOTES
Care Management Follow Up    Length of Stay (days): 3    Expected Discharge Date: 08/02/2022     Concerns to be Addressed:     Colonscopy  Patient plan of care discussed at interdisciplinary rounds: Yes    Anticipated Discharge Disposition:  Likely return to apartment with resumption of services     Anticipated Discharge Services:  Home PT OT- YANETH  Anticipated Discharge DME:  [facility]     Patient/family educated on Medicare website which has current facility and service quality ratings:    Education Provided on the Discharge Plan:  yes  Patient/Family in Agreement with the Plan:  yes    Referrals Placed by CM/SW:    Private pay costs discussed: Not applicable    Additional Information:  Chart reviewed. Pt lives at [facility] in [place], is in assisted living.   Has home PT OT with [facility] per [facility] .   Was at [facility]. Moved in to [facility] about a month ago.    Sophie: [facility] : [phone]  Dr Giovanna Contreras is primary care provider.     Director of Nursing at [facility] [phone] ([facility]).       Carmita Talley, JAYSW

## 2022-08-01 NOTE — PLAN OF CARE
Goal Outcome Evaluation:      [id number].... Pt started the 2 days bowel prep for her scheduled colonoscopy on 8/2, she will be NPO as of midnight. Pt has been having lots of loose/watery stools and will be  incontinent at times. Pt wayne rectal area is red/raw with hemorrhoid, Calmoseptine cream applied. Assist of 1 with walker and gait belt to the bathroom/bedside commode. Will continue to monitor.

## 2022-08-01 NOTE — PLAN OF CARE
Problem: Anemia  Goal: Anemia Symptom Improvement  Outcome: Ongoing, Progressing     Problem: Bleeding (Gastrointestinal Bleeding)  Goal: Hemostasis  Outcome: Ongoing, Progressing   Goal Outcome Evaluation:        Pt refused to finish bowel prep. She got about 200 left. Pt stated she wanted to sleep and would rather continue in the morning. IV access loss overnight. Refused Protonix at 0700    Elevated BP's overnight. Dr Suarez was made awake and gave a verbal order for hydralazine for >180 systolic. However pt's BP dropped below 170 systolic on its own. Nursing to continue to monitor.

## 2022-08-02 NOTE — ANESTHESIA CARE TRANSFER NOTE
Patient: Yun Rodgers    Procedure: Procedure(s):  COLONOSCOPY WITH RECTUM BIOPSIES       Diagnosis: Rectal bleeding [K62.5]  Diagnosis Additional Information: No value filed.    Anesthesia Type:   MAC     Note:    Oropharynx: oropharynx clear of all foreign objects and spontaneously breathing  Level of Consciousness: awake  Oxygen Supplementation: room air    Independent Airway: airway patency satisfactory and stable  Dentition: dentition unchanged  Vital Signs Stable: post-procedure vital signs reviewed and stable  Report to RN Given: handoff report given  Patient transferred to: Medical/Surgical Unit    Handoff Report: Identifed the Patient, Identified the Reponsible Provider, Reviewed the pertinent medical history, Discussed the surgical course, Reviewed Intra-OP anesthesia mangement and issues during anesthesia, Set expectations for post-procedure period and Allowed opportunity for questions and acknowledgement of understanding      Vitals:  Vitals Value Taken Time   /58 08/02/22 0933   Temp     Pulse 56 08/02/22 0933   Resp 14 08/02/22 0933   SpO2 98 % 08/02/22 0933       Electronically Signed By: AKIKO Roman CRNA  August 2, 2022  9:41 AM   Principal Discharge DX:	Hyperosmolar non-ketotic state in patient with type 2 diabetes mellitus  Goal:	prevent recurrence  Assessment and plan of treatment:	patient was sent form nursing home for uncontrollable sugar levels, patient started on insulin drip and switched to lantus and humalog when controlled  Secondary Diagnosis:	Sacral decubitus ulcer, stage IV  Assessment and plan of treatment:	grade IV decubitus ulcer noted on back around 10x12 cm in size.   wound care consulted and recommended for surgical debridement, which was done at bedside and culture was done which showed proteus and candida albicans - meropenum, clindamycin and fluconazole was started.  CT abdomen pelvis done which showed possible osteomyelitis.  Secondary Diagnosis:	Septic shock  Assessment and plan of treatment:	secondary to decubitus wound. treated with meropenum, clindamycin and fluconazole  was on levophed to maintain BP.

## 2022-08-02 NOTE — PLAN OF CARE
Goal Outcome Evaluation:  Pt went for colonoscopy at 0730.  Came back to room by 0930.   Ordered small bite diet. Pt ordered breakfast and ate 100% of it.  Was up to BR with walker prior to going to OR.  BP elevated after colonoscopy but Imdur given as was scheduled but not given prior to colonoscopy and BP has come down.  Working with therapy now.

## 2022-08-02 NOTE — ANESTHESIA POSTPROCEDURE EVALUATION
Patient: Yun Rodgers    Procedure: Procedure(s):  COLONOSCOPY WITH RECTUM BIOPSIES       Anesthesia Type:  MAC    Note:  Disposition: Inpatient   Postop Pain Control: Uneventful            Sign Out: Well controlled pain   PONV: No   Neuro/Psych: Uneventful            Sign Out: Acceptable/Baseline neuro status   Airway/Respiratory: Uneventful            Sign Out: Acceptable/Baseline resp. status   CV/Hemodynamics: Uneventful            Sign Out: Acceptable CV status; No obvious hypovolemia; No obvious fluid overload   Other NRE: NONE   DID A NON-ROUTINE EVENT OCCUR? No    Event details/Postop Comments:  Tolerated mac anesthetic well.            Last vitals:  Vitals:    08/02/22 0756 08/02/22 0933 08/02/22 0953   BP:  113/58 (!) 152/68   Pulse: 56 56 67   Resp:  14 16   Temp:   36.6  C (97.9  F)   SpO2: 98% 98% 98%       Electronically Signed By: Tomasz Lambert MD  August 2, 2022  10:06 AM

## 2022-08-02 NOTE — H&P
GENERAL PRE-PROCEDURE:   Procedure:  Colonoscopy    Verbal consent obtained?: Yes    Written consent obtained?: Yes    Risks and benefits: Risks, benefits and alternatives were discussed    Consent given by:  Patient  Patient states understanding of procedure being performed: Yes    Patient's understanding of procedure matches consent: Yes    Procedure consent matches procedure scheduled: Yes    Expected level of sedation:  Deep  Appropriately NPO:  Yes  ASA Class:  3  Mallampati  :  Grade 2- soft palate, base of uvula, tonsillar pillars, and portion of posterior pharyngeal wall visible  Lungs:  Lungs clear with good breath sounds bilaterally  Heart:  Normal heart sounds and rate  History & Physical reviewed:  History and physical reviewed and no updates needed  Statement of review:  I have reviewed the lab findings, diagnostic data, medications, and the plan for sedation

## 2022-08-02 NOTE — PLAN OF CARE
Speech Language Therapy Discharge Summary    Reason for therapy discharge:    All goals and outcomes met, no further needs identified.    Progress towards therapy goal(s). See goals on Care Plan in [facility] electronic health record for goal details.  Goals met    Therapy recommendation(s):    No further therapy is recommended. Patient prefers soft food textures and is safest with soft & bite size diet w/thin liquids. She has mild diffuse oral stasis with certain foods and often talks with food in her mouth, this results in occasional reactive cough. Dentures are also ill fitting even with adhesive. She is able to use strategies of sips of liquid every 1-2 bites, no straws, and avoid talking with food in her mouth.

## 2022-08-02 NOTE — ANESTHESIA PREPROCEDURE EVALUATION
"Anesthesia Pre-Procedure Evaluation    Patient: Yun Rodgers   MRN: [id number] : 1935        Procedure : Procedure(s):  COLONOSCOPY          Past Medical History:   Diagnosis Date     Antiplatelet or antithrombotic long-term use     aspirin     Hypertension      Stented coronary artery       Past Surgical History:   Procedure Laterality Date     ABDOMEN SURGERY       APPENDECTOMY       COLOSTOMY CLOSURE       EYE SURGERY       HYSTERECTOMY       IR AORTIC ARCH 4 VESSEL ANGIOGRAM  2002     IR AORTIC ARCH 4 VESSEL ANGIOGRAM  2002     IR CAROTID ANGIOGRAM  2002     IR CAROTID ANGIOGRAM  2002     IR EXTREMITY ANGIOGRAM BILATERAL  2007     IR MISCELLANEOUS PROCEDURE  2002     IR MISCELLANEOUS PROCEDURE  2002     IR MISCELLANEOUS PROCEDURE  2002     IR MISCELLANEOUS PROCEDURE  2002     OPEN REDUCTION INTERNAL FIXATION ANKLE Right 3/2/2018    Procedure: OPEN REDUCTION INTERNAL FIXATION TRIMALLEOLAR FRACTURE RIGHT ANKLE;  Surgeon: Shawn Vega DO;  Location: [facility];  Service:      OTHER SURGICAL HISTORY      Resection of mesenteryper patient-- \"they took out my mesentery about 6 months after I had a baby\"     REMOVE HARDWARE LOWER EXTREMITY Right 3/4/2022    Procedure: RIGHT ANKLE REMOVAL OF HARDWARE;  Surgeon: Shawn Vega DO;  Location: [facility] APPENDECTOMY      Description: Appendectomy;  Recorded: 2014;     [facility] COLOSTOMY      Description: Colostomy;  Recorded: 2014;     [facility] THROMBOENDARTECTMY NECK,NECK INCIS      Description: Carotid Thromboendarterectomy;  Recorded: 2014;     [facility] TOTAL ABDOM HYSTERECTOMY      Description: Total Abdominal Hysterectomy;  Recorded: 2014;      Allergies   Allergen Reactions     Aminoquinolines Rash     Other reaction(s): rash     Primaquine Rash     Aloe      Other reaction(s): itchy skin     Atorvastatin Unknown     Other reaction(s): muscle aches     " "Cilostazol      Demeclocycline Other (See Comments)     \"sore bottom\"     Dextromethorphan      Diagnostic X-Ray Materials Unknown and Other (See Comments)     Chest tightness,sshakes     Guaifenesin & Derivatives Hives     Robitussin D     Metrizamide Other (See Comments)     Chest tightness,sshakes     Pravastatin Unknown     Other reaction(s): muscle aches     Simvastatin Other (See Comments)     Muscle aches   Other reaction(s): muscle aches     Tetracyclines Unknown and Other (See Comments)     Other reaction(s): Unknown  \"sore bottom\"       Tomato      Hydroxychloroquine Sulfate [Hydroxychloroquine] Rash     Preservision Areds Rash     AREDs formula only caused rash.       Social History     Tobacco Use     Smoking status: Never Smoker     Smokeless tobacco: Never Used   Substance Use Topics     Alcohol use: Never      Wt Readings from Last 1 Encounters:   07/29/22 46.9 kg (103 lb 4.8 oz)        Anesthesia Evaluation            ROS/MED HX  ENT/Pulmonary:  - neg pulmonary ROS     Neurologic:  - neg neurologic ROS     Cardiovascular: Comment: 2/2018 TTE    Moderate concentric left ventricular hypertrophy.    Left ventricle ejection fraction is normal. The estimated left ventricular ejection fraction is 65% without wall motion abnormality.    Normal right ventricular size with mild decrease in systolic function    Mild mitral and tricuspid regurgitation with borderline to mild pulmonary hypertension    (+) hypertension-Peripheral Vascular Disease-- Carotid Stenosis. CAD --stent-HERNANDEZ.     METS/Exercise Tolerance:     Hematologic:     (+) anemia,     Musculoskeletal:       GI/Hepatic:     (+) GERD, bowel prep,     Renal/Genitourinary:     (+) renal disease,     Endo:  - neg endo ROS     Psychiatric/Substance Use:       Infectious Disease:       Malignancy:       Other:            Physical Exam    Airway  airway exam normal      Mallampati: II   TM distance: > 3 FB   Neck ROM: full   Mouth opening: > 3 " cm    Respiratory Devices and Support         Dental       (+) upper dentures and lower dentures      Cardiovascular   cardiovascular exam normal       Rhythm and rate: regular and normal     Pulmonary   pulmonary exam normal        breath sounds clear to auscultation           OUTSIDE LABS:  CBC:   Lab Results   Component Value Date    WBC 6.7 08/02/2022    WBC 6.2 07/31/2022    HGB 8.9 (L) 08/02/2022    HGB 9.1 (L) 08/01/2022    HCT 27.3 (L) 08/02/2022    HCT 25.1 (L) 07/31/2022     08/02/2022     07/31/2022     BMP:   Lab Results   Component Value Date     (L) 08/02/2022     (L) 08/01/2022    POTASSIUM 3.4 (L) 08/02/2022    POTASSIUM 4.1 08/01/2022    CHLORIDE 107 08/02/2022    CHLORIDE 108 (H) 08/01/2022    CO2 21 (L) 08/02/2022    CO2 20 (L) 08/01/2022    BUN 10 08/02/2022    BUN 9 08/01/2022    CR 0.97 08/02/2022    CR 0.88 08/01/2022    GLC 82 08/02/2022    GLC 82 08/01/2022     COAGS:   Lab Results   Component Value Date    PTT 32 07/29/2022    INR 1.22 (H) 07/29/2022     POC: No results found for: BGM, HCG, HCGS  HEPATIC:   Lab Results   Component Value Date    ALBUMIN 2.1 (L) 07/30/2022    PROTTOTAL 5.5 (L) 07/30/2022    ALT 11 07/30/2022    AST 17 07/30/2022    ALKPHOS 72 07/30/2022    BILITOTAL 0.8 07/30/2022     OTHER:   Lab Results   Component Value Date    LACT 1.2 06/14/2022    ARON 7.8 (L) 08/02/2022    PHOS 2.7 02/28/2018    MAG 1.7 (L) 08/02/2022    TSH 2.11 07/28/2022       Anesthesia Plan    ASA Status:  3      Anesthesia Type: MAC.              Consents    Anesthesia Plan(s) and associated risks, benefits, and realistic alternatives discussed. Questions answered and patient/representative(s) expressed understanding.    - Discussed:     - Discussed with:  Patient      - Patient is DNR/DNI Status: Yes             Suspend during perioperative period? Yes.             Agree to: chemical resuscitation (Ok for defibrillation, no chest compressions).        Postoperative  Care            Comments:                Jose F Harman MD

## 2022-08-02 NOTE — PLAN OF CARE
"Goal Outcome Evaluation:    Plan of Care Reviewed With: patient     Overall Patient Progress: improving    Outcome Evaluation: Patient NPO since midnight. Cares clustered per patient request. Denies pain or discomfort. Scant bleeding from rectum/hemorrhoids.    /64 (BP Location: Left arm)   Pulse 59   Temp 98.7  F (37.1  C) (Oral)   Resp 18   Ht 1.645 m (5' 4.75\")   Wt 46.9 kg (103 lb 4.8 oz)   SpO2 95%   BMI 17.32 kg/m      "

## 2022-08-02 NOTE — PROGRESS NOTES
"   RENAL PROGRESS NOTE  CC: AKID/CKD    S: Patient resting in bed.  She is very comfortable, she denies any problems at present.  Denies chest pain, shortness of breath, no nausea, no vomiting.     Impression and Plan:      1. CKD stage 3b- Baseline creatinine is 1 to 1.4.  She presented with serum creatinine of 0.95,  Cystatin C at 1.7, GFR calculation base on this is 32 (7/30/2021), Scr was 0.80 at that time .  Her urine output has been good as reported by patient.  Denies NSAID use, not on ACE/ARB, and no hypotension.  CKD since 2018.    Screening for multiple myeloma was negative during last admission in July 2022.    Urinalysis negative for urinary tract infection, trace of protein and blood.    PTH is normal.    No ACE inhibitors, no aminoglycosides, avoid IV contrast, avoid NSAIDs.   2. Hypokalemia: was hypokalemic also during admission in June of 2022.  Likely due to diarrhea.  Continue potassium replacement.  3. Hypertension.  Patient with longstanding history of hypertension.  Mildly elevated blood pressure today.  No medication changes.  4. Anemia chronic kidney disease.  Anemia made worse by rectal bleeding.  Hemoglobin at primary care clinic came back at 7.\    Colonoscopy (8/2, results pending.    Continue to monitor hemoglobin.    Iron deficiency present    Initiated iron sucrose 100 mg IV daily x5 doses.    Monitor hemoglobin.                 Maruy Curry MD  [facility]      Physical Exam  BP (!) 151/65   Pulse 63   Temp 97.9  F (36.6  C) (Oral)   Resp 16   Ht 1.645 m (5' 4.75\")   Wt 46.9 kg (103 lb 4.8 oz)   SpO2 98%   BMI 17.32 kg/m     Patient Vitals for the past 96 hrs:   Weight   07/29/22 1725 46.9 kg (103 lb 4.8 oz)       Intake/Output Summary (Last 24 hours) at 7/31/2022 0922  Last data filed at 7/30/2022 1823  Gross per 24 hour   Intake 840 ml   Output 300 ml   Net 540 ml       Physical Exam:   BP (!) 151/65   Pulse 63   Temp 97.9  F (36.6  C) (Oral)   Resp 16  " " Ht 1.645 m (5' 4.75\")   Wt 46.9 kg (103 lb 4.8 oz)   SpO2 98%   BMI 17.32 kg/m    In general this is a 87 year old female in no acute distress.   General: Calm & comfortable   Eyes: Pupils equal, sclerae not icteric   ENT: Mucus membranes moist, no lesions noted   Resp: no distress, normal effort   CV: no hip/leg edema   GI: Soft NT NG   Psych: A&Ox3, not depressed   Neuro: Moves all extremities.     Skin: No rash noted       Recent Labs   Lab Test 07/31/22  0538 07/30/22  1647 07/30/22  0659 07/29/22  2040 07/29/22  1540   POTASSIUM 3.5 3.8 2.5*   < > 2.1*   CHLORIDE 111*  --  112*  --  109*   ANIONGAP 6  --  7  --  9    < > = values in this interval not displayed.     Recent Labs   Lab Test 07/31/22  0538 07/30/22  0659 07/29/22  1540   WBC 6.2 6.4 7.8   RBC 2.72* 2.73* 2.21*   MCV 92 91 95   MCH 30.9 30.4 30.8   RDW 16.2* 15.9* 15.5*               I personally reviewed these labs today    "

## 2022-08-02 NOTE — PROGRESS NOTES
Care Management Follow Up    Length of Stay (days): 4    Expected Discharge Date: 08/02/2022 or 8/3/2022     Concerns to be Addressed:     NPO for Colonoscopy today (workup for possible mass in progress).   Patient plan of care discussed at interdisciplinary rounds: Yes    Anticipated Discharge Disposition:  Likely return to apartment with resumption of services     Anticipated Discharge Services:  Home with home PT OT ([facility])  Anticipated Discharge DME:  [facility]     Patient/family educated on Medicare website which has current facility and service quality ratings:  yes  Education Provided on the Discharge Plan:  yes  Patient/Family in Agreement with the Plan:  yes    Referrals Placed by CM/SW:  [facility];   Private pay costs discussed: Not applicable    Additional Information:  Patient is a resident of [facility] in [place] where she lives in assisted living ([address] [place] [address]).  She received medication administration at the [facility] and has home PT and OT with [facility]. She had been at [facility] but moved in to [facility] about a month ago. Anticipate patient's son will provide transportation to home.   3:51 PM:  Reviewed with [facility] who notes that patient is open to their [place] location. They will communicate with the [place] location to confirm the plan. Spoke with Ghulam at [facility] ([phone]) who stated that their fax number is [phone].    Contacts:   Sophie: Ania Physician : [phone]  Dr Giovanna Contreras is primary care provider.   Director of Nursing at [facility] [phone] (Formerly named [facility]).     Lala Lopez RN

## 2022-08-02 NOTE — PROGRESS NOTES
[facility]    Medicine Progress Note - Hospitalist Service    Date of Admission:  7/29/2022    Assessment & Plan        Yun Navarro is a 87 year old female  with PMH of CKD 3, constipation, hemorrhoids, RTA, chronic metabolic acidosis, subacute cutaneous lupus, Sjogren's, CAD s/p post stent in 2007, arthritis, peripheral vascular disease status post right carotid enterectomy admitted with anemia, Hemoccult positive stool, hypokalemia and suspected rectal mass.     Acute on chronic blood loss anemia due to GI bleeding  Anemia of chronic disease, history of iron deficiency anemia  Rectal mass- biopsy pending  -hemoglobin ton admission 6.8, 1 unit of packed red blood cells given  -Stool was positive for occult blood, ER physician reports on rectal exam concerns for a mass.    -Admitted in June with fecal impaction and she declined sigmoidoscopy at that time.Treated with with enemas and bowel regimen during previous admission.  Had hematochezia at that time which was felt secondary to hemorrhoids.    -Has been diagnosed with iron deficiency anemia and was started on iron supplementation last admission.    -Hemoglobin stable now between 8 and 9.    -Will monitor  -Iron studies 7/30: Ferritin 571, iron 22, transferrin 76.7  -On IV iron supplementation x5 doses     Hypokalemia  Metabolic acidosis- NAGMA    -Potassium was 2.1 on admission, nephrology consulted.   -Now on potassium bicarb.    -Will monitor closely.    Hypomagnesemia-1.7  -Monitoring and replacing as needed with nurse replacement protocol     Chronic kidney disease stage IIIb:   -Creatinine currently normal.  Baseline creatinine around 1.2-1.5.    -Screening for multiple myeloma was negative during last admission.    -Nephrology feels serum creatinine not entirely showing true picture of renal status. Cystatin C 1.7. -Avoid nephrotoxic medications, no ACE inhibitor/ARB, aminoglycosides, IV contrast or NSAIDs if possible.     Coronary  "artery disease-  -Status post LAD stent in 2007 with abnormal stress test 2018 that showed a small area of distal anterior septal ischemia with normal EF  -Continued PTA Imdur.  She is not on aspirin, since she had intermittent rectal bleeding thought to be from hemorrhoids as bleed related to rectal mass    Failure to thrive  Cachexia  -Has mild intermittent dysphagia, poor appetite.  BMI 17  -Previous labs labs negative for B12, folate deficiencies.  Normal TSH.  -Evaluated by speech last admission and recommended thickened fluids.  -SLP saw her today: State that goals are met, patient does prefer soft and bite-size diet with thin liquids so she was started on this, mild diffuse oral stasis, talks with food in her mouth with occasional reactive cough, dentures ill fitting even with adhesive     History of urinary retention:   -Had Serrano last time, had a large episode of urinary incontinence after admission, bladder protocol and place Serrano if indicated     Diet: Soft & Bite Sized Diet (level 6) Thin Liquids (level 0)    DVT Prophylaxis: Pneumatic Compression Devices  Serrano Catheter: Not present  Central Lines: None  Cardiac Monitoring: None  Code Status: No CPR- Do NOT Intubate      Disposition Plan      Expected Discharge Date: 08/03/2022    Discharge Delays: Other (Add Comment)  Destination: assisted living  Discharge Comments: monitoring labs, colonoscopy 8/2, starting extended prep        The patient's care was discussed with the Patient.    Daija Marc MD  Hospitalist Service  [facility]  Securely message with the Vocera Web Console (learn more here)  Text page via [employer] Paging/Directory         Clinically Significant Risk Factors Present on Admission               # Cachexia: Estimated body mass index is 17.32 kg/m  as calculated from the following:    Height as of this encounter: 1.645 m (5' 4.75\").    Weight as of this encounter: 46.9 kg (103 lb 4.8 oz).    "     ______________________________________________________________________    Interval History   Ms. Rodgers did well with her colonoscopy today.  I saw her after the procedure.  She wanted to wait to go home until tomorrow because she did not feel ready today.  I let her know that I will be letting her go primarily in the morning and she is fine with this.  She did ask me to call her son to him about it however I tried calling him twice and he did not answer either time we will try to contact them again tomorrow as he is her ride home.    Data reviewed today: I reviewed all medications, new labs and imaging results over the last 24 hours. I personally reviewed no images or EKG's today.    Physical Exam   Vital Signs: Temp: 97.9  F (36.6  C) Temp src: Oral BP: (!) 151/65 Pulse: 63   Resp: 16 SpO2: 98 % O2 Device: None (Room air)    Weight: 103 lbs 4.8 oz  General Appearance: Awake, alert, in no acute distress  Respiratory: CTAB, no wheeze  Cardiovascular: RRR, no murmur noted  GI: soft, nontender, non distended, normal bowel sounds  Skin: no jaundice, no rash      Data   Recent Labs   Lab 08/02/22  1510 08/02/22  0618 08/01/22  1304 08/01/22  0643 07/31/22  0538 07/30/22  1647 07/30/22  0659 07/29/22  2040 07/29/22  1540   WBC  --  6.7  --   --  6.2  --  6.4  --  7.8   HGB  --  8.9*  --  9.1* 8.4*  --  8.3*   < > 6.8*   MCV  --  94  --   --  92  --  91  --  95   PLT  --  158  --   --  183  --  191  --  212   INR  --   --   --   --   --   --   --   --  1.22*   NA  --  134*  --  135* 134*  --  137  --  135*   POTASSIUM 4.0 3.4* 4.1 3.4* 3.5   < > 2.5*   < > 2.1*   CHLORIDE  --  107  --  108* 111*  --  112*  --  109*   CO2  --  21*  --  20* 17*  --  18*  --  17*   BUN  --  10  --  9 11  --  10  --  12   CR  --  0.97  --  0.88 0.92  --  0.80  --  0.95   ANIONGAP  --  6  --  7 6  --  7  --  9   ARON  --  7.8*  --  7.8* 7.6*  --  7.4*  --  7.6*   GLC  --  82  --  82 83  --  86  --  105   ALBUMIN  --   --   --   --   --   --   2.1*  --   --    PROTTOTAL  --   --   --   --   --   --  5.5*  --   --    BILITOTAL  --   --   --   --   --   --  0.8  --   --    ALKPHOS  --   --   --   --   --   --  72  --   --    ALT  --   --   --   --   --   --  11  --   --    AST  --   --   --   --   --   --  17  --   --     < > = values in this interval not displayed.     No results found for this or any previous visit (from the past 24 hour(s)).  Medications       [Held by provider] Centravites 50 Plus   Oral Daily     [Held by provider] Ferrous Gluconate  324 mg Oral BID     fexofenadine  180 mg Oral Daily     iron sucrose  100 mg Intravenous Daily     isosorbide mononitrate  120 mg Oral Daily     isosorbide mononitrate  30 mg Oral Daily     menthol-zinc oxide   Topical TID     metoprolol tartrate  25 mg Oral BID     mirtazapine  15 mg Oral At Bedtime     pantoprazole  40 mg Oral BID AC     pantoprazole  40 mg Oral At Bedtime     phenylephrine-cocoa butter  1 suppository Rectal At Bedtime     polyethylene glycol-propylene glycol  1 drop Both Eyes 4x Daily     sennosides  2 tablet Oral At Bedtime     sodium bicarbonate  650 mg Oral BID     sodium chloride (PF)  3 mL Intracatheter Q8H

## 2022-08-02 NOTE — PLAN OF CARE
Problem: Plan of Care - These are the overarching goals to be used throughout the patient stay.    Goal: Optimal Comfort and Wellbeing  Outcome: Ongoing, Progressing   Goal Outcome Evaluation:      Alert and oriented x4. Tolerated bowel prep well. Multiple liquid bowel movements.     Jenelle Barajas

## 2022-08-03 PROBLEM — K56.41 FECAL IMPACTION IN RECTUM (H): Status: RESOLVED | Noted: 2022-01-01 | Resolved: 2022-01-01

## 2022-08-03 PROBLEM — R51.9 HEADACHE: Status: RESOLVED | Noted: 2022-01-01 | Resolved: 2022-01-01

## 2022-08-03 PROBLEM — E86.0 DEHYDRATION: Status: RESOLVED | Noted: 2017-11-12 | Resolved: 2022-01-01

## 2022-08-03 PROBLEM — R33.9 URINARY RETENTION: Status: RESOLVED | Noted: 2022-01-01 | Resolved: 2022-01-01

## 2022-08-03 PROBLEM — E87.6 HYPOKALEMIA: Status: RESOLVED | Noted: 2022-01-01 | Resolved: 2022-01-01

## 2022-08-03 PROBLEM — R11.0 NAUSEA: Status: RESOLVED | Noted: 2017-11-14 | Resolved: 2022-01-01

## 2022-08-03 PROBLEM — K59.00 CONSTIPATION: Status: RESOLVED | Noted: 2022-01-01 | Resolved: 2022-01-01

## 2022-08-03 PROBLEM — J18.9 ATYPICAL PNEUMONIA: Status: RESOLVED | Noted: 2017-11-13 | Resolved: 2022-01-01

## 2022-08-03 NOTE — PLAN OF CARE
Physical Therapy Discharge Summary    Reason for therapy discharge:    Discharged to home with home therapy.    Progress towards therapy goal(s). See goals on Care Plan in [facility] electronic health record for goal details.  Goals partially met.  Barriers to achieving goals:   discharge from facility.    Therapy recommendation(s):    Continued therapy is recommended.  Rationale/Recommendations:  PT goals not fully met.

## 2022-08-03 NOTE — PLAN OF CARE
Problem: Plan of Care - These are the overarching goals to be used throughout the patient stay.    Goal: Readiness for Transition of Care  Outcome: Adequate for Care Transition   Goal Outcome Evaluation:           Pt discharged to assisted living via her son. Sent with her glasses, dentures and hearing aides. Pt and son verbalize understanding discharge instructions, copies of current orders sent with.

## 2022-08-03 NOTE — PROGRESS NOTES
"   RENAL PROGRESS NOTE  CC: AKID/CKD    S: Patient resting in bed.  Diet is being advanced.     Impression and Plan:      1. CKD stage 3b- Baseline creatinine is 1 to 1.4.  She presented with serum creatinine of 0.95,  Cystatin C at 1.7, GFR calculation base on this is 32 (7/30/2021), Scr was 0.80 at that time .  Her urine output has been good as reported by patient.  Denies NSAID use, not on ACE/ARB, and no hypotension.  CKD since 2018.    Screening for multiple myeloma was negative during last admission in July 2022.    Urinalysis negative for urinary tract infection, trace of protein and blood.    PTH is normal.    No ACE inhibitors, no aminoglycosides, avoid IV contrast, avoid NSAIDs.   2. Hypokalemia: was hypokalemic also during admission in June of 2022.  Likely due to diarrhea.  Continue potassium replacement.  3. Hypertension.  Patient with longstanding history of hypertension.  Blood pressure well controlled today.  No medication changes.  4. Hyponatremia: Mild presently.  Recheck this afternoon assure this not worsening.  5. Anemia chronic kidney disease.  Anemia made worse by rectal bleeding.  Hemoglobin at primary care clinic came back at 7.\    Colonoscopy (8/2, with rectal mass noted.    Continue to monitor hemoglobin.    Iron deficiency present    Initiated iron sucrose 100 mg IV daily x5 doses.    Monitor hemoglobin.               Gen Mallory MD  [facility]  Office # [phone]      Physical Exam  /65 (BP Location: Right arm)   Pulse 70   Temp 98.5  F (36.9  C) (Oral)   Resp 18   Ht 1.645 m (5' 4.75\")   Wt 46.9 kg (103 lb 4.8 oz)   SpO2 96%   BMI 17.32 kg/m     No data found.    Intake/Output Summary (Last 24 hours) at 7/31/2022 0922  Last data filed at 7/30/2022 1823  Gross per 24 hour   Intake 840 ml   Output 300 ml   Net 540 ml       Physical Exam:   /65 (BP Location: Right arm)   Pulse 70   Temp 98.5  F (36.9  C) (Oral)   Resp 18   Ht 1.645 m (5' 4.75\")   " Wt 46.9 kg (103 lb 4.8 oz)   SpO2 96%   BMI 17.32 kg/m    In general this is a 87 year old female in no acute distress.   General: Calm & comfortable, frail appearing.  Sitting up in bed.  Eyes: Pupils equal, sclerae not icteric   ENT: Mucus membranes moist, no lesions noted   Resp: no distress, normal effort   CV: no hip/leg edema   GI: Soft NT NG   Psych: A&Ox3, not depressed   Neuro: Moves all extremities.     Skin: No rash noted     Recent Results (from the past 48 hour(s))   Potassium    Collection Time: 08/01/22  1:04 PM   Result Value Ref Range    Potassium 4.1 3.5 - 5.0 mmol/L   Magnesium    Collection Time: 08/02/22  6:18 AM   Result Value Ref Range    Magnesium 1.7 (L) 1.8 - 2.6 mg/dL   Basic metabolic panel    Collection Time: 08/02/22  6:18 AM   Result Value Ref Range    Sodium 134 (L) 136 - 145 mmol/L    Potassium 3.4 (L) 3.5 - 5.0 mmol/L    Chloride 107 98 - 107 mmol/L    Carbon Dioxide (CO2) 21 (L) 22 - 31 mmol/L    Anion Gap 6 5 - 18 mmol/L    Urea Nitrogen 10 8 - 28 mg/dL    Creatinine 0.97 0.60 - 1.10 mg/dL    Calcium 7.8 (L) 8.5 - 10.5 mg/dL    Glucose 82 70 - 125 mg/dL    GFR Estimate 56 (L) >60 mL/min/1.73m2   CBC with platelets    Collection Time: 08/02/22  6:18 AM   Result Value Ref Range    WBC Count 6.7 4.0 - 11.0 10e3/uL    RBC Count 2.90 (L) 3.80 - 5.20 10e6/uL    Hemoglobin 8.9 (L) 11.7 - 15.7 g/dL    Hematocrit 27.3 (L) 35.0 - 47.0 %    MCV 94 78 - 100 fL    MCH 30.7 26.5 - 33.0 pg    MCHC 32.6 31.5 - 36.5 g/dL    RDW 15.2 (H) 10.0 - 15.0 %    Platelet Count 158 150 - 450 10e3/uL   COLONOSCOPY    Collection Time: 08/02/22  8:56 AM   Result Value Ref Range    COLONOSCOPY       [facility]  [address] [place] [address]  _______________________________________________________________________________  Patient Name: Yun Rodgers             Procedure Date: 8/2/2022 8:56 AM  MRN: [id number]                       Account Number: [id number]  Date of Birth:  1935              Admit Type: Inpatient  Age: 87                               Room: [place] [address]  Note Status: Finalized                Attending MD: BETZY ELIZALDE MD  Instrument Name: Pediatric Colonoscope [id number]   _______________________________________________________________________________     Procedure:             Colonoscopy  Indications:           Hematochezia  Providers:             BETZY ELIZALDE MD  Referring MD:            Medicines:             Monitored Anesthesia Care  Complications:         No immediate complications.  _______________________________________________________________________________  Procedure:             Pre-Anesthesia Assessment:                          - Prior to the procedure, a History and Physical was                          performed, and patient medications and allergies were                          reviewed. The risks and benefits of the procedure and                          the sedation options and risks were discussed with the                          patient. All questions were answered and informed                          consent was obtained. Patient identification and                          proposed procedure were verified by the physician, the                          nurse and the anesthesiologist in the pre-procedure                          area in the procedure room in the endoscopy suite.                          Mental Status Examination: alert and oriented. Airway                          Examination: normal oropharyngeal airway and neck                          mobility. Respiratory Examination: clear to                          auscultation. CV Examination: regular rate and rhythm.                           Prophylactic Antibiotics: The patient does not require                          prophylactic antibiotics. Prior Anticoagulants: The                          patient has taken no anticoagulant or antiplatelet                           agents. ASA Grade Assessment: III - A patient with                          severe systemic disease. After reviewing the risks and                          benefits, the patient was deemed in satisfactory                          condition to undergo the procedure. The anesthesia                          plan was to use monitored anesthesia care (MAC).                          Immediately prior to administration of medications,                          the patient was re-assessed for adequacy to receive                          sedatives. The physical status of the patient was                          re-assessed after the procedure.                         After obtaining informed consent, the colonoscope was                          pas sed under direct vision. Throughout the procedure,                          the patient's blood pressure, pulse, and oxygen                          saturations were monitored continuously. The pediatric                          colonoscope was introduced through the anus and                          advanced to the cecum, identified by appendiceal                          orifice and ileocecal valve. The colonoscopy was                          performed without difficulty. The patient tolerated                          the procedure well. The quality of the bowel                          preparation was poor.                                                                                   Findings:       A moderate amount of stool was found in the entire colon, interfering        with visualization. Lavage of the area was performed using copious        amounts of sterile water, resulting in incomplete clearance with fair        visualization.       Multiple diverticula were found in  the sigmoid colon, descending colon,        transverse colon and ascending colon.       A frond-like/villous, fungating and infiltrative non-obstructing large        mass was found in the distal  rectum/anal canal. The mass was        non-circumferential. The mass measured seven cm in length. Biopsies were        taken with a cold forceps for histology.       The digital rectal exam revealed a 7 cm (diameter) firm ano- rectal        mass. The mass was non-circumferential and located predominantly at the        posterior bowel wall.                                                                                   Moderate Sedation:       MAC  Impression:            - Preparation of the colon was poor.                         - Stool in the entire examined colon.                         - Diverticulosis in the sigmoid colon, in the                          descending colon, in the transverse colon and in the                          ascending colon.                         - Rule out mal ignancy, tumor in the distal rectum/anal                          canal. Biopsied.  Recommendation:        - Advance diet as tolerated.                         - Await pathology results.                                                                                     Betzy Posey MD  ______________________  BETZY POSEY MD  8/2/2022 9:58:34 AM  I was physically present for the entire viewing portion of the exam.  __________________________  Signature of teaching physician  [id number] POSEY MD  Number of Addenda: 0    Note Initiated On: 8/2/2022 8:56 AM  Scope In: 9:10:17 AM  Scope Out: 9:27:44 AM     Potassium    Collection Time: 08/02/22  3:10 PM   Result Value Ref Range    Potassium 4.0 3.5 - 5.0 mmol/L   Magnesium    Collection Time: 08/03/22  6:59 AM   Result Value Ref Range    Magnesium 1.6 (L) 1.8 - 2.6 mg/dL   CBC with platelets    Collection Time: 08/03/22  6:59 AM   Result Value Ref Range    WBC Count 6.8 4.0 - 11.0 10e3/uL    RBC Count 2.60 (L) 3.80 - 5.20 10e6/uL    Hemoglobin 8.0 (L) 11.7 - 15.7 g/dL    Hematocrit 24.6 (L) 35.0 - 47.0 %    MCV 95 78 - 100 fL    MCH 30.8 26.5 - 33.0 pg     MCHC 32.5 31.5 - 36.5 g/dL    RDW 15.0 10.0 - 15.0 %    Platelet Count 142 (L) 150 - 450 10e3/uL   Renal panel    Collection Time: 08/03/22  6:59 AM   Result Value Ref Range    Sodium 131 (L) 136 - 145 mmol/L    Potassium 3.8 3.5 - 5.0 mmol/L    Chloride 106 98 - 107 mmol/L    Carbon Dioxide (CO2) 22 22 - 31 mmol/L    Anion Gap 3 (L) 5 - 18 mmol/L    Urea Nitrogen 13 8 - 28 mg/dL    Creatinine 1.01 0.60 - 1.10 mg/dL    Calcium 7.8 (L) 8.5 - 10.5 mg/dL    Glucose 92 70 - 125 mg/dL    Albumin 1.8 (L) 3.5 - 5.0 g/dL    Phosphorus 3.2 2.5 - 4.5 mg/dL    GFR Estimate 54 (L) >60 mL/min/1.73m2               I personally reviewed these labs today

## 2022-08-03 NOTE — PLAN OF CARE
"Goal Outcome Evaluation:    Plan of Care Reviewed With: patient     Overall Patient Progress: improving    Outcome Evaluation: Patient c/o 1/10 pain to her R distal tricep region. The area has a small scab present with surrounding blanchable redness. Area marked from prev shift. Ice applied through the night. Noted reduction in redness upon reassessment. Cares clustered.    /61 (BP Location: Right arm)   Pulse 77   Temp 98.3  F (36.8  C) (Oral)   Resp 18   Ht 1.645 m (5' 4.75\")   Wt 46.9 kg (103 lb 4.8 oz)   SpO2 95%   BMI 17.32 kg/m           "

## 2022-08-03 NOTE — PROGRESS NOTES
Care Management Discharge Note    Discharge Date: 08/03/2022       Discharge Disposition: Assisted Living, Home Care (Resumption of [facility]  home PT/OT)    Discharge Services:      Discharge DME:      Discharge Transportation: family or friend will provide    Private pay costs discussed: Not applicable    Education Provided on the Discharge Plan:    Persons Notified of Discharge Plans: MD stated that pt will go home today  Patient/Family in Agreement with the Plan: yes    Handoff Referral Completed: Yes    Additional Information:  [facility] site phone [phone]; Fax [phone]. [facility] faxed orders to home care agency. [facility] updated HC agency about discharge for today.     Pt anticipated to return home to [facility] ([phone]; fax: [phone]) in [place] today with son to transport. Pt will resume home care (PT/OT) with [facility]. Ania Barrios was updated over the phone on plan to discharge today. CM following.     [facility] spoke with [facility] staff - want to confirm pt has oral meds and not IV. Fax accurate already in note. [facility] will fax orders when available.       LATASHA DumontSW

## 2022-08-03 NOTE — PLAN OF CARE
Occupational Therapy Discharge Summary    Reason for therapy discharge:    Discharged to correction    Progress towards therapy goal(s). See goals on Care Plan in [facility] electronic health record for goal details.  Goals partially met.  Barriers to achieving goals:   discharge from facility.    Marina HOLLIS, OTR/L, CLT 8/3/2022 , 2:41 PM

## 2022-08-03 NOTE — PLAN OF CARE
Problem: Plan of Care - These are the overarching goals to be used throughout the patient stay.    Goal: Optimal Comfort and Wellbeing  Outcome: Ongoing, Progressing   Goal Outcome Evaluation:  Patient A&Ox4, denies pain or shortness of breath. Ate 100% of dinner. Declines preparation H. Assist x1 with walker. Patient reporting redness and swelling of right upper arm, slightly warm to touch. Gave ice pack and outlined with wanda, will monitor. Temp 100.5 this evening, gave tylenol with effectiveness, recheck 98.6.

## 2022-08-04 NOTE — DISCHARGE SUMMARY
[facility]  Hospitalist Discharge Summary      Date of Admission:  7/29/2022  Date of Discharge:  8/3/2022  2:05 PM  Discharging Provider: Daija Marc MD  Discharge Service: Hospitalist Service    Discharge Diagnoses   Acute on chronic blood loss anemia due to GI bleeding  Anemia of chronic disease and history of iron deficiency anemia  Rectal mass-squamous cell carcinoma  Hypokalemia  NAGMA  CKD stage IIIb  CAD  Failure to thrive  Cachexia  Urinary retention history    Follow-ups Needed After Discharge   Follow-up Appointments     Follow-up and recommended labs and tests       Follow up with primary care provider, Ronaldo Grijalva, within 7 days to   evaluate medication change and for hospital follow- up.  The following   labs/tests are recommended: iron level, CBC.             Unresulted Labs Ordered in the Past 30 Days of this Admission     No orders found from 6/29/2022 to 7/30/2022.      These results will be followed up by Daija Marc    Discharge Disposition   Discharged to home  Condition at discharge: Stable    Hospital Course   Yun Navarro is a 87 year old female  with PMH of CKD 3, constipation, hemorrhoids, RTA, chronic metabolic acidosis, subacute cutaneous lupus, Sjogren's, CAD s/p post stent in 2007, arthritis, peripheral vascular disease status post right carotid enterectomy admitted with anemia, Hemoccult positive stool, hypokalemia and suspected rectal mass.  Biopsy showed rectal mass as squamous cell carcinoma.     Acute on chronic blood loss anemia due to GI bleeding  Anemia of chronic disease, history of iron deficiency anemia  Rectal mass-squamous cell carcinoma  -hemoglobin ton admission 6.8, 1 unit of packed red blood cells given  -Stool was positive for occult blood, ER physician reports on rectal exam concerns for a mass.    -Admitted in June with fecal impaction and she declined sigmoidoscopy at that time.Treated with with enemas and bowel regimen during  previous admission.  Had hematochezia at that time   -Has been diagnosed with iron deficiency anemia and was started on iron supplementation last admission.    -Hemoglobin stable now between 8 and 9.    -Iron studies 7/30: Ferritin 571, iron 22, transferrin 76.7  -Given IV iron supplementation  -Biopsy returned as squamous cell carcinoma     Hypokalemia  Metabolic acidosis- NAGMA    -Potassium was 2.1 on admission, nephrology consulted.   -on potassium bicarb.      Hypomagnesemia-1.7  -Monitored and replace as needed with nurse replacement protocol     Chronic kidney disease stage IIIb:   -Creatinine currently normal.  Baseline creatinine around 1.2-1.5.    -Screening for multiple myeloma was negative during last admission.    -Nephrology feels serum creatinine not entirely showing true picture of renal status. Cystatin C 1.7. -Avoided nephrotoxic medications, no ACE inhibitor/ARB, aminoglycosides, IV contrast or NSAIDs if possible should be given.     Coronary artery disease-  -Status post LAD stent in 2007 with abnormal stress test 2018 that showed a small area of distal anterior septal ischemia with normal EF  -Continued PTA Imdur.  She is not on aspirin, since she had intermittent rectal bleeding thought to be bleed related to rectal mass    Failure to thrive-generalized weakness and gait abnormality  Cachexia  -Has mild intermittent dysphagia, poor appetite.  BMI 17  -Previous labs labs negative for B12, folate deficiencies.  Normal TSH.  -Evaluated by speech last admission and recommended thickened fluids.  -SLP consulted     History of urinary retention:   -Had Serrano last time, had a large episode of urinary incontinence after admission, bladder protocol in place     Consultations This Hospital Stay   CARE MANAGEMENT / SOCIAL WORK IP CONSULT  GASTROENTEROLOGY IP CONSULT  NEPHROLOGY IP CONSULT  SPEECH LANGUAGE PATH ADULT IP CONSULT  PHYSICAL THERAPY ADULT IP CONSULT  OCCUPATIONAL THERAPY ADULT IP  CONSULT    Code Status   Prior    Time Spent on this Encounter   I, Daija Marc MD, personally saw the patient today and spent greater than 30 minutes discharging this patient.       Daija Marc MD  [address] [address]  Phone: [phone]  Fax: [phone]  ______________________________________________________________________    Physical Exam   Vital Signs:                    Weight: 103 lbs 4.8 oz  General Appearance: Awake, alert, in no acute distress  Respiratory: CTAB, no wheeze  Cardiovascular: RRR, no murmur noted  GI: soft, nontender, non distended, normal bowel sounds  Skin: no jaundice, no rash         Primary Care Physician   Ronaldo Grijalva    Discharge Orders      Primary Care - Care Coordination Referral      Home Care Referral      Reason for your hospital stay    Blood loss anemia, Iron deficiency, rectal mass with bleeding     Follow-up and recommended labs and tests     Follow up with primary care provider, Ronaldo Grijalva, within 7 days to evaluate medication change and for hospital follow- up.  The following labs/tests are recommended: iron level, CBC.     Activity    Your activity upon discharge: activity as tolerated     Resume Home Care Services     Diet    Follow this diet upon discharge: Orders Placed This Encounter      Regular Diet Adult       Significant Results and Procedures   Most Recent 3 CBC's:Recent Labs   Lab Test 08/04/22  0704 08/03/22  0659 08/02/22  0618   WBC 7.7 6.8 6.7   HGB 7.9* 8.0* 8.9*   MCV 96 95 94   * 142* 158     Most Recent 3 BMP's:Recent Labs   Lab Test 08/04/22  0704 08/03/22  0659 08/02/22  1510 08/02/22  0618   * 131*  --  134*   POTASSIUM 4.3 3.8 4.0 3.4*   CHLORIDE 102 106  --  107   CO2 22 22  --  21*   BUN 13.3 13  --  10   CR 1.19* 1.01  --  0.97   ANIONGAP 6* 3*  --  6   ARON 8.1* 7.8*  --  7.8*   GLC 86 92  --  82     Most Recent 2 LFT's:Recent Labs   Lab Test  08/04/22  0704 07/30/22  0659   AST 18 17   ALT 7* 11   ALKPHOS 73 72   BILITOTAL 0.3 0.8   ,   Results for orders placed or performed during the hospital encounter of 06/14/22   CT Abdomen Pelvis w/o Contrast    Narrative    EXAM: CT ABDOMEN PELVIS W/O CONTRAST  LOCATION: [facility]  DATE/TIME: 6/14/2022 5:52 PM    INDICATION: Right red blood per rectum.  COMPARISON: CT abdomen and pelvis 6/12/2007  TECHNIQUE: CT scan of the abdomen and pelvis was performed without IV contrast. Multiplanar reformats were obtained. Dose reduction techniques were used.  CONTRAST: None.    FINDINGS:   LOWER CHEST: Mild edema. Tiny granuloma right middle lobe. Minimal scarring along the lateral pleural surface right lower lobe. No tracer effusions. Coronary artery calcifications. Atherosclerotic disease thoracic aorta.    HEPATOBILIARY: Normal.    PANCREAS: Normal.    SPLEEN: Normal.    ADRENAL GLANDS: Nodular configuration left adrenal gland measuring 2.2 x 1.5 cm slightly more prominent from the previous study.    KIDNEYS/BLADDER: Tiny hyperdense cyst left kidney and small right renal cyst. Vascular calcifications both renal ligia. No definite stones. Moderate bladder distention.    BOWEL: Fecal impaction with large volume of stool distal sigmoid colon and rectum. No evidence for obstruction. Surgical anastomosis descending colon.  Scattered diverticula throughout the colon.    LYMPH NODES: Normal.    VASCULATURE: Extensive atherosclerotic disease abdominal aorta and iliac arteries, with bilateral common iliac artery stents. Plaque at the origins of all of the mesenteric vessels and renal arteries.    PELVIC ORGANS: Normal.    MUSCULOSKELETAL: Osteopenia. Scoliosis. Advanced degenerative disc disease lumbar spine. Sclerotic changes both femoral heads.      Impression    IMPRESSION:   1.  Fecal impaction, with large volume of stool distal sigmoid colon and rectum. No current evidence for upstream colonic  obstruction.  2.  Scattered diverticula throughout the colon, without evidence for diverticulitis.  3.  Severe atherosclerotic disease, with evaluation limited due to the lack of IV contrast.  4.  Left adrenal adenoma.     XR Chest 2 Views    Narrative    EXAM: XR CHEST 2 VW  LOCATION: [facility]  DATE/TIME: 6/15/2022 8:38 AM    INDICATION: failure to thrive  COMPARISON: CT of the abdomen and pelvis which includes the lung bases 06/14/2022      Impression    IMPRESSION:     Cardiac silhouette is normal in size. Extensive aortic atheromatous calcifications. There is a stent in the proximal left common carotid artery. The vascular pedicle width is normal.    Symmetric lung inflation. No interstitial or alveolar opacities. No focal lung volume loss.    No pleural effusion or pneumothorax.    Thoracic vertebra are maintained in height.   XR Abdomen 2 Views    Narrative    EXAM: XR ABDOMEN 2VIEWS  LOCATION: [facility]  DATE/TIME: 6/16/2022 11:19 AM    INDICATION: obstipation, please comment on the amount of colonic stool  COMPARISON: CT 06/14/2022      Impression    IMPRESSION: Negative abdomen. Bowel gas pattern is normal. Nothing for obstruction or free air. No evidence for renal stones. Minimal stool burden, significantly decreased compared with CT 06/14/2022. Only a small amount of stool in the rectum.        Discharge Medications   Discharge Medication List as of 8/3/2022  1:41 PM      CONTINUE these medications which have NOT CHANGED    Details   acetaminophen (TYLENOL) 500 MG tablet Take 1,000 mg by mouth 2 times daily as needed for pain, Historical      CALMOSEPTINE 0.44-20.6 % OINT ointment Apply topically 3 times daily Apply to coccyx every shift at 0800, 1600, & 2200, JUDITH, Historical      fexofenadine (ALLEGRA) 180 MG tablet Take 180 mg by mouth daily, Historical      hydrocortisone (Perianal) (ANUSOL-HC) 2.5 % cream Place 1 applicator rectally 2 times daily as  needed for hemorrhoidsHistorical      !! isosorbide mononitrate (IMDUR) 120 MG 24 hr tablet [ISOSORBIDE MONONITRATE (IMDUR) 120 MG 24 HR TABLET] TAKE 1 TABLET BY MOUTH EVERY DAY **TAKE W/30 MG TABLET** - **PATIENT ONLY WANTS 1 MONTH PER FILL**, Disp-30 tablet, R-11, Normal      !! isosorbide mononitrate (IMDUR) 30 MG 24 hr tablet Take 30 mg by mouth daily , Historical      loperamide (IMODIUM) 2 MG capsule Take 2 mg by mouth 4 times daily as needed for diarrhea, Historical      melatonin 3 MG tablet Take 3 mg by mouth At Bedtime, Historical      metoprolol tartrate (LOPRESSOR) 25 MG tablet Take 25 mg by mouth 2 times daily, Historical      mirtazapine (REMERON) 15 MG tablet [MIRTAZAPINE (REMERON) 15 MG TABLET] Take 15 mg by mouth at bedtime. Indications: major depressive disorder, Historical      MULTIVITAMIN W-MINERALS/LUTEIN (CENTRUM SILVER ORAL) [MULTIVITAMIN W-MINERALS/LUTEIN (CENTRUM SILVER ORAL)] Take 1 tablet by mouth daily., Historical      nitroglycerin (NITROSTAT) 0.4 MG SL tablet [NITROGLYCERIN (NITROSTAT) 0.4 MG SL TABLET] Place 0.4 mg under the tongue as needed for chest pain., Historical      pantoprazole (PROTONIX) 40 MG tablet Take 40 mg by mouth 2 times daily (before meals), Historical      peg 400-propylene glycol (SYSTANE) 0.4-0.3 % Drop Place 1 drop into both eyes 4 times daily At 0830, 1230, 1600, & 2030. And 4 times daily as needed, Historical      phenylephrine-cocoa butter (PREPARATION H) 0.25-88.44 % suppository Place 1 suppository rectally At Bedtime, Historical      polyethylene glycol (MIRALAX) 17 g packet Take 1 packet by mouth daily as needed for constipation, Historical      PREPARATION H 0.25-14-74.9 % rectal ointment Place rectally 4 times daily as needed for hemorrhoids, JUDITH, Historical      senna (SENOKOT) 8.6 MG tablet Take 2 tablets by mouth At Bedtime, Disp-60 tablet, R-0, E-Prescribe      triamcinolone (KENALOG) 0.025 % cream Apply 1 Application topically 3 times daily as  "needed for irritation (to rectum)Historical       !! - Potential duplicate medications found. Please discuss with provider.      STOP taking these medications       esomeprazole (NEXIUM) 40 MG DR capsule Comments:   Reason for Stopping:         Ferrous Gluconate 324 (37.5 Fe) MG TABS Comments:   Reason for Stopping:         nystatin (MYCOSTATIN) 449063 UNIT/GM external powder Comments:   Reason for Stopping:             Allergies   Allergies   Allergen Reactions     Aminoquinolines Rash     Other reaction(s): rash     Primaquine Rash     Aloe      Other reaction(s): itchy skin     Atorvastatin Unknown     Other reaction(s): muscle aches     Cilostazol      Demeclocycline Other (See Comments)     \"sore bottom\"     Dextromethorphan      Diagnostic X-Ray Materials Unknown and Other (See Comments)     Chest tightness,sshakes     Guaifenesin & Derivatives Hives     Robitussin D     Metrizamide Other (See Comments)     Chest tightness,sshakes     Pravastatin Unknown     Other reaction(s): muscle aches     Simvastatin Other (See Comments)     Muscle aches   Other reaction(s): muscle aches     Tetracyclines Unknown and Other (See Comments)     Other reaction(s): Unknown  \"sore bottom\"       Tomato      Hydroxychloroquine Sulfate [Hydroxychloroquine] Rash     Preservision Areds Rash     AREDs formula only caused rash.      "

## 2022-08-08 PROBLEM — E87.1 HYPONATREMIA: Status: ACTIVE | Noted: 2022-01-01

## 2022-08-08 PROBLEM — D72.829 LEUKOCYTOSIS, UNSPECIFIED TYPE: Status: ACTIVE | Noted: 2022-01-01

## 2022-08-08 PROBLEM — R00.0 SINUS TACHYCARDIA: Status: ACTIVE | Noted: 2022-01-01

## 2022-08-08 PROBLEM — E83.42 HYPOMAGNESEMIA: Status: ACTIVE | Noted: 2022-01-01

## 2022-08-08 PROBLEM — I50.9 CONGESTIVE HEART FAILURE, UNSPECIFIED HF CHRONICITY, UNSPECIFIED HEART FAILURE TYPE (H): Status: ACTIVE | Noted: 2022-01-01

## 2022-08-08 NOTE — ED TRIAGE NOTES
Pt arrives via EMS for evaluation of SOB. Pt reports being SOB for the past few years however significantly increased last night and was unable to lay flat. Pt denies any cough or CP. She was recently discharged from the hospital on Wednesday for a colonoscopy for GIB. Pitting edema to BLE. O2 sats 95% on RA.      Triage Assessment     Row Name 08/08/22 0031       Triage Assessment (Adult)    Airway WDL WDL       Respiratory WDL    Respiratory WDL X;rhythm/pattern    Rhythm/Pattern, Respiratory shortness of breath;labored       Cardiac WDL    Cardiac WDL X  Pitting edema to BLE L>R       Peripheral/Neurovascular WDL    Peripheral Neurovascular WDL WDL       Cognitive/Neuro/Behavioral WDL    Cognitive/Neuro/Behavioral WDL WDL

## 2022-08-08 NOTE — PLAN OF CARE
Heart Failure Care Map  GOALS TO BE MET BEFORE DISCHARGE:    1. Decrease congestion and/or edema with diuretic therapy to achieve near optimal volume status.     Dyspnea improved: Yes, satisfactory for discharge.   Edema improved: Yes, satisfactory for discharge.        Net I/O and Weights since admission:   07/09 1500 - 08/08 1459  In: 0   Out: 2725 [Urine:2725]  Net: -2725     Vitals:    08/08/22 0029 08/08/22 0528   Weight: 46.7 kg (103 lb) 47.9 kg (105 lb 9.6 oz)       2.  O2 sats > 90% on room air, or at prior home O2 therapy level.      Able to wean O2 this shift to keep sats above 90%?: on 2L nC   Does patient use Home O2? No          Current oxygenation status:   SpO2: 94 %     O2 Device: Nasal cannula, Oxygen Delivery: 2 LPM    3.  Tolerates ambulation and mobility near baseline.     Ambulation: Yes, satisfactory for discharge.   Times patient ambulated with staff this shift: 2    Please review the Heart Failure Care Map for additional HF goal outcomes.    Vidhi Mireles RN  8/8/2022    Problem: Plan of Care - These are the overarching goals to be used throughout the patient stay.    Goal: Optimal Comfort and Wellbeing  Outcome: Ongoing, Progressing     Problem: Risk for Delirium  Goal: Improved Sleep  Outcome: Ongoing, Progressing   Goal Outcome Evaluation:    Plan of Care Reviewed With: patient     Overall Patient Progress: improving  Pt is axox4, on 1L NC weaned from 2L with a baseline of RA, sinus isabel with BBB, assistx1 and has a henry catheter. Scheduled Metoprolol held r/t HR under 60. Son Dagoberto at bedside and requested oncology consult, dylan Sandoval, awaiting reply. Pt has a pressure wound on cocxyx, cream applied. Some bright-red blood is present coming from the rectum. Continue to monitor.

## 2022-08-08 NOTE — PROGRESS NOTES
[facility]    Medicine Progress Note - Hospitalist Service    Date of Admission:  8/8/2022    Assessment & Plan          Yun Rodgers is a 87 year old female presenting with SOB she was discharged on 8/3 due to GI bleed, was found to have a rectal mass found to be invasive squamous cell carcinoma.  At the time of presentation she requested only palliative care, she now would like to continue evaluation and treatment of this new mass.     CHF with pleural effusions on imaging and elevated BNP. Physical exam findings support. No previous h/o of CHF, Hgb stable from previous   - IV lasix has produced - 4 Liters  - continue CHF protocol   - Echo Findings:  The left atrium is severely dilated.  The right atrium is moderately dilated.  The visual ejection fraction is 60-65%.  Left ventricular diastolic function is abnormal.  Diastolic Doppler findings (E/E' ratio and/or other parameters) suggest left  ventricular filling pressures are increased.  No regional wall motion abnormalities noted.  There is mild (1+) mitral regurgitation.  There is mild (1+) aortic regurgitation.  There is trace to mild tricuspid regurgitation.    - trending trops  - lytes monitoring   - personally reviewed EKG       New rectal cancer diagnosis and GI bleeding per patient is still having bleeding at times: Pathology from 8/2/2022  HISTOLOGIC TYPE: IN SITU AND INVASIVE SQUAMOUS CELL CARCINOMA HISTOLOGIC GRADE: GRADE 2 (OF 4); FOCAL KERATINIZATION DEPTH OF INVASION: INVOLVES LAMINA PROPRIA; DEEPER INVASION CANNOT BE EXCLUDED.  MUSCULARIS PROPRIA NOT PRESENT FOR EVALUATION LYMPHOVASCULAR INVASION: NOT IDENTIFIED ADDITIONAL FINDINGS: ADJACENT UNREMARKABLE COLONIC MUCOSA   - CTA of abd and chest -large malignant appearing mass in the right side of the rectum measuring up to 7 cm in diameter strongly concerning for malignancy, most likely primary rectal cancer. This likely accounts for the patient's symptoms of GI bleed.  There is concern for locally advanced malignancy Several indeterminate enhancing bilateral groin lymph nodes with metastatic lymph nodes not excluded. Multifocal nodularity left adrenal gland suspected to be due to adrenal adenomas based on noncontrast density.  - Palliative consult for symptom control ordered last night.  - Patient requests curative therapy so will consult [facility] for ongoing GI blood loss and Heme/Onc for cancer evaluation/treatment.  - monitor Hgb   - no NSAIDs or VTE meds      Subacute Hyponatremia from CHF   - diuresing as above  - trend sodium      Coronary artery disease-previous h/o LAD stent in 2007 with abnormal stress test 2018 that showed a small area of distal anterior septal ischemia with normal EF  - She is not on aspirin, since she had intermittent rectal bleeding   - will resume Imdur once verified   - trending trops   - resumed metoprolol      HTN  - metoprolol 25,g BID was causing bradycardia so will decrease to 12.5 mg BID.  - IV lasix as above  - prn IV hydralazine     CKD3  - trend renal function with diuresis       Tachycardia/ Bradycardia   - likely initially due to distress from from fluid overload   - telelemtry monitoring  - Metoprolol ordered with parameters  - diuresing      Leukocytosis   - acute phase reactant      hypomag  - 2gm IV mag and recheck in am     Cachexia  - supplement nutrition with magic cup    Pressure injury to Coccyx  - consult WOC, work on nutition     Previous admission had urinary retention   - will place henry since diuresing           Diet: 2 Gram Sodium Diet Other - please comment    DVT Prophylaxis: Pneumatic Compression Devices  Henry Catheter: PRESENT, indication: Other (Comment) (Diuresing/ accurate I/Os)  Central Lines: None  Cardiac Monitoring: ACTIVE order. Indication: Acute decompensated heart failure (48 hours)  Code Status: No CPR- Do NOT Intubate      Disposition Plan      Expected Discharge Date: 08/10/2022        Discharge Comments:  "compenstation        The patient's care was discussed with the Bedside Nurse, Patient and Patient's Family.    Earl Granda MD  Hospitalist Service  [facility]  Securely message with the Vocera Web Console (learn more here)  Text page via [employer] Paging/Directory         Clinically Significant Risk Factors Present on Admission         # Hyponatremia: Na = 128 mmol/L (Ref range: 136 - 145 mmol/L) on admission, will monitor as appropriate     # Hypoalbuminemia: Albumin = 2.3 g/dL (Ref range: 3.5 - 5.0 g/dL) on admission, will monitor as appropriate   # Coagulation Defect: INR = 1.23 (Ref range: 0.85 - 1.15) and/or PTT = 29 Seconds (Ref range: 22 - 38 Seconds) on admission, will monitor for bleeding      # Cachexia: Estimated body mass index is 17.57 kg/m  as calculated from the following:    Height as of this encounter: 1.651 m (5' 5\").    Weight as of this encounter: 47.9 kg (105 lb 9.6 oz).        ______________________________________________________________________    Interval History   Patient feeling better as far as breathing goes, continued bleeding from rectum.  No cough or chest pain.    Data reviewed today: I reviewed all medications, new labs and imaging results over the last 24 hours. I personally reviewed   Physical Exam   Vital Signs: Temp: 99.4  F (37.4  C) Temp src: Oral BP: 115/55 Pulse: 53   Resp: 18 SpO2: 100 % O2 Device: Nasal cannula Oxygen Delivery: 1 LPM  Weight: 105 lbs 9.6 oz  Constitutional: awake, alert, cooperative, no apparent distress, and appears stated age, cachectic  Eyes: extra-ocular muscles intact and sclera clear  ENT: normocepalic, without obvious abnormality  Respiratory: crackles right base and left base  Cardiovascular: normal S1 and S2  GI: Normal bowel sounds, soft, non-distended, non-tender  Skin: no bruising or bleeding and no jaundice  Musculoskeletal: there is no redness, warmth, or swelling of the joints  Neurologic: Awake, alert, oriented " to name, place and time.  Cranial nerves II-XII are grossly intact. Moves all extremities.    Data   Recent Labs   Lab 08/08/22  0125 08/04/22  0704 08/03/22  0659   WBC 11.4* 7.7 6.8   HGB 9.0* 7.9* 8.0*   MCV 95 96 95    127* 142*   INR 1.23*  --   --    * 130* 131*   POTASSIUM 3.8 4.3 3.8   CHLORIDE 106 102 106   CO2 16* 22 22   BUN 11 13.3 13   CR 1.02 1.19* 1.01   ANIONGAP 6 6* 3*   ARON 8.1* 8.1* 7.8*    86 92   ALBUMIN 2.3* 2.2* 1.8*   PROTTOTAL 6.1 5.2*  --    BILITOTAL 0.4 0.3  --    ALKPHOS 87 73  --    ALT 16 7*  --    AST 22 18  --      Medications       ferrous gluconate  324 mg Oral BID     fexofenadine  180 mg Oral Daily     furosemide  40 mg Intravenous Q12H     isosorbide mononitrate  120 mg Oral Daily     isosorbide mononitrate  30 mg Oral Daily     menthol-zinc oxide   Topical TID     metoprolol tartrate  12.5 mg Oral BID     mirtazapine  15 mg Oral At Bedtime     pantoprazole  40 mg Oral BID AC     phenylephrine-cocoa butter  1 suppository Rectal At Bedtime     polyethylene glycol-propylene glycol  1 drop Both Eyes 4x Daily     sennosides  2 tablet Oral At Bedtime

## 2022-08-08 NOTE — PROGRESS NOTES
NUTRITION EDUCATION      REASON FOR ASSESSMENT:  Consulted to educate pt on 2 gram Na diet    NUTRITION HISTORY:  Information obtained from pt sleeping x 3    CURRENT DIET:  2 gram Na    INTERVENTIONS:  Will try back again tomorrow ((8/9) to complete diet education

## 2022-08-08 NOTE — ED NOTES
"[facility] ED Handoff Report    ED Chief Complaint: SOB, Dyspnea     ED Diagnosis:  (R07.9) Chest pain, unspecified type  Comment:   Plan:     (I50.9) Congestive heart failure, unspecified HF chronicity, unspecified heart failure type (H)  Comment:   Plan: Given 20mg Lasix IV        PMH:    Past Medical History:   Diagnosis Date     Antiplatelet or antithrombotic long-term use     aspirin     Hypertension      Stented coronary artery         Code Status:  Prior     Falls Risk: Yes Band: Applied    Current Living Situation/Residence: lives in a skilled nursing facility     Elimination Status: Continent: Yes and wears briefs     Activity Level: SBA w/ walker    Patients Preferred Language:  English     Needed: No    Vital Signs:  BP (!) 208/92   Pulse 106   Temp 98.5  F (36.9  C) (Oral)   Resp (!) 33   Ht 1.651 m (5' 5\")   Wt 46.7 kg (103 lb)   SpO2 99%   BMI 17.14 kg/m       Cardiac Rhythm: SR     Pain Score: 0/10    Is the Patient Confused:  No    Last Food or Drink: 8/7/22     Focused Assessment:  Pulmonary: Pt reporting SOB. O2 sats 95% on RA. O2 applied for comfort. Some expiratory wheezing.   20mg Lasix IV given for fluid overload.     Tests Performed: Done: Labs and Imaging    Treatments Provided:  IV lasix     Family Dynamics/Concerns: No    Family Updated On Visitor Policy: No    Plan of Care Communicated to Family: No    Who Was Updated about Plan of Care: Pt's son Dagoberto, attempted to call, no answer.     Belongings Checklist Done and Signed by Patient: Yes    Medications sent with patient: NA    Covid: asymptomatic , negative    Additional Information: Meds whole in applesauce.   Pt not hypoxic, on O2 for comfort only,     RN: Eli Tariq RN   8/8/2022 4:14 AM     "

## 2022-08-08 NOTE — CONSULTS
Care Management Initial Consult    General Information  Assessment completed with: VM-chart review, Care Team Member,         Primary Care Provider verified and updated as needed:     Readmission within the last 30 days:        Reason for Consult: discharge planning  Advance Care Planning:            Communication Assessment  Patient's communication style: spoken language (English or Bilingual)             Cognitive  Cognitive/Neuro/Behavioral: WDL                      Living Environment:   People in home:       Current living Arrangements: assisted living (Pt from [facility] ([phone]; fax: [phone]))      Able to return to prior arrangements: yes       Family/Social Support:  Care provided by:    Provides care for:                  Description of Support System:           Current Resources:   Patient receiving home care services: Yes  Skilled Home Care Services: Physicial Therapy, Occupational Therapy (Has [facility] site phone [phone]; Fax [phone])  Community Resources:  (Sophie Jorge CM: [phone].)  Equipment currently used at home:    Supplies currently used at home:      Employment/Financial:  Employment Status:          Financial Concerns:             Lifestyle & Psychosocial Needs:  Social Determinants of Health     Tobacco Use: Low Risk      Smoking Tobacco Use: Never Smoker     Smokeless Tobacco Use: Never Used   Alcohol Use: Not on file   Financial Resource Strain: Not on file   Food Insecurity: Not on file   Transportation Needs: Not on file   Physical Activity: Not on file   Stress: Not on file   Social Connections: Not on file   Intimate Partner Violence: Not on file   Depression: Not on file   Housing Stability: Not on file       Functional Status:  Prior to admission patient needed assistance:              Mental Health Status:          Chemical Dependency Status:                Values/Beliefs:  Spiritual, Cultural Beliefs, Advent Practices,  Values that affect care:                 Additional Information:    Readmit. Previous admission at [facility] 7/29 - 8/3. Discharged home to [facility] with resumptions of HC.    Pt is from [facility] ([phone]; fax: [phone]). Has [facility] site phone [phone]; Fax [phone]. Son Dagoberto is primary contact. Sophie is Ania CM: [phone].    Therapy consults pending at this time.    Palliative consult pending for [facility].    MORALES Dumont

## 2022-08-08 NOTE — PHARMACY-ADMISSION MEDICATION HISTORY
Pharmacy Note - Admission Medication History    Pertinent Provider Information:  Patient has two PPIs on her Assisted Living med list - esomeprazole and pantoprazole.    ______________________________________________________________________    Prior To Admission (PTA) med list completed and updated in EMR.       PTA Med List   Medication Sig Last Dose     acetaminophen (TYLENOL) 500 MG tablet Take 1,000 mg by mouth 2 times daily as needed for pain Unknown at prn     CALMOSEPTINE 0.44-20.6 % OINT ointment Apply topically 3 times daily Apply to coccyx every shift at 0800, 1600, & 2200 8/7/2022 at Unknown time     esomeprazole (NEXIUM) 40 MG DR capsule Take 40 mg by mouth At Bedtime 8/7/2022 at Unknown time     Ferrous Gluconate 324 (37.5 Fe) MG TABS Take 1 tablet by mouth 2 times daily 8/7/2022 at Unknown time     fexofenadine (ALLEGRA) 180 MG tablet Take 180 mg by mouth daily 8/7/2022 at Unknown time     hydrocortisone (Perianal) (ANUSOL-HC) 2.5 % cream Place 1 applicator rectally 2 times daily as needed for hemorrhoids Unknown at prn     isosorbide mononitrate (IMDUR) 120 MG 24 hr tablet [ISOSORBIDE MONONITRATE (IMDUR) 120 MG 24 HR TABLET] TAKE 1 TABLET BY MOUTH EVERY DAY **TAKE W/30 MG TABLET** - **PATIENT ONLY WANTS 1 MONTH PER FILL** 8/7/2022 at Unknown time     isosorbide mononitrate (IMDUR) 30 MG 24 hr tablet Take 30 mg by mouth daily With 120 mg tablet for total dose of 150 mg 8/7/2022 at Unknown time     loperamide (IMODIUM) 2 MG capsule Take 2 mg by mouth 4 times daily as needed for diarrhea Unknown at prn     melatonin 3 MG tablet Take 3 mg by mouth At Bedtime 8/7/2022 at Unknown time     metoprolol tartrate (LOPRESSOR) 25 MG tablet Take 25 mg by mouth 2 times daily 8/7/2022 at Unknown time     mirtazapine (REMERON) 15 MG tablet [MIRTAZAPINE (REMERON) 15 MG TABLET] Take 15 mg by mouth at bedtime. Indications: major depressive disorder 8/7/2022 at Unknown time     MULTIVITAMIN W-MINERALS/LUTEIN (CENTRUM  SILVER ORAL) [MULTIVITAMIN W-MINERALS/LUTEIN (CENTRUM SILVER ORAL)] Take 1 tablet by mouth daily. 8/7/2022 at Unknown time     nitroglycerin (NITROSTAT) 0.4 MG SL tablet [NITROGLYCERIN (NITROSTAT) 0.4 MG SL TABLET] Place 0.4 mg under the tongue as needed for chest pain. Unknown at prn     nystatin (MYCOSTATIN) 285755 UNIT/GM external powder Apply topically 2 times daily as needed (irritation, rash) Unknown at prn     pantoprazole (PROTONIX) 40 MG tablet Take 40 mg by mouth 2 times daily (before meals) 8/7/2022 at Unknown time     peg 400-propylene glycol (SYSTANE) 0.4-0.3 % Drop Place 1 drop into both eyes 4 times daily At 0830, 1230, 1600, & 2030. And 4 times daily as needed 8/7/2022 at Unknown time     phenylephrine-cocoa butter (PREPARATION H) 0.25-88.44 % suppository Place 1 suppository rectally At Bedtime 8/7/2022 at Unknown time     polyethylene glycol (MIRALAX) 17 g packet Take 1 packet by mouth daily as needed for constipation Unknown at prn     PREPARATION H 0.25-14-74.9 % rectal ointment Place rectally 4 times daily as needed for hemorrhoids Unknown at prn     senna (SENOKOT) 8.6 MG tablet Take 1 tablet by mouth daily as needed for constipation Unknown at prn     senna (SENOKOT) 8.6 MG tablet Take 2 tablets by mouth At Bedtime 8/7/2022 at Unknown time     triamcinolone (KENALOG) 0.025 % cream Apply 1 Application topically 3 times daily as needed for irritation (to rectum) Unknown at prn       Information source(s): Facility ([facility]/) medication list/MAR  Method of interview communication: N/A    Summary of Changes to PTA Med List  New: esomeprazole, ferrous gluc, nystatin powder, senna PRN  Discontinued: none  Changed: none    In the past week, estimated patient taking medication this percent of the time:  greater than 90%.    Allergies were reviewed, assessed, and updated per records.      Patient did not bring any medications to the hospital and can't retrieve from home. No multi-dose medications are  available for use during hospital stay.     The information provided in this note is only as accurate as the sources available at the time of the update(s).    Thank you,  Nasreen Batres, [facility]  8/8/2022 10:03 AM

## 2022-08-08 NOTE — ED PROVIDER NOTES
EMERGENCY DEPARTMENT ENCOUNTER      NAME: Yun Rodgers  AGE: 87 year old female  YOB: 1935  MRN: [id number]  EVALUATION DATE & TIME: 8/8/2022 12:24 AM    PCP: Ronaldo Grijalva    ED PROVIDER: Red Hernandez M.D.      Chief Complaint   Patient presents with     Shortness of Breath         FINAL IMPRESSION:  1. Chest pain, unspecified type    2. Congestive heart failure, unspecified HF chronicity, unspecified heart failure type (H)          ED COURSE & MEDICAL DECISION MAKING:    Pertinent Labs & Imaging studies reviewed. (See chart for details)  87 year old female presents to the Emergency Department for evaluation of chest pain and shortness of breath.  Patient has a history of CHF.  I think this is likely the cause but we will further work her up.  Patient does have an elevated D-dimer so did get a CT scan of her chest.  There is a listed contrast allergy but I think this is unlikely an allergy and she has tolerated contrast in the past.  She also has a history of GI bleeding so we will image her abdomen as well.  Her hemoglobin is actually up from where it been.  Did have a recent colonoscopy.  There is a questionable mass which there is not results of the biopsy as of yet.  No signs of PE.  EKG does not show obvious ischemic changes and is not changed from previous.  Patient's sodium slightly low at 128 of unclear significance.  BNP is elevated 2600.  I suspect there is a component of CHF.  Patient will be admitted for further care.  Discussed with the hospitalist.      12:35 AM I met with the patient to gather history and to perform my initial exam. I discussed the plan for care while in the Emergency Department. PPE: mask and gloves  1:58 AM I rechecked patient.   2:22 AM I spoke with Dr. Ellis, [facility]  Patient has a listed allergy to IV contrast.  I did have a discussion with the patient.  This is a distant allergy.  She states she felt shaky.  No true allergic symptoms.  This was  "a long time ago and has had angiogram since.  I do think it safe for her to proceed with IV contrast  3:38 AM I spoke with Dr. Chapo Berg,Hospitalist    At the conclusion of the encounter I discussed the results of all of the tests and the disposition. The questions were answered. The patient or family acknowledged understanding and was agreeable with the care plan.       MEDICATIONS GIVEN IN THE EMERGENCY:  Medications   HOLD: nitroGLYcerin IF (has no administration in time range)   furosemide (LASIX) injection 40 mg (has no administration in time range)   metoprolol tartrate (LOPRESSOR) tablet 25 mg (has no administration in time range)   hydrALAZINE (APRESOLINE) injection 10 mg (has no administration in time range)   iopamidol (ISOVUE-370) solution 75 mL (75 mLs Intravenous Given 8/8/22 0253)   furosemide (LASIX) injection 20 mg (20 mg Intravenous Given 8/8/22 0343)   hydrOXYzine (ATARAX) tablet 25 mg (25 mg Oral Given 8/8/22 0349)       NEW PRESCRIPTIONS STARTED AT TODAY'S ER VISIT  New Prescriptions    No medications on file          =================================================================    HPI    Patient information was obtained from: Patient    Use of : N/A       Yun Rodgers is a 87 year old female with a pertinent history of chronic fatigue syndrome, chest pain, CAD, PVD, HTN, mixed hyperlipidemia, GERD, kidney disease, UTI, ASHD, and depression who presents to this ED via EMS for evaluation of shortness of breath.     Per chart review, patient was seen at [facility] on 8/2/22 for a colonoscopy with rectum biopsies.     Patient reports shortness of breath that started last night. She notes having shortness of breath all the time, but last night it got worse. Patient states having cough due to her medication and tailbone pain. She also associated bilateral leg swellings. She denies being on oxygen. Patient has a history of colonoscopy and describes the bleeding as \"bright red\" " "and that it \"drips\". She is not on blood thinner. Patient is DNR/DNI and lives at [facility] and [facility]. Otherwise, patient denies fever, abdominal pain, nausea, and vomiting. No other complaints at this time.    REVIEW OF SYSTEMS   Review of Systems   Constitutional: Negative for fever.   Respiratory: Positive for cough (Due to her medications) and shortness of breath.    Gastrointestinal: Negative for abdominal pain, nausea and vomiting.   Musculoskeletal:        Positive for tailbone pain secondary to SOB   All other systems reviewed and are negative.       PAST MEDICAL HISTORY:  Past Medical History:   Diagnosis Date     Antiplatelet or antithrombotic long-term use     aspirin     Hypertension      Stented coronary artery        PAST SURGICAL HISTORY:  Past Surgical History:   Procedure Laterality Date     ABDOMEN SURGERY       APPENDECTOMY       COLONOSCOPY N/A 8/2/2022    Procedure: COLONOSCOPY WITH RECTUM BIOPSIES;  Surgeon: Santana Posey MD;  Location: [facility]     COLOSTOMY CLOSURE       EYE SURGERY       HYSTERECTOMY       IR AORTIC ARCH 4 VESSEL ANGIOGRAM  6/7/2002     IR AORTIC ARCH 4 VESSEL ANGIOGRAM  12/27/2002     IR CAROTID ANGIOGRAM  12/27/2002     IR CAROTID ANGIOGRAM  12/27/2002     IR EXTREMITY ANGIOGRAM BILATERAL  11/27/2007     IR MISCELLANEOUS PROCEDURE  6/7/2002     IR MISCELLANEOUS PROCEDURE  12/27/2002     IR MISCELLANEOUS PROCEDURE  12/27/2002     IR MISCELLANEOUS PROCEDURE  12/27/2002     OPEN REDUCTION INTERNAL FIXATION ANKLE Right 3/2/2018    Procedure: OPEN REDUCTION INTERNAL FIXATION TRIMALLEOLAR FRACTURE RIGHT ANKLE;  Surgeon: Shawn Vega DO;  Location: [facility];  Service:      OTHER SURGICAL HISTORY      Resection of mesenteryper patient-- \"they took out my mesentery about 6 months after I had a baby\"     REMOVE HARDWARE LOWER EXTREMITY Right 3/4/2022    Procedure: RIGHT ANKLE REMOVAL OF HARDWARE;  Surgeon: Shawn Vega " "DO Segundo;  Location: [facility] OR     [facility] APPENDECTOMY      Description: Appendectomy;  Recorded: 09/26/2014;     [facility] COLOSTOMY      Description: Colostomy;  Recorded: 09/26/2014;     [facility] THROMBOENDARTECTMY NECK,NECK INCIS      Description: Carotid Thromboendarterectomy;  Recorded: 09/26/2014;     [facility] TOTAL ABDOM HYSTERECTOMY      Description: Total Abdominal Hysterectomy;  Recorded: 09/26/2014;           CURRENT MEDICATIONS:    Current Facility-Administered Medications   Medication     furosemide (LASIX) injection 40 mg     HOLD: nitroGLYcerin IF     hydrALAZINE (APRESOLINE) injection 10 mg     metoprolol tartrate (LOPRESSOR) tablet 25 mg     Current Outpatient Medications   Medication     acetaminophen (TYLENOL) 500 MG tablet     CALMOSEPTINE 0.44-20.6 % OINT ointment     fexofenadine (ALLEGRA) 180 MG tablet     hydrocortisone (Perianal) (ANUSOL-HC) 2.5 % cream     isosorbide mononitrate (IMDUR) 120 MG 24 hr tablet     isosorbide mononitrate (IMDUR) 30 MG 24 hr tablet     loperamide (IMODIUM) 2 MG capsule     melatonin 3 MG tablet     metoprolol tartrate (LOPRESSOR) 25 MG tablet     mirtazapine (REMERON) 15 MG tablet     MULTIVITAMIN W-MINERALS/LUTEIN (CENTRUM SILVER ORAL)     nitroglycerin (NITROSTAT) 0.4 MG SL tablet     pantoprazole (PROTONIX) 40 MG tablet     peg 400-propylene glycol (SYSTANE) 0.4-0.3 % Drop     phenylephrine-cocoa butter (PREPARATION H) 0.25-88.44 % suppository     polyethylene glycol (MIRALAX) 17 g packet     PREPARATION H 0.25-14-74.9 % rectal ointment     senna (SENOKOT) 8.6 MG tablet     triamcinolone (KENALOG) 0.025 % cream         ALLERGIES:  Allergies   Allergen Reactions     Aminoquinolines Rash     Other reaction(s): rash     Primaquine Rash     Aloe      Other reaction(s): itchy skin     Atorvastatin Unknown     Other reaction(s): muscle aches     Cilostazol      Demeclocycline Other (See Comments)     \"sore bottom\"     Dextromethorphan      Diagnostic X-Ray Materials " "Unknown and Other (See Comments)     Chest tightness,sshakes     Guaifenesin & Derivatives Hives     Robitussin D     Metrizamide Other (See Comments)     Chest tightness,sshakes     Pravastatin Unknown     Other reaction(s): muscle aches     Simvastatin Other (See Comments)     Muscle aches   Other reaction(s): muscle aches     Tetracyclines Unknown and Other (See Comments)     Other reaction(s): Unknown  \"sore bottom\"       Tomato      Hydroxychloroquine Sulfate [Hydroxychloroquine] Rash     Preservision Areds Rash     AREDs formula only caused rash.        FAMILY HISTORY:  Family History   Problem Relation Age of Onset     Cancer Mother      Hypertension Mother      No Known Problems Father        SOCIAL HISTORY:   Social History     Socioeconomic History     Marital status:    Tobacco Use     Smoking status: Never Smoker     Smokeless tobacco: Never Used   Substance and Sexual Activity     Alcohol use: Never     Drug use: Never       VITALS:  BP (!) 180/82   Pulse 107   Temp 98.5  F (36.9  C) (Oral)   Resp 29   Ht 1.651 m (5' 5\")   Wt 46.7 kg (103 lb)   SpO2 96%   BMI 17.14 kg/m      PHYSICAL EXAM    Physical Exam  Constitutional:       General: She is not in acute distress.     Appearance: She is not diaphoretic.   HENT:      Head: Atraumatic.      Mouth/Throat:      Pharynx: No oropharyngeal exudate.   Eyes:      General: No scleral icterus.     Pupils: Pupils are equal, round, and reactive to light.   Cardiovascular:      Rate and Rhythm: Normal rate and regular rhythm.      Heart sounds: Normal heart sounds.   Pulmonary:      Effort: No accessory muscle usage or respiratory distress.      Breath sounds: Normal breath sounds.   Abdominal:      Palpations: Abdomen is soft.      Tenderness: There is no abdominal tenderness. There is no guarding or rebound.   Musculoskeletal:         General: No tenderness.      Right lower leg: Edema present.      Left lower leg: Edema present.   Skin:     " General: Skin is warm.      Findings: No rash.   Neurological:      General: No focal deficit present.      Mental Status: She is alert.           LAB:  All pertinent labs reviewed and interpreted.  Labs Ordered and Resulted from Time of ED Arrival to Time of ED Departure   D DIMER QUANTITATIVE - Abnormal       Result Value    D-Dimer Quantitative 1.82 (*)    INR - Abnormal    INR 1.23 (*)    COMPREHENSIVE METABOLIC PANEL - Abnormal    Sodium 128 (*)     Potassium 3.8      Chloride 106      Carbon Dioxide (CO2) 16 (*)     Anion Gap 6      Urea Nitrogen 11      Creatinine 1.02      Calcium 8.1 (*)     Glucose 112      Alkaline Phosphatase 87      AST 22      ALT 16      Protein Total 6.1      Albumin 2.3 (*)     Bilirubin Total 0.4      GFR Estimate 53 (*)    B-TYPE NATRIURETIC PEPTIDE ( EAST ONLY) - Abnormal    BNP 2,648 (*)    CBC WITH PLATELETS AND DIFFERENTIAL - Abnormal    WBC Count 11.4 (*)     RBC Count 2.88 (*)     Hemoglobin 9.0 (*)     Hematocrit 27.4 (*)     MCV 95      MCH 31.3      MCHC 32.8      RDW 14.4      Platelet Count 168      % Neutrophils 79      % Lymphocytes 10      % Monocytes 11      % Eosinophils 0      % Basophils 0      % Immature Granulocytes 0      NRBCs per 100 WBC 0      Absolute Neutrophils 9.0 (*)     Absolute Lymphocytes 1.2      Absolute Monocytes 1.3      Absolute Eosinophils 0.0      Absolute Basophils 0.0      Absolute Immature Granulocytes 0.0      Absolute NRBCs 0.0     PARTIAL THROMBOPLASTIN TIME - Normal    aPTT 29     TROPONIN I - Normal    Troponin I 0.09     COVID-19 VIRUS (CORONAVIRUS) BY PCR - Normal    SARS CoV2 PCR Negative     MAGNESIUM   TROPONIN I   TYPE AND SCREEN, ADULT    ABO/RH(D) A NEG      Antibody Screen Negative      SPECIMEN EXPIRATION DATE [id number]     ABO/RH TYPE AND SCREEN       RADIOLOGY:  Reviewed all pertinent imaging. Please see official radiology report.  CTA Abdomen Pelvis with Contrast   Final Result   IMPRESSION:   1.  No acute vascular  findings. Diffuse atheromatous disease. See above for nonacute vascular findings.      2.  No evidence for active GI bleed.      3.  However, there is a large malignant appearing mass in the right side of the rectum measuring up to 7 cm in diameter strongly concerning for malignancy, most likely primary rectal cancer. This likely accounts for the patient's symptoms of GI bleed.    There is concern for locally advanced malignancy. MRI could be obtained for local staging purposes.      4.  Several indeterminate enhancing bilateral groin lymph nodes with metastatic lymph nodes not excluded.      5.  Multifocal nodularity left adrenal gland suspected to be due to adrenal adenomas based on noncontrast density.      6.  Hysterectomy.      CT Chest Pulmonary Embolism w Contrast   Final Result   IMPRESSION:   1.  Negative for pulmonary embolism.      2.  Cardiac enlargement, diffuse interlobular septal thickening in the lungs, and bilateral pleural effusions right greater than left. Constellation of findings would be compatible with CHF or fluid overload.      3.  Prominent areas of bibasilar consolidation suspicious for potential pneumonitis. Clinical correlation.      4.  Marked coronary artery calcification.      Echocardiogram Complete    (Results Pending)       EKG:    Performed at: 0053  Impression: Sinus rhythm with right bundle branch block.  No acute changes compared to previous dated 7 July 2020 ninth 2022  Sinus rhythm intergrade 89.  VT interval 122.  .  QTc 445    I have independently reviewed and interpreted the EKG(s) documented above.    PROCEDURES:         I, Starla Sheffield, am serving as a scribe to document services personally performed by Dr. Red Hernandez, based on my observation and the provider's statements to me. I, Red Hernandez MD attest that Starla Sheffield is acting in a scribe capacity, has observed my performance of the services and has documented them in accordance with my direction.    Red  EMILIANA Hernandez.  Emergency Medicine  [facility] EMERGENCY DEPARTMENT  [facility] [facility] [id number] [phone]  Dept: [phone]     Red Hernandez MD  08/08/22 0448

## 2022-08-08 NOTE — H&P
Admission History and Physical   Yun Rodgers, 1935, [id number]  [facility]  Congestive heart failure, unspecified HF chronicity, unspecified heart failure type (H) [I50.9]  PCP:Ronaldo Grijalva, [phone]   Admitting provider: Tsering Sandoval MD.    Code status:  No CPR- Do NOT Intubate          Extended Emergency Contact Information  Primary Emergency Contact: Kayode Rodgers   [facility]  Home Phone: [phone]  Mobile Phone: [phone]  Relation: Son       Assessment and Plan  Active Problems:    Essential hypertension    Chest pain, unspecified type    Stage 3 chronic kidney disease (H)    Lower GI bleed    Rectal mass    Congestive heart failure, unspecified HF chronicity, unspecified heart failure type (H)    Hyponatremia    Leukocytosis, unspecified type    Sinus tachycardia    Hypomagnesemia    Yun Rodgers is a 87 year old female presenting with SOB     CHF with pleural effusions on imaging and elevated BNP. Physical exam findings support. No previous h/o of CHF, Hgb stable from previous   - IV lasix given in ED   - continue CHF protocol   - will check Echo for completeness   - consulting Palliative to see today for GOC  - trend trops  - lytes monitoring   - personally reviewed EKG      New rectal cancer diagnosis and GI bleeding per patient is still having bleeding at times: Pathology from 8/2/2022  HISTOLOGIC TYPE: IN SITU AND INVASIVE SQUAMOUS CELL CARCINOMA HISTOLOGIC GRADE: GRADE 2 (OF 4); FOCAL KERATINIZATION DEPTH OF INVASION: INVOLVES LAMINA PROPRIA; DEEPER INVASION CANNOT BE EXCLUDED.  MUSCULARIS PROPRIA NOT PRESENT FOR EVALUATION LYMPHOVASCULAR INVASION: NOT IDENTIFIED ADDITIONAL FINDINGS: ADJACENT UNREMARKABLE COLONIC MUCOSA   - CTA of abd and chest -large malignant appearing mass in the right side of the rectum measuring up to 7 cm in diameter strongly concerning for malignancy, most likely primary rectal cancer. This likely accounts for the patient's symptoms of  GI bleed. There is concern for locally advanced malignancy Several indeterminate enhancing bilateral groin lymph nodes with metastatic lymph nodes not excluded. Multifocal nodularity left adrenal gland suspected to be due to adrenal adenomas based on noncontrast density.  - Palliative consult   - monitor Hgb stable currently   - no NSAIDs or VTE meds     Hyponatremia from CHF   - diuresing as above  - trend sodium     Coronary artery disease-previous h/o LAD stent in 2007 with abnormal stress test 2018 that showed a small area of distal anterior septal ischemia with normal EF  - She is not on aspirin, since she had intermittent rectal bleeding   - will resume Imdur once verified   - trending trops   - resumed metoprolol     HTN  - metoprolol 25,g BID  - IV lasix  - prn IV hydralazine    CKD3  - trend renal function with diuresis      Tachycardia from fluid overload   - telelemtry monitoring  - Metoprolol ordered  - diuresing     Leukocytosis   - acute phase reactant     hypomag  - 2gm IV mag and recheck in am     Previous admission had urinary retention   - will place henry since diuresing     COVID-19 PCR Results    COVID-19 PCR Results 1/10/22 3/1/22 6/14/22 7/29/22 8/8/22   SARS CoV2 PCR Negative Negative Negative Negative Negative      Comments are available for some flowsheets but are not being displayed.         COVID-19 Antibody Results, Testing for Immunity    COVID-19 Antibody Results, Testing for Immunity   No data to display.           VTE prophylaxis:  Pneumatic Compression Devices  DIET: Orders Placed This Encounter      2 Gram Sodium Diet Other - please comment    Drains/Lines: none  Weight bearing status: WBAT  Disposition/Barriers to discharge: pending compensation   Code Status:No CPR- Do NOT Intubate    HPI: Yun Rodgers is a 87 year old old female with h/o CKD 3, constipation, hemorrhoids, RTA, chronic metabolic acidosis, subacute cutaneous lupus, Sjogren's, CAD s/p post stent in 2007,  "peripheral vascular disease status post right carotid enterectomy admitted with anemia, recent rectal cancer diagnosis presents with SOB.  Patient has had increased SOB and worse last night. She also complains of chest tightness, not pain, orthopnea, PND, leg swelling and HERNANDEZ. She is still having rectal bleeding and was to meet with surgeon and oncology to discuss options but per patient she is not interested in pursuing surgery or chemotherapy.     Past Medical History:   Diagnosis Date     Antiplatelet or antithrombotic long-term use     aspirin     Hypertension      Stented coronary artery      Past Surgical History:   Procedure Laterality Date     ABDOMEN SURGERY       APPENDECTOMY       COLONOSCOPY N/A 8/2/2022    Procedure: COLONOSCOPY WITH RECTUM BIOPSIES;  Surgeon: Santana Posey MD;  Location: [facility] OR     COLOSTOMY CLOSURE       EYE SURGERY       HYSTERECTOMY       IR AORTIC ARCH 4 VESSEL ANGIOGRAM  6/7/2002     IR AORTIC ARCH 4 VESSEL ANGIOGRAM  12/27/2002     IR CAROTID ANGIOGRAM  12/27/2002     IR CAROTID ANGIOGRAM  12/27/2002     IR EXTREMITY ANGIOGRAM BILATERAL  11/27/2007     IR MISCELLANEOUS PROCEDURE  6/7/2002     IR MISCELLANEOUS PROCEDURE  12/27/2002     IR MISCELLANEOUS PROCEDURE  12/27/2002     IR MISCELLANEOUS PROCEDURE  12/27/2002     OPEN REDUCTION INTERNAL FIXATION ANKLE Right 3/2/2018    Procedure: OPEN REDUCTION INTERNAL FIXATION TRIMALLEOLAR FRACTURE RIGHT ANKLE;  Surgeon: Shawn Vega DO;  Location: [facility];  Service:      OTHER SURGICAL HISTORY      Resection of mesenteryper patient-- \"they took out my mesentery about 6 months after I had a baby\"     REMOVE HARDWARE LOWER EXTREMITY Right 3/4/2022    Procedure: RIGHT ANKLE REMOVAL OF HARDWARE;  Surgeon: Shawn Vega DO;  Location: [facility] APPENDECTOMY      Description: Appendectomy;  Recorded: 09/26/2014;     [facility] COLOSTOMY      Description: Colostomy;  Recorded: 09/26/2014; " "    [facility] THROMBOENDARTECTMY NECK,NECK INCIS      Description: Carotid Thromboendarterectomy;  Recorded: 09/26/2014;     [facility] TOTAL ABDOM HYSTERECTOMY      Description: Total Abdominal Hysterectomy;  Recorded: 09/26/2014;     Family History   Problem Relation Age of Onset     Cancer Mother      Hypertension Mother      No Known Problems Father      Social History     Socioeconomic History     Marital status:      Spouse name: Not on file     Number of children: Not on file     Years of education: Not on file     Highest education level: Not on file   Occupational History     Not on file   Tobacco Use     Smoking status: Never Smoker     Smokeless tobacco: Never Used   Substance and Sexual Activity     Alcohol use: Never     Drug use: Never     Sexual activity: Not on file   Other Topics Concern     Not on file   Social History Narrative     Not on file     Social Determinants of Health     Financial Resource Strain: Not on file   Food Insecurity: Not on file   Transportation Needs: Not on file   Physical Activity: Not on file   Stress: Not on file   Social Connections: Not on file   Intimate Partner Violence: Not on file   Housing Stability: Not on file     Allergies   Allergen Reactions     Aminoquinolines Rash     Other reaction(s): rash     Primaquine Rash     Aloe      Other reaction(s): itchy skin     Atorvastatin Unknown     Other reaction(s): muscle aches     Cilostazol      Demeclocycline Other (See Comments)     \"sore bottom\"     Dextromethorphan      Diagnostic X-Ray Materials Unknown and Other (See Comments)     Chest tightness,sshakes     Guaifenesin & Derivatives Hives     Robitussin D     Metrizamide Other (See Comments)     Chest tightness,sshakes     Pravastatin Unknown     Other reaction(s): muscle aches     Simvastatin Other (See Comments)     Muscle aches   Other reaction(s): muscle aches     Tetracyclines Unknown and Other (See Comments)     Other reaction(s): Unknown  \"sore bottom\"       " Tomato      Hydroxychloroquine Sulfate [Hydroxychloroquine] Rash     Preservision Areds Rash     AREDs formula only caused rash.        PRIOR TO ADMISSION MEDICATIONS   (Not in a hospital admission)       REVIEW OF SYSTEMS:  12 point reviewed pertinent negatives and positives in HPI all others negative     PHYSICAL EXAM  Temp:  [98.5  F (36.9  C)] 98.5  F (36.9  C)  Pulse:  [] 107  Resp:  [25-38] 29  BP: (168-208)/(79-92) 180/82  SpO2:  [95 %-100 %] 96 %  Wt Readings from Last 1 Encounters:   08/08/22 46.7 kg (103 lb)     Body mass index is 17.14 kg/m .  Physical Exam  Constitutional:       General: She is in acute distress.      Appearance: She is ill-appearing.   HENT:      Head: Normocephalic and atraumatic.      Mouth/Throat:      Mouth: Mucous membranes are moist.      Pharynx: Oropharynx is clear.   Cardiovascular:      Rate and Rhythm: Regular rhythm. Tachycardia present.      Pulses: Normal pulses.      Comments: VIJAY  Pulmonary:      Comments: Tachypnea, bibasilar crackles, anteriorly and posteriorly   Abdominal:      General: Bowel sounds are normal.      Palpations: Abdomen is soft.      Comments: Old surgical scar, fullness suprapubic area   Musculoskeletal:      Cervical back: Neck supple.      Comments: BLE edema L>R   Skin:     General: Skin is warm and dry.      Capillary Refill: Capillary refill takes less than 2 seconds.      Coloration: Skin is pale.   Neurological:      General: No focal deficit present.      Mental Status: She is alert and oriented to person, place, and time. Mental status is at baseline.         PERTINENT LABS and RADIOLOGY   Results for orders placed or performed during the hospital encounter of 08/08/22   CT Chest Pulmonary Embolism w Contrast    Impression    IMPRESSION:  1.  Negative for pulmonary embolism.    2.  Cardiac enlargement, diffuse interlobular septal thickening in the lungs, and bilateral pleural effusions right greater than left. Constellation of findings  would be compatible with CHF or fluid overload.    3.  Prominent areas of bibasilar consolidation suspicious for potential pneumonitis. Clinical correlation.    4.  Marked coronary artery calcification.   CTA Abdomen Pelvis with Contrast    Impression    IMPRESSION:  1.  No acute vascular findings. Diffuse atheromatous disease. See above for nonacute vascular findings.    2.  No evidence for active GI bleed.    3.  However, there is a large malignant appearing mass in the right side of the rectum measuring up to 7 cm in diameter strongly concerning for malignancy, most likely primary rectal cancer. This likely accounts for the patient's symptoms of GI bleed.   There is concern for locally advanced malignancy. MRI could be obtained for local staging purposes.    4.  Several indeterminate enhancing bilateral groin lymph nodes with metastatic lymph nodes not excluded.    5.  Multifocal nodularity left adrenal gland suspected to be due to adrenal adenomas based on noncontrast density.    6.  Hysterectomy.       Recent Labs   Lab 08/08/22  0125 08/04/22  0704 08/03/22  0659   WBC 11.4* 7.7 6.8   HGB 9.0* 7.9* 8.0*   MCV 95 96 95    127* 142*   INR 1.23*  --   --    * 130* 131*   POTASSIUM 3.8 4.3 3.8   CHLORIDE 106 102 106   CO2 16* 22 22   BUN 11 13.3 13   CR 1.02 1.19* 1.01   ANIONGAP 6 6* 3*   ARON 8.1* 8.1* 7.8*    86 92   ALBUMIN 2.3* 2.2* 1.8*   PROTTOTAL 6.1 5.2*  --    BILITOTAL 0.4 0.3  --    ALKPHOS 87 73  --    ALT 16 7*  --    AST 22 18  --      EKG:Sinus tachycardia with RBBB       HISTOLOGIC TYPE: IN SITU AND INVASIVE SQUAMOUS CELL CARCINOMA    HISTOLOGIC GRADE: GRADE 2 (OF 4); FOCAL KERATINIZATION   DEPTH OF INVASION: INVOLVES LAMINA PROPRIA; DEEPER INVASION CANNOT BE EXCLUDED.  MUSCULARIS PROPRIA    NOT PRESENT FOR EVALUATION  LYMPHOVASCULAR INVASION: NOT IDENTIFIED  ADDITIONAL FINDINGS: ADJACENT UNREMARKABLE COLONIC MUCOSA     Tsering Sandoval MD  [facility]  Service  [phone]

## 2022-08-08 NOTE — CONSULTS
PALLIATIVE CARE CONSULT NOTE     Patient Name: Yun Rodgers  Date of Admission: 8/8/2022   Requesting Clinician / Team: Jaime  Reason for consult:[place] [facility]          Impressions and Recommendations     1)   GOALS OF CARE are (see below for full discussion).   ? Comfort focused. She states she does not want to have chemo or a lot of excessive treatments but she wants to talk to the oncologist to know what she has.  ? Discussed Hospice with her son Dagoberto and he would like that service  ? I will placed consult to  as he wants [facility]     2)    ADVANCED CARE PLANNING  ? Patient has completed health care directive: Not sure, son will bring in what he has.  We will complete a POLST too  ? Surrogate  health care agent:  Son Dagoberto is primary contact  ? Code Status:DNR DNI     3)    SYMPTOM MANAGEMENT      -Dyspnea due to:CHF with pleural effusions   Comment pleural effusions on imaging and elevated BNP. Physical exam findings support. No previous h/o of CHF  Recommendations  Dyspnea improved with diuretics.Now on 1 l NC       Weight loss resulting in loose dentures   Comment: Albumin 2.3   Recommendations   Soft food Diet       4)    PSYCHOSOCIAL/SPIRITUAL SUPPORT  ? Will request spiritual care support  And Palliative SW support   ? Palliative medicine will continue to follow         Admission Info       History of Present Illness:    Yun Rodgers is a 87 year old old female with h/o CKD 3, constipation, hemorrhoids, RTA, chronic metabolic acidosis, subacute cutaneous lupus, Sjogren's, CAD s/p post stent in 2007, peripheral vascular disease status post right carotid enterectomy admitted with anemia, recent rectal cancer diagnosis presents with SOB.  Patient has had increased SOB and worse last night. She also complains of chest tightness, not pain, orthopnea, PND, leg swelling and HERNANDEZ. She is still having rectal bleeding and was to meet with surgeon and oncology to discuss options but per patient she is not  interested in pursuing surgery or chemotherapy.    Recent Inpatient Admissions (last 12 mo)    [facility] 7/29 - 8/3 rectal bleed  6/15/22  Iron deficiency  3/4 closed fx of r ankle           Interval History:    Chart review/discussion with unit or clinical team members:          Palliative Assessment/discussion     I called son and I discussed the reason for Palliative Care Referral and our role in symptom management, patient family communication, understanding their choices for medical treatment, and providing  guidance in making difficult decisions in the framework of focusing on patient comfort and quality of life.I indicated that Palliative Medicine is a specialty service .  We are a Medical service with a team of Physicians, APRN's,  and Chaplains.  I explained that we are not Hospice.   I stated we see patients both in the hospital and in clinic and depending on the reason for the referral, we can assist in symptom management, help with patient and/or  family communication to help them  understand their choices for medical treatment, educate about their disease trajectory and providing  guidance in making difficult decisions in the framework of focusing on patient comfort and quality of life.  We also assist with Advance Care planning or end of life discussions.     Son Dagoberto indicates he would like Hospice services in her ALS that she just moved to there on July 12. Discussed  LTC vs ASL/MC   Level of Care  I explained  that they will have a higher level of certified care at a nursing home (CNA, LPN, 24/7 RN as compared to an ASL (PCA, 8 hr a day RN).  He liked the idea of hospice services on top of the care she has there.    I Discussed Advanced Directive and he knows that  she did the  POA form and there was some health care directions on it but he confirmed DNR DNI.    We will meet tomorrow at 11:45 to complete a POLST and also a Advanced directive if needed.      Hospice discussion    I  discussed  Hospice services, the focus of care (comfort, emotional support, symptom management, helping him thru this process), care delivery (short  Visits by certified  providers (Hospice team:MD, RN, SW, , volunteers) and where services are held (persons home, care center or a  hospice house   (latter two are private pay for room and board) and qualifying Dx. Goal is to provide all cares in Care center and not re hospitalize but family has final decision on this.    I explained hospice has all licensed care givers which will increase the quality of care he receives.           .      Prognosis, goals and planning        Patient's decision making preferences: son    I have concerns about the patient health literacy today: n    I have concerns about the  family's health literacy today: n    Prognosis, Goals, and/or Advance Care Planning were addressed today: Y    Mood, coping, and/or meaning in the context of serious illness were addressed today: Y    [facility] Symptom data:   Pain: N  Dyspnea: N  Nausea: N  Anxiety: N   :      Functional Status:  Current PPS% (100% normal, 0% death): 50   Baseline PPS% (2 weeks prior to admit): 60    Capacity evaluation:    Patient does    have decision making capacity based on the following:     Ability to understand relevant information about current  condition? y    Ability to demonstrate understanding of  current  illness and care needs? y    Ability to communicate  options for treatment? y      Social:   Living situation:  assisted living  [facility], moved in July 12  Baseline function: assisted with cares  Home support: ASL  Marital status:    Number of children: 1 son     Spiritual:  Are you a spiritual person: yes  Chandrika: Congregation           Review of Systems:     A full 14 point review of systems was otherwise completed and is negative aside from that mentioned  "above    -----------------------------------------------------------------------------------------------------------------  I have reviewed and supplemented the documentation in this patient's medical record listed below regarding past medical history, social history, active medical problems, allergies and medications.     Current Problem List:   Active Problems:    Essential hypertension    Chest pain, unspecified type    Stage 3 chronic kidney disease (H)    Lower GI bleed    Rectal mass    Congestive heart failure, unspecified HF chronicity, unspecified heart failure type (H)    Hyponatremia    Leukocytosis, unspecified type    Sinus tachycardia    Hypomagnesemia      Medical/Surgical History :  Past Medical History:   Diagnosis Date     Antiplatelet or antithrombotic long-term use     aspirin     Hypertension      Stented coronary artery      Past Surgical History:   Procedure Laterality Date     ABDOMEN SURGERY       APPENDECTOMY       COLONOSCOPY N/A 8/2/2022    Procedure: COLONOSCOPY WITH RECTUM BIOPSIES;  Surgeon: Santana Posey MD;  Location: [facility]     COLOSTOMY CLOSURE       EYE SURGERY       HYSTERECTOMY       IR AORTIC ARCH 4 VESSEL ANGIOGRAM  6/7/2002     IR AORTIC ARCH 4 VESSEL ANGIOGRAM  12/27/2002     IR CAROTID ANGIOGRAM  12/27/2002     IR CAROTID ANGIOGRAM  12/27/2002     IR EXTREMITY ANGIOGRAM BILATERAL  11/27/2007     IR MISCELLANEOUS PROCEDURE  6/7/2002     IR MISCELLANEOUS PROCEDURE  12/27/2002     IR MISCELLANEOUS PROCEDURE  12/27/2002     IR MISCELLANEOUS PROCEDURE  12/27/2002     OPEN REDUCTION INTERNAL FIXATION ANKLE Right 3/2/2018    Procedure: OPEN REDUCTION INTERNAL FIXATION TRIMALLEOLAR FRACTURE RIGHT ANKLE;  Surgeon: Shawn Vega DO;  Location: [facility];  Service:      OTHER SURGICAL HISTORY      Resection of mesenteryper patient-- \"they took out my mesentery about 6 months after I had a baby\"     REMOVE HARDWARE LOWER EXTREMITY Right 3/4/2022    " Procedure: RIGHT ANKLE REMOVAL OF HARDWARE;  Surgeon: Shawn Vega DO;  Location: [facility] OR     [facility] APPENDECTOMY      Description: Appendectomy;  Recorded: 09/26/2014;     [facility] COLOSTOMY      Description: Colostomy;  Recorded: 09/26/2014;     [facility] THROMBOENDARTECTMY NECK,NECK INCIS      Description: Carotid Thromboendarterectomy;  Recorded: 09/26/2014;     [facility] TOTAL ABDOM HYSTERECTOMY      Description: Total Abdominal Hysterectomy;  Recorded: 09/26/2014;     Relevant Family History  Family History   Problem Relation Age of Onset     Cancer Mother      Hypertension Mother      No Known Problems Father      Family Status   Relation Name Status     Mo  (Not Specified)     Fa  (Not Specified)     Social History:    she  reports that she has never smoked. She has never used smokeless tobacco. She reports that she does not drink alcohol and does not use drugs.  Medication  :  Current Facility-Administered Medications   Medication     acetaminophen (TYLENOL) tablet 650 mg     ferrous gluconate (FERGON) tablet 324 mg     fexofenadine (ALLEGRA) tablet 180 mg     furosemide (LASIX) injection 40 mg     HOLD: nitroGLYcerin IF     hydrALAZINE (APRESOLINE) injection 10 mg     hydrocortisone (Perianal) (ANUSOL-HC) 2.5 % cream 1 g     isosorbide mononitrate (IMDUR) 24 hr tablet 120 mg     isosorbide mononitrate (IMDUR) 24 hr tablet 30 mg     melatonin tablet 3 mg     menthol-zinc oxide (CALMOSEPTINE) 0.44-20.6 % ointment OINT     metoprolol tartrate (LOPRESSOR) half-tab 12.5 mg     mirtazapine (REMERON) tablet 15 mg     nitroGLYcerin (NITROSTAT) sublingual tablet 0.4 mg     pantoprazole (PROTONIX) EC tablet 40 mg     phenylephrine-cocoa butter (PREPARATION H) per suppository 1 suppository     phenylephrine-mineral oil-petrolatum (PREPARATION H) 0.25-14-74.9 % rectal ointment     polyethylene glycol (MIRALAX) Packet 17 g     polyethylene glycol-propylene glycol (SYSTANE ULTRA) ophthalmic solution 1 drop      "prochlorperazine (COMPAZINE) injection 5 mg     sennosides (SENOKOT) tablet 1 tablet     sennosides (SENOKOT) tablet 2 tablet            Allergies:  Allergies   Allergen Reactions     Aminoquinolines Rash     Other reaction(s): rash     Primaquine Rash     Aloe      Other reaction(s): itchy skin     Atorvastatin Unknown     Other reaction(s): muscle aches     Cilostazol      Demeclocycline Other (See Comments)     \"sore bottom\"     Dextromethorphan      Diagnostic X-Ray Materials Unknown and Other (See Comments)     Chest tightness,sshakes     Guaifenesin & Derivatives Hives     Robitussin D     Metrizamide Other (See Comments)     Chest tightness,sshakes     Pravastatin Unknown     Other reaction(s): muscle aches     Simvastatin Other (See Comments)     Muscle aches   Other reaction(s): muscle aches     Tetracyclines Unknown and Other (See Comments)     Other reaction(s): Unknown  \"sore bottom\"       Tomato      Hydroxychloroquine Sulfate [Hydroxychloroquine] Rash     Preservision Areds Rash     AREDs formula only caused rash.      Emergency Contact Info (Pulled from chart)  Extended Emergency Contact Information  Primary Emergency Contact: VishalKayode   [facility]  Home Phone: [phone]  Mobile Phone: [phone]  Relation: Son     Evaluation             PERTINENT PHYSICAL EXAMINATION:  Vital Signs: Blood pressure 115/55, pulse 53, temperature 99.4  F (37.4  C), temperature source Oral, resp. rate 18, height 1.651 m (5' 5\"), weight 47.9 kg (105 lb 9.6 oz), SpO2 100 %.   GENERAL: pleasant, think, NAD  SKIN: Warm and dry   HEENT: Normocephalic, anicteric sclera, moist mucous membranes  LUNGS: Clear to auscultation anterolaterally; non-labored   CARDIAC: RRR, normal s1/s2, w/o m/r/g   ABDOMINAL: BS (+), soft, non distended, non tender  : henry in place  MUSKL: no gross joint deformities   EXTREMITIES: No edema or cyanosis, pulses 2+ and symmetrical  NEUROLOGIC: alert and oriented x4  PSYCH:  calm         " Data Reviewed:     All labs/imaging reviewed in [facility]   Lab Results   Component Value Date    ALBUMIN 2.3 08/08/2022     GFR Estimate   Date Value Ref Range Status   08/08/2022 53 (L) >60 mL/min/1.73m2 Final     Comment:     Effective December 21, 2021 eGFRcr in adults is calculated using the 2021 CKD-EPI creatinine equation which includes age and gender (Romulo et al., NE, DOI: 10.1056/HHVMjy0314674)   05/13/2021 32 (L) >60 mL/min/1.73m2 Final     Recent Labs   Lab 08/08/22  0125 08/04/22  0704 08/03/22  0659 08/02/22  0618   HGB 9.0* 7.9* 8.0* 8.9*      Pathology from 8/2/2022  HISTOLOGIC TYPE: IN SITU AND INVASIVE SQUAMOUS CELL CARCINOMA HISTOLOGIC GRADE: GRADE 2 (OF 4); FOCAL KERATINIZATION DEPTH OF INVASION: INVOLVES LAMINA PROPRIA; DEEPER INVASION CANNOT BE EXCLUDED.  MUSCULARIS PROPRIA NOT PRESENT FOR EVALUATION LYMPHOVASCULAR INVASION: NOT IDENTIFIED ADDITIONAL FINDINGS: ADJACENT UNREMARKABLE COLONIC MUCOSA   - CTA of abd and chest -large malignant appearing mass in the right side of the rectum measuring up to 7 cm in diameter strongly concerning for malignancy, most likely primary rectal cancer. This likely accounts for the patient's symptoms of GI bleed. There is concern for locally advanced malignancy Several indeterminate enhancing bilateral groin lymph nodes with metastatic lymph nodes not excluded. Multifocal nodularity left adrenal gland suspected to be due to adrenal adenomas based on noncontrast density.     PERTINENT LABS and RADIOLOGY       Results for orders placed or performed during the hospital encounter of 08/08/22   CT Chest Pulmonary Embolism w Contrast     Impression     IMPRESSION:  1.  Negative for pulmonary embolism.     2.  Cardiac enlargement, diffuse interlobular septal thickening in the lungs, and bilateral pleural effusions right greater than left. Constellation of findings would be compatible with CHF or fluid overload.     3.  Prominent areas of bibasilar consolidation suspicious  for potential pneumonitis. Clinical correlation.     4.  Marked coronary artery calcification.   CTA Abdomen Pelvis with Contrast     Impression     IMPRESSION:  1.  No acute vascular findings. Diffuse atheromatous disease. See above for nonacute vascular findings.     2.  No evidence for active GI bleed.     3.  However, there is a large malignant appearing mass in the right side of the rectum measuring up to 7 cm in diameter strongly concerning for malignancy, most likely primary rectal cancer. This likely accounts for the patient's symptoms of GI bleed.   There is concern for locally advanced malignancy. MRI could be obtained for local staging purposes.     4.  Several indeterminate enhancing bilateral groin lymph nodes with metastatic lymph nodes not excluded.     5.  Multifocal nodularity left adrenal gland suspected to be due to adrenal adenomas based on noncontrast density.     6.  Hysterectomy.           ====================================================  TT: I have personally spent a total of 87  minutes on the unit in review of medical record, consultation with the medical providers and assessment of patient today, with more than 50% of this time spent in counseling, coordination of care, and discussion with patient and then son on phone re: diagnostic results, prognosis, symptom management, risks and benefits of management options, FFC, GOC , code status and development of plan of care as noted above.  ====================================================    AKIKO Reid, NP-C, [facility]  Palliative Medicine  [phone]

## 2022-08-08 NOTE — ED NOTES
Pt has been reporting feeling very restless and anxious. Wanting to get up and walk. Sat at the edge of the bed, stood and marched in place. Updated provider, Hydroxyzine ordered and given. Pt states she never feels like this. O2 is on for comfort. Will continue to monitor and assess.

## 2022-08-08 NOTE — ED NOTES
Bed: JNED-06  Expected date: 8/8/22  Expected time: 12:10 AM  Means of arrival: Ambulance  Comments:  [facility]  88 yo F SOB

## 2022-08-08 NOTE — PROGRESS NOTES
08/08/22 1300   Quick Adds   Type of Visit Initial Occupational Therapy Evaluation   Living Environment   People in Home alone   Current Living Arrangements assisted living   Home Accessibility no concerns   Transportation Anticipated family or friend will provide   Self-Care   Usual Activity Tolerance moderate   Current Activity Tolerance poor   Equipment Currently Used at Home walker, rolling;raised toilet seat;shower chair;grab bar, toilet   Fall history within last six months yes   Activity/Exercise/Self-Care Comment independent with self cares   General Information   Onset of Illness/Injury or Date of Surgery 08/08/22   Limitations/Impairments hearing   Cognitive Status Examination   Orientation Status orientation to person, place and time   Follows Commands WNL   Cognitive Status Comments needed encouragement to get up and get cleaned up(incont. stool), aide stated she had been refusing   Visual Perception   Visual Impairment/Limitations corrective lenses full-time   Pain Assessment   Patient Currently in Pain Yes, see Vital Sign flowsheet  (bottom, notiifed nsg. staff)   Range of Motion Comprehensive   General Range of Motion no range of motion deficits identified   Bed Mobility   Bed Mobility supine-sit   Supine-Sit [place] (Bed Mobility) supervision  (cues to initiate)   Assistive Device (Bed Mobility) bed rails   Transfers   Transfers sit-stand transfer   Sit-Stand Transfer   Sit-Stand [place] (Transfers) minimum assist (75% patient effort)   Toilet Transfer   [place] Level (Toilet Transfer) minimum assist (75% patient effort)   Assistive Device (Toilet Transfer) raised toilet seat;grab bars/safety frame   Type (Toilet Transfer) sit-stand;stand-sit   Balance   Balance Assessment standing balance: static;standing balance: dynamic   Balance Comments min assist   Lower Body Dressing Assessment/Training   [place] Level (Lower Body Dressing) moderate assist (50% patient effort)   Grooming  Assessment/Training   [place] Level (Grooming) unable to perform  (due to fatique)   Toileting   [place] Level (Toileting) maximum assist (25% patient effort)   Assistive Devices (Toileting) grab bar, toilet;raised toilet seat   Comment, (Toileting) incont. bowel and not wanting to get up out of bed, bleeding and open wounds on bottom   Clinical Impression   Criteria for Skilled Therapeutic Interventions Met (OT) Yes, treatment indicated   OT Diagnosis decreased ADL's due to CHF/SOB   OT Problem List-Impairments impacting ADL activity tolerance impaired;balance;strength;mobility;hearing;pain   Assessment of Occupational Performance 3-5 Performance Deficits   Identified Performance Deficits toileitng, transfers, bathing, groom/hygiene, L/B dressing   Planned Therapy Interventions (OT) ADL retraining;balance training;strengthening;transfer training;progressive activity/exercise   Clinical Decision Making Complexity (OT) moderate complexity   Risk & Benefits of therapy have been explained evaluation/treatment results reviewed;patient;son   Clinical Impression Comments Pt. demonstrates decreased ADL's with fatique/SOB/weakness, wounds. Recommend TCU for safety.   OT Discharge Planning   OT Discharge Recommendation (DC Rec) (S)  Transitional Care Facility;home with assist   OT Rationale for DC Rec Pt. would require assist of 1 for ADL's to manage  wounds on bottom, weakness/fatique/SOB and oxygen needs limit ability to care for self.   Total Evaluation Time (Minutes)   Total Evaluation Time (Minutes) 40   OT Goals   Therapy Frequency (OT) Daily   OT Predicted Duration/Target Date for Goal Attainment 08/15/22   OT Goals Hygiene/Grooming;Lower Body Dressing;Toilet Transfer/Toileting   OT: Hygiene/Grooming supervision/stand-by assist   OT: Lower Body Dressing Minimal assist   OT: Toilet Transfer/Toileting Minimal assist

## 2022-08-08 NOTE — PLAN OF CARE
Problem: Adjustment to Illness (Heart Failure)  Goal: Optimal Coping  Outcome: Ongoing, Progressing     Problem: Cardiac Output Decreased (Heart Failure)  Goal: Optimal Cardiac Output  Outcome: Ongoing, Progressing     Problem: Dysrhythmia (Heart Failure)  Goal: Stable Heart Rate and Rhythm  Outcome: Ongoing, Progressing     Problem: Fluid Imbalance (Heart Failure)  Goal: Fluid Balance  Outcome: Ongoing, Progressing     Problem: Functional Ability Impaired (Heart Failure)  Goal: Optimal Functional Ability  Outcome: Ongoing, Progressing     Problem: Respiratory Compromise (Heart Failure)  Goal: Effective Oxygenation and Ventilation  Outcome: Ongoing, Progressing     Problem: Oral Intake Inadequate (Heart Failure)  Goal: Optimal Nutrition Intake  Outcome: Ongoing, Progressing   Goal Outcome Evaluation:         Heart Failure Care Map  GOALS TO BE MET BEFORE DISCHARGE:    1. Decrease congestion and/or edema with diuretic therapy to achieve near optimal volume status.     Dyspnea improved: No, further care required to meet this goal. Please explain pt still has SOB   Edema improved: No, further care required to meet this goal. Please explain BLE has plus 2 edema        Net I/O and Weights since admission:   07/09 0700 - 08/08 0659  In: -   Out: 1875 [Urine:1875]  Net: -1875     Vitals:    08/08/22 0029 08/08/22 0528   Weight: 46.7 kg (103 lb) 47.9 kg (105 lb 9.6 oz)       2.  O2 sats > 90% on room air, or at prior home O2 therapy level.      Able to wean O2 this shift to keep sats above 90%?: Yes, satisfactory for discharge.   Does patient use Home O2? No          Current oxygenation status:   SpO2: 97 %     O2 Device: Nasal cannula, Oxygen Delivery: 2 LPM    3.  Tolerates ambulation and mobility near baseline.     Ambulation: No, further care required to meet this goal. Please explain pt just arrived in the unit   Times patient ambulated with staff this shift: 0    Please review the Heart Failure Care Map for  additional HF goal outcomes.    Syeda Lira, AXEL  8/8/2022

## 2022-08-08 NOTE — ED NOTES
Henry placed per MD's request. 900cc urine out with henry placement, after Pt had just voided 375cc.

## 2022-08-09 NOTE — PROGRESS NOTES
PALLIATIVE CARE PROGRESS NOTE     Patient Name: Yun Rodgers  Date of VISIT  8/9/2022              Impressions and Recommendations     1)   GOALS OF CARE are (see below for full discussion).   ?  Restorative:  Overall Goal is to control the bleeding so she does not have continued transfusions and has a quality of life.   They want to stage with ultrasound and have CT scan with possible chemorad for bleeding control'  ? They would like to follow with Outpt Palliative Care when they have this Rx  ? Once bleeding is under control then they would want to transition to [facility] at her [facility].       2)    ADVANCED CARE PLANNING  ? Patient has completed health care directive: son brought it in and I filed it  We  completed a POLST too  ? Surrogate  health care agent:  Son Dagoberto is primary contact  ? Code Status:DNR DNI     3)    SYMPTOM MANAGEMENT      -Dyspnea due to:CHF with pleural effusions   Comment pleural effusions on imaging and elevated BNP. Physical exam findings support. No previous h/o of CHF  Recommendations  Dyspnea improved with diuretics.Now on 1 l NC       Weight loss resulting in loose dentures   Comment: Albumin 2.3   Recommendations   Soft food Diet       4)    PSYCHOSOCIAL/SPIRITUAL SUPPORT  ? Will request spiritual care support  And Palliative SW support   ? Palliative medicine will continue to follow         Admission Info       History of Present Illness:    Yun Rodgers is a 87 year old old female with h/o CKD 3, constipation, hemorrhoids, RTA, chronic metabolic acidosis, subacute cutaneous lupus, Sjogren's, CAD s/p post stent in 2007, peripheral vascular disease status post right carotid enterectomy admitted with anemia, recent rectal cancer diagnosis presents with SOB.  Patient has had increased SOB and worse last night. She also complains of chest tightness, not pain, orthopnea, PND, leg swelling and HERNANDEZ. She is still having rectal bleeding and was to meet with surgeon and oncology to  discuss options but per patient she is not interested in pursuing surgery or chemotherapy.    Recent Inpatient Admissions (last 12 mo)    [facility] 7/29 - 8/3 rectal bleed  6/15/22  Iron deficiency  3/4 closed fx of r ankle           Interval History:    Chart review/discussion with unit or clinical team members:   Discussed with МАРИНА Jones       Palliative Assessment/discussion     8/9 I met with patient and her son today.  We reviewed the gastroenterology consult and discussed that laser cautery does not sound like it would be successful but he did suggest staging with an ultrasound and CT and then per oncology consider consider chemoradiation to stop bleeding.  The son likes that idea and so does the patient and they want to talk with the oncologist when Dr. MERCADO sees them today.  Once the bleeding is controlled then they would like to go to her assisted living on with [facility] hospice and have services with them.  Their goal is to get the bleeding controlled first    8/8  I called son and I discussed the reason for Palliative Care Referral and our role in symptom management, patient family communication, understanding their choices for medical treatment, and providing  guidance in making difficult decisions in the framework of focusing on patient comfort and quality of life.I indicated that Palliative Medicine is a specialty service .  We are a Medical service with a team of Physicians, APRN's,  and Chaplains.  I explained that we are not Hospice.   I stated we see patients both in the hospital and in clinic and depending on the reason for the referral, we can assist in symptom management, help with patient and/or  family communication to help them  understand their choices for medical treatment, educate about their disease trajectory and providing  guidance in making difficult decisions in the framework of focusing on patient comfort and quality of life.  We also assist with Advance Care planning or  end of life discussions.     Son Dagoberto indicates he would like Hospice services in her [place] that she just moved to there on July 12. Discussed  LTC vs ASL/MC   Level of Care  I explained  that they will have a higher level of certified care at a nursing home (CNA, LPN, 24/7 RN as compared to an ASL (PCA, 8 hr a day RN).  He liked the idea of hospice services on top of the care she has there.    I Discussed Advanced Directive and he knows that  she did the  POA form and there was some health care directions on it but he confirmed DNR DNI.    We will meet tomorrow at 11:45 to complete a POLST and also a Advanced directive if needed.      Hospice discussion    I  discussed Hospice services, the focus of care (comfort, emotional support, symptom management, helping him thru this process), care delivery (short  Visits by certified  providers (Hospice team:MD, RN, SW, , volunteers) and where services are held (persons home, care center or a  hospice house   (latter two are private pay for room and board) and qualifying Dx. Goal is to provide all cares in Care center and not re hospitalize but family has final decision on this.    I explained hospice has all licensed care givers which will increase the quality of care he receives.           .      Prognosis, goals and planning        Patient's decision making preferences: son    I have concerns about the patient health literacy today: n    I have concerns about the  family's health literacy today: n    Prognosis, Goals, and/or Advance Care Planning were addressed today: Y    Mood, coping, and/or meaning in the context of serious illness were addressed today: Y    [facility] Symptom data:   Pain: N  Dyspnea: N  Nausea: N  Anxiety: N   :         Review of Systems:     A full 14 point review of systems was otherwise completed and is negative aside from that mentioned  above    -----------------------------------------------------------------------------------------------------------------  I have reviewed and supplemented the documentation in this patient's medical record listed below regarding past medical history, social history, active medical problems, allergies and medications.     Current Problem List:   Active Problems:    Essential hypertension    Chest pain, unspecified type    Stage 3 chronic kidney disease (H)    Lower GI bleed    Rectal mass    Congestive heart failure, unspecified HF chronicity, unspecified heart failure type (H)    Hyponatremia    Leukocytosis, unspecified type    Sinus tachycardia    Hypomagnesemia        Medication  :  Current Facility-Administered Medications   Medication     acetaminophen (TYLENOL) tablet 650 mg     ferrous gluconate (FERGON) tablet 324 mg     fexofenadine (ALLEGRA) tablet 180 mg     furosemide (LASIX) injection 20 mg     HOLD: nitroGLYcerin IF     hydrALAZINE (APRESOLINE) injection 10 mg     hydrocortisone (Perianal) (ANUSOL-HC) 2.5 % cream 1 g     isosorbide mononitrate (IMDUR) 24 hr tablet 120 mg     isosorbide mononitrate (IMDUR) 24 hr tablet 30 mg     magnesium sulfate 2 g in water intermittent infusion     melatonin tablet 3 mg     menthol-zinc oxide (CALMOSEPTINE) 0.44-20.6 % ointment OINT     metoprolol tartrate (LOPRESSOR) half-tab 12.5 mg     mirtazapine (REMERON) tablet 15 mg     nitroGLYcerin (NITROSTAT) sublingual tablet 0.4 mg     pantoprazole (PROTONIX) EC tablet 40 mg     phenylephrine-cocoa butter (PREPARATION H) per suppository 1 suppository     phenylephrine-mineral oil-petrolatum (PREPARATION H) 0.25-14-74.9 % rectal ointment     polyethylene glycol (MIRALAX) Packet 17 g     polyethylene glycol-propylene glycol (SYSTANE ULTRA) ophthalmic solution 1 drop     prochlorperazine (COMPAZINE) injection 5 mg     sennosides (SENOKOT) tablet 1 tablet     sennosides (SENOKOT) tablet 2 tablet            Evaluation         "     PERTINENT PHYSICAL EXAMINATION:  Vital Signs: Blood pressure 133/62, pulse 63, temperature 97.7  F (36.5  C), temperature source Oral, resp. rate 24, height 1.651 m (5' 5\"), weight 48.3 kg (106 lb 8 oz), SpO2 96 %.   GENERAL: pleasant, thin, NAD  SKIN: Warm and dry   HEENT: Normocephalic, anicteric sclera, moist mucous membranes  LUNGS: Clear to auscultation anterolaterally; non-labored   CARDIAC: RRR, normal s1/s2, w/o m/r/g   ABDOMINAL: BS (+), soft, non distended, non tender  : henry in place  MUSKL: no gross joint deformities   EXTREMITIES: No edema or cyanosis, pulses 2+ and symmetrical  NEUROLOGIC: alert and oriented x4  PSYCH:  calm         Data Reviewed:     [facility]   Lab Results   Component Value Date    ALBUMIN 2.3 08/08/2022     GFR Estimate   Date Value Ref Range Status   08/09/2022 45 (L) >60 mL/min/1.73m2 Final     Comment:     Effective December 21, 2021 eGFRcr in adults is calculated using the 2021 CKD-EPI creatinine equation which includes age and gender (Romulo et al., NEJ, DOI: 10.1056/SGNGrh7986959)   05/13/2021 32 (L) >60 mL/min/1.73m2 Final     Recent Labs   Lab 08/09/22  0503 08/08/22  0125 08/04/22  0704 08/03/22  0659   HGB 6.9* 9.0* 7.9* 8.0*      Pathology from 8/2/2022  HISTOLOGIC TYPE: IN SITU AND INVASIVE SQUAMOUS CELL CARCINOMA HISTOLOGIC GRADE: GRADE 2 (OF 4); FOCAL KERATINIZATION DEPTH OF INVASION: INVOLVES LAMINA PROPRIA; DEEPER INVASION CANNOT BE EXCLUDED.  MUSCULARIS PROPRIA NOT PRESENT FOR EVALUATION LYMPHOVASCULAR INVASION: NOT IDENTIFIED ADDITIONAL FINDINGS: ADJACENT UNREMARKABLE COLONIC MUCOSA   - CTA of abd and chest -large malignant appearing mass in the right side of the rectum measuring up to 7 cm in diameter strongly concerning for malignancy, most likely primary rectal cancer. This likely accounts for the patient's symptoms of GI bleed. There is concern for locally advanced malignancy Several indeterminate enhancing bilateral groin lymph " nodes with metastatic lymph nodes not excluded. Multifocal nodularity left adrenal gland suspected to be due to adrenal adenomas based on noncontrast density.     PERTINENT LABS and RADIOLOGY       Results for orders placed or performed during the hospital encounter of 08/08/22   CT Chest Pulmonary Embolism w Contrast     Impression     IMPRESSION:  1.  Negative for pulmonary embolism.     2.  Cardiac enlargement, diffuse interlobular septal thickening in the lungs, and bilateral pleural effusions right greater than left. Constellation of findings would be compatible with CHF or fluid overload.     3.  Prominent areas of bibasilar consolidation suspicious for potential pneumonitis. Clinical correlation.     4.  Marked coronary artery calcification.   CTA Abdomen Pelvis with Contrast     Impression     IMPRESSION:  1.  No acute vascular findings. Diffuse atheromatous disease. See above for nonacute vascular findings.     2.  No evidence for active GI bleed.     3.  However, there is a large malignant appearing mass in the right side of the rectum measuring up to 7 cm in diameter strongly concerning for malignancy, most likely primary rectal cancer. This likely accounts for the patient's symptoms of GI bleed.   There is concern for locally advanced malignancy. MRI could be obtained for local staging purposes.     4.  Several indeterminate enhancing bilateral groin lymph nodes with metastatic lymph nodes not excluded.     5.  Multifocal nodularity left adrenal gland suspected to be due to adrenal adenomas based on noncontrast density.     6.  Hysterectomy.           ====================================================  TT: I have personally spent a total of 35   minutes on the unit in review of medical record, consultation with the medical providers and assessment of patient today, with more than 50% of this time spent in counseling, coordination of care, and discussion with patient and then son on phone re: diagnostic  results, prognosis, symptom management, risks and benefits of management options, FFC, GOC , code status and development of plan of care as noted above.    Additional time spent on Family conf with son.  Total time 35 + 45 min  1130 to 1230  ====================================================    AKIKO Reid, NP-C, [facility]  Palliative Medicine  [phone]

## 2022-08-09 NOTE — CONSULTS
Care Management Follow Up    Length of Stay (days): 1    Expected Discharge Date: 08/10/2022     Concerns to be Addressed:       Patient plan of care discussed at interdisciplinary rounds: Yes    Anticipated Discharge Disposition: Assisted Living, Hospice     Anticipated Discharge Services:    Anticipated Discharge DME:      Referrals Placed by CM/SW:    Private pay costs discussed: Not applicable    Additional Information:    Per palliative, pt/son want referral to [facility] ([facility], [phone]). [facility] sent referral and spoke with [facility] who will review then call out to son.     Per Sophie from [facility], pt PCP is Giovanna Contreras MD. [facility] updated chart to reflect this change.  Left VM with Sophie this AM with pt update.     Anticipate pt to [facility] to [facility] ([facility]: phone, [phone]; fax, [phone]) with Hospice, pend coordination and medical mgmt. SONYA spoke briefly with [facility] staff to update her on ongoing discussions regarding hospice and no discharge anticipated today. Keep [facility] updated.       MORALES Dumont

## 2022-08-09 NOTE — CONSULTS
[facility]  WOC Nurse Inpatient Assessment     Consulted for: Coccyx    Patient History (according to provider note(s):          Areas Assessed:      Skin Injury Location:Coccyx and perianal skin    Skin injury due to: Incontinence associated dermatitis (IAD)  Affected area:      Skin assessment: Denudement and Erythema     Measurements (length x width x depth, in cm) scattered areas of denudement measuring 0.5 cm x  0.5 cm or less within a 3 cm x 3 cm area of blanchable erythema on the coccyx and erythema near perianal skin     Color: normal and consistent with surrounding tissue     Temperature  normal      Drainage: none .      Color: none      Odor: none  Pain: denies   Pain interventions prior to dressing change: N/A  Treatment goal: Heal   STATUS: initial assessment  Supplies ordered: gathered       Treatment Plan:     Wound care: Coccyx - Every 3 days  Cleanse with water, gently dry.  Apply Mepilex dressing.    Orders: Written    RECOMMEND PRIMARY TEAM ORDER: None, at this time  Education provided: plan of care  Discussed plan of care with: Patient  WOC nurse follow-up plan: weekly  Notify WOC if wound(s) deteriorate.  Nursing to notify the Provider(s) and re-consult the WOC Nurse if new skin concern.    DATA:     Current support surface: Standard  Foam mattress  Containment of urine/stool: Incontinence Protocol  BMI: Body mass index is 17.72 kg/m .   Active diet order: Orders Placed This Encounter      2 Gram Sodium Diet Other - please comment     Output: I/O last 3 completed shifts:  In: -   Out: 3950 [Urine:3950]     Labs: Recent Labs   Lab 08/09/22  0503 08/08/22  0125   ALBUMIN  --  2.3*   HGB 6.9* 9.0*   INR  --  1.23*   WBC 6.1 11.4*     Pressure injury risk assessment:   Sensory Perception: 4-->no impairment  Moisture: 4-->rarely moist  Activity: 3-->walks occasionally  Mobility: 3-->slightly limited  Nutrition: 3-->adequate  Friction and Shear: 2-->potential problem  Wilber  Score: 19    Minda Bowman RN CWOCN

## 2022-08-09 NOTE — PLAN OF CARE
Heart Failure Care Map  GOALS TO BE MET BEFORE DISCHARGE:    1. Decrease congestion and/or edema with diuretic therapy to achieve near optimal volume status.     Dyspnea improved: No, further care required to meet this goal. Please explain Pt. On 1 L NC and and still having dyspnea on exertion    Edema improved: No, further care required to meet this goal. Please explain Pt. Still have + edema on lower extremities         Net I/O and Weights since admission:   07/10 0700 - 08/09 0659  In: 0   Out: 5825 [Urine:5825]  Net: -5825     Vitals:    08/08/22 0029 08/08/22 0528 08/09/22 0346   Weight: 46.7 kg (103 lb) 47.9 kg (105 lb 9.6 oz) 48.3 kg (106 lb 8 oz)       2.  O2 sats > 90% on room air, or at prior home O2 therapy level.      Able to wean O2 this shift to keep sats above 90%?: No, further care required to meet this goal. Please explain Pt. Still on 1 L NC and is not on oxygen at baseline   Does patient use Home O2? No          Current oxygenation status:   SpO2: 96 %     O2 Device: Nasal cannula, Oxygen Delivery: 1 LPM    3.  Tolerates ambulation and mobility near baseline.     Ambulation: No, further care required to meet this goal. Please explain Pt. Ambulating in room with 1 assist but is getting tired easily. For walking in the hallway    Times patient ambulated with staff this shift: 1    Please review the Heart Failure Care Map for additional HF goal outcomes.    Inocencia Correa RN  8/9/2022

## 2022-08-09 NOTE — PROGRESS NOTES
Daily Progress Note        CODE STATUS:  No CPR- Do NOT Intubate    Date of visit; 08/09/22    Length of stay (  LOS: 1 day  )     Assessment/Plan:  Yun Rodgers is a 87 year old female with past medical history of CKD stage III, CAD status post a stent in 2007, PVD status post right carotid endarterectomy, recent hospitalization from 7/29 to 8/3/2022 for GI bleed who received blood transfusion and underwent colonoscopy, biopsy came back positive for rectal cancer who presented to ED for evaluation of shortness of breath and subsequently admitted for further management    Shortness of breath; likely multifactorial, heart failure, anemia, physical deconditioning from recent hospitalization  Acute diastolic heart failure;  --On admission CTA chest negative for pulm embolism but reported findings consistent with heart failure or volume overload  --On admission elevated BNP  --On this admission, echo reported EF 60 to 65%  --Continue IV diuretics  --Monitor intake/output, weight closely    Recent diagnosis of rectal cancer;  Acute on chronic lower GI bleeding; likely due to rectal cancer  --CTA abdomen/pelvis reported no evidence for active GI bleeding.  However, large malignant appearing mass in the right side of rectum which likely accounts for patient's symptoms of GI bleed.  CT also reports concern for locally advanced malignancy.  --Hemoglobin 6.9, transfuse 1 unit packed RBC.  Monitor hemoglobin closely  --GI and oncology consulted and awaiting input  --Palliative care on board    Hyponatremia; likely due to CHF  -- Serum sodium stable to trending up.   -- Hypokalemia, replace per protocol  -- Mild GILBERTO, likely due to overdiuresis, decreased IV Lasix dose from 40 mg twice daily to 20 mg.  -- Labs in a.m.    History of CAD status post stent in 2007;  --Denied chest pain and reports breathing better with diuretics  --Not on aspirin since he had intermittent ongoing rectal bleeding  --Continue Imdur,  metoprolol    Previous admission had urinary retention;  --Currently has a Serrano catheter as patient on IV diuretics     DVT prophylaxis; SCDs, ambulate as able.  No pharmacological agent given GI bleed requiring transfusion.    Disposition; pending  Barrier to discharge; multiple medical issues, consult pending      Subjective:  Interval History: Patient is new to me.  Patient seen and examined. Notes, labs, imaging reports personally reviewed.  Patient denied chest pain or shortness of breath.  Patient reported that she has not been out of the bed and when lying no dizziness.  Denied abdomen pain, nausea, vomiting.  Patient denied rectal pain.  Discussed with nursing staffs, reported ongoing mild bleeding per rectum.    Review of Systems:   As mentioned in subjective.    Patient Active Problem List   Diagnosis     Mixed hyperlipidemia     Essential hypertension     Coronary Artery Disease     Carotid artery disease (H)     Peripheral vascular disease (H)     Gastroesophageal reflux disease with esophagitis     Chest pain, unspecified type     Angina concurrent with and due to arteriosclerosis of coronary artery (H)     Stable angina (H)     Malaise     Gastroesophageal reflux disease without esophagitis     Trimalleolar fracture     Anemia, unspecified type     Coronary artery disease of native artery of native heart with stable angina pectoris (H)     Pulmonary valve disorder     Fracture dislocation of ankle, right, closed, initial encounter     Closed right ankle fracture     Stage 3 chronic kidney disease (H)     Acute renal failure, unspecified acute renal failure type (H)     Sjogren's syndrome (H)     Poor appetite     Lesion of ulnar nerve     History of tobacco use     History of coronary artery bypass surgery     Grief     Chronic fatigue syndrome     Burning mouth syndrome     Adult failure to thrive syndrome     Acquired trigger finger     Abnormal gait     Ulcer of external ear (H)     Lower GI bleed      Rectal mass     Congestive heart failure, unspecified HF chronicity, unspecified heart failure type (H)     Hyponatremia     Leukocytosis, unspecified type     Sinus tachycardia     Hypomagnesemia       Scheduled Meds:    ferrous gluconate  324 mg Oral BID     fexofenadine  180 mg Oral Daily     furosemide  40 mg Intravenous Q12H     isosorbide mononitrate  120 mg Oral Daily     isosorbide mononitrate  30 mg Oral Daily     magnesium sulfate  2 g Intravenous Once     menthol-zinc oxide   Topical TID     metoprolol tartrate  12.5 mg Oral BID     mirtazapine  15 mg Oral At Bedtime     pantoprazole  40 mg Oral BID AC     phenylephrine-cocoa butter  1 suppository Rectal At Bedtime     polyethylene glycol-propylene glycol  1 drop Both Eyes 4x Daily     potassium chloride  20 mEq Oral or Feeding Tube Once     sennosides  2 tablet Oral At Bedtime     Continuous Infusions:  PRN Meds:.acetaminophen, HOLD MEDICATION, hydrALAZINE, hydrocortisone (Perianal), melatonin, nitroGLYcerin, phenylephrine-mineral oil-petrolatum, polyethylene glycol, prochlorperazine, sennosides    Objective:  Vital signs in last 24 hours:  Temp:  [97.7  F (36.5  C)-99.4  F (37.4  C)] 98.3  F (36.8  C)  Pulse:  [52-71] 70  Resp:  [18-20] 20  BP: (103-138)/(52-63) 138/63  SpO2:  [96 %-100 %] 96 %      Intake/Output Summary (Last 24 hours) at 8/9/2022 0817  Last data filed at 8/9/2022 0800  Gross per 24 hour   Intake 100 ml   Output 3100 ml   Net -3000 ml       Physical Exam:    General: Not in obvious distress.   HEENT: Normocephalic, supple neck  Chest: Clear to auscultation bilateral anteriorly, no wheezing  Heart: S1S2 normal, regular  Abdomen: Soft. NT, ND. Bowel sounds- active.  Neuro: alert and awake, follows simple commands appropriately, moves all extremities      Lab Results:(I have personally reviewed the results)    Recent Results (from the past 24 hour(s))   Echocardiogram Complete    Collection Time: 08/08/22 10:01 AM   Result Value Ref  Range    LVEF  60-65%    Troponin I    Collection Time: 08/08/22  2:23 PM   Result Value Ref Range    Troponin I 0.12 0.00 - 0.29 ng/mL   Basic metabolic panel    Collection Time: 08/08/22  2:23 PM   Result Value Ref Range    Sodium 129 (L) 136 - 145 mmol/L    Potassium 3.3 (L) 3.5 - 5.0 mmol/L    Chloride 104 98 - 107 mmol/L    Carbon Dioxide (CO2) 17 (L) 22 - 31 mmol/L    Anion Gap 8 5 - 18 mmol/L    Urea Nitrogen 12 8 - 28 mg/dL    Creatinine 0.97 0.60 - 1.10 mg/dL    Calcium 7.8 (L) 8.5 - 10.5 mg/dL    Glucose 127 (H) 70 - 125 mg/dL    GFR Estimate 56 (L) >60 mL/min/1.73m2   Basic metabolic panel    Collection Time: 08/09/22  5:03 AM   Result Value Ref Range    Sodium 130 (L) 136 - 145 mmol/L    Potassium 3.1 (L) 3.5 - 5.0 mmol/L    Chloride 103 98 - 107 mmol/L    Carbon Dioxide (CO2) 23 22 - 31 mmol/L    Anion Gap 4 (L) 5 - 18 mmol/L    Urea Nitrogen 14 8 - 28 mg/dL    Creatinine 1.16 (H) 0.60 - 1.10 mg/dL    Calcium 7.3 (L) 8.5 - 10.5 mg/dL    Glucose 85 70 - 125 mg/dL    GFR Estimate 45 (L) >60 mL/min/1.73m2   Magnesium    Collection Time: 08/09/22  5:03 AM   Result Value Ref Range    Magnesium 1.9 1.8 - 2.6 mg/dL   CBC with platelets    Collection Time: 08/09/22  5:03 AM   Result Value Ref Range    WBC Count 6.1 4.0 - 11.0 10e3/uL    RBC Count 2.24 (L) 3.80 - 5.20 10e6/uL    Hemoglobin 6.9 (LL) 11.7 - 15.7 g/dL    Hematocrit 21.1 (L) 35.0 - 47.0 %    MCV 94 78 - 100 fL    MCH 30.8 26.5 - 33.0 pg    MCHC 32.7 31.5 - 36.5 g/dL    RDW 14.5 10.0 - 15.0 %    Platelet Count 146 (L) 150 - 450 10e3/uL   Prepare red blood cells (unit)    Collection Time: 08/09/22  7:00 AM   Result Value Ref Range    CROSSMATCH Compatible     UNIT ABO/RH A Neg     Unit Number [id number]     Unit Status Ready     Blood Component Type Red Blood Cells     Product Code [id number]     CODING SYSTEM [id number]     UNIT TYPE ISBT 0600          Serum Glucose range:   Recent Labs   Lab 08/09/22  0503 08/08/22  1423 08/08/22  0125 08/04/22  0704    GLC 85 127* 112 86     ABG: No lab results found in last 7 days.  CBC:   Recent Labs   Lab 22  0503 22  0125 22  0704   WBC 6.1 11.4* 7.7   HGB 6.9* 9.0* 7.9*   HCT 21.1* 27.4* 24.6*   MCV 94 95 96   * 168 127*   NEUTROPHIL  --  79  --    LYMPH  --  10  --    MONOCYTE  --  11  --    EOSINOPHIL  --  0  --      Chemistry:   Recent Labs   Lab 22  0503 22  1423 22  0125 22  0704 22  0659   * 129* 128* 130* 131*   POTASSIUM 3.1* 3.3* 3.8 4.3 3.8   CHLORIDE 103 104 106 102 106   CO2 23 17* 16* 22 22   BUN 14 12 11 13.3 13   CR 1.16* 0.97 1.02 1.19* 1.01   GFRESTIMATED 45* 56* 53* 44* 54*   ARON 7.3* 7.8* 8.1* 8.1* 7.8*   MAG 1.9  --  1.7*  --  1.6*   PROTTOTAL  --   --  6.1 5.2*  --    ALBUMIN  --   --  2.3* 2.2* 1.8*   AST  --   --  22 18  --    ALT  --   --  16 7*  --    ALKPHOS  --   --  87 73  --    BILITOTAL  --   --  0.4 0.3  --      Coags:  Recent Labs   Lab 22  0125   INR 1.23*   PTT 29     Cardiac Markers:  Recent Labs   Lab 22  1423   TROPONINI 0.12        Echocardiogram Complete    Result Date: 2022  [id number]^WHITE-MELONY^SIMRAN^[place] [address]  Name: SULEMA LEOS MRN: [id number] : 1935 Study Date: 2022 07:22 AM Age: 87 yrs Gender: Female Patient Location: [facility] Reason For Study: CHF Ordering Physician: SIMRAN SEARS Performed By: ANTHONY  BSA: 1.5 m2 Height: 65 in Weight: 105 lb HR: 68 BP: 167/74 mmHg ______________________________________________________________________________ Procedure Complete Echo Adult. ______________________________________________________________________________ Interpretation Summary  The left atrium is severely dilated. The right atrium is moderately dilated. The visual ejection fraction is 60-65%. Left ventricular diastolic function is abnormal. Diastolic Doppler findings (E/E' ratio and/or other parameters) suggest left  ventricular filling pressures are increased. No regional wall motion abnormalities noted. There is mild (1+) mitral regurgitation. There is mild (1+) aortic regurgitation. There is trace to mild tricuspid regurgitation. ______________________________________________________________________________ Left Ventricle The left ventricle is normal in size. There is mild concentric left ventricular hypertrophy. The visual ejection fraction is 60-65%. Left ventricular diastolic function is abnormal. Diastolic Doppler findings (E/E' ratio and/or other parameters) suggest left ventricular filling pressures are increased. No regional wall motion abnormalities noted.  Right Ventricle The right ventricle is normal in size and function.  Atria The left atrium is severely dilated. The right atrium is moderately dilated. Intact atrial septum.  Mitral Valve The mitral valve leaflets appear thickened, but open well. There is mild (1+) mitral regurgitation. There is no mitral valve stenosis.  Tricuspid Valve Tricuspid valve leaflets appear normal. There is trace to mild tricuspid regurgitation. The right ventricular systolic pressure is approximated at 24mmHg plus the right atrial pressure.  Aortic Valve There is mild trileaflet aortic sclerosis. There is mild (1+) aortic regurgitation.  Pulmonic Valve The pulmonic valve is normal in structure and function.  Vessels The aorta root is normal. Normal size ascending aorta. IVC diameter >2.1 cm collapsing <50% with sniff suggests a high RA pressure estimated at 15 mmHg or greater.  Pericardium There is no pericardial effusion. Moderate left pleural effusion.  ______________________________________________________________________________ MMode/2D Measurements & Calculations RVDd: 2.3 cm IVSd: 0.86 cm LVIDd: 5.0 cm LVIDs: 3.2 cm LVPWd: 1.3 cm FS: 35.2 %  LV mass(C)d: 198.4 grams LV mass(C)dI: 131.9 grams/m2 Ao root diam: 3.1 cm LA dimension: 4.1 cm asc Aorta Diam: 3.1 cm LA/Ao: 1.3 LVOT  diam: 1.7 cm LVOT area: 2.1 cm2 LA Volume Indexed (AL/bp): 65.9 ml/m2 RWT: 0.51  Doppler Measurements & Calculations MV E max fuentes: 110.0 cm/sec MV A max fuentes: 33.7 cm/sec MV E/A: 3.3 MV dec slope: 845.7 cm/sec2 MV dec time: 0.13 sec  Ao V2 max: 130.3 cm/sec Ao max P.0 mmHg Ao V2 mean: 78.2 cm/sec Ao mean P.9 mmHg Ao V2 VTI: 36.7 cm ANTONIO(I,D): 1.3 cm2 ANTONIO(V,D): 1.4 cm2 AI P1/2t: 1159 msec LV V1 max PG: 3.0 mmHg LV V1 max: 86.4 cm/sec LV V1 VTI: 21.8 cm SV(LVOT): 46.9 ml SI(LVOT): 31.2 ml/m2 PA acc time: 0.12 sec TR max fuentes: 243.2 cm/sec TR max P.7 mmHg AV Fuentes Ratio (DI): 0.66 ANTONIO Index (cm2/m2): 0.85 E/E' av.8 Lateral E/e': 44.0 Medial E/e': 31.6  ______________________________________________________________________________ Report approved by: Marian Bond 2022 12:26 PM       CTA Abdomen Pelvis with Contrast    Result Date: 2022  EXAM: CTA ABDOMEN PELVIS WITH CONTRAST LOCATION: [facility] DATE/TIME: 2022 2:52 AM INDICATION: Abdominal pain. GI bleed. COMPARISON: 2022. TECHNIQUE: CT angiogram abdomen pelvis during arterial phase of injection of IV contrast. 2D and 3D MIP reconstructions were performed by the CT technologist. Dose reduction techniques were used. CONTRAST: Isovue 370 75 ml. FINDINGS: ANGIOGRAM ABDOMEN/PELVIS: Diffuse aortoiliac atheromatous disease. Ectasia of the distal abdominal aorta measuring up to 2.2 cm in diameter. Negative for dissection, acute intimal hematoma, or significant stenosis. Apparent aortobiiliac artery stent grafts in place which are patent. Multifocal mild atheromatous narrowing in the iliac arteries bilaterally. Mild atheromatous narrowing at the origin of the celiac and superior mesenteric arteries. JAMEEL appears occluded. Moderate to marked atheromatous narrowing at the origin of the bilateral renal arteries. No definite acute vascular findings. No definite CT evidence for active GI bleed. See additional bowel  findings below. LOWER CHEST: See separate chest CT report for those details. HEPATOBILIARY: Normal. PANCREAS: Normal. SPLEEN: Normal. ADRENAL GLANDS: Multifocal nodularity left adrenal gland suspected to be due to adrenal adenomas based on this noncontrast density. Right adrenal gland is negative. KIDNEYS/BLADDER: Multiple small benign-appearing bilateral renal cysts. No hydronephrosis. Bladder is distended but otherwise grossly unremarkable. BOWEL: Bowel is normal in caliber with no evidence for obstruction. Multifocal sites of postoperative change in the left hemicolon. There is a large enhancing malignant appearing mass along the right side of the rectum measuring approximately 5.4 x 3.9 x  7.0 cm (AP x transverse x SI). This is strongly concerning for malignancy, most likely a primary rectal malignancy and likely accounts for the patient's symptoms of GI bleed. There is concern for probable invasion of tumor into the sphincter suggesting locally advanced malignancy. LYMPH NODES: There are a few enhancing bilateral groin lymph nodes which are indeterminate including a 1.2 x 1.2 cm node on the right on series 8 image 386. PELVIC ORGANS: Hysterectomy. MUSCULOSKELETAL: Moderate degenerative changes in the spine.     IMPRESSION: 1.  No acute vascular findings. Diffuse atheromatous disease. See above for nonacute vascular findings. 2.  No evidence for active GI bleed. 3.  However, there is a large malignant appearing mass in the right side of the rectum measuring up to 7 cm in diameter strongly concerning for malignancy, most likely primary rectal cancer. This likely accounts for the patient's symptoms of GI bleed. There is concern for locally advanced malignancy. MRI could be obtained for local staging purposes. 4.  Several indeterminate enhancing bilateral groin lymph nodes with metastatic lymph nodes not excluded. 5.  Multifocal nodularity left adrenal gland suspected to be due to adrenal adenomas based on  noncontrast density. 6.  Hysterectomy.    CT Chest Pulmonary Embolism w Contrast    Result Date: 8/8/2022  EXAM: CT CHEST PULMONARY EMBOLISM W CONTRAST LOCATION: [facility] DATE/TIME: 8/8/2022 2:40 AM INDICATION: Dyspnea. COMPARISON: None. TECHNIQUE: CT chest pulmonary angiogram during arterial phase injection of IV contrast. Multiplanar reformats and MIP reconstructions were performed. Dose reduction techniques were used. CONTRAST: Isovue 370 75 ml. FINDINGS: ANGIOGRAM CHEST: Pulmonary arteries are normal caliber and negative for pulmonary emboli. Thoracic aorta is negative for dissection. No CT evidence of right heart strain. LUNGS AND PLEURA: Prominent areas of bibasilar consolidation suspicious for pneumonitis. Diffuse interlobular septal thickening of the lungs compatible with fluid overload. Some subtle patchy groundglass opacities in the lungs may be due to some pulmonary edema. Moderate to large right pleural effusion and moderate left pleural effusion. MEDIASTINUM/AXILLAE: No adenopathy. Cardiac enlargement. No pericardial effusion. Normal caliber thoracic aorta. Prominent aortic atheromatous disease. Left subclavian artery stent noted. Esophagus is grossly negative. CORONARY ARTERY CALCIFICATION: Severe. UPPER ABDOMEN: See separate abdomen and pelvis CT report for those details. MUSCULOSKELETAL: Mild degenerative changes thoracic spine.     IMPRESSION: 1.  Negative for pulmonary embolism. 2.  Cardiac enlargement, diffuse interlobular septal thickening in the lungs, and bilateral pleural effusions right greater than left. Constellation of findings would be compatible with CHF or fluid overload. 3.  Prominent areas of bibasilar consolidation suspicious for potential pneumonitis. Clinical correlation. 4.  Marked coronary artery calcification.          Latest radiology report personally reviewed.    Note created using dragon voice recognition software so sounds alike errors may have  escaped editing.    08/09/2022   RENEE WHITLOCK MD  HOSPITALIST, [facility]  PAGER NO. [phone]

## 2022-08-09 NOTE — CONSULTS
[facility] Gastroenterology Consultation    Yun Rodgers MRN# [id number]   Age: 87 year old YOB: 1935     Date of Admission:  8/8/2022    Reason for consult: Lower GI bleed       Requesting physician:  Dr. Granda       Level of consult: Consult, follow and place orders           Assessment and Plan:   Assessment:   Lower GI bleed: This is a patient well-known to our service.  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  She has had ongoing GI bleeding.  Hemoglobin on admission was 6.9.  There has been many conversations about how aggressive to get with this.  Patient's son has been involved.  He is coming in later today.  If it is decided that palliative care is desired then we can defer.  It will be difficult as she will probably have ongoing rectal bleeding.  Chemoradiation may be able to help with that but it has to sound obvious side effects.  Laser cautery of these tend not to be terribly successful.  If she decides to do anything with this then I would suggest staging with ultrasound and CT and then per oncology chemoradiation.  Palliative care is chosen we will certainly defer to that decision.  Unfortunately we do not have much to offer in this situation.      Plan:   See above             Chief Complaint:   Upper GI bleed     History is obtained from the patient  Rectal bleeding from a squamous cell carcinoma.  We had been working with family as an outpatient.  The decision for palliative care versus symptomatic treatment will be made by the patient's son and the patient.  She has a good appetite.  Only pain with defecation.  She does have fatigue.  No chest pain or shortness of breath.  -           Past Medical History:   I have reviewed this patient's past medical history          Past Surgical History:   I have reviewed this patient's past surgical history          Social History:   I have reviewed this patient's social history           Family History:   I have reviewed this patient's family history          Immunizations:   Immunizations are current          Allergies:   All allergies reviewed and addressed          Medications:     Current Facility-Administered Medications   Medication     acetaminophen (TYLENOL) tablet 650 mg     ferrous gluconate (FERGON) tablet 324 mg     fexofenadine (ALLEGRA) tablet 180 mg     furosemide (LASIX) injection 20 mg     HOLD: nitroGLYcerin IF     hydrALAZINE (APRESOLINE) injection 10 mg     hydrocortisone (Perianal) (ANUSOL-HC) 2.5 % cream 1 g     isosorbide mononitrate (IMDUR) 24 hr tablet 120 mg     isosorbide mononitrate (IMDUR) 24 hr tablet 30 mg     magnesium sulfate 2 g in water intermittent infusion     melatonin tablet 3 mg     menthol-zinc oxide (CALMOSEPTINE) 0.44-20.6 % ointment OINT     metoprolol tartrate (LOPRESSOR) half-tab 12.5 mg     mirtazapine (REMERON) tablet 15 mg     nitroGLYcerin (NITROSTAT) sublingual tablet 0.4 mg     pantoprazole (PROTONIX) EC tablet 40 mg     phenylephrine-cocoa butter (PREPARATION H) per suppository 1 suppository     phenylephrine-mineral oil-petrolatum (PREPARATION H) 0.25-14-74.9 % rectal ointment     polyethylene glycol (MIRALAX) Packet 17 g     polyethylene glycol-propylene glycol (SYSTANE ULTRA) ophthalmic solution 1 drop     prochlorperazine (COMPAZINE) injection 5 mg     sennosides (SENOKOT) tablet 1 tablet     sennosides (SENOKOT) tablet 2 tablet             Review of Systems:   The Review of Systems is negative other than noted in the HPI     -She appears pale.  She is oriented x3.  She is hard of hearing.  Chest is clear to auscultation.  Cardiovascular exam shows a regular rate and rhythm.  Abdomen is soft and nontender to palpation.  No peripheral edema.  No ecchymoses or spider angioma.          Data:   All laboratory data reviewed  -  All imaging studies reviewed by me.    Attestation:  I have reviewed today's vital signs, notes, medications, labs and  imaging.    Zack Miranda MD

## 2022-08-09 NOTE — PLAN OF CARE
Heart Failure Care Map  GOALS TO BE MET BEFORE DISCHARGE:    1. Decrease congestion and/or edema with diuretic therapy to achieve near optimal volume status.     Dyspnea improved: No, further care required to meet this goal. Please explain Pt. Continues to have dyspnea on exertion.    Edema improved: No, further care required to meet this goal. Please explain pt. Continues to have +2 edema on lower extremities.         Net I/O and Weights since admission:   07/10 1500 - 08/09 1459  In: 0   Out: 5825 [Urine:5825]  Net: -5825     Vitals:    08/08/22 0029 08/08/22 0528 08/09/22 0346   Weight: 46.7 kg (103 lb) 47.9 kg (105 lb 9.6 oz) 48.3 kg (106 lb 8 oz)       2.  O2 sats > 90% on room air, or at prior home O2 therapy level.      Able to wean O2 this shift to keep sats above 90%?: No, further care required to meet this goal. Please explain PT. Still on 1 L NC    Does patient use Home O2? No          Current oxygenation status:   SpO2: 96 %     O2 Device: Nasal cannula, Oxygen Delivery: 1 LPM    3.  Tolerates ambulation and mobility near baseline.     Ambulation: No, further care required to meet this goal. Please explain Pt. Continues to have weakness and struggles ambulating.  Hem 6.9 this morning.     Times patient ambulated with staff this shift: 0    Please review the Heart Failure Care Map for additional HF goal outcomes.    Inocencia Correa RN  8/9/2022

## 2022-08-09 NOTE — CONSULTS
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    Stage is not yet determined but this likely is locally advanced based on the CT images..    As noted by GI, if the patient desires treatment, then combined modality therapy with radiation plus chemotherapy would be likeliest to provide disease control.  Radiation alone has inferior outcomes with respect to local control.  Mitomycin fluorouracil + radiation has been the standard of care for at least 30 years.      My sense is that the patient, despite poor hearing, is clear enough mentally to be presented with this option.  Per her request, I spoke to her son Dagoberto on the phone.  He would appreciate a discussion with a radiation oncologist to guide their decision making.  If the patient and son decide to opt for therapy, then a PET scan could be done after discharge.      My colleague, Boo Vega MD will be following this patient.     History  The patient tells me that she's been reporting red blood with stool for at least 6 months.  She hasn't had pain and felt this might be related to hemorrhoids.  She had not seen a gastroenterologist until recently.  She was hospitalized in June with anemia, constipation, and lower GI bleeding.      She was offered proctoscopy at that time but declined the procedure.  A colonoscopic biopsy done on 2 August returned a diagnosis of invasive squamous carcinoma.     CT done yesterday  IMPRESSION:  1.  No acute vascular findings. Diffuse atheromatous disease. See above for nonacute vascular findings.   2.  No evidence for active GI bleed.   3.  However, there is a large malignant appearing mass in the right side of the rectum measuring up to 7 cm in diameter strongly concerning for malignancy, most likely primary rectal cancer. This likely accounts for the patient's symptoms of GI bleed.   There is concern for locally advanced malignancy. MRI  could be obtained for local staging purposes.   4.  Several indeterminate enhancing bilateral groin lymph nodes with metastatic lymph nodes not excluded.   5.  Multifocal nodularity left adrenal gland suspected to be due to adrenal adenomas based on noncontrast density.   6.  Hysterectomy.    Review Of Systems  Skin: carries a diagnosis of lupus  Eyes: negative  Ears/Nose/Throat: very hard of hearing;  dentures  Respiratory: Dyspnea on exertion-  Cardiovascular: negative  Gastrointestinal: hematochezia and constipation  Genitourinary: negative  Musculoskeletal: arthritis  Neurologic: negative  Psychiatric: negative  Hematologic/Lymphatic/Immunologic: anemia  Endocrine: negative      Patient Active Problem List   Diagnosis     Mixed hyperlipidemia     Essential hypertension     Coronary Artery Disease     Carotid artery disease (H)     Peripheral vascular disease (H)     Gastroesophageal reflux disease with esophagitis     Chest pain, unspecified type     Angina concurrent with and due to arteriosclerosis of coronary artery (H)     Stable angina (H)     Malaise     Gastroesophageal reflux disease without esophagitis     Trimalleolar fracture     Anemia, unspecified type     Coronary artery disease of native artery of native heart with stable angina pectoris (H)     Pulmonary valve disorder     Fracture dislocation of ankle, right, closed, initial encounter     Closed right ankle fracture     Stage 3 chronic kidney disease (H)     Acute renal failure, unspecified acute renal failure type (H)     Sjogren's syndrome (H)     Poor appetite     Lesion of ulnar nerve     History of tobacco use     History of coronary artery bypass surgery     Grief     Chronic fatigue syndrome     Burning mouth syndrome     Adult failure to thrive syndrome     Acquired trigger finger     Abnormal gait     Ulcer of external ear (H)     Lower GI bleed     Rectal mass     Congestive heart failure, unspecified HF chronicity, unspecified heart  failure type (H)     Hyponatremia     Leukocytosis, unspecified type     Sinus tachycardia     Hypomagnesemia     Pleasant and cooperative but thin elderly woman  [facility]  [place]  Chest is clear  Heart tones are normal  Abdomen is soft  Incontinent of stool (wearing a depends)  No deformity or edema  Apart from hearing deficit, no neurologic findings of note.    Recent Results (from the past 720 hour(s))   Hemoglobin    Collection Time: 07/12/22  5:15 AM   Result Value Ref Range    Hemoglobin 8.1 (L) 11.7 - 15.7 g/dL   Ferritin    Collection Time: 07/21/22 10:41 AM   Result Value Ref Range    Ferritin 626 (H) 11 - 328 ng/mL   CBC with platelets    Collection Time: 07/21/22 10:41 AM   Result Value Ref Range    WBC Count 7.4 4.0 - 11.0 10e3/uL    RBC Count 2.41 (L) 3.80 - 5.20 10e6/uL    Hemoglobin 7.4 (L) 11.7 - 15.7 g/dL    Hematocrit 23.4 (L) 35.0 - 47.0 %    MCV 97 78 - 100 fL    MCH 30.7 26.5 - 33.0 pg    MCHC 31.6 31.5 - 36.5 g/dL    RDW 14.6 10.0 - 15.0 %    Platelet Count 182 150 - 450 10e3/uL   TSH    Collection Time: 07/28/22  9:59 AM   Result Value Ref Range    TSH 2.11 0.30 - 4.20 uIU/mL   Vitamin B12    Collection Time: 07/28/22  9:59 AM   Result Value Ref Range    Vitamin B12 402 232 - 1,245 pg/mL   CBC with platelets    Collection Time: 07/28/22  9:59 AM   Result Value Ref Range    WBC Count 6.5 4.0 - 11.0 10e3/uL    RBC Count 2.25 (L) 3.80 - 5.20 10e6/uL    Hemoglobin 7.0 (L) 11.7 - 15.7 g/dL    Hematocrit 22.1 (L) 35.0 - 47.0 %    MCV 98 78 - 100 fL    MCH 31.1 26.5 - 33.0 pg    MCHC 31.7 31.5 - 36.5 g/dL    RDW 15.7 (H) 10.0 - 15.0 %    Platelet Count 226 150 - 450 10e3/uL   INR    Collection Time: 07/29/22  3:40 PM   Result Value Ref Range    INR 1.22 (H) 0.85 - 1.15   Partial thromboplastin time    Collection Time: 07/29/22  3:40 PM   Result Value Ref Range    aPTT 32 22 - 38 Seconds   Basic metabolic panel    Collection Time: 07/29/22  3:40 PM   Result Value Ref Range    Sodium 135 (L)  136 - 145 mmol/L    Potassium 2.1 (LL) 3.5 - 5.0 mmol/L    Chloride 109 (H) 98 - 107 mmol/L    Carbon Dioxide (CO2) 17 (L) 22 - 31 mmol/L    Anion Gap 9 5 - 18 mmol/L    Urea Nitrogen 12 8 - 28 mg/dL    Creatinine 0.95 0.60 - 1.10 mg/dL    Calcium 7.6 (L) 8.5 - 10.5 mg/dL    Glucose 105 70 - 125 mg/dL    GFR Estimate 58 (L) >60 mL/min/1.73m2   Adult Type and Screen    Collection Time: 07/29/22  3:40 PM   Result Value Ref Range    ABO/RH(D) A NEG     Antibody Screen Negative Negative    SPECIMEN EXPIRATION DATE [id number]    CBC with platelets and differential    Collection Time: 07/29/22  3:40 PM   Result Value Ref Range    WBC Count 7.8 4.0 - 11.0 10e3/uL    RBC Count 2.21 (L) 3.80 - 5.20 10e6/uL    Hemoglobin 6.8 (LL) 11.7 - 15.7 g/dL    Hematocrit 21.0 (L) 35.0 - 47.0 %    MCV 95 78 - 100 fL    MCH 30.8 26.5 - 33.0 pg    MCHC 32.4 31.5 - 36.5 g/dL    RDW 15.5 (H) 10.0 - 15.0 %    Platelet Count 212 150 - 450 10e3/uL    % Neutrophils 71 %    % Lymphocytes 14 %    % Monocytes 13 %    % Eosinophils 1 %    % Basophils 0 %    % Immature Granulocytes 1 %    NRBCs per 100 WBC 0 <1 /100    Absolute Neutrophils 5.7 1.6 - 8.3 10e3/uL    Absolute Lymphocytes 1.1 0.8 - 5.3 10e3/uL    Absolute Monocytes 1.0 0.0 - 1.3 10e3/uL    Absolute Eosinophils 0.1 0.0 - 0.7 10e3/uL    Absolute Basophils 0.0 0.0 - 0.2 10e3/uL    Absolute Immature Granulocytes 0.0 <=0.4 10e3/uL    Absolute NRBCs 0.0 10e3/uL   Extra Red Top Tube    Collection Time: 07/29/22  3:42 PM   Result Value Ref Range    Hold Specimen JI    Occult blood stool    Collection Time: 07/29/22  3:43 PM   Result Value Ref Range    Occult Blood Positive (A) Negative   Prepare red blood cells (unit)    Collection Time: 07/29/22  4:30 PM   Result Value Ref Range    CROSSMATCH Compatible     UNIT ABO/RH A Neg     Unit Number [id number]     Unit Status Transfused     Blood Component Type Red Blood Cells     Product Code [id number]     CODING SYSTEM [id number]     UNIT TYPE  ISBT 0600     ISSUE DATE AND TIME [id number]    Asymptomatic COVID-19 Virus (Coronavirus) by PCR Nasopharyngeal    Collection Time: 07/29/22  4:50 PM    Specimen: Nasopharyngeal; Swab   Result Value Ref Range    SARS CoV2 PCR Negative Negative   ECG 12-LEAD WITH MUSE (LHE)    Collection Time: 07/29/22  5:57 PM   Result Value Ref Range    Systolic Blood Pressure  mmHg    Diastolic Blood Pressure  mmHg    Ventricular Rate 77 BPM    Atrial Rate 77 BPM    MS Interval 130 ms    QRS Duration 144 ms     ms    QTc 436 ms    P Axis 90 degrees    R AXIS -42 degrees    T Axis 88 degrees    Interpretation ECG       Sinus rhythm  Left axis deviation  Right bundle branch block  Left ventricular hypertrophy with repolarization abnormality  Abnormal ECG  When compared with ECG of 14-JUN-2022 18:03,  MS interval has increased  Minimal criteria for Septal infarct are no longer Present  T wave inversion now evident in Lateral leads  Confirmed by MICKI THOMPSON MD LOC:WW ([id number]) on 7/30/2022 12:36:06 PM     Potassium    Collection Time: 07/29/22  8:40 PM   Result Value Ref Range    Potassium 2.2 (LL) 3.5 - 5.0 mmol/L   Potassium    Collection Time: 07/29/22 10:49 PM   Result Value Ref Range    Potassium 2.2 (LL) 3.5 - 5.0 mmol/L   Hemoglobin    Collection Time: 07/29/22 10:49 PM   Result Value Ref Range    Hemoglobin 8.8 (L) 11.7 - 15.7 g/dL   Magnesium    Collection Time: 07/29/22 10:49 PM   Result Value Ref Range    Magnesium 1.8 1.8 - 2.6 mg/dL   Comprehensive metabolic panel    Collection Time: 07/30/22  6:59 AM   Result Value Ref Range    Sodium 137 136 - 145 mmol/L    Potassium 2.5 (LL) 3.5 - 5.0 mmol/L    Chloride 112 (H) 98 - 107 mmol/L    Carbon Dioxide (CO2) 18 (L) 22 - 31 mmol/L    Anion Gap 7 5 - 18 mmol/L    Urea Nitrogen 10 8 - 28 mg/dL    Creatinine 0.80 0.60 - 1.10 mg/dL    Calcium 7.4 (L) 8.5 - 10.5 mg/dL    Glucose 86 70 - 125 mg/dL    Alkaline Phosphatase 72 45 - 120 U/L    AST 17 0 - 40 U/L    ALT 11 0 -  45 U/L    Protein Total 5.5 (L) 6.0 - 8.0 g/dL    Albumin 2.1 (L) 3.5 - 5.0 g/dL    Bilirubin Total 0.8 0.0 - 1.0 mg/dL    GFR Estimate 71 >60 mL/min/1.73m2   CBC with platelets    Collection Time: 07/30/22  6:59 AM   Result Value Ref Range    WBC Count 6.4 4.0 - 11.0 10e3/uL    RBC Count 2.73 (L) 3.80 - 5.20 10e6/uL    Hemoglobin 8.3 (L) 11.7 - 15.7 g/dL    Hematocrit 24.9 (L) 35.0 - 47.0 %    MCV 91 78 - 100 fL    MCH 30.4 26.5 - 33.0 pg    MCHC 33.3 31.5 - 36.5 g/dL    RDW 15.9 (H) 10.0 - 15.0 %    Platelet Count 191 150 - 450 10e3/uL   Cystatin C with GFR    Collection Time: 07/30/22  6:59 AM   Result Value Ref Range    Cystatin C 1.7 (H) 0.6 - 1.0 mg/L    GFR Calculated with Cystatin C 32 (L) >=60 mL/min/1.73m2   Parathyroid Hormone Intact    Collection Time: 07/30/22  6:59 AM   Result Value Ref Range    Parathyroid Hormone Intact 37 15 - 65 pg/mL   Transferrin    Collection Time: 07/30/22  6:59 AM   Result Value Ref Range    Transferrin 76.7 (L) 200.0 - 360.0 mg/dL   Iron    Collection Time: 07/30/22  6:59 AM   Result Value Ref Range    Iron 22 (L) 37 - 145 ug/dL   Ferritin    Collection Time: 07/30/22  6:59 AM   Result Value Ref Range    Ferritin 571 (H) 11 - 328 ng/mL   Protein  random urine    Collection Time: 07/30/22 11:31 AM   Result Value Ref Range    Total Protein Urine mg/dL 21.5 mg/dL    Total Protein UR MG/MG CR 2.15 mg/mg Cr    Creatinine Urine mg/dL 10 mg/dL   Urinalysis Macroscopic    Collection Time: 07/30/22 11:31 AM   Result Value Ref Range    Color Urine Colorless Colorless, Straw, Light Yellow, Yellow    Appearance Urine Clear Clear    Glucose Urine Negative Negative mg/dL    Bilirubin Urine Negative Negative    Ketones Urine Negative Negative mg/dL    Specific Gravity Urine 1.007 1.001 - 1.030    Blood Urine 1.0 mg/dL (A) Negative    pH Urine 7.0 5.0 - 7.0    Protein Albumin Urine 10  (A) Negative mg/dL    Urobilinogen Urine <2.0 <2.0 mg/dL    Nitrite Urine Negative Negative    Leukocyte  Esterase Urine Negative Negative   Potassium    Collection Time: 07/30/22  4:47 PM   Result Value Ref Range    Potassium 3.8 3.5 - 5.0 mmol/L   Extra Purple Top Tube    Collection Time: 07/30/22  4:47 PM   Result Value Ref Range    Hold Specimen JIC    Magnesium    Collection Time: 07/31/22  5:38 AM   Result Value Ref Range    Magnesium 1.9 1.8 - 2.6 mg/dL   CBC with platelets    Collection Time: 07/31/22  5:38 AM   Result Value Ref Range    WBC Count 6.2 4.0 - 11.0 10e3/uL    RBC Count 2.72 (L) 3.80 - 5.20 10e6/uL    Hemoglobin 8.4 (L) 11.7 - 15.7 g/dL    Hematocrit 25.1 (L) 35.0 - 47.0 %    MCV 92 78 - 100 fL    MCH 30.9 26.5 - 33.0 pg    MCHC 33.5 31.5 - 36.5 g/dL    RDW 16.2 (H) 10.0 - 15.0 %    Platelet Count 183 150 - 450 10e3/uL   Basic metabolic panel    Collection Time: 07/31/22  5:38 AM   Result Value Ref Range    Sodium 134 (L) 136 - 145 mmol/L    Potassium 3.5 3.5 - 5.0 mmol/L    Chloride 111 (H) 98 - 107 mmol/L    Carbon Dioxide (CO2) 17 (L) 22 - 31 mmol/L    Anion Gap 6 5 - 18 mmol/L    Urea Nitrogen 11 8 - 28 mg/dL    Creatinine 0.92 0.60 - 1.10 mg/dL    Calcium 7.6 (L) 8.5 - 10.5 mg/dL    Glucose 83 70 - 125 mg/dL    GFR Estimate 60 (L) >60 mL/min/1.73m2   Hemoglobin    Collection Time: 08/01/22  6:43 AM   Result Value Ref Range    Hemoglobin 9.1 (L) 11.7 - 15.7 g/dL   Basic metabolic panel    Collection Time: 08/01/22  6:43 AM   Result Value Ref Range    Sodium 135 (L) 136 - 145 mmol/L    Potassium 3.4 (L) 3.5 - 5.0 mmol/L    Chloride 108 (H) 98 - 107 mmol/L    Carbon Dioxide (CO2) 20 (L) 22 - 31 mmol/L    Anion Gap 7 5 - 18 mmol/L    Urea Nitrogen 9 8 - 28 mg/dL    Creatinine 0.88 0.60 - 1.10 mg/dL    Calcium 7.8 (L) 8.5 - 10.5 mg/dL    Glucose 82 70 - 125 mg/dL    GFR Estimate 63 >60 mL/min/1.73m2   Magnesium    Collection Time: 08/01/22  6:43 AM   Result Value Ref Range    Magnesium 1.7 (L) 1.8 - 2.6 mg/dL   Potassium    Collection Time: 08/01/22  1:04 PM   Result Value Ref Range    Potassium 4.1  3.5 - 5.0 mmol/L   Magnesium    Collection Time: 08/02/22  6:18 AM   Result Value Ref Range    Magnesium 1.7 (L) 1.8 - 2.6 mg/dL   Basic metabolic panel    Collection Time: 08/02/22  6:18 AM   Result Value Ref Range    Sodium 134 (L) 136 - 145 mmol/L    Potassium 3.4 (L) 3.5 - 5.0 mmol/L    Chloride 107 98 - 107 mmol/L    Carbon Dioxide (CO2) 21 (L) 22 - 31 mmol/L    Anion Gap 6 5 - 18 mmol/L    Urea Nitrogen 10 8 - 28 mg/dL    Creatinine 0.97 0.60 - 1.10 mg/dL    Calcium 7.8 (L) 8.5 - 10.5 mg/dL    Glucose 82 70 - 125 mg/dL    GFR Estimate 56 (L) >60 mL/min/1.73m2   CBC with platelets    Collection Time: 08/02/22  6:18 AM   Result Value Ref Range    WBC Count 6.7 4.0 - 11.0 10e3/uL    RBC Count 2.90 (L) 3.80 - 5.20 10e6/uL    Hemoglobin 8.9 (L) 11.7 - 15.7 g/dL    Hematocrit 27.3 (L) 35.0 - 47.0 %    MCV 94 78 - 100 fL    MCH 30.7 26.5 - 33.0 pg    MCHC 32.6 31.5 - 36.5 g/dL    RDW 15.2 (H) 10.0 - 15.0 %    Platelet Count 158 150 - 450 10e3/uL   COLONOSCOPY    Collection Time: 08/02/22  8:56 AM   Result Value Ref Range    COLONOSCOPY       [facility]  [address] [place] [address]  _______________________________________________________________________________  Patient Name: Yun Rodgers             Procedure Date: 8/2/2022 8:56 AM  MRN: [id number]                       Account Number: [id number]  YOB: 1935              Admit Type: Inpatient  Age: 87                               Room: [place] [address]  Note Status: Finalized                Attending MD: BETZY ELIZALDE MD  Instrument Name: Pediatric Colonoscope [id number]   _______________________________________________________________________________     Procedure:             Colonoscopy  Indications:           Hematochezia  Providers:             BETZY ELIZALDE MD  Referring MD:            Medicines:             Monitored Anesthesia Care  Complications:         No immediate  complications.  _______________________________________________________________________________  Procedure:             Pre-Anesthesia Assessment:                          - Prior to the procedure, a History and Physical was                          performed, and patient medications and allergies were                          reviewed. The risks and benefits of the procedure and                          the sedation options and risks were discussed with the                          patient. All questions were answered and informed                          consent was obtained. Patient identification and                          proposed procedure were verified by the physician, the                          nurse and the anesthesiologist in the pre-procedure                          area in the procedure room in the endoscopy suite.                          Mental Status Examination: alert and oriented. Airway                          Examination: normal oropharyngeal airway and neck                          mobility. Respiratory Examination: clear to                          auscultation. CV Examination: regular rate and rhythm.                           Prophylactic Antibiotics: The patient does not require                          prophylactic antibiotics. Prior Anticoagulants: The                          patient has taken no anticoagulant or antiplatelet                          agents. ASA Grade Assessment: III - A patient with                          severe systemic disease. After reviewing the risks and                          benefits, the patient was deemed in satisfactory                          condition to undergo the procedure. The anesthesia                          plan was to use monitored anesthesia care (MAC).                          Immediately prior to administration of medications,                          the patient was re-assessed for adequacy to receive                           sedatives. The physical status of the patient was                          re-assessed after the procedure.                         After obtaining informed consent, the colonoscope was                          pas sed under direct vision. Throughout the procedure,                          the patient's blood pressure, pulse, and oxygen                          saturations were monitored continuously. The pediatric                          colonoscope was introduced through the anus and                          advanced to the cecum, identified by appendiceal                          orifice and ileocecal valve. The colonoscopy was                          performed without difficulty. The patient tolerated                          the procedure well. The quality of the bowel                          preparation was poor.                                                                                   Findings:       A moderate amount of stool was found in the entire colon, interfering        with visualization. Lavage of the area was performed using copious        amounts of sterile water, resulting in incomplete clearance with fair        visualization.       Multiple diverticula were found in  the sigmoid colon, descending colon,        transverse colon and ascending colon.       A frond-like/villous, fungating and infiltrative non-obstructing large        mass was found in the distal rectum/anal canal. The mass was        non-circumferential. The mass measured seven cm in length. Biopsies were        taken with a cold forceps for histology.       The digital rectal exam revealed a 7 cm (diameter) firm ano- rectal        mass. The mass was non-circumferential and located predominantly at the        posterior bowel wall.                                                                                   Moderate Sedation:       MAC  Impression:            - Preparation of the colon was poor.                          - Stool in the entire examined colon.                         - Diverticulosis in the sigmoid colon, in the                          descending colon, in the transverse colon and in the                          ascending colon.                         - Rule out mal ignancy, tumor in the distal rectum/anal                          canal. Biopsied.  Recommendation:        - Advance diet as tolerated.                         - Await pathology results.                                                                                     Betzy Posey MD  ______________________  BETZY POSEY MD  8/2/2022 9:58:34 AM  I was physically present for the entire viewing portion of the exam.  __________________________  Signature of teaching physician  [email or web address] POSEY MD  Number of Addenda: 0    Note Initiated On: 8/2/2022 8:56 AM  Scope In: 9:10:17 AM  Scope Out: 9:27:44 AM     Surgical Pathology Exam    Collection Time: 08/02/22  9:14 AM   Result Value Ref Range    Case Report       Surgical Pathology Report                         Case: [id number]                                  Authorizing Provider:  Betzy Posey MD    Collected:           08/02/2022 09:14 AM          Ordering Location:     [facility]      Received:            08/02/2022 11:22 AM                                 [facility]                                                               Pathologist:           Britney Lange MD                                                           Specimen:    Rectum, RECTUM BIOPSIES                                                                    Final Diagnosis       RECTUM, BIOPSIES:        HISTOLOGIC TYPE: IN SITU AND INVASIVE SQUAMOUS CELL CARCINOMA        HISTOLOGIC GRADE: GRADE 2 (OF 4); FOCAL KERATINIZATION        DEPTH OF INVASION: INVOLVES LAMINA PROPRIA; DEEPER INVASION CANNOT BE EXCLUDED.  MUSCULARIS PROPRIA                                             NOT PRESENT  FOR EVALUATION        LYMPHOVASCULAR INVASION: NOT IDENTIFIED        ADDITIONAL FINDINGS: ADJACENT UNREMARKABLE COLONIC MUCOSA          Comment       Dr. Doug Harmon concurs with the diagnosis.      Clinical Information       Procedure: COLONOSCOPY WITH RECTUM BIOPSIES  Pre-op Diagnosis: Rectal bleeding [K62.5]  Post-op Diagnosis: K62.5 - Rectal bleeding [ICD-10-CM]      Gross Description       A(1). Rectum, RECTUM BIOPSIES:  Received in formalin, labeled with the patient's name and rectum biopsies, are multiple minute to 0.4 cm, irregular, tan-pink soft tissues and admixed dark red blood clot.  F&TE-1C Albin Ferguson:klj-[id number]       Microscopic Description       Microscopic examination performed, substantiating the above diagnosis.       MCRS Yes (A) N/A    Performing Labs       The technical component of this testing was completed at [facility]      Case Images     Potassium    Collection Time: 08/02/22  3:10 PM   Result Value Ref Range    Potassium 4.0 3.5 - 5.0 mmol/L   Magnesium    Collection Time: 08/03/22  6:59 AM   Result Value Ref Range    Magnesium 1.6 (L) 1.8 - 2.6 mg/dL   CBC with platelets    Collection Time: 08/03/22  6:59 AM   Result Value Ref Range    WBC Count 6.8 4.0 - 11.0 10e3/uL    RBC Count 2.60 (L) 3.80 - 5.20 10e6/uL    Hemoglobin 8.0 (L) 11.7 - 15.7 g/dL    Hematocrit 24.6 (L) 35.0 - 47.0 %    MCV 95 78 - 100 fL    MCH 30.8 26.5 - 33.0 pg    MCHC 32.5 31.5 - 36.5 g/dL    RDW 15.0 10.0 - 15.0 %    Platelet Count 142 (L) 150 - 450 10e3/uL   Renal panel    Collection Time: 08/03/22  6:59 AM   Result Value Ref Range    Sodium 131 (L) 136 - 145 mmol/L    Potassium 3.8 3.5 - 5.0 mmol/L    Chloride 106 98 - 107 mmol/L    Carbon Dioxide (CO2) 22 22 - 31 mmol/L    Anion Gap 3 (L) 5 - 18 mmol/L    Urea Nitrogen 13 8 - 28 mg/dL    Creatinine 1.01 0.60 - 1.10 mg/dL    Calcium 7.8 (L) 8.5 - 10.5 mg/dL    Glucose 92 70 - 125 mg/dL    Albumin 1.8 (L) 3.5 - 5.0 g/dL    Phosphorus  3.2 2.5 - 4.5 mg/dL    GFR Estimate 54 (L) >60 mL/min/1.73m2   Comprehensive metabolic panel    Collection Time: 08/04/22  7:04 AM   Result Value Ref Range    Sodium 130 (L) 136 - 145 mmol/L    Potassium 4.3 3.4 - 5.3 mmol/L    Creatinine 1.19 (H) 0.51 - 0.95 mg/dL    Urea Nitrogen 13.3 8.0 - 23.0 mg/dL    Chloride 102 98 - 107 mmol/L    Carbon Dioxide (CO2) 22 22 - 29 mmol/L    Anion Gap 6 (L) 7 - 15 mmol/L    Glucose 86 70 - 99 mg/dL    Calcium 8.1 (L) 8.8 - 10.2 mg/dL    Protein Total 5.2 (L) 6.4 - 8.3 g/dL    Albumin 2.2 (L) 3.5 - 5.2 g/dL    Bilirubin Total 0.3 <=1.2 mg/dL    Alkaline Phosphatase 73 35 - 104 U/L    AST 18 10 - 35 U/L    ALT 7 (L) 10 - 35 U/L    GFR Estimate 44 (L) >60 mL/min/1.73m2   TSH    Collection Time: 08/04/22  7:04 AM   Result Value Ref Range    TSH 1.42 0.30 - 4.20 uIU/mL   Vitamin B12    Collection Time: 08/04/22  7:04 AM   Result Value Ref Range    Vitamin B12 451 232 - 1,245 pg/mL   CBC with platelets    Collection Time: 08/04/22  7:04 AM   Result Value Ref Range    WBC Count 7.7 4.0 - 11.0 10e3/uL    RBC Count 2.57 (L) 3.80 - 5.20 10e6/uL    Hemoglobin 7.9 (L) 11.7 - 15.7 g/dL    Hematocrit 24.6 (L) 35.0 - 47.0 %    MCV 96 78 - 100 fL    MCH 30.7 26.5 - 33.0 pg    MCHC 32.1 31.5 - 36.5 g/dL    RDW 14.8 10.0 - 15.0 %    Platelet Count 127 (L) 150 - 450 10e3/uL   ECG 12-LEAD WITH MUSE (LHE)    Collection Time: 08/08/22 12:53 AM   Result Value Ref Range    Systolic Blood Pressure 176 mmHg    Diastolic Blood Pressure 81 mmHg    Ventricular Rate 89 BPM    Atrial Rate 89 BPM    AZ Interval 122 ms    QRS Duration 128 ms     ms    QTc 445 ms    P Axis 85 degrees    R AXIS -41 degrees    T Axis 28 degrees    Interpretation ECG       Sinus rhythm with Premature supraventricular complexes  Left axis deviation  Right bundle branch block  Septal infarct , age undetermined  Abnormal ECG  When compared with ECG of 29-JUL-2022 17:57,  Premature supraventricular complexes are now  Present  Confirmed by SEE ED PROVIDER NOTE FOR, ECG INTERPRETATION ([id number]),  CHUCK OLIVARES ([id number]) on 8/8/2022 6:34:30 AM     D dimer quantitative    Collection Time: 08/08/22  1:25 AM   Result Value Ref Range    D-Dimer Quantitative 1.82 (H) 0.00 - 0.50 ug/mL FEU   INR    Collection Time: 08/08/22  1:25 AM   Result Value Ref Range    INR 1.23 (H) 0.85 - 1.15   Partial thromboplastin time    Collection Time: 08/08/22  1:25 AM   Result Value Ref Range    aPTT 29 22 - 38 Seconds   Comprehensive metabolic panel    Collection Time: 08/08/22  1:25 AM   Result Value Ref Range    Sodium 128 (L) 136 - 145 mmol/L    Potassium 3.8 3.5 - 5.0 mmol/L    Chloride 106 98 - 107 mmol/L    Carbon Dioxide (CO2) 16 (L) 22 - 31 mmol/L    Anion Gap 6 5 - 18 mmol/L    Urea Nitrogen 11 8 - 28 mg/dL    Creatinine 1.02 0.60 - 1.10 mg/dL    Calcium 8.1 (L) 8.5 - 10.5 mg/dL    Glucose 112 70 - 125 mg/dL    Alkaline Phosphatase 87 45 - 120 U/L    AST 22 0 - 40 U/L    ALT 16 0 - 45 U/L    Protein Total 6.1 6.0 - 8.0 g/dL    Albumin 2.3 (L) 3.5 - 5.0 g/dL    Bilirubin Total 0.4 0.0 - 1.0 mg/dL    GFR Estimate 53 (L) >60 mL/min/1.73m2   Troponin I    Collection Time: 08/08/22  1:25 AM   Result Value Ref Range    Troponin I 0.09 0.00 - 0.29 ng/mL   B-Type Natriuretic Peptide ([facility] Only)    Collection Time: 08/08/22  1:25 AM   Result Value Ref Range    BNP 2,648 (H) 0 - 167 pg/mL   CBC with platelets and differential    Collection Time: 08/08/22  1:25 AM   Result Value Ref Range    WBC Count 11.4 (H) 4.0 - 11.0 10e3/uL    RBC Count 2.88 (L) 3.80 - 5.20 10e6/uL    Hemoglobin 9.0 (L) 11.7 - 15.7 g/dL    Hematocrit 27.4 (L) 35.0 - 47.0 %    MCV 95 78 - 100 fL    MCH 31.3 26.5 - 33.0 pg    MCHC 32.8 31.5 - 36.5 g/dL    RDW 14.4 10.0 - 15.0 %    Platelet Count 168 150 - 450 10e3/uL    % Neutrophils 79 %    % Lymphocytes 10 %    % Monocytes 11 %    % Eosinophils 0 %    % Basophils 0 %    % Immature Granulocytes 0 %    NRBCs per 100 WBC 0 <1 /100     Absolute Neutrophils 9.0 (H) 1.6 - 8.3 10e3/uL    Absolute Lymphocytes 1.2 0.8 - 5.3 10e3/uL    Absolute Monocytes 1.3 0.0 - 1.3 10e3/uL    Absolute Eosinophils 0.0 0.0 - 0.7 10e3/uL    Absolute Basophils 0.0 0.0 - 0.2 10e3/uL    Absolute Immature Granulocytes 0.0 <=0.4 10e3/uL    Absolute NRBCs 0.0 10e3/uL   Adult Type and Screen    Collection Time: 08/08/22  1:25 AM   Result Value Ref Range    ABO/RH(D) A NEG     Antibody Screen Negative Negative    SPECIMEN EXPIRATION DATE [id number]    Magnesium    Collection Time: 08/08/22  1:25 AM   Result Value Ref Range    Magnesium 1.7 (L) 1.8 - 2.6 mg/dL   Asymptomatic COVID-19 Virus (Coronavirus) by PCR Nasopharyngeal    Collection Time: 08/08/22  1:27 AM    Specimen: Nasopharyngeal; Swab   Result Value Ref Range    SARS CoV2 PCR Negative Negative   Troponin I    Collection Time: 08/08/22  7:17 AM   Result Value Ref Range    Troponin I 0.14 0.00 - 0.29 ng/mL   Extra Purple Top Tube    Collection Time: 08/08/22  7:17 AM   Result Value Ref Range    Hold Specimen JIC    Echocardiogram Complete    Collection Time: 08/08/22 10:01 AM   Result Value Ref Range    LVEF  60-65%    Troponin I    Collection Time: 08/08/22  2:23 PM   Result Value Ref Range    Troponin I 0.12 0.00 - 0.29 ng/mL   Basic metabolic panel    Collection Time: 08/08/22  2:23 PM   Result Value Ref Range    Sodium 129 (L) 136 - 145 mmol/L    Potassium 3.3 (L) 3.5 - 5.0 mmol/L    Chloride 104 98 - 107 mmol/L    Carbon Dioxide (CO2) 17 (L) 22 - 31 mmol/L    Anion Gap 8 5 - 18 mmol/L    Urea Nitrogen 12 8 - 28 mg/dL    Creatinine 0.97 0.60 - 1.10 mg/dL    Calcium 7.8 (L) 8.5 - 10.5 mg/dL    Glucose 127 (H) 70 - 125 mg/dL    GFR Estimate 56 (L) >60 mL/min/1.73m2   Basic metabolic panel    Collection Time: 08/09/22  5:03 AM   Result Value Ref Range    Sodium 130 (L) 136 - 145 mmol/L    Potassium 3.1 (L) 3.5 - 5.0 mmol/L    Chloride 103 98 - 107 mmol/L    Carbon Dioxide (CO2) 23 22 - 31 mmol/L    Anion Gap 4  (L) 5 - 18 mmol/L    Urea Nitrogen 14 8 - 28 mg/dL    Creatinine 1.16 (H) 0.60 - 1.10 mg/dL    Calcium 7.3 (L) 8.5 - 10.5 mg/dL    Glucose 85 70 - 125 mg/dL    GFR Estimate 45 (L) >60 mL/min/1.73m2   Magnesium    Collection Time: 08/09/22  5:03 AM   Result Value Ref Range    Magnesium 1.9 1.8 - 2.6 mg/dL   CBC with platelets    Collection Time: 08/09/22  5:03 AM   Result Value Ref Range    WBC Count 6.1 4.0 - 11.0 10e3/uL    RBC Count 2.24 (L) 3.80 - 5.20 10e6/uL    Hemoglobin 6.9 (LL) 11.7 - 15.7 g/dL    Hematocrit 21.1 (L) 35.0 - 47.0 %    MCV 94 78 - 100 fL    MCH 30.8 26.5 - 33.0 pg    MCHC 32.7 31.5 - 36.5 g/dL    RDW 14.5 10.0 - 15.0 %    Platelet Count 146 (L) 150 - 450 10e3/uL   Prepare red blood cells (unit)    Collection Time: 08/09/22  7:00 AM   Result Value Ref Range    CROSSMATCH Compatible     UNIT ABO/RH A Neg     Unit Number [id number]     Unit Status Issued     Blood Component Type Red Blood Cells     Product Code F2369L28     CODING SYSTEM ICGT136     UNIT TYPE ISBT 0600     ISSUE DATE AND TIME [id number]    Potassium    Collection Time: 08/09/22  1:28 PM   Result Value Ref Range    Potassium 3.7 3.5 - 5.0 mmol/L     Recent Results (from the past 744 hour(s))   CT Chest Pulmonary Embolism w Contrast    Narrative    EXAM: CT CHEST PULMONARY EMBOLISM W CONTRAST  LOCATION: [facility]  DATE/TIME: 8/8/2022 2:40 AM    INDICATION: Dyspnea.  COMPARISON: None.  TECHNIQUE: CT chest pulmonary angiogram during arterial phase injection of IV contrast. Multiplanar reformats and MIP reconstructions were performed. Dose reduction techniques were used.   CONTRAST: Isovue 370 75 ml.    FINDINGS:  ANGIOGRAM CHEST: Pulmonary arteries are normal caliber and negative for pulmonary emboli. Thoracic aorta is negative for dissection. No CT evidence of right heart strain.    LUNGS AND PLEURA: Prominent areas of bibasilar consolidation suspicious for pneumonitis. Diffuse interlobular septal  thickening of the lungs compatible with fluid overload. Some subtle patchy groundglass opacities in the lungs may be due to some   pulmonary edema. Moderate to large right pleural effusion and moderate left pleural effusion.    MEDIASTINUM/AXILLAE: No adenopathy. Cardiac enlargement. No pericardial effusion. Normal caliber thoracic aorta. Prominent aortic atheromatous disease. Left subclavian artery stent noted. Esophagus is grossly negative.    CORONARY ARTERY CALCIFICATION: Severe.    UPPER ABDOMEN: See separate abdomen and pelvis CT report for those details.    MUSCULOSKELETAL: Mild degenerative changes thoracic spine.      Impression    IMPRESSION:  1.  Negative for pulmonary embolism.    2.  Cardiac enlargement, diffuse interlobular septal thickening in the lungs, and bilateral pleural effusions right greater than left. Constellation of findings would be compatible with CHF or fluid overload.    3.  Prominent areas of bibasilar consolidation suspicious for potential pneumonitis. Clinical correlation.    4.  Marked coronary artery calcification.   CTA Abdomen Pelvis with Contrast    Narrative    EXAM: CTA ABDOMEN PELVIS WITH CONTRAST  LOCATION: [facility]  DATE/TIME: 8/8/2022 2:52 AM    INDICATION: Abdominal pain. GI bleed.  COMPARISON: 6/14/2022.  TECHNIQUE: CT angiogram abdomen pelvis during arterial phase of injection of IV contrast. 2D and 3D MIP reconstructions were performed by the CT technologist. Dose reduction techniques were used.  CONTRAST: Isovue 370 75 ml.    FINDINGS:  ANGIOGRAM ABDOMEN/PELVIS: Diffuse aortoiliac atheromatous disease. Ectasia of the distal abdominal aorta measuring up to 2.2 cm in diameter. Negative for dissection, acute intimal hematoma, or significant stenosis. Apparent aortobiiliac artery stent   grafts in place which are patent. Multifocal mild atheromatous narrowing in the iliac arteries bilaterally. Mild atheromatous narrowing at the origin of the  celiac and superior mesenteric arteries. JAMEEL appears occluded. Moderate to marked atheromatous   narrowing at the origin of the bilateral renal arteries. No definite acute vascular findings. No definite CT evidence for active GI bleed. See additional bowel findings below.    LOWER CHEST: See separate chest CT report for those details.    HEPATOBILIARY: Normal.    PANCREAS: Normal.    SPLEEN: Normal.    ADRENAL GLANDS: Multifocal nodularity left adrenal gland suspected to be due to adrenal adenomas based on this noncontrast density. Right adrenal gland is negative.    KIDNEYS/BLADDER: Multiple small benign-appearing bilateral renal cysts. No hydronephrosis. Bladder is distended but otherwise grossly unremarkable.    BOWEL: Bowel is normal in caliber with no evidence for obstruction. Multifocal sites of postoperative change in the left hemicolon. There is a large enhancing malignant appearing mass along the right side of the rectum measuring approximately 5.4 x 3.9 x   7.0 cm (AP x transverse x SI). This is strongly concerning for malignancy, most likely a primary rectal malignancy and likely accounts for the patient's symptoms of GI bleed. There is concern for probable invasion of tumor into the sphincter suggesting   locally advanced malignancy.    LYMPH NODES: There are a few enhancing bilateral groin lymph nodes which are indeterminate including a 1.2 x 1.2 cm node on the right on series 8 image 386.    PELVIC ORGANS: Hysterectomy.    MUSCULOSKELETAL: Moderate degenerative changes in the spine.      Impression    IMPRESSION:  1.  No acute vascular findings. Diffuse atheromatous disease. See above for nonacute vascular findings.    2.  No evidence for active GI bleed.    3.  However, there is a large malignant appearing mass in the right side of the rectum measuring up to 7 cm in diameter strongly concerning for malignancy, most likely primary rectal cancer. This likely accounts for the patient's symptoms of GI  bleed.   There is concern for locally advanced malignancy. MRI could be obtained for local staging purposes.    4.  Several indeterminate enhancing bilateral groin lymph nodes with metastatic lymph nodes not excluded.    5.  Multifocal nodularity left adrenal gland suspected to be due to adrenal adenomas based on noncontrast density.    6.  Hysterectomy.   Echocardiogram Complete   Result Value    LVEF  60-65%    [facility]    [id number]  [id number]  [id number]  [id number]^ORION^SIMRAN^[place] [address]     Name: SULEMA LEOS  MRN: [id number]  : 1935  Study Date: 2022 07:22 AM  Age: 87 yrs  Gender: Female  Patient Location: [facility]  Reason For Study: CHF  Ordering Physician: SIMRAN SEARS  Performed By: ANTHONY     BSA: 1.5 m2  Height: 65 in  Weight: 105 lb  HR: 68  BP: 167/74 mmHg  ______________________________________________________________________________  Procedure  Complete Echo Adult.  ______________________________________________________________________________  Interpretation Summary     The left atrium is severely dilated.  The right atrium is moderately dilated.  The visual ejection fraction is 60-65%.  Left ventricular diastolic function is abnormal.  Diastolic Doppler findings (E/E' ratio and/or other parameters) suggest left  ventricular filling pressures are increased.  No regional wall motion abnormalities noted.  There is mild (1+) mitral regurgitation.  There is mild (1+) aortic regurgitation.  There is trace to mild tricuspid regurgitation.  ______________________________________________________________________________  Left Ventricle  The left ventricle is normal in size. There is mild concentric left  ventricular hypertrophy. The visual ejection fraction is 60-65%. Left  ventricular diastolic function is abnormal. Diastolic Doppler findings (E/E'  ratio and/or other parameters) suggest left ventricular filling pressures are  increased. No  regional wall motion abnormalities noted.     Right Ventricle  The right ventricle is normal in size and function.     Atria  The left atrium is severely dilated. The right atrium is moderately dilated.  Intact atrial septum.     Mitral Valve  The mitral valve leaflets appear thickened, but open well. There is mild (1+)  mitral regurgitation. There is no mitral valve stenosis.     Tricuspid Valve  Tricuspid valve leaflets appear normal. There is trace to mild tricuspid  regurgitation. The right ventricular systolic pressure is approximated at  24mmHg plus the right atrial pressure.     Aortic Valve  There is mild trileaflet aortic sclerosis. There is mild (1+) aortic  regurgitation.     Pulmonic Valve  The pulmonic valve is normal in structure and function.     Vessels  The aorta root is normal. Normal size ascending aorta. IVC diameter >2.1 cm  collapsing <50% with sniff suggests a high RA pressure estimated at 15 mmHg or  greater.     Pericardium  There is no pericardial effusion. Moderate left pleural effusion.     ______________________________________________________________________________  MMode/2D Measurements & Calculations  RVDd: 2.3 cm  IVSd: 0.86 cm  LVIDd: 5.0 cm  LVIDs: 3.2 cm  LVPWd: 1.3 cm  FS: 35.2 %     LV mass(C)d: 198.4 grams  LV mass(C)dI: 131.9 grams/m2  Ao root diam: 3.1 cm  LA dimension: 4.1 cm  asc Aorta Diam: 3.1 cm  LA/Ao: 1.3  LVOT diam: 1.7 cm  LVOT area: 2.1 cm2  LA Volume Indexed (AL/bp): 65.9 ml/m2  RWT: 0.51     Doppler Measurements & Calculations  MV E max trace: 110.0 cm/sec  MV A max trace: 33.7 cm/sec  MV E/A: 3.3  MV dec slope: 845.7 cm/sec2  MV dec time: 0.13 sec     Ao V2 max: 130.3 cm/sec  Ao max P.0 mmHg  Ao V2 mean: 78.2 cm/sec  Ao mean P.9 mmHg  Ao V2 VTI: 36.7 cm  ANTONIO(I,D): 1.3 cm2  ANTONIO(V,D): 1.4 cm2  AI P1/2t: 1159 msec  LV V1 max PG: 3.0 mmHg  LV V1 max: 86.4 cm/sec  LV V1 VTI: 21.8 cm  SV(LVOT): 46.9 ml  SI(LVOT): 31.2 ml/m2  PA acc time: 0.12 sec  TR max trace:  243.2 cm/sec  TR max P.7 mmHg  AV Fuentes Ratio (DI): 0.66  ANTONIO Index (cm2/m2): 0.85  E/E' av.8  Lateral E/e': 44.0  Medial E/e': 31.6     ______________________________________________________________________________  Report approved by: Marian Bond 2022 12:26 PM

## 2022-08-09 NOTE — UTILIZATION REVIEW
Inpatient appropriate  Admission Status; Secondary Review Determination     Under the authority of the Utilization Management Committee, the utilization review process indicated a secondary review on the above patient. The review outcome is based on review of the medical records, discussions with staff, and applying clinical experience noted on the date of the review.     (x) Inpatient Status Appropriate - This patient's medical care is consistent with medical management for inpatient care and reasonable inpatient medical practice.     RATIONALE FOR DETERMINATION   87 years old female evaluated in the ED on 8/8/2022 for shortness of breath, orthopnea, pitting edema.  Past medical history of CAD, hypertension, CKD, GI bleed, recently diagnosed with rectal cancer.  Laboratory work-up remarkable for D-dimer 1.82, WBC 11.4, hemoglobin 9, sodium 128, BNP 2648, magnesium 1.7.  CT chest was negative for PE but showed evidence of CHF/fluid overload.  Patient was admitted for IV diuretics.  Hemoglobin dropped to 6.9 from 9 in the setting of GI bleed related to malignancy requiring PRBC transfusion.  At the time of admission with the information available to the attending physician more than 2 nights Hospital complex care was anticipated, based on patient risk of adverse outcome if treated as outpatient and complex care required. Inpatient admission is appropriate based on the Medicare guidelines.     This document was produced using voice recognition software     The information on this document is developed by the utilization review team in order for the business office to ensure compliance. This only denotes the appropriateness of proper admission status and does not reflect the quality of care rendered.   The definitions of Inpatient Status and Observation Status used in making the determination above are those provided in the CMS Coverage Manual, Chapter 1 and Chapter 6, section 70.4.     Sincerely,     Antonio Suarez,  MD [facility]  Utilization Review Physician Advisor  Pager: [phone]

## 2022-08-09 NOTE — PLAN OF CARE
Problem: Respiratory Compromise (Heart Failure)  Goal: Effective Oxygenation and Ventilation  Outcome: Ongoing, Progressing     Problem: Dysrhythmia (Heart Failure)  Goal: Stable Heart Rate and Rhythm  Outcome: Ongoing, Progressing   Goal Outcome Evaluation:    Plan of Care Reviewed With: patient     Overall Patient Progress: improving    Pt is axox4, weaned onto RA from 1L NC, sinus isabel with a BBB and assistx1 with a walker and a gait belt. 1 unit of blood given this shift. Pt is [place] and has hearing aids. Pt takes meds with applesauce. +2 edema in the lower extremities. Light bleeding from rectum present. K and Mg protocol, recheck in the AM.

## 2022-08-10 PROBLEM — I50.31 ACUTE DIASTOLIC HEART FAILURE (H): Status: ACTIVE | Noted: 2022-01-01

## 2022-08-10 NOTE — PLAN OF CARE
Goal Outcome Evaluation:    Plan of Care Reviewed With: patient     Overall Patient Progress: improving    Outcome Evaluation: Pt reports no pain this shift. VSS on room air. 2 bloody stools overnight. Hgb stable this AM at 9.6.

## 2022-08-10 NOTE — CONSULTS
CLINICAL NUTRITION SERVICES - EDUCATION     Saw patient for 2g sodium diet ed consult. Patient lives in an assisted living facility where meals are provided for her. She is on a soft diet at the facility and says she lives on mashed potatoes and gravy. We discussed limiting sodium and she says she does not shake salt on her foods.  RD will follow per protocol.    Allison Alvarez RDN, LD

## 2022-08-10 NOTE — PROGRESS NOTES
"[facility]   Social Work Note    Assessment   Initial visit with Yun Navarro along with colleague AKIKO Epstein. Yun Navarro recalled meeting our colleague AKIKO Mathis yesterday. Yun Navarro  had \"tough morning\" was doing better when we arrived. Denied any symptoms. She is waiting to gather more information re her illness and tx options before making any decisions re next steps. She wants her son Dagoberto involved in discussions and feels very supported by him. She shared that she had another son, Shawn who  about 4 years ago. She was tearful as she talked about this experience and how long it took for her to participate in life again since \"there wasn't anything to look forward to after Shawn .\"     She feels she is coping well at this time and expressed appreciation for support and visit today.    Clinical Interventions  Build trust/rapport  Active/empathetic listening  Assess coping   Validation of feelings   Emotional support     Plan  PC SW will continue to follow to offer psychosocial/emotional support.     Mariangel Frank, [facility]  Palliative Care   Office # [phone]  "

## 2022-08-10 NOTE — CONSULTS
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    Principal Problem:    Acute diastolic heart failure (H)  Active Problems:    Essential hypertension    Chest pain, unspecified type    Stage 3 chronic kidney disease (H)    Lower GI bleed    Rectal mass    Congestive heart failure, unspecified HF chronicity, unspecified heart failure type (H)    Hyponatremia    Leukocytosis, unspecified type    Sinus tachycardia    Hypomagnesemia       ECOG Performance Status: (3) Capable of limited self-care, confined to bed or chair > 50% of waking hours    Discussed radiation therapy options including a palliative course of radiation therapy with the goal of stopping her rectal bleeding and reducing pain.  She does not have significant pain now but may reduce pain caused by continued tumor progression.  Discussed high likelihood of cessation of bleeding and tumor shrinkage with 5-10 total radiation treatments.  Discussed this treatment is not curative.  Would estimate based on published data approximately 50% likelihood of tumor progression at 1 year.      Discussed should she proceed with PET/CT for staging and tumor remains localized to primary with involvement of inguinal lymph node suspicious on CT she could proceed with more aggressive chemoradiation with curative intent.  Even with curative intent radiation, given the size of her pelvic mass, 3-year disease-free survival is likely around 50%.    Discussed indications for, process of, potential side effects of both palliative and definitive radiation therapy.    Patient is quite concerned about the toxicity of aggressive definitive chemoradiation therapy  particularly regarding perianal skin irritation and breakdown.      She and her son Dagoberto will discuss further together and consider treatment options, but at this time is not able to make decision about what she might be interested in      Discussed patient both with hospitalist MD PARISH and heme-onc MD Vega.    Total time of this visit, including time spent face-to-face with patient and or via video/audio, and also in preparing for today's visit for MDM and documentation. Medical decision-making included consideration and possible diagnoses, management options, complex record review, review of diagnostic tests and information, consideration and discussion of significant complications based on comorbidities, discussion with providers involved in the care of the patient.     70 Minutes spent.       Staging History    Cancer Staging  No matching staging information was found for the patient.    History  Patient presented to the ED 6/14/2022 with bloody stools of at least 1 week duration and crampy abdominal pain.  CT abdomen pelvis performed identified fecal impaction with large volume of stool distal sigmoid colon and rectum with no evidence for obstruction.  Exam with external hemorrhoid and hemorrhoids thought likely reason for bleeding.  Sigmoidoscopy by GI was declined by patient and she was discharged 6/17/2022 to TCU and iron supplementation started for iron deficiency anemia (hemoglobin alex 7.3 on 6/16)    She returned to the ED 6/23/2022 with ongoing rectal bleeding and anemia with hemoglobin 6.9 at TCU.  She was transfused with 1 unit PRBC and again declined lower GI diagnostics.    She returned to the ED 7/29/2022 for ongoing bleeding with colonoscopy planned for 8/3/2022.  A 2 cm firm mass in the perirectal region was noted.  Hemoglobin 6.8 patient transfused and patient admitted.  Colonoscopy 8/2/2022 identified a frond-like villous fungating and infiltrative nonobstructing large mass within the distal  rectum/anal canal.  The mass was noncircumferential.  The mass measured 7 cm in length.  Biopsies were taken for histology.  Pathology: Invasive squamous cell carcinoma, grade 2.  Focal keratinization.  Depth of invasion, involves lamina propria, deeper invasion cannot be excluded.  Muscularis propria not present for evaluation.  Lymphovascular invasion not identified.  Adjacent unremarkable colonic mucosa.  She was discharged 8/3/2022 prior to pathology returning.     Prior admissions have also noted failure to thrive with generalized weakness and cachexia.    8/8/2022 she return to the ED with shortness of breath.  8/8/2022 CT chest negative for pulmonary embolism.  Cardiac enlargement, diffuse interlobular septal thickening in the lungs and bilateral pleural effusions right greater than left compatible with CHF or fluid overload.  Prominent areas of bibasilar consolidation suspicious for potential pneumonitis.  8/8/2022 CTA abdomen pelvis with large malignant appearing mass in the right side of the rectum measuring up to 7 cm in diameters concerning for malignancy.  Likely accounts for patient's GI bleed.  Several intermediate enhancing bilateral groin lymph nodes with metastatic lymph nodes not excluded.  Incidental left adrenal gland nodularity may be due to adrenal adenomas based on this noncontrast density.    8/8/2022 she met with palliative care and discussed desire to meet with oncology for more details about her diagnosis but with no interest in chemotherapy.  She met with heme-onc yesterday to discuss anal squamous cell carcinoma and potential treatment.  Discussed potential curative intent concurrent chemoradiation and need for PET/CT to complete staging and ensure disease localized to the pelvis prior to proceeding with any definitive therapy.    Today the patient reports approximately 1 month ago she became less independent in her daily activities.  Previously she was cooking and cleaning on her own  however about 1 month ago she stopped being able to do those activities.  Currently she bathes and dresses herself.  She uses a walker for ambulation and reports a fall about 1 week ago.  She is generalized weakness and spends the majority of her day sitting in a chair.  She reports blood with bowel movements which occur daily.  Yesterday she did not notice any blood but that is unusual.  She reported blood per rectum this morning but did not look at how much.  She reports some pain in the skin where she has coccygeal ulcer but denies any significant abdominal or pelvic pain.  She occasionally has some mild crampy abdominal pain.  Minimal pain noted with bowel movements.  She has had difficulty with constipation.  She reports incontinence with both stool and bladder at times but not consistently.      She reports her brother  of a similar diagnosis 2-1/2 years ago.  He declined any surgery chemo or radiation therapy.  He did have some pain associated with his tumor which was managed with morphine.    She reports she does not feel she has a good sense of knowing what is going on.  She feels until she knows more about what is going on she is unable to make a decision about how she would like to proceed.  She is nervous about whether or not she could tolerate us staging PET/CT but would like information about how extensive her cancer has spread prior to making any decisions.    Discussed suspicion that patient has a large primary tumor in the pelvis with likely spread to bilateral inguinal lymph nodes.  She does have smaller lymph nodes, not enlarged by size criteria, along the left pelvic sidewall as well as more superiorly periaortic, and at the bifurcation into common iliacs that may or may not be involved.  No evidence of other distant metastases noted on CT scans.    Following our meeting also discussed all of the above with patient's son to him.  Both patient and her son asked appropriate questions which were  "answered to their satisfaction and verbalized understanding.  Her son Dagoberto also feels that obtaining a PET/CT would be the most helpful factor in helping them make decision between no further treatment, palliative radiation therapy, or even potential for more aggressive chemoradiation should she have localized disease.      Review of Systems    Pertinent items are noted in HPI.    Past History    Past Medical History:   Diagnosis Date     Antiplatelet or antithrombotic long-term use     aspirin     Hypertension      Stented coronary artery       Family History   Problem Relation Age of Onset     Cancer Mother      Hypertension Mother      No Known Problems Father      Past Surgical History:   Procedure Laterality Date     ABDOMEN SURGERY       APPENDECTOMY       COLONOSCOPY N/A 8/2/2022    Procedure: COLONOSCOPY WITH RECTUM BIOPSIES;  Surgeon: Santana Posey MD;  Location: [facility] OR     COLOSTOMY CLOSURE       EYE SURGERY       HYSTERECTOMY       IR AORTIC ARCH 4 VESSEL ANGIOGRAM  6/7/2002     IR AORTIC ARCH 4 VESSEL ANGIOGRAM  12/27/2002     IR CAROTID ANGIOGRAM  12/27/2002     IR CAROTID ANGIOGRAM  12/27/2002     IR EXTREMITY ANGIOGRAM BILATERAL  11/27/2007     IR MISCELLANEOUS PROCEDURE  6/7/2002     IR MISCELLANEOUS PROCEDURE  12/27/2002     IR MISCELLANEOUS PROCEDURE  12/27/2002     IR MISCELLANEOUS PROCEDURE  12/27/2002     OPEN REDUCTION INTERNAL FIXATION ANKLE Right 3/2/2018    Procedure: OPEN REDUCTION INTERNAL FIXATION TRIMALLEOLAR FRACTURE RIGHT ANKLE;  Surgeon: Shawn Vega DO;  Location: [facility];  Service:      OTHER SURGICAL HISTORY      Resection of mesenteryper patient-- \"they took out my mesentery about 6 months after I had a baby\"     REMOVE HARDWARE LOWER EXTREMITY Right 3/4/2022    Procedure: RIGHT ANKLE REMOVAL OF HARDWARE;  Surgeon: Shawn Vega DO;  Location: [facility] APPENDECTOMY      Description: Appendectomy;  Recorded: 09/26/2014; " "    Z COLOSTOMY      Description: Colostomy;  Recorded: 09/26/2014;     [facility] THROMBOENDARTECTMY NECK,NECK INCIS      Description: Carotid Thromboendarterectomy;  Recorded: 09/26/2014;     [facility] TOTAL ABDOM HYSTERECTOMY      Description: Total Abdominal Hysterectomy;  Recorded: 09/26/2014;      Social History     Socioeconomic History     Marital status:      Spouse name: Not on file     Number of children: Not on file     Years of education: Not on file     Highest education level: Not on file   Occupational History     Not on file   Tobacco Use     Smoking status: Never Smoker     Smokeless tobacco: Never Used   Substance and Sexual Activity     Alcohol use: Never     Drug use: Never     Sexual activity: Not on file   Other Topics Concern     Not on file   Social History Narrative     Not on file     Social Determinants of Health     Financial Resource Strain: Not on file   Food Insecurity: Not on file   Transportation Needs: Not on file   Physical Activity: Not on file   Stress: Not on file   Social Connections: Not on file   Intimate Partner Violence: Not on file   Housing Stability: Not on file       Allergies    Allergies   Allergen Reactions     Aminoquinolines Rash     Other reaction(s): rash     Primaquine Rash     Aloe      Other reaction(s): itchy skin     Atorvastatin Unknown     Other reaction(s): muscle aches     Cilostazol      Demeclocycline Other (See Comments)     \"sore bottom\"     Dextromethorphan      Diagnostic X-Ray Materials Unknown and Other (See Comments)     Chest tightness,sshakes     Guaifenesin & Derivatives Hives     Robitussin D     Metrizamide Other (See Comments)     Chest tightness,sshakes     Pravastatin Unknown     Other reaction(s): muscle aches     Simvastatin Other (See Comments)     Muscle aches   Other reaction(s): muscle aches     Tetracyclines Unknown and Other (See Comments)     Other reaction(s): Unknown  \"sore bottom\"       Tomato      Hydroxychloroquine Sulfate " [Hydroxychloroquine] Rash     Preservision Areds Rash     AREDs formula only caused rash.         Physical Exam    Gen: Alert, in NAD pleasant interactive, very thin, hard of hearing  Eyes: EOMI, sclera anicteric  HENT     Head: NC/AT- surgical incision healing  Pulm: No wheezing, stridor or respiratory distress  Neurologic: A/Ox3, face symmetric, speech fluent, no focal motor deficits  Psychiatric: Appropriate mood and affect     Lab Results     Results for orders placed or performed during the hospital encounter of 22   Comprehensive metabolic panel   Result Value Ref Range    Sodium 128 (L) 136 - 145 mmol/L    Potassium 3.8 3.5 - 5.0 mmol/L    Chloride 106 98 - 107 mmol/L    Carbon Dioxide (CO2) 16 (L) 22 - 31 mmol/L    Anion Gap 6 5 - 18 mmol/L    Urea Nitrogen 11 8 - 28 mg/dL    Creatinine 1.02 0.60 - 1.10 mg/dL    Calcium 8.1 (L) 8.5 - 10.5 mg/dL    Glucose 112 70 - 125 mg/dL    Alkaline Phosphatase 87 45 - 120 U/L    AST 22 0 - 40 U/L    ALT 16 0 - 45 U/L    Protein Total 6.1 6.0 - 8.0 g/dL    Albumin 2.3 (L) 3.5 - 5.0 g/dL    Bilirubin Total 0.4 0.0 - 1.0 mg/dL    GFR Estimate 53 (L) >60 mL/min/1.73m2     No results found for this or any previous visit.     Imaging Results    Echocardiogram Complete    Result Date: 2022  [id number]^WHITE-MELONY^[place] [address]  Name: SULEMA ELOS MRN: [id number] : 1935 Study Date: 2022 07:22 AM Age: 87 yrs Gender: Female Patient Location: [facility] Reason For Study: CHF Ordering Physician: SIMRAN SEARS Performed By: ANTHONY  BSA: 1.5 m2 Height: 65 in Weight: 105 lb HR: 68 BP: 167/74 mmHg ______________________________________________________________________________ Procedure Complete Echo Adult. ______________________________________________________________________________ Interpretation Summary  The left atrium is severely dilated. The right atrium is moderately dilated. The  visual ejection fraction is 60-65%. Left ventricular diastolic function is abnormal. Diastolic Doppler findings (E/E' ratio and/or other parameters) suggest left ventricular filling pressures are increased. No regional wall motion abnormalities noted. There is mild (1+) mitral regurgitation. There is mild (1+) aortic regurgitation. There is trace to mild tricuspid regurgitation. ______________________________________________________________________________ Left Ventricle The left ventricle is normal in size. There is mild concentric left ventricular hypertrophy. The visual ejection fraction is 60-65%. Left ventricular diastolic function is abnormal. Diastolic Doppler findings (E/E' ratio and/or other parameters) suggest left ventricular filling pressures are increased. No regional wall motion abnormalities noted.  Right Ventricle The right ventricle is normal in size and function.  Atria The left atrium is severely dilated. The right atrium is moderately dilated. Intact atrial septum.  Mitral Valve The mitral valve leaflets appear thickened, but open well. There is mild (1+) mitral regurgitation. There is no mitral valve stenosis.  Tricuspid Valve Tricuspid valve leaflets appear normal. There is trace to mild tricuspid regurgitation. The right ventricular systolic pressure is approximated at 24mmHg plus the right atrial pressure.  Aortic Valve There is mild trileaflet aortic sclerosis. There is mild (1+) aortic regurgitation.  Pulmonic Valve The pulmonic valve is normal in structure and function.  Vessels The aorta root is normal. Normal size ascending aorta. IVC diameter >2.1 cm collapsing <50% with sniff suggests a high RA pressure estimated at 15 mmHg or greater.  Pericardium There is no pericardial effusion. Moderate left pleural effusion.  ______________________________________________________________________________ MMode/2D Measurements & Calculations RVDd: 2.3 cm IVSd: 0.86 cm LVIDd: 5.0 cm LVIDs: 3.2 cm  LVPWd: 1.3 cm FS: 35.2 %  LV mass(C)d: 198.4 grams LV mass(C)dI: 131.9 grams/m2 Ao root diam: 3.1 cm LA dimension: 4.1 cm asc Aorta Diam: 3.1 cm LA/Ao: 1.3 LVOT diam: 1.7 cm LVOT area: 2.1 cm2 LA Volume Indexed (AL/bp): 65.9 ml/m2 RWT: 0.51  Doppler Measurements & Calculations MV E max fuentes: 110.0 cm/sec MV A max fuentes: 33.7 cm/sec MV E/A: 3.3 MV dec slope: 845.7 cm/sec2 MV dec time: 0.13 sec  Ao V2 max: 130.3 cm/sec Ao max P.0 mmHg Ao V2 mean: 78.2 cm/sec Ao mean P.9 mmHg Ao V2 VTI: 36.7 cm ANTONIO(I,D): 1.3 cm2 ANTONIO(V,D): 1.4 cm2 AI P1/2t: 1159 msec LV V1 max PG: 3.0 mmHg LV V1 max: 86.4 cm/sec LV V1 VTI: 21.8 cm SV(LVOT): 46.9 ml SI(LVOT): 31.2 ml/m2 PA acc time: 0.12 sec TR max fuentes: 243.2 cm/sec TR max P.7 mmHg AV Fuentes Ratio (DI): 0.66 ANTONIO Index (cm2/m2): 0.85 E/E' av.8 Lateral E/e': 44.0 Medial E/e': 31.6  ______________________________________________________________________________ Report approved by: Marian Bond 2022 12:26 PM       CTA Abdomen Pelvis with Contrast    Result Date: 2022  EXAM: CTA ABDOMEN PELVIS WITH CONTRAST LOCATION: [facility] DATE/TIME: 2022 2:52 AM INDICATION: Abdominal pain. GI bleed. COMPARISON: 2022. TECHNIQUE: CT angiogram abdomen pelvis during arterial phase of injection of IV contrast. 2D and 3D MIP reconstructions were performed by the CT technologist. Dose reduction techniques were used. CONTRAST: Isovue 370 75 ml. FINDINGS: ANGIOGRAM ABDOMEN/PELVIS: Diffuse aortoiliac atheromatous disease. Ectasia of the distal abdominal aorta measuring up to 2.2 cm in diameter. Negative for dissection, acute intimal hematoma, or significant stenosis. Apparent aortobiiliac artery stent grafts in place which are patent. Multifocal mild atheromatous narrowing in the iliac arteries bilaterally. Mild atheromatous narrowing at the origin of the celiac and superior mesenteric arteries. JAMEEL appears occluded. Moderate to marked atheromatous  narrowing at the origin of the bilateral renal arteries. No definite acute vascular findings. No definite CT evidence for active GI bleed. See additional bowel findings below. LOWER CHEST: See separate chest CT report for those details. HEPATOBILIARY: Normal. PANCREAS: Normal. SPLEEN: Normal. ADRENAL GLANDS: Multifocal nodularity left adrenal gland suspected to be due to adrenal adenomas based on this noncontrast density. Right adrenal gland is negative. KIDNEYS/BLADDER: Multiple small benign-appearing bilateral renal cysts. No hydronephrosis. Bladder is distended but otherwise grossly unremarkable. BOWEL: Bowel is normal in caliber with no evidence for obstruction. Multifocal sites of postoperative change in the left hemicolon. There is a large enhancing malignant appearing mass along the right side of the rectum measuring approximately 5.4 x 3.9 x  7.0 cm (AP x transverse x SI). This is strongly concerning for malignancy, most likely a primary rectal malignancy and likely accounts for the patient's symptoms of GI bleed. There is concern for probable invasion of tumor into the sphincter suggesting locally advanced malignancy. LYMPH NODES: There are a few enhancing bilateral groin lymph nodes which are indeterminate including a 1.2 x 1.2 cm node on the right on series 8 image 386. PELVIC ORGANS: Hysterectomy. MUSCULOSKELETAL: Moderate degenerative changes in the spine.     IMPRESSION: 1.  No acute vascular findings. Diffuse atheromatous disease. See above for nonacute vascular findings. 2.  No evidence for active GI bleed. 3.  However, there is a large malignant appearing mass in the right side of the rectum measuring up to 7 cm in diameter strongly concerning for malignancy, most likely primary rectal cancer. This likely accounts for the patient's symptoms of GI bleed. There is concern for locally advanced malignancy. MRI could be obtained for local staging purposes. 4.  Several indeterminate enhancing bilateral  groin lymph nodes with metastatic lymph nodes not excluded. 5.  Multifocal nodularity left adrenal gland suspected to be due to adrenal adenomas based on noncontrast density. 6.  Hysterectomy.    CT Chest Pulmonary Embolism w Contrast    Result Date: 8/8/2022  EXAM: CT CHEST PULMONARY EMBOLISM W CONTRAST LOCATION: [facility] DATE/TIME: 8/8/2022 2:40 AM INDICATION: Dyspnea. COMPARISON: None. TECHNIQUE: CT chest pulmonary angiogram during arterial phase injection of IV contrast. Multiplanar reformats and MIP reconstructions were performed. Dose reduction techniques were used. CONTRAST: Isovue 370 75 ml. FINDINGS: ANGIOGRAM CHEST: Pulmonary arteries are normal caliber and negative for pulmonary emboli. Thoracic aorta is negative for dissection. No CT evidence of right heart strain. LUNGS AND PLEURA: Prominent areas of bibasilar consolidation suspicious for pneumonitis. Diffuse interlobular septal thickening of the lungs compatible with fluid overload. Some subtle patchy groundglass opacities in the lungs may be due to some pulmonary edema. Moderate to large right pleural effusion and moderate left pleural effusion. MEDIASTINUM/AXILLAE: No adenopathy. Cardiac enlargement. No pericardial effusion. Normal caliber thoracic aorta. Prominent aortic atheromatous disease. Left subclavian artery stent noted. Esophagus is grossly negative. CORONARY ARTERY CALCIFICATION: Severe. UPPER ABDOMEN: See separate abdomen and pelvis CT report for those details. MUSCULOSKELETAL: Mild degenerative changes thoracic spine.     IMPRESSION: 1.  Negative for pulmonary embolism. 2.  Cardiac enlargement, diffuse interlobular septal thickening in the lungs, and bilateral pleural effusions right greater than left. Constellation of findings would be compatible with CHF or fluid overload. 3.  Prominent areas of bibasilar consolidation suspicious for potential pneumonitis. Clinical correlation. 4.  Marked coronary artery  calcification.      Pathology    No results found for this or any previous visit (from the past 8760 hour(s)).    Case Report   Surgical Pathology Report                         Case: [id number]                                   Authorizing Provider:  Santana Posey MD    Collected:           08/02/2022 09:14 AM           Ordering Location:     [facility]      Received:            08/02/2022 11:22 AM                                  [facility]                                                                Pathologist:           Britney Lange MD                                                            Specimen:    Rectum, RECTUM BIOPSIES                                                                    Final Diagnosis   RECTUM, BIOPSIES:        HISTOLOGIC TYPE: IN SITU AND INVASIVE SQUAMOUS CELL CARCINOMA        HISTOLOGIC GRADE: GRADE 2 (OF 4); FOCAL KERATINIZATION        DEPTH OF INVASION: INVOLVES LAMINA PROPRIA; DEEPER INVASION CANNOT BE EXCLUDED.  MUSCULARIS PROPRIA                                             NOT PRESENT FOR EVALUATION        LYMPHOVASCULAR INVASION: NOT IDENTIFIED        ADDITIONAL FINDINGS: ADJACENT UNREMARKABLE COLONIC MUCOSA          Signed by: Liz Mo MD

## 2022-08-10 NOTE — PLAN OF CARE
Goal Outcome Evaluation:    Plan of Care Reviewed With: patient     Overall Patient Progress: improving    Outcome Evaluation: Pt. denies any pain and is on room air. Pt. has henry in for retention and had an out put of 775 mL this morning. Pt. is A&O x4. Will continue to monitor.

## 2022-08-10 NOTE — PROGRESS NOTES
"[facility] Progress Note     IMPRESSION:  88 yo female with PMH of CAD, HTN, CKD, recent diagnosis of rectal cancer who was admitted on 22 for SOB and pitting edema. GI consulted for anemia with admitting hgb 6.9.    1. Anemia, rectal bleeding  - Likely related to rectal cancer.  - Colonoscopy 22 - tumor in distal rectum/anal canal bx consistent cancer.   - Palliative care met with patient on  to discuss chemoradiation to stop bleeding, son and pt wanted to meet with oncology for further options. Conversation is still ongoing. Oncology consulted , recommending further discussion with radiation oncologist to guide decision making - if pt opts for therapy, PET scan can be considered.       RECOMMENDATIONS:  - Agree with ongoing discussion between radiation oncology to determine final plans. Pt would like to know her options before final decision is made.   - Endoscopic evaluation and treatment is not indicated in patients bleeding from rectal cancer.   - Continue to monitor Hgb    GI will sign off at this time. Thank you for consulting us on this pleasant patient. Please call if questions arise or the patient's status changes.       Approximately 15 minutes of total time was spent providing patient care, including patient evaluation, reviewing documentation/test results, and     Tao Chi PA-C  [facility]  [phone]  ________________________________________________________________________      SUBJECTIVE:    No new complaints today     OBJECTIVE:  BP 98/48 (BP Location: Left arm)   Pulse 57   Temp 98.7  F (37.1  C) (Oral)   Resp 18   Ht 1.651 m (5' 5\")   Wt 42.5 kg (93 lb 12.8 oz)   SpO2 96%   BMI 15.61 kg/m    Temp (24hrs), Av.3  F (37.4  C), Min:98.7  F (37.1  C), Max:100  F (37.8  C)    Patient Vitals for the past 72 hrs:   Weight   08/10/22 0418 42.5 kg (93 lb 12.8 oz)   22 0346 48.3 kg (106 lb 8 oz)   22 0528 47.9 kg (105 lb 9.6 oz) "   08/08/22 0029 46.7 kg (103 lb)       Intake/Output Summary (Last 24 hours) at 8/10/2022 1319  Last data filed at 8/10/2022 0900  Gross per 24 hour   Intake 268 ml   Output 2375 ml   Net -2107 ml        PHYSICAL EXAM  GEN: Alert, oriented x3, communicative and in NAD.    LYMPH: No LAD noted.  HRT: RRR  LUNGS: CTA  ABD:  ND, +BS, no guarding or pain to palpation, no rebound, no HSM.  SKIN: No rash, jaundice or spider angiomata      LABS:  I have reviewed the patient's new clinical lab results:     Recent Labs   Lab Test 08/10/22  0513 08/09/22  1828 08/09/22  0503 08/08/22  0125 08/04/22  0704 07/29/22  2249 07/29/22  1540 06/15/22  0822 06/14/22  1830   WBC  --   --  6.1 11.4* 7.7   < > 7.8   < > 8.4   HGB 9.6* 9.1* 6.9* 9.0* 7.9*   < > 6.8*   < > 9.6*   MCV  --   --  94 95 96   < > 95   < > 99   PLT  --   --  146* 168 127*   < > 212   < > 207   INR  --   --   --  1.23*  --   --  1.22*  --  1.36*    < > = values in this interval not displayed.     Recent Labs   Lab Test 08/10/22  1201 08/10/22  0513 08/09/22  1328 08/09/22  0503 08/08/22  1423   POTASSIUM 3.8 3.3* 3.7 3.1* 3.3*   CHLORIDE  --  102  --  103 104   CO2  --  26  --  23 17*   BUN  --  19  --  14 12   CR  --  1.23*  --  1.16* 0.97   ANIONGAP  --  5  --  4* 8     Recent Labs   Lab Test 08/08/22  0125 08/04/22  0704 08/03/22  0659 07/30/22  1131 07/30/22  0659 07/07/22  1324 06/15/22  0924   ALBUMIN 2.3* 2.2* 1.8*  --  2.1*  --   --    BILITOTAL 0.4 0.3  --   --  0.8  --   --    ALT 16 7*  --   --  11  --   --    AST 22 18  --   --  17  --   --    PROTEIN  --   --   --  10 *  --  70 * 20 *         IMAGING:  reviewed

## 2022-08-10 NOTE — PROGRESS NOTES
Daily Progress Note        CODE STATUS:  No CPR- Do NOT Intubate    Date of visit; 08/09/22    Length of stay (  LOS: 1 day  )     Assessment/Plan:  Yun Rodgers is a 87 year old female with past medical history of CKD stage III, CAD status post a stent in 2007, PVD status post right carotid endarterectomy, recent hospitalization from 7/29 to 8/3/2022 for GI bleed who received blood transfusion and underwent colonoscopy, biopsy came back positive for rectal cancer who presented to ED for evaluation of shortness of breath and subsequently admitted for further management    Shortness of breath; likely multifactorial, heart failure, anemia, physical deconditioning from recent hospitalization  Acute diastolic heart failure;  --On admission CTA chest negative for pulm embolism but reported findings consistent with heart failure or volume overload  --On admission elevated BNP  --On this admission, echo reported EF 60 to 65%  ---Breathing better, on room air.  Creatinine trending up, discontinued IV Lasix today.  Monitor labs, intake/output, weight closely    Recent diagnosis of rectal cancer on 8/2/2020;  Acute on chronic lower GI bleeding; likely due to rectal cancer  --CTA abdomen/pelvis reported no evidence for active GI bleeding.  However, large malignant appearing mass in the right side of rectum which likely accounts for patient's symptoms of GI bleed.  CT also reports concern for locally advanced malignancy.  --Hemoglobin 6.9, transfuse 1 unit pRBC and hemoglobin improved.  --Personally discussed with radiation oncologist and medical oncologist.  After discussing with Dr Vega ordered PET scan but uncertain whether gets approved or not.  Allergy list reported allergic to diagnostic extremity chills, discussed with PET scan department, informed patient had IV contrast for imaging test recently and reported okay to order test.  --Palliative care on case  Addendum;  -Received call from nuclear medicine department  and reported PET scan should be done as an outpatient and requested to cancel the order.    Hyponatremia; likely due to CHF  -- Serum sodium stable to trending up.   -- Hypokalemia, replace per protocol  -- Mild GILBERTO, likely due to overdiuresis, decreased IV Lasix dose from 40 mg twice daily to 20 mg yesterday and discontinued today.  -- Labs in a.m.    History of CAD status post stent in 2007;  --Denied chest pain and reports breathing better with diuretics  --Not on aspirin since he had intermittent ongoing rectal bleeding  --Continue Imdur, metoprolol    Previous admission had urinary retention;  --Currently has a Serrano catheter as patient on IV diuretics, now IV diuretics discontinued, will consider discontinue catheter when patient is more active/mobile     DVT prophylaxis; SCDs, ambulate as able.  No pharmacological agent given GI bleed requiring transfusion.    Disposition; pending  Barrier to discharge; multiple medical issues, consult pending      Subjective:  Interval History:  Patient seen and examined. Notes, labs, imaging reports personally reviewed.  Patient denied short of breath or chest pain.  Denied abdomen pain, nausea, vomiting.  However, complaining of ongoing bleeding per rectum but denied rectal pain.  Discussed with nursing staffs.  Discussed with radiation oncologist.  Discussed with medical oncologist.  Did not call family member as oncologist team discussed with patient's son today.    Review of Systems:   As mentioned in subjective.    Patient Active Problem List   Diagnosis     Mixed hyperlipidemia     Essential hypertension     Coronary Artery Disease     Carotid artery disease (H)     Peripheral vascular disease (H)     Gastroesophageal reflux disease with esophagitis     Chest pain, unspecified type     Angina concurrent with and due to arteriosclerosis of coronary artery (H)     Stable angina (H)     Malaise     Gastroesophageal reflux disease without esophagitis     Trimalleolar  fracture     Anemia, unspecified type     Coronary artery disease of native artery of native heart with stable angina pectoris (H)     Pulmonary valve disorder     Fracture dislocation of ankle, right, closed, initial encounter     Closed right ankle fracture     Stage 3 chronic kidney disease (H)     Acute renal failure, unspecified acute renal failure type (H)     Sjogren's syndrome (H)     Poor appetite     Lesion of ulnar nerve     History of tobacco use     History of coronary artery bypass surgery     Grief     Chronic fatigue syndrome     Burning mouth syndrome     Adult failure to thrive syndrome     Acquired trigger finger     Abnormal gait     Ulcer of external ear (H)     Lower GI bleed     Rectal mass     Congestive heart failure, unspecified HF chronicity, unspecified heart failure type (H)     Hyponatremia     Leukocytosis, unspecified type     Sinus tachycardia     Hypomagnesemia       Scheduled Meds:    ferrous gluconate  324 mg Oral BID     fexofenadine  180 mg Oral Daily     isosorbide mononitrate  120 mg Oral Daily     isosorbide mononitrate  30 mg Oral Daily     magnesium sulfate  2 g Intravenous Once     menthol-zinc oxide   Topical TID     metoprolol tartrate  12.5 mg Oral BID     mirtazapine  15 mg Oral At Bedtime     pantoprazole  40 mg Oral BID AC     phenylephrine-cocoa butter  1 suppository Rectal At Bedtime     polyethylene glycol-propylene glycol  1 drop Both Eyes 4x Daily     sennosides  2 tablet Oral At Bedtime     Continuous Infusions:  PRN Meds:.acetaminophen, hydrALAZINE, hydrocortisone (Perianal), melatonin, nitroGLYcerin, phenylephrine-mineral oil-petrolatum, polyethylene glycol, prochlorperazine, sennosides    Objective:  Vital signs in last 24 hours:  Temp:  [97.7  F (36.5  C)-100  F (37.8  C)] 98.9  F (37.2  C)  Pulse:  [60-69] 65  Resp:  [18-24] 18  BP: (117-151)/(54-70) 135/61  SpO2:  [92 %-98 %] 92 %      Intake/Output Summary (Last 24 hours) at 8/9/2022 0817  Last data  filed at 8/9/2022 0800  Gross per 24 hour   Intake 100 ml   Output 3100 ml   Net -3000 ml       Physical Exam:  General: Not in obvious distress.   HEENT: Normocephalic, supple neck  Chest: Clear to auscultation bilateral anteriorly, no wheezing  Heart: S1S2 normal, regular  Abdomen: Soft. NT, ND. Bowel sounds- active.  Neuro: alert and awake, follows simple commands appropriately, moves all extremities      Lab Results:(I have personally reviewed the results)    Recent Results (from the past 24 hour(s))   Potassium    Collection Time: 08/09/22  1:28 PM   Result Value Ref Range    Potassium 3.7 3.5 - 5.0 mmol/L   Hemoglobin    Collection Time: 08/09/22  6:28 PM   Result Value Ref Range    Hemoglobin 9.1 (L) 11.7 - 15.7 g/dL   Magnesium    Collection Time: 08/10/22  5:13 AM   Result Value Ref Range    Magnesium 2.0 1.8 - 2.6 mg/dL   Basic metabolic panel    Collection Time: 08/10/22  5:13 AM   Result Value Ref Range    Sodium 133 (L) 136 - 145 mmol/L    Potassium 3.3 (L) 3.5 - 5.0 mmol/L    Chloride 102 98 - 107 mmol/L    Carbon Dioxide (CO2) 26 22 - 31 mmol/L    Anion Gap 5 5 - 18 mmol/L    Urea Nitrogen 19 8 - 28 mg/dL    Creatinine 1.23 (H) 0.60 - 1.10 mg/dL    Calcium 7.9 (L) 8.5 - 10.5 mg/dL    Glucose 86 70 - 125 mg/dL    GFR Estimate 42 (L) >60 mL/min/1.73m2   Hemoglobin    Collection Time: 08/10/22  5:13 AM   Result Value Ref Range    Hemoglobin 9.6 (L) 11.7 - 15.7 g/dL         Serum Glucose range:   Recent Labs   Lab 08/10/22  0513 08/09/22  0503 08/08/22  1423 08/08/22  0125   GLC 86 85 127* 112     ABG: No lab results found in last 7 days.  CBC:   Recent Labs   Lab 08/10/22  0513 08/09/22  1828 08/09/22  0503 08/08/22  0125 08/04/22  0704   WBC  --   --  6.1 11.4* 7.7   HGB 9.6* 9.1* 6.9* 9.0* 7.9*   HCT  --   --  21.1* 27.4* 24.6*   MCV  --   --  94 95 96   PLT  --   --  146* 168 127*   NEUTROPHIL  --   --   --  79  --    LYMPH  --   --   --  10  --    MONOCYTE  --   --   --  11  --    EOSINOPHIL  --   --    --  0  --      Chemistry:   Recent Labs   Lab 08/10/22  0513 22  1328 22  0503 22  1423 22  0125 22  0704 22  0704   *  --  130* 129* 128*  --  130*   POTASSIUM 3.3* 3.7 3.1* 3.3* 3.8   < > 4.3   CHLORIDE 102  --  103 104 106   < > 102   CO2 26  --  23 17* 16*   < > 22   BUN 19  --  14 12 11   < > 13.3   CR 1.23*  --  1.16* 0.97 1.02  --  1.19*   GFRESTIMATED 42*  --  45* 56* 53*  --  44*   ARON 7.9*  --  7.3* 7.8* 8.1*  --  8.1*   MAG 2.0  --  1.9  --  1.7*  --   --    PROTTOTAL  --   --   --   --  6.1  --  5.2*   ALBUMIN  --   --   --   --  2.3*  --  2.2*   AST  --   --   --   --  22  --  18   ALT  --   --   --   --  16  --  7*   ALKPHOS  --   --   --   --  87  --  73   BILITOTAL  --   --   --   --  0.4  --  0.3    < > = values in this interval not displayed.     Coags:  Recent Labs   Lab 22  0125   INR 1.23*   PTT 29     Cardiac Markers:  Recent Labs   Lab 22  1423   TROPONINI 0.12        Echocardiogram Complete    Result Date: 2022  [id number]^WHITE-MELONY^[place] [address]  Name: SULEMA LEOS MRN: [id number] : 1935 Study Date: 2022 07:22 AM Age: 87 yrs Gender: Female Patient Location: [facility] Reason For Study: CHF Ordering Physician: SIMRAN SEARS Performed By: ANTHONY  BSA: 1.5 m2 Height: 65 in Weight: 105 lb HR: 68 BP: 167/74 mmHg ______________________________________________________________________________ Procedure Complete Echo Adult. ______________________________________________________________________________ Interpretation Summary  The left atrium is severely dilated. The right atrium is moderately dilated. The visual ejection fraction is 60-65%. Left ventricular diastolic function is abnormal. Diastolic Doppler findings (E/E' ratio and/or other parameters) suggest left ventricular filling pressures are increased. No regional wall motion abnormalities noted.  There is mild (1+) mitral regurgitation. There is mild (1+) aortic regurgitation. There is trace to mild tricuspid regurgitation. ______________________________________________________________________________ Left Ventricle The left ventricle is normal in size. There is mild concentric left ventricular hypertrophy. The visual ejection fraction is 60-65%. Left ventricular diastolic function is abnormal. Diastolic Doppler findings (E/E' ratio and/or other parameters) suggest left ventricular filling pressures are increased. No regional wall motion abnormalities noted.  Right Ventricle The right ventricle is normal in size and function.  Atria The left atrium is severely dilated. The right atrium is moderately dilated. Intact atrial septum.  Mitral Valve The mitral valve leaflets appear thickened, but open well. There is mild (1+) mitral regurgitation. There is no mitral valve stenosis.  Tricuspid Valve Tricuspid valve leaflets appear normal. There is trace to mild tricuspid regurgitation. The right ventricular systolic pressure is approximated at 24mmHg plus the right atrial pressure.  Aortic Valve There is mild trileaflet aortic sclerosis. There is mild (1+) aortic regurgitation.  Pulmonic Valve The pulmonic valve is normal in structure and function.  Vessels The aorta root is normal. Normal size ascending aorta. IVC diameter >2.1 cm collapsing <50% with sniff suggests a high RA pressure estimated at 15 mmHg or greater.  Pericardium There is no pericardial effusion. Moderate left pleural effusion.  ______________________________________________________________________________ MMode/2D Measurements & Calculations RVDd: 2.3 cm IVSd: 0.86 cm LVIDd: 5.0 cm LVIDs: 3.2 cm LVPWd: 1.3 cm FS: 35.2 %  LV mass(C)d: 198.4 grams LV mass(C)dI: 131.9 grams/m2 Ao root diam: 3.1 cm LA dimension: 4.1 cm asc Aorta Diam: 3.1 cm LA/Ao: 1.3 LVOT diam: 1.7 cm LVOT area: 2.1 cm2 LA Volume Indexed (AL/bp): 65.9 ml/m2 RWT: 0.51  Doppler  Measurements & Calculations MV E max fuentes: 110.0 cm/sec MV A max fuentes: 33.7 cm/sec MV E/A: 3.3 MV dec slope: 845.7 cm/sec2 MV dec time: 0.13 sec  Ao V2 max: 130.3 cm/sec Ao max P.0 mmHg Ao V2 mean: 78.2 cm/sec Ao mean P.9 mmHg Ao V2 VTI: 36.7 cm ANTONIO(I,D): 1.3 cm2 ANTONIO(V,D): 1.4 cm2 AI P1/2t: 1159 msec LV V1 max PG: 3.0 mmHg LV V1 max: 86.4 cm/sec LV V1 VTI: 21.8 cm SV(LVOT): 46.9 ml SI(LVOT): 31.2 ml/m2 PA acc time: 0.12 sec TR max fuentes: 243.2 cm/sec TR max P.7 mmHg AV Fuentes Ratio (DI): 0.66 ANTONIO Index (cm2/m2): 0.85 E/E' av.8 Lateral E/e': 44.0 Medial E/e': 31.6  ______________________________________________________________________________ Report approved by: Marian Bond 2022 12:26 PM       CTA Abdomen Pelvis with Contrast    Result Date: 2022  EXAM: CTA ABDOMEN PELVIS WITH CONTRAST LOCATION: [facility] DATE/TIME: 2022 2:52 AM INDICATION: Abdominal pain. GI bleed. COMPARISON: 2022. TECHNIQUE: CT angiogram abdomen pelvis during arterial phase of injection of IV contrast. 2D and 3D MIP reconstructions were performed by the CT technologist. Dose reduction techniques were used. CONTRAST: Isovue 370 75 ml. FINDINGS: ANGIOGRAM ABDOMEN/PELVIS: Diffuse aortoiliac atheromatous disease. Ectasia of the distal abdominal aorta measuring up to 2.2 cm in diameter. Negative for dissection, acute intimal hematoma, or significant stenosis. Apparent aortobiiliac artery stent grafts in place which are patent. Multifocal mild atheromatous narrowing in the iliac arteries bilaterally. Mild atheromatous narrowing at the origin of the celiac and superior mesenteric arteries. JAMEEL appears occluded. Moderate to marked atheromatous narrowing at the origin of the bilateral renal arteries. No definite acute vascular findings. No definite CT evidence for active GI bleed. See additional bowel findings below. LOWER CHEST: See separate chest CT report for those details. HEPATOBILIARY:  Normal. PANCREAS: Normal. SPLEEN: Normal. ADRENAL GLANDS: Multifocal nodularity left adrenal gland suspected to be due to adrenal adenomas based on this noncontrast density. Right adrenal gland is negative. KIDNEYS/BLADDER: Multiple small benign-appearing bilateral renal cysts. No hydronephrosis. Bladder is distended but otherwise grossly unremarkable. BOWEL: Bowel is normal in caliber with no evidence for obstruction. Multifocal sites of postoperative change in the left hemicolon. There is a large enhancing malignant appearing mass along the right side of the rectum measuring approximately 5.4 x 3.9 x  7.0 cm (AP x transverse x SI). This is strongly concerning for malignancy, most likely a primary rectal malignancy and likely accounts for the patient's symptoms of GI bleed. There is concern for probable invasion of tumor into the sphincter suggesting locally advanced malignancy. LYMPH NODES: There are a few enhancing bilateral groin lymph nodes which are indeterminate including a 1.2 x 1.2 cm node on the right on series 8 image 386. PELVIC ORGANS: Hysterectomy. MUSCULOSKELETAL: Moderate degenerative changes in the spine.     IMPRESSION: 1.  No acute vascular findings. Diffuse atheromatous disease. See above for nonacute vascular findings. 2.  No evidence for active GI bleed. 3.  However, there is a large malignant appearing mass in the right side of the rectum measuring up to 7 cm in diameter strongly concerning for malignancy, most likely primary rectal cancer. This likely accounts for the patient's symptoms of GI bleed. There is concern for locally advanced malignancy. MRI could be obtained for local staging purposes. 4.  Several indeterminate enhancing bilateral groin lymph nodes with metastatic lymph nodes not excluded. 5.  Multifocal nodularity left adrenal gland suspected to be due to adrenal adenomas based on noncontrast density. 6.  Hysterectomy.    CT Chest Pulmonary Embolism w Contrast    Result Date:  8/8/2022  EXAM: CT CHEST PULMONARY EMBOLISM W CONTRAST LOCATION: [facility] DATE/TIME: 8/8/2022 2:40 AM INDICATION: Dyspnea. COMPARISON: None. TECHNIQUE: CT chest pulmonary angiogram during arterial phase injection of IV contrast. Multiplanar reformats and MIP reconstructions were performed. Dose reduction techniques were used. CONTRAST: Isovue 370 75 ml. FINDINGS: ANGIOGRAM CHEST: Pulmonary arteries are normal caliber and negative for pulmonary emboli. Thoracic aorta is negative for dissection. No CT evidence of right heart strain. LUNGS AND PLEURA: Prominent areas of bibasilar consolidation suspicious for pneumonitis. Diffuse interlobular septal thickening of the lungs compatible with fluid overload. Some subtle patchy groundglass opacities in the lungs may be due to some pulmonary edema. Moderate to large right pleural effusion and moderate left pleural effusion. MEDIASTINUM/AXILLAE: No adenopathy. Cardiac enlargement. No pericardial effusion. Normal caliber thoracic aorta. Prominent aortic atheromatous disease. Left subclavian artery stent noted. Esophagus is grossly negative. CORONARY ARTERY CALCIFICATION: Severe. UPPER ABDOMEN: See separate abdomen and pelvis CT report for those details. MUSCULOSKELETAL: Mild degenerative changes thoracic spine.     IMPRESSION: 1.  Negative for pulmonary embolism. 2.  Cardiac enlargement, diffuse interlobular septal thickening in the lungs, and bilateral pleural effusions right greater than left. Constellation of findings would be compatible with CHF or fluid overload. 3.  Prominent areas of bibasilar consolidation suspicious for potential pneumonitis. Clinical correlation. 4.  Marked coronary artery calcification.          Latest radiology report personally reviewed.    Note created using dragon voice recognition software so sounds alike errors may have escaped editing.    08/10/2022   RENEE WHITLOCK MD  HOSPITALIST, [facility]  PAGER NO.  [phone]

## 2022-08-10 NOTE — PLAN OF CARE
Goal Outcome Evaluation:    Problem: Plan of Care - These are the overarching goals to be used throughout the patient stay.    Goal: Optimal Comfort and Wellbeing  Intervention: Monitor Pain and Promote Comfort  Recent Flowsheet Documentation  Taken 8/9/2022 1700 by Tony Mireles RN  Pain Management Interventions: repositioned     Problem: Gas Exchange Impaired  Goal: Optimal Gas Exchange  Outcome: Met  Intervention: Optimize Oxygenation and Ventilation  Recent Flowsheet Documentation  Taken 8/9/2022 1700 by Tony Mireles RN  Head of Bed (HOB) Positioning: HOB at 30-45 degrees     Vital sign stable. Pt denied pain, discomfort and shortness of breath. Pt refused scheduled suppository. Noted trace of blood in rectum. This is a known occurrence. Continue to monitor.

## 2022-08-10 NOTE — PROGRESS NOTES
[facility]  Palliative Care Daily Progress Note      Code status: No CPR- Do NOT Intubate     Impressions, Recommendations & Counseling     SYMPTOM ASSESSMENT:  1. Dyspnea, multifactorial, pleural effusions, heart failure, anemia and overall physical weakness- Dyspnea improved with diuretics.  - Off oxygen presently.  - Continue to monitor.      2.Weight loss, multifactorial, multiple comorbid conditions, underlying malignancy, congestive heart failure, decreased oral intake- resulting in loose dentures; Albumin 2.3  - Soft food Diet  -Patient reports herself being a risk of aspiration.  Knows to be sitting upright and to chew thoroughly.    GOALS OF CARE DISCUSSION  - Code status: DNR/I  - Surrogate decision maker: Kayode Rodgers  - Health care directive in chart.  - POLST completed and in chart.    ADVANCED CARE PLANNING  -Patient interested in obtaining additional information prior to making decisions about treatment.  Should treatment be more burdensome she likely would not pursue it as benefit would not outweigh burden.  -Awaiting further evaluation from radiation oncology and to have PET scan which is ordered 8/10.      HPI   Notes and chart reviewed.       Yun Rodgers is a 87 year old old female with h/o CKD 3, constipation, hemorrhoids, RTA, chronic metabolic acidosis, subacute cutaneous lupus, Sjogren's, CAD s/p post stent in 2007, peripheral vascular disease status post right carotid enterectomy. Patient recently diagnosed with rectal cancer 8/2/2022 and presented to ED 8/8/2022 with SOB, anemia, chest tightness, orthopnea, PND, leg swelling. She continues to  have rectal bleeding and was to meet with surgeon and oncology to discuss options.    Today, the patient was seen for:  Goals of care, support    Prognosis, Goals, or Advance Care Planning was addressed today with: Yes.  Mood, coping, and/or meaning in the context of serious illness were addressed today:  "Yes.  Summary/Comments: Met with patient in room with MORALES Kenney with palliative care.  Support provided to patient who is jovial and in good spirits and reports \"it is a party\" when palliative providers entered the room.  She is cheerfully eager to eat her oatmeal and drink coffee.  She is at peace with what ever the outcome is.  Eager to get additional information from oncology about treatment options.  Interested in obtaining a PET scan regarding extent of disease to assist in decision making.  Also wishes to involve her son Kayode and all decisions.            Interval History:     Chart review/discussion with unit or clinical team members:   Plan is to continue current treatments and cares.  Patient interested in obtaining PET scan for further evaluation of extent of disease for recommendations for treatment.  No acute distress.  Vital signs stable.  Afebrile.  Shortness of breath alleviated presently.    Key Palliative Symptoms:  # Pain severity the last 12 hours: none  # Dyspnea severity the last 12 hours: none  # Nausea severity the last 12 hours: none  # Anxiety severity the last 12 hours: none  Does report some shortness of breath with exertion.  Weakness: Moderate             Review of Systems:     Besides above, an additional system ROS was reviewed.          Medications:     I have reviewed this patient's medication profile and medications during this hospitalization.           Physical Exam:   Temp:  [97.7  F (36.5  C)-100  F (37.8  C)] 98.9  F (37.2  C)  Pulse:  [60-69] 65  Resp:  [18-24] 18  BP: (117-151)/(54-70) 135/61  SpO2:  [92 %-98 %] 92 %    General appearance: alert, cachectic, fatigued and no distress  Head: Normocephalic, without obvious abnormality, atraumatic, temporal wasting noted  Eyes: Lids and lashes normal.  Sclera anicteric.  Pupils round.  Nose: no discharge  Throat: Lips without lesions.  Oral mucosa pink and moist.  Ill fitting dentures noted.  Lungs: Nonlabored at " rest in bed  Abdomen: Soft, nontender  Neurologic: Alert, oriented to person and place and situation.             Data Reviewed:     Pertinent Labs  Lab Results: personally reviewed.   Lab Results   Component Value Date     08/10/2022    CO2 26 08/10/2022    BUN 19 08/10/2022     Lab Results   Component Value Date    WBC 6.1 2022    HGB 9.6 08/10/2022    HCT 21.1 2022    MCV 94 2022     2022     AST   Date Value Ref Range Status   2022 22 0 - 40 U/L Final     ALT   Date Value Ref Range Status   2022 16 0 - 45 U/L Final     Alkaline Phosphatase   Date Value Ref Range Status   2022 87 45 - 120 U/L Final     Albumin   Date Value Ref Range Status   2022 2.3 (L) 3.5 - 5.0 g/dL Final         Radiology Results  Echocardiogram Complete    Result Date: 2022  [id number]^WHITE-MELONY^SIMRAN^[place] [address]  Name: SULEMA LEOS MRN: [id number] : 1935 Study Date: 2022 07:22 AM Age: 87 yrs Gender: Female Patient Location: [facility] Reason For Study: CHF Ordering Physician: SIMRAN SEARS Performed By: ANTHONY  BSA: 1.5 m2 Height: 65 in Weight: 105 lb HR: 68 BP: 167/74 mmHg ______________________________________________________________________________ Procedure Complete Echo Adult. ______________________________________________________________________________ Interpretation Summary  The left atrium is severely dilated. The right atrium is moderately dilated. The visual ejection fraction is 60-65%. Left ventricular diastolic function is abnormal. Diastolic Doppler findings (E/E' ratio and/or other parameters) suggest left ventricular filling pressures are increased. No regional wall motion abnormalities noted. There is mild (1+) mitral regurgitation. There is mild (1+) aortic regurgitation. There is trace to mild tricuspid regurgitation.  ______________________________________________________________________________ Left Ventricle The left ventricle is normal in size. There is mild concentric left ventricular hypertrophy. The visual ejection fraction is 60-65%. Left ventricular diastolic function is abnormal. Diastolic Doppler findings (E/E' ratio and/or other parameters) suggest left ventricular filling pressures are increased. No regional wall motion abnormalities noted.  Right Ventricle The right ventricle is normal in size and function.  Atria The left atrium is severely dilated. The right atrium is moderately dilated. Intact atrial septum.  Mitral Valve The mitral valve leaflets appear thickened, but open well. There is mild (1+) mitral regurgitation. There is no mitral valve stenosis.  Tricuspid Valve Tricuspid valve leaflets appear normal. There is trace to mild tricuspid regurgitation. The right ventricular systolic pressure is approximated at 24mmHg plus the right atrial pressure.  Aortic Valve There is mild trileaflet aortic sclerosis. There is mild (1+) aortic regurgitation.  Pulmonic Valve The pulmonic valve is normal in structure and function.  Vessels The aorta root is normal. Normal size ascending aorta. IVC diameter >2.1 cm collapsing <50% with sniff suggests a high RA pressure estimated at 15 mmHg or greater.  Pericardium There is no pericardial effusion. Moderate left pleural effusion.  ______________________________________________________________________________ MMode/2D Measurements & Calculations RVDd: 2.3 cm IVSd: 0.86 cm LVIDd: 5.0 cm LVIDs: 3.2 cm LVPWd: 1.3 cm FS: 35.2 %  LV mass(C)d: 198.4 grams LV mass(C)dI: 131.9 grams/m2 Ao root diam: 3.1 cm LA dimension: 4.1 cm asc Aorta Diam: 3.1 cm LA/Ao: 1.3 LVOT diam: 1.7 cm LVOT area: 2.1 cm2 LA Volume Indexed (AL/bp): 65.9 ml/m2 RWT: 0.51  Doppler Measurements & Calculations MV E max trace: 110.0 cm/sec MV A max trace: 33.7 cm/sec MV E/A: 3.3 MV dec slope: 845.7 cm/sec2 MV dec time:  0.13 sec  Ao V2 max: 130.3 cm/sec Ao max P.0 mmHg Ao V2 mean: 78.2 cm/sec Ao mean P.9 mmHg Ao V2 VTI: 36.7 cm ANTONIO(I,D): 1.3 cm2 NATONIO(V,D): 1.4 cm2 AI P1/2t: 1159 msec LV V1 max PG: 3.0 mmHg LV V1 max: 86.4 cm/sec LV V1 VTI: 21.8 cm SV(LVOT): 46.9 ml SI(LVOT): 31.2 ml/m2 PA acc time: 0.12 sec TR max fuentes: 243.2 cm/sec TR max P.7 mmHg AV Fuentes Ratio (DI): 0.66 ANTONIO Index (cm2/m2): 0.85 E/E' av.8 Lateral E/e': 44.0 Medial E/e': 31.6  ______________________________________________________________________________ Report approved by: Marian Bond 2022 12:26 PM       CTA Abdomen Pelvis with Contrast    Result Date: 2022  EXAM: CTA ABDOMEN PELVIS WITH CONTRAST LOCATION: [facility] DATE/TIME: 2022 2:52 AM INDICATION: Abdominal pain. GI bleed. COMPARISON: 2022. TECHNIQUE: CT angiogram abdomen pelvis during arterial phase of injection of IV contrast. 2D and 3D MIP reconstructions were performed by the CT technologist. Dose reduction techniques were used. CONTRAST: Isovue 370 75 ml. FINDINGS: ANGIOGRAM ABDOMEN/PELVIS: Diffuse aortoiliac atheromatous disease. Ectasia of the distal abdominal aorta measuring up to 2.2 cm in diameter. Negative for dissection, acute intimal hematoma, or significant stenosis. Apparent aortobiiliac artery stent grafts in place which are patent. Multifocal mild atheromatous narrowing in the iliac arteries bilaterally. Mild atheromatous narrowing at the origin of the celiac and superior mesenteric arteries. JAMEEL appears occluded. Moderate to marked atheromatous narrowing at the origin of the bilateral renal arteries. No definite acute vascular findings. No definite CT evidence for active GI bleed. See additional bowel findings below. LOWER CHEST: See separate chest CT report for those details. HEPATOBILIARY: Normal. PANCREAS: Normal. SPLEEN: Normal. ADRENAL GLANDS: Multifocal nodularity left adrenal gland suspected to be due to adrenal  adenomas based on this noncontrast density. Right adrenal gland is negative. KIDNEYS/BLADDER: Multiple small benign-appearing bilateral renal cysts. No hydronephrosis. Bladder is distended but otherwise grossly unremarkable. BOWEL: Bowel is normal in caliber with no evidence for obstruction. Multifocal sites of postoperative change in the left hemicolon. There is a large enhancing malignant appearing mass along the right side of the rectum measuring approximately 5.4 x 3.9 x  7.0 cm (AP x transverse x SI). This is strongly concerning for malignancy, most likely a primary rectal malignancy and likely accounts for the patient's symptoms of GI bleed. There is concern for probable invasion of tumor into the sphincter suggesting locally advanced malignancy. LYMPH NODES: There are a few enhancing bilateral groin lymph nodes which are indeterminate including a 1.2 x 1.2 cm node on the right on series 8 image 386. PELVIC ORGANS: Hysterectomy. MUSCULOSKELETAL: Moderate degenerative changes in the spine.     IMPRESSION: 1.  No acute vascular findings. Diffuse atheromatous disease. See above for nonacute vascular findings. 2.  No evidence for active GI bleed. 3.  However, there is a large malignant appearing mass in the right side of the rectum measuring up to 7 cm in diameter strongly concerning for malignancy, most likely primary rectal cancer. This likely accounts for the patient's symptoms of GI bleed. There is concern for locally advanced malignancy. MRI could be obtained for local staging purposes. 4.  Several indeterminate enhancing bilateral groin lymph nodes with metastatic lymph nodes not excluded. 5.  Multifocal nodularity left adrenal gland suspected to be due to adrenal adenomas based on noncontrast density. 6.  Hysterectomy.    CT Chest Pulmonary Embolism w Contrast    Result Date: 8/8/2022  EXAM: CT CHEST PULMONARY EMBOLISM W CONTRAST LOCATION: [facility] DATE/TIME: 8/8/2022 2:40 AM  INDICATION: Dyspnea. COMPARISON: None. TECHNIQUE: CT chest pulmonary angiogram during arterial phase injection of IV contrast. Multiplanar reformats and MIP reconstructions were performed. Dose reduction techniques were used. CONTRAST: Isovue 370 75 ml. FINDINGS: ANGIOGRAM CHEST: Pulmonary arteries are normal caliber and negative for pulmonary emboli. Thoracic aorta is negative for dissection. No CT evidence of right heart strain. LUNGS AND PLEURA: Prominent areas of bibasilar consolidation suspicious for pneumonitis. Diffuse interlobular septal thickening of the lungs compatible with fluid overload. Some subtle patchy groundglass opacities in the lungs may be due to some pulmonary edema. Moderate to large right pleural effusion and moderate left pleural effusion. MEDIASTINUM/AXILLAE: No adenopathy. Cardiac enlargement. No pericardial effusion. Normal caliber thoracic aorta. Prominent aortic atheromatous disease. Left subclavian artery stent noted. Esophagus is grossly negative. CORONARY ARTERY CALCIFICATION: Severe. UPPER ABDOMEN: See separate abdomen and pelvis CT report for those details. MUSCULOSKELETAL: Mild degenerative changes thoracic spine.     IMPRESSION: 1.  Negative for pulmonary embolism. 2.  Cardiac enlargement, diffuse interlobular septal thickening in the lungs, and bilateral pleural effusions right greater than left. Constellation of findings would be compatible with CHF or fluid overload. 3.  Prominent areas of bibasilar consolidation suspicious for potential pneumonitis. Clinical correlation. 4.  Marked coronary artery calcification.     TTS: I have personally spent a total of 35 minutes today on unit in review of medical record, consultation with the medical providers and assessment of patient today, with more than 50% of this time spent in counseling, coordination of care, and discussion with patient and family re: diagnostic results, prognosis, symptom management, risks and benefits of  management options, and development of plan of care as noted above.    MOHAMUD Wade, APRN, CNS  Palliative Care  [phone]

## 2022-08-10 NOTE — PROGRESS NOTES
Call from Dai ([phone]) at [facility]. Son is needing reassurance that hospice will have more supportive cares for pt at [facility] than OP Palliative. Staff to help encourage benefits of hospice at home.

## 2022-08-11 NOTE — PROGRESS NOTES
[facility] In[facility] Progress Note    Patient: Yun Rodgers  MRN: [id number]  Date of Service: August 10, 2022         Reason for Visit    Anal carcinoma squamous cell type.  GI bleeding    Assessment and Plan    1.  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  Causing GI bleeding.  CT scan is showing very suspicious lymph nodes in the bilateral inguinal area.  Which is the usual site of metastases.  I think will be helpful to know her full stage.  PET scan cannot be done as an inpatient.  She will have to do it as an outpatient.  2.  Overall disposition of care.  Discussed with the patient that at this time she needs to which he would like to do.  She needs to make her wishes known to her family and her doctors.  Condition is the patient is not wanting to get too aggressive.  3.  Discussed with Dr. Land.  If she starts to have significant bleeding then she can consider single agent radiation to control the GI blood losses.  Currently her hemoglobin seems to be stable.    Cancer Staging  No matching staging information was found for the patient.    ECOG Performance Status: 3       History of Present Illness    Ms. Yun Rodgers is a very pleasant 87-year-old woman who has been admitted with GI bleeding.  She is found to have a large mass in the upper rectum.  Biopsies positive for squamous cell carcinoma.  This is compatible with anal carcinoma.  Staging work-up has not been completed yet.  She was seen by Dr. Sánchez yesterday.  Due to her comorbid conditions discussion ranged from standard concurrent chemoradiation therapy to comfort care approaches.  Patient does have significant comorbidities.  She has bilateral pleural effusion secondary to CHF akathisias.  She is also fairly bedbound that needs her performance status is not the greatest.    She has needed some blood transfusion due to her GI bleeding from the malignancy.  At this time she is  "wondering about stage before she makes any decision.    Review of Systems    Pertinent findings noted in history.    Physical Exam    /56 (BP Location: Left arm)   Pulse 70   Temp 98.2  F (36.8  C) (Oral)   Resp 18   Ht 1.651 m (5' 5\")   Wt 42.5 kg (93 lb 12.8 oz)   SpO2 96%   BMI 15.61 kg/m      GENERAL: no acute distress. Cooperative in conversation.   HEENT: pupils are equal, round and reactive. Oral mucosa is moist and intact.  RESP:Chest symmetric. Regular respiratory rate. No stridor.  ABD: Nondistended, soft.  EXTREMITIES: 1+ lower extremity edema.   NEURO: non focal. Alert and oriented x3.   PSYCH: within normal limits. No depression or anxiety.  SKIN: warm dry intact       Lab Results    Recent Results (from the past 24 hour(s))   Magnesium    Collection Time: 08/10/22  5:13 AM   Result Value Ref Range    Magnesium 2.0 1.8 - 2.6 mg/dL   Basic metabolic panel    Collection Time: 08/10/22  5:13 AM   Result Value Ref Range    Sodium 133 (L) 136 - 145 mmol/L    Potassium 3.3 (L) 3.5 - 5.0 mmol/L    Chloride 102 98 - 107 mmol/L    Carbon Dioxide (CO2) 26 22 - 31 mmol/L    Anion Gap 5 5 - 18 mmol/L    Urea Nitrogen 19 8 - 28 mg/dL    Creatinine 1.23 (H) 0.60 - 1.10 mg/dL    Calcium 7.9 (L) 8.5 - 10.5 mg/dL    Glucose 86 70 - 125 mg/dL    GFR Estimate 42 (L) >60 mL/min/1.73m2   Hemoglobin    Collection Time: 08/10/22  5:13 AM   Result Value Ref Range    Hemoglobin 9.6 (L) 11.7 - 15.7 g/dL   Potassium    Collection Time: 08/10/22 12:01 PM   Result Value Ref Range    Potassium 3.8 3.5 - 5.0 mmol/L        Imaging    Echocardiogram Complete    Result Date: 2022  [id number]^[email or web address] [address]  Name: SULEMA LEOS MRN: [id number] : 1935 Study Date: 2022 07:22 AM Age: 87 yrs Gender: Female Patient Location: [facility] Reason For Study: CHF Ordering Physician: SIMRAN SEARS Performed By: ANTHONY  BSA: 1.5 m2 " Height: 65 in Weight: 105 lb HR: 68 BP: 167/74 mmHg ______________________________________________________________________________ Procedure Complete Echo Adult. ______________________________________________________________________________ Interpretation Summary  The left atrium is severely dilated. The right atrium is moderately dilated. The visual ejection fraction is 60-65%. Left ventricular diastolic function is abnormal. Diastolic Doppler findings (E/E' ratio and/or other parameters) suggest left ventricular filling pressures are increased. No regional wall motion abnormalities noted. There is mild (1+) mitral regurgitation. There is mild (1+) aortic regurgitation. There is trace to mild tricuspid regurgitation. ______________________________________________________________________________ Left Ventricle The left ventricle is normal in size. There is mild concentric left ventricular hypertrophy. The visual ejection fraction is 60-65%. Left ventricular diastolic function is abnormal. Diastolic Doppler findings (E/E' ratio and/or other parameters) suggest left ventricular filling pressures are increased. No regional wall motion abnormalities noted.  Right Ventricle The right ventricle is normal in size and function.  Atria The left atrium is severely dilated. The right atrium is moderately dilated. Intact atrial septum.  Mitral Valve The mitral valve leaflets appear thickened, but open well. There is mild (1+) mitral regurgitation. There is no mitral valve stenosis.  Tricuspid Valve Tricuspid valve leaflets appear normal. There is trace to mild tricuspid regurgitation. The right ventricular systolic pressure is approximated at 24mmHg plus the right atrial pressure.  Aortic Valve There is mild trileaflet aortic sclerosis. There is mild (1+) aortic regurgitation.  Pulmonic Valve The pulmonic valve is normal in structure and function.  Vessels The aorta root is normal. Normal size ascending aorta. IVC diameter >2.1  cm collapsing <50% with sniff suggests a high RA pressure estimated at 15 mmHg or greater.  Pericardium There is no pericardial effusion. Moderate left pleural effusion.  ______________________________________________________________________________ MMode/2D Measurements & Calculations RVDd: 2.3 cm IVSd: 0.86 cm LVIDd: 5.0 cm LVIDs: 3.2 cm LVPWd: 1.3 cm FS: 35.2 %  LV mass(C)d: 198.4 grams LV mass(C)dI: 131.9 grams/m2 Ao root diam: 3.1 cm LA dimension: 4.1 cm asc Aorta Diam: 3.1 cm LA/Ao: 1.3 LVOT diam: 1.7 cm LVOT area: 2.1 cm2 LA Volume Indexed (AL/bp): 65.9 ml/m2 RWT: 0.51  Doppler Measurements & Calculations MV E max fuentes: 110.0 cm/sec MV A max fuentes: 33.7 cm/sec MV E/A: 3.3 MV dec slope: 845.7 cm/sec2 MV dec time: 0.13 sec  Ao V2 max: 130.3 cm/sec Ao max P.0 mmHg Ao V2 mean: 78.2 cm/sec Ao mean P.9 mmHg Ao V2 VTI: 36.7 cm ANTONIO(I,D): 1.3 cm2 ANTONIO(V,D): 1.4 cm2 AI P1/2t: 1159 msec LV V1 max PG: 3.0 mmHg LV V1 max: 86.4 cm/sec LV V1 VTI: 21.8 cm SV(LVOT): 46.9 ml SI(LVOT): 31.2 ml/m2 PA acc time: 0.12 sec TR max fuentes: 243.2 cm/sec TR max P.7 mmHg AV Fuentes Ratio (DI): 0.66 ANTONIO Index (cm2/m2): 0.85 E/E' av.8 Lateral E/e': 44.0 Medial E/e': 31.6  ______________________________________________________________________________ Report approved by: Marian Bond 2022 12:26 PM       CTA Abdomen Pelvis with Contrast    Result Date: 2022  EXAM: CTA ABDOMEN PELVIS WITH CONTRAST LOCATION: [facility] DATE/TIME: 2022 2:52 AM INDICATION: Abdominal pain. GI bleed. COMPARISON: 2022. TECHNIQUE: CT angiogram abdomen pelvis during arterial phase of injection of IV contrast. 2D and 3D MIP reconstructions were performed by the CT technologist. Dose reduction techniques were used. CONTRAST: Isovue 370 75 ml. FINDINGS: ANGIOGRAM ABDOMEN/PELVIS: Diffuse aortoiliac atheromatous disease. Ectasia of the distal abdominal aorta measuring up to 2.2 cm in diameter. Negative for dissection,  acute intimal hematoma, or significant stenosis. Apparent aortobiiliac artery stent grafts in place which are patent. Multifocal mild atheromatous narrowing in the iliac arteries bilaterally. Mild atheromatous narrowing at the origin of the celiac and superior mesenteric arteries. JAMEEL appears occluded. Moderate to marked atheromatous narrowing at the origin of the bilateral renal arteries. No definite acute vascular findings. No definite CT evidence for active GI bleed. See additional bowel findings below. LOWER CHEST: See separate chest CT report for those details. HEPATOBILIARY: Normal. PANCREAS: Normal. SPLEEN: Normal. ADRENAL GLANDS: Multifocal nodularity left adrenal gland suspected to be due to adrenal adenomas based on this noncontrast density. Right adrenal gland is negative. KIDNEYS/BLADDER: Multiple small benign-appearing bilateral renal cysts. No hydronephrosis. Bladder is distended but otherwise grossly unremarkable. BOWEL: Bowel is normal in caliber with no evidence for obstruction. Multifocal sites of postoperative change in the left hemicolon. There is a large enhancing malignant appearing mass along the right side of the rectum measuring approximately 5.4 x 3.9 x  7.0 cm (AP x transverse x SI). This is strongly concerning for malignancy, most likely a primary rectal malignancy and likely accounts for the patient's symptoms of GI bleed. There is concern for probable invasion of tumor into the sphincter suggesting locally advanced malignancy. LYMPH NODES: There are a few enhancing bilateral groin lymph nodes which are indeterminate including a 1.2 x 1.2 cm node on the right on series 8 image 386. PELVIC ORGANS: Hysterectomy. MUSCULOSKELETAL: Moderate degenerative changes in the spine.     IMPRESSION: 1.  No acute vascular findings. Diffuse atheromatous disease. See above for nonacute vascular findings. 2.  No evidence for active GI bleed. 3.  However, there is a large malignant appearing mass in the  right side of the rectum measuring up to 7 cm in diameter strongly concerning for malignancy, most likely primary rectal cancer. This likely accounts for the patient's symptoms of GI bleed. There is concern for locally advanced malignancy. MRI could be obtained for local staging purposes. 4.  Several indeterminate enhancing bilateral groin lymph nodes with metastatic lymph nodes not excluded. 5.  Multifocal nodularity left adrenal gland suspected to be due to adrenal adenomas based on noncontrast density. 6.  Hysterectomy.    CT Chest Pulmonary Embolism w Contrast    Result Date: 8/8/2022  EXAM: CT CHEST PULMONARY EMBOLISM W CONTRAST LOCATION: [facility] DATE/TIME: 8/8/2022 2:40 AM INDICATION: Dyspnea. COMPARISON: None. TECHNIQUE: CT chest pulmonary angiogram during arterial phase injection of IV contrast. Multiplanar reformats and MIP reconstructions were performed. Dose reduction techniques were used. CONTRAST: Isovue 370 75 ml. FINDINGS: ANGIOGRAM CHEST: Pulmonary arteries are normal caliber and negative for pulmonary emboli. Thoracic aorta is negative for dissection. No CT evidence of right heart strain. LUNGS AND PLEURA: Prominent areas of bibasilar consolidation suspicious for pneumonitis. Diffuse interlobular septal thickening of the lungs compatible with fluid overload. Some subtle patchy groundglass opacities in the lungs may be due to some pulmonary edema. Moderate to large right pleural effusion and moderate left pleural effusion. MEDIASTINUM/AXILLAE: No adenopathy. Cardiac enlargement. No pericardial effusion. Normal caliber thoracic aorta. Prominent aortic atheromatous disease. Left subclavian artery stent noted. Esophagus is grossly negative. CORONARY ARTERY CALCIFICATION: Severe. UPPER ABDOMEN: See separate abdomen and pelvis CT report for those details. MUSCULOSKELETAL: Mild degenerative changes thoracic spine.     IMPRESSION: 1.  Negative for pulmonary embolism. 2.  Cardiac  enlargement, diffuse interlobular septal thickening in the lungs, and bilateral pleural effusions right greater than left. Constellation of findings would be compatible with CHF or fluid overload. 3.  Prominent areas of bibasilar consolidation suspicious for potential pneumonitis. Clinical correlation. 4.  Marked coronary artery calcification.     Total visit time of the visit was over 35 minutes.    Signed by: Boo Vega MD, MD    This note has been dictated using voice recognition software. Any grammatical or context distortions are unintentional and inherent to the software

## 2022-08-11 NOTE — PROGRESS NOTES
[facility]  Palliative Care Daily Progress Note      Code status: No CPR- Do NOT Intubate     Impressions, Recommendations & Counseling     SYMPTOM ASSESSMENT:  1. Dyspnea, multifactorial, pleural effusions, heart failure, anemia and overall physical weakness- Dyspnea improved with diuretics.  - Off oxygen presently.  - Continue to monitor.      2.Weight loss, multifactorial, multiple comorbid conditions, underlying malignancy, congestive heart failure, decreased oral intake- resulting in loose dentures; Albumin 2.3  - Soft food Diet  -Patient reports herself being a risk of aspiration.  Knows to be sitting upright and to chew thoroughly.    3. Discomfort related to rectal bleed  Comment: Hgb has been stable at 9.1 to 9.6, today it is 9.3  Recommendations:  Plan is to Discharge home and and return for outpt PET to determine next steps    GOALS OF CARE DISCUSSION  - Code status: DNR/I  - Surrogate decision maker: Kayode Rodgers  - Health care directive in chart.  - POLST completed and in chart.    ADVANCED CARE PLANNING  -Patient interested in obtaining additional information prior to making decisions about treatment.  Should treatment be more burdensome she likely would not pursue it as benefit would not outweigh burden.   Checked with PET scan dept, PET scan needs  To be done outpatient for them to be reimbursed.       HPI   Notes and chart reviewed.    Yun Rodgers is a 87 year old old female with h/o CKD 3, constipation, hemorrhoids, RTA, chronic metabolic acidosis, subacute cutaneous lupus, Sjogren's, CAD s/p post stent in 2007, peripheral vascular disease status post right carotid enterectomy. Patient recently diagnosed with rectal cancer 8/2/2022 and presented to ED 8/8/2022 with SOB, anemia, chest tightness, orthopnea, PND, leg swelling. She continues to  have rectal bleeding and was to meet with surgeon and oncology to discuss options.           Interval History:     Some confusion  "over discharge and where PET scan can be done.  I clarified this with son, KATHY and PET scan dept             Palliative Care Followup:     8/11  Met with patient, she wants PET scan to determine staging. Discussed with son. He wants PET scan.  I explained that I spoke to CM who indicate there is an issue with discharge back to [facility] .   I explained that PET scan cannot be done inpatient.  He will call [facility] to clear up discharge issue. I updated RNKATHY    8/10   Seen by my colleagues     She is at peace with what ever the outcome is.  Eager to get additional information from oncology about treatment options.  Interested in obtaining a PET scan regarding extent of disease to assist in decision making.    Patient interested in obtaining PET scan for further evaluation of extent of disease for recommendations for treatment.    Chart review/discussion with unit or clinical team members:   Discussed with RN, will update Dr. PARISH    [facility] Symptoms:  # Pain severity the last 12 hours: none  # Dyspnea severity the last 12 hours: none  # Nausea severity the last 12 hours: none  # Anxiety severity the last 12 hours: none  Does report some shortness of breath with exertion, none at rest   Weakness: Moderate ECOG 3                Review of Systems:     Besides above, an additional system ROS was reviewed.         Medications:     I have reviewed this patient's medication profile and medications during this hospitalization.           Physical Exam:       BP (!) 150/67 (BP Location: Left arm)   Pulse 74   Temp 99.5  F (37.5  C) (Oral)   Resp 16   Ht 1.651 m (5' 5\")   Wt 42.5 kg (93 lb 12.8 oz)   SpO2 92%   BMI 15.61 kg/m            General appearance: alert, cachectic, fatigued and no distress  Head: Normocephalic, without obvious abnormality, atraumatic, temporal wasting noted  Eyes: Lids and lashes normal.  Sclera anicteric.  Pupils round.  Nose: no discharge  Throat: Lips without lesions.  Oral mucosa pink " and moist.  Ill fitting dentures noted.  Lungs: Nonlabored at rest in bed  Abdomen: Soft, nontender  Neurologic: Alert, oriented to person and place and situation.               Data Reviewed:     Hgb 9.3 today       Pertinent Labs  Lab Results: personally reviewed.   Lab Results   Component Value Date     08/10/2022    CO2 26 08/10/2022    BUN 19 08/10/2022     Lab Results   Component Value Date    WBC 6.1 2022    HGB 9.6 08/10/2022    HCT 21.1 2022    MCV 94 2022     2022     AST   Date Value Ref Range Status   2022 22 0 - 40 U/L Final     ALT   Date Value Ref Range Status   2022 16 0 - 45 U/L Final     Alkaline Phosphatase   Date Value Ref Range Status   2022 87 45 - 120 U/L Final     Albumin   Date Value Ref Range Status   2022 2.3 (L) 3.5 - 5.0 g/dL Final         Radiology Results  Echocardiogram Complete    Result Date: 2022  [id number]^WHITE-MELONY^[place] [address]  Name: SULEMA LEOS MRN: [id number] : 1935 Study Date: 2022 07:22 AM Age: 87 yrs Gender: Female Patient Location: [facility] Reason For Study: CHF Ordering Physician: SIMRAN SEARS Performed By: ANTHONY  BSA: 1.5 m2 Height: 65 in Weight: 105 lb HR: 68 BP: 167/74 mmHg ______________________________________________________________________________ Procedure Complete Echo Adult. ______________________________________________________________________________ Interpretation Summary  The left atrium is severely dilated. The right atrium is moderately dilated. The visual ejection fraction is 60-65%. Left ventricular diastolic function is abnormal. Diastolic Doppler findings (E/E' ratio and/or other parameters) suggest left ventricular filling pressures are increased. No regional wall motion abnormalities noted. There is mild (1+) mitral regurgitation. There is mild (1+) aortic regurgitation. There is trace to  mild tricuspid regurgitation. ______________________________________________________________________________ Left Ventricle The left ventricle is normal in size. There is mild concentric left ventricular hypertrophy. The visual ejection fraction is 60-65%. Left ventricular diastolic function is abnormal. Diastolic Doppler findings (E/E' ratio and/or other parameters) suggest left ventricular filling pressures are increased. No regional wall motion abnormalities noted.  Right Ventricle The right ventricle is normal in size and function.  Atria The left atrium is severely dilated. The right atrium is moderately dilated. Intact atrial septum.  Mitral Valve The mitral valve leaflets appear thickened, but open well. There is mild (1+) mitral regurgitation. There is no mitral valve stenosis.  Tricuspid Valve Tricuspid valve leaflets appear normal. There is trace to mild tricuspid regurgitation. The right ventricular systolic pressure is approximated at 24mmHg plus the right atrial pressure.  Aortic Valve There is mild trileaflet aortic sclerosis. There is mild (1+) aortic regurgitation.  Pulmonic Valve The pulmonic valve is normal in structure and function.  Vessels The aorta root is normal. Normal size ascending aorta. IVC diameter >2.1 cm collapsing <50% with sniff suggests a high RA pressure estimated at 15 mmHg or greater.  Pericardium There is no pericardial effusion. Moderate left pleural effusion.  ______________________________________________________________________________ MMode/2D Measurements & Calculations RVDd: 2.3 cm IVSd: 0.86 cm LVIDd: 5.0 cm LVIDs: 3.2 cm LVPWd: 1.3 cm FS: 35.2 %  LV mass(C)d: 198.4 grams LV mass(C)dI: 131.9 grams/m2 Ao root diam: 3.1 cm LA dimension: 4.1 cm asc Aorta Diam: 3.1 cm LA/Ao: 1.3 LVOT diam: 1.7 cm LVOT area: 2.1 cm2 LA Volume Indexed (AL/bp): 65.9 ml/m2 RWT: 0.51  Doppler Measurements & Calculations MV E max trace: 110.0 cm/sec MV A max trace: 33.7 cm/sec MV E/A: 3.3 MV dec  slope: 845.7 cm/sec2 MV dec time: 0.13 sec  Ao V2 max: 130.3 cm/sec Ao max P.0 mmHg Ao V2 mean: 78.2 cm/sec Ao mean P.9 mmHg Ao V2 VTI: 36.7 cm ANTONIO(I,D): 1.3 cm2 ANTONIO(V,D): 1.4 cm2 AI P1/2t: 1159 msec LV V1 max PG: 3.0 mmHg LV V1 max: 86.4 cm/sec LV V1 VTI: 21.8 cm SV(LVOT): 46.9 ml SI(LVOT): 31.2 ml/m2 PA acc time: 0.12 sec TR max fuentes: 243.2 cm/sec TR max P.7 mmHg AV Fuentes Ratio (DI): 0.66 ANTONIO Index (cm2/m2): 0.85 E/E' av.8 Lateral E/e': 44.0 Medial E/e': 31.6  ______________________________________________________________________________ Report approved by: Marian Bond 2022 12:26 PM       CTA Abdomen Pelvis with Contrast    Result Date: 2022  EXAM: CTA ABDOMEN PELVIS WITH CONTRAST LOCATION: [facility] DATE/TIME: 2022 2:52 AM INDICATION: Abdominal pain. GI bleed. COMPARISON: 2022. TECHNIQUE: CT angiogram abdomen pelvis during arterial phase of injection of IV contrast. 2D and 3D MIP reconstructions were performed by the CT technologist. Dose reduction techniques were used. CONTRAST: Isovue 370 75 ml. FINDINGS: ANGIOGRAM ABDOMEN/PELVIS: Diffuse aortoiliac atheromatous disease. Ectasia of the distal abdominal aorta measuring up to 2.2 cm in diameter. Negative for dissection, acute intimal hematoma, or significant stenosis. Apparent aortobiiliac artery stent grafts in place which are patent. Multifocal mild atheromatous narrowing in the iliac arteries bilaterally. Mild atheromatous narrowing at the origin of the celiac and superior mesenteric arteries. JAMEEL appears occluded. Moderate to marked atheromatous narrowing at the origin of the bilateral renal arteries. No definite acute vascular findings. No definite CT evidence for active GI bleed. See additional bowel findings below. LOWER CHEST: See separate chest CT report for those details. HEPATOBILIARY: Normal. PANCREAS: Normal. SPLEEN: Normal. ADRENAL GLANDS: Multifocal nodularity left adrenal gland  suspected to be due to adrenal adenomas based on this noncontrast density. Right adrenal gland is negative. KIDNEYS/BLADDER: Multiple small benign-appearing bilateral renal cysts. No hydronephrosis. Bladder is distended but otherwise grossly unremarkable. BOWEL: Bowel is normal in caliber with no evidence for obstruction. Multifocal sites of postoperative change in the left hemicolon. There is a large enhancing malignant appearing mass along the right side of the rectum measuring approximately 5.4 x 3.9 x  7.0 cm (AP x transverse x SI). This is strongly concerning for malignancy, most likely a primary rectal malignancy and likely accounts for the patient's symptoms of GI bleed. There is concern for probable invasion of tumor into the sphincter suggesting locally advanced malignancy. LYMPH NODES: There are a few enhancing bilateral groin lymph nodes which are indeterminate including a 1.2 x 1.2 cm node on the right on series 8 image 386. PELVIC ORGANS: Hysterectomy. MUSCULOSKELETAL: Moderate degenerative changes in the spine.     IMPRESSION: 1.  No acute vascular findings. Diffuse atheromatous disease. See above for nonacute vascular findings. 2.  No evidence for active GI bleed. 3.  However, there is a large malignant appearing mass in the right side of the rectum measuring up to 7 cm in diameter strongly concerning for malignancy, most likely primary rectal cancer. This likely accounts for the patient's symptoms of GI bleed. There is concern for locally advanced malignancy. MRI could be obtained for local staging purposes. 4.  Several indeterminate enhancing bilateral groin lymph nodes with metastatic lymph nodes not excluded. 5.  Multifocal nodularity left adrenal gland suspected to be due to adrenal adenomas based on noncontrast density. 6.  Hysterectomy.    CT Chest Pulmonary Embolism w Contrast    Result Date: 8/8/2022  EXAM: CT CHEST PULMONARY EMBOLISM W CONTRAST LOCATION: [facility]  DATE/TIME: 8/8/2022 2:40 AM INDICATION: Dyspnea. COMPARISON: None. TECHNIQUE: CT chest pulmonary angiogram during arterial phase injection of IV contrast. Multiplanar reformats and MIP reconstructions were performed. Dose reduction techniques were used. CONTRAST: Isovue 370 75 ml. FINDINGS: ANGIOGRAM CHEST: Pulmonary arteries are normal caliber and negative for pulmonary emboli. Thoracic aorta is negative for dissection. No CT evidence of right heart strain. LUNGS AND PLEURA: Prominent areas of bibasilar consolidation suspicious for pneumonitis. Diffuse interlobular septal thickening of the lungs compatible with fluid overload. Some subtle patchy groundglass opacities in the lungs may be due to some pulmonary edema. Moderate to large right pleural effusion and moderate left pleural effusion. MEDIASTINUM/AXILLAE: No adenopathy. Cardiac enlargement. No pericardial effusion. Normal caliber thoracic aorta. Prominent aortic atheromatous disease. Left subclavian artery stent noted. Esophagus is grossly negative. CORONARY ARTERY CALCIFICATION: Severe. UPPER ABDOMEN: See separate abdomen and pelvis CT report for those details. MUSCULOSKELETAL: Mild degenerative changes thoracic spine.     IMPRESSION: 1.  Negative for pulmonary embolism. 2.  Cardiac enlargement, diffuse interlobular septal thickening in the lungs, and bilateral pleural effusions right greater than left. Constellation of findings would be compatible with CHF or fluid overload. 3.  Prominent areas of bibasilar consolidation suspicious for potential pneumonitis. Clinical correlation. 4.  Marked coronary artery calcification.     TTS: I have personally spent a total of 35 minutes today on unit in review of medical record, consultation with the medical providers and assessment of patient today, with more than 50% of this time spent in counseling, coordination of care, and discussion with patient and family re: diagnostic results, prognosis, symptom management,  risks and benefits of management options, and development of plan of care as noted above.      AKIKO Reid, NP-C, [facility]  Palliative Care  [phone]

## 2022-08-11 NOTE — PROGRESS NOTES
Daily Progress Note    Addendum;  Discussed with patient and patient's son.  Patient reported that she does not want any further testing as she does not want treatment for cancer.. Patient's son is in agreement.  Patient's son reported that hospice will be coming to see her on Monday at assisted living facility.  I did informed oncologist    CODE STATUS:  No CPR- Do NOT Intubate    Date of visit; 08/09/22    Length of stay (  LOS: 1 day  )     Assessment/Plan:  Yun Rodgers is a 87 year old female with past medical history of CKD stage III, CAD status post a stent in 2007, PVD status post right carotid endarterectomy, recent hospitalization from 7/29 to 8/3/2022 for GI bleed who received blood transfusion and underwent colonoscopy, biopsy came back positive for rectal cancer who presented to ED for evaluation of shortness of breath and subsequently admitted for further management    Shortness of breath; likely multifactorial, heart failure, anemia, physical deconditioning from recent hospitalization  Acute diastolic heart failure;  --On admission CTA chest negative for pulm embolism but reported findings consistent with heart failure or volume overload  --On admission elevated BNP  --On this admission, echo reported EF 60 to 65%  ---Breathing better, on room air.    --Creatinine trending up then discontinued IV Lasix yesterday, now creatinine trending down.    --Monitor labs, intake/output, weight closely    Recent diagnosis of rectal cancer on 8/2/2020;  Acute on chronic lower GI bleeding; likely due to rectal cancer  --CTA abdomen/pelvis reported no evidence for active GI bleeding.  However, large malignant appearing mass in the right side of rectum which likely accounts for patient's symptoms of GI bleed.  CT also reports concern for locally advanced malignancy.  --Hemoglobin 6.9, transfuse 1 unit pRBC and hemoglobin improved and remains in the 9 range since last 3 days despite having ongoing mild intermittent  rectal bleeding.  --Personally discussed with radiation oncologist and medical oncologist.  --Unable to do PET scan as an inpatient.  Discussed with oncologist, Dr Vega and will plan as an outpatient possibly coming Monday if able to discharge patient tomorrow.    Hyponatremia; likely due to CHF  -- Serum sodium stable to trending up.   -- Hypokalemia, replaced  -- Mild GILBERTO, likely due to overdiuresis, decreased IV Lasix dose from 40 mg twice daily to 20 mg yesterday and discontinued and now improving    History of CAD status post stent in 2007;  --Denied chest pain and reports breathing better with diuretics  --Not on aspirin since he had intermittent ongoing rectal bleeding  --Continue Imdur, metoprolol    Previous admission had urinary retention;  --Currently has a Serrano catheter as patient on IV diuretics, now IV diuretics discontinued and will discontinue Serrano catheter.     DVT prophylaxis; SCDs, ambulate as able.  No pharmacological agent given GI bleed requiring transfusion.    Disposition; anticipate back to assisted living facility  Barrier to discharge; clinical progress      Subjective:  Interval History:  Patient seen and examined. Notes, labs, imaging reports personally reviewed.  Patient denied short of breath or chest pain.  Patient reports getting overwhelmed with information and forgetting about all the providers are telling her and requested to communicate with her son.  Oncology team has been personally communicating with patient's son.  I did call patient's son and left message with callback number today.  Discussed with nursing staffs.  Discussed with oncologist.  Discussed with care manager/    Review of Systems:   As mentioned in subjective.    Patient Active Problem List   Diagnosis     Mixed hyperlipidemia     Essential hypertension     Coronary Artery Disease     Carotid artery disease (H)     Peripheral vascular disease (H)     Gastroesophageal reflux disease with  esophagitis     Chest pain, unspecified type     Angina concurrent with and due to arteriosclerosis of coronary artery (H)     Stable angina (H)     Malaise     Gastroesophageal reflux disease without esophagitis     Trimalleolar fracture     Anemia, unspecified type     Coronary artery disease of native artery of native heart with stable angina pectoris (H)     Pulmonary valve disorder     Fracture dislocation of ankle, right, closed, initial encounter     Closed right ankle fracture     Stage 3 chronic kidney disease (H)     Acute renal failure, unspecified acute renal failure type (H)     Sjogren's syndrome (H)     Poor appetite     Lesion of ulnar nerve     History of tobacco use     History of coronary artery bypass surgery     Grief     Chronic fatigue syndrome     Burning mouth syndrome     Adult failure to thrive syndrome     Acquired trigger finger     Abnormal gait     Ulcer of external ear (H)     Lower GI bleed     Rectal mass     Congestive heart failure, unspecified HF chronicity, unspecified heart failure type (H)     Hyponatremia     Leukocytosis, unspecified type     Sinus tachycardia     Hypomagnesemia     Acute diastolic heart failure (H)       Scheduled Meds:    ferrous gluconate  324 mg Oral BID     fexofenadine  180 mg Oral Daily     isosorbide mononitrate  120 mg Oral Daily     isosorbide mononitrate  30 mg Oral Daily     menthol-zinc oxide   Topical TID     metoprolol tartrate  25 mg Oral BID     mirtazapine  15 mg Oral At Bedtime     pantoprazole  40 mg Oral BID AC     polyethylene glycol-propylene glycol  1 drop Both Eyes 4x Daily     sennosides  2 tablet Oral At Bedtime     Continuous Infusions:  PRN Meds:.acetaminophen, hydrALAZINE, hydrocortisone (Perianal), melatonin, nitroGLYcerin, phenylephrine-mineral oil-petrolatum, polyethylene glycol, prochlorperazine, sennosides    Objective:  Vital signs in last 24 hours:  Temp:  [98.1  F (36.7  C)-99.8  F (37.7  C)] 99.5  F (37.5  C)  Pulse:   [65-75] 74  Resp:  [16-18] 16  BP: (113-165)/(55-73) 150/67  SpO2:  [92 %-96 %] 92 %      Intake/Output Summary (Last 24 hours) at 8/9/2022 0817  Last data filed at 8/9/2022 0800  Gross per 24 hour   Intake 100 ml   Output 3100 ml   Net -3000 ml       Physical Exam:  General: Not in obvious distress.   HEENT: Normocephalic, supple neck  Chest: Clear to auscultation bilateral anteriorly, no wheezing  Heart: S1S2 normal, regular  Abdomen: Soft. NT, ND. Bowel sounds- active.  Neuro: alert and awake, follows simple commands appropriately, moves all extremities      Lab Results:(I have personally reviewed the results)    Recent Results (from the past 24 hour(s))   Basic metabolic panel    Collection Time: 08/11/22  7:17 AM   Result Value Ref Range    Sodium 132 (L) 136 - 145 mmol/L    Potassium 3.8 3.5 - 5.0 mmol/L    Chloride 101 98 - 107 mmol/L    Carbon Dioxide (CO2) 27 22 - 31 mmol/L    Anion Gap 4 (L) 5 - 18 mmol/L    Urea Nitrogen 17 8 - 28 mg/dL    Creatinine 1.05 0.60 - 1.10 mg/dL    Calcium 7.9 (L) 8.5 - 10.5 mg/dL    Glucose 83 70 - 125 mg/dL    GFR Estimate 51 (L) >60 mL/min/1.73m2   Magnesium    Collection Time: 08/11/22  7:17 AM   Result Value Ref Range    Magnesium 2.1 1.8 - 2.6 mg/dL   Hemoglobin    Collection Time: 08/11/22  7:17 AM   Result Value Ref Range    Hemoglobin 9.3 (L) 11.7 - 15.7 g/dL         Serum Glucose range:   Recent Labs   Lab 08/11/22  0717 08/10/22  0513 08/09/22  0503 08/08/22  1423   GLC 83 86 85 127*     ABG: No lab results found in last 7 days.  CBC:   Recent Labs   Lab 08/11/22  0717 08/10/22  0513 08/09/22  1828 08/09/22  0503 08/08/22  0125   WBC  --   --   --  6.1 11.4*   HGB 9.3* 9.6* 9.1* 6.9* 9.0*   HCT  --   --   --  21.1* 27.4*   MCV  --   --   --  94 95   PLT  --   --   --  146* 168   NEUTROPHIL  --   --   --   --  79   LYMPH  --   --   --   --  10   MONOCYTE  --   --   --   --  11   EOSINOPHIL  --   --   --   --  0     Chemistry:   Recent Labs   Lab 08/11/22  3955  08/10/22  1201 08/10/22  0513 22  1328 22  0503 22  1423 22  0125   *  --  133*  --  130*   < > 128*   POTASSIUM 3.8 3.8 3.3*   < > 3.1*   < > 3.8   CHLORIDE 101  --  102  --  103   < > 106   CO2 27  --  26  --  23   < > 16*   BUN 17  --  19  --  14   < > 11   CR 1.05  --  1.23*  --  1.16*   < > 1.02   GFRESTIMATED 51*  --  42*  --  45*   < > 53*   ARON 7.9*  --  7.9*  --  7.3*   < > 8.1*   MAG 2.1  --  2.0  --  1.9  --  1.7*   PROTTOTAL  --   --   --   --   --   --  6.1   ALBUMIN  --   --   --   --   --   --  2.3*   AST  --   --   --   --   --   --  22   ALT  --   --   --   --   --   --  16   ALKPHOS  --   --   --   --   --   --  87   BILITOTAL  --   --   --   --   --   --  0.4    < > = values in this interval not displayed.     Coags:  Recent Labs   Lab 22  0125   INR 1.23*   PTT 29     Cardiac Markers:  Recent Labs   Lab 22  1423   TROPONINI 0.12        Echocardiogram Complete    Result Date: 2022  [id number]^WHITE-MELONY^[place] [address]  Name: SULEMA LEOS MRN: [id number] : 1935 Study Date: 2022 07:22 AM Age: 87 yrs Gender: Female Patient Location: [facility] Reason For Study: CHF Ordering Physician: SIMRAN SEARS Performed By: ANTHONY  BSA: 1.5 m2 Height: 65 in Weight: 105 lb HR: 68 BP: 167/74 mmHg ______________________________________________________________________________ Procedure Complete Echo Adult. ______________________________________________________________________________ Interpretation Summary  The left atrium is severely dilated. The right atrium is moderately dilated. The visual ejection fraction is 60-65%. Left ventricular diastolic function is abnormal. Diastolic Doppler findings (E/E' ratio and/or other parameters) suggest left ventricular filling pressures are increased. No regional wall motion abnormalities noted. There is mild (1+) mitral regurgitation. There is  mild (1+) aortic regurgitation. There is trace to mild tricuspid regurgitation. ______________________________________________________________________________ Left Ventricle The left ventricle is normal in size. There is mild concentric left ventricular hypertrophy. The visual ejection fraction is 60-65%. Left ventricular diastolic function is abnormal. Diastolic Doppler findings (E/E' ratio and/or other parameters) suggest left ventricular filling pressures are increased. No regional wall motion abnormalities noted.  Right Ventricle The right ventricle is normal in size and function.  Atria The left atrium is severely dilated. The right atrium is moderately dilated. Intact atrial septum.  Mitral Valve The mitral valve leaflets appear thickened, but open well. There is mild (1+) mitral regurgitation. There is no mitral valve stenosis.  Tricuspid Valve Tricuspid valve leaflets appear normal. There is trace to mild tricuspid regurgitation. The right ventricular systolic pressure is approximated at 24mmHg plus the right atrial pressure.  Aortic Valve There is mild trileaflet aortic sclerosis. There is mild (1+) aortic regurgitation.  Pulmonic Valve The pulmonic valve is normal in structure and function.  Vessels The aorta root is normal. Normal size ascending aorta. IVC diameter >2.1 cm collapsing <50% with sniff suggests a high RA pressure estimated at 15 mmHg or greater.  Pericardium There is no pericardial effusion. Moderate left pleural effusion.  ______________________________________________________________________________ MMode/2D Measurements & Calculations RVDd: 2.3 cm IVSd: 0.86 cm LVIDd: 5.0 cm LVIDs: 3.2 cm LVPWd: 1.3 cm FS: 35.2 %  LV mass(C)d: 198.4 grams LV mass(C)dI: 131.9 grams/m2 Ao root diam: 3.1 cm LA dimension: 4.1 cm asc Aorta Diam: 3.1 cm LA/Ao: 1.3 LVOT diam: 1.7 cm LVOT area: 2.1 cm2 LA Volume Indexed (AL/bp): 65.9 ml/m2 RWT: 0.51  Doppler Measurements & Calculations MV E max trace: 110.0 cm/sec  MV A max fuentes: 33.7 cm/sec MV E/A: 3.3 MV dec slope: 845.7 cm/sec2 MV dec time: 0.13 sec  Ao V2 max: 130.3 cm/sec Ao max P.0 mmHg Ao V2 mean: 78.2 cm/sec Ao mean P.9 mmHg Ao V2 VTI: 36.7 cm ANTONIO(I,D): 1.3 cm2 ANTONIO(V,D): 1.4 cm2 AI P1/2t: 1159 msec LV V1 max PG: 3.0 mmHg LV V1 max: 86.4 cm/sec LV V1 VTI: 21.8 cm SV(LVOT): 46.9 ml SI(LVOT): 31.2 ml/m2 PA acc time: 0.12 sec TR max fuentes: 243.2 cm/sec TR max P.7 mmHg AV Fuentes Ratio (DI): 0.66 ANTONIO Index (cm2/m2): 0.85 E/E' av.8 Lateral E/e': 44.0 Medial E/e': 31.6  ______________________________________________________________________________ Report approved by: Marian Bond 2022 12:26 PM       CTA Abdomen Pelvis with Contrast    Result Date: 2022  EXAM: CTA ABDOMEN PELVIS WITH CONTRAST LOCATION: [facility] DATE/TIME: 2022 2:52 AM INDICATION: Abdominal pain. GI bleed. COMPARISON: 2022. TECHNIQUE: CT angiogram abdomen pelvis during arterial phase of injection of IV contrast. 2D and 3D MIP reconstructions were performed by the CT technologist. Dose reduction techniques were used. CONTRAST: Isovue 370 75 ml. FINDINGS: ANGIOGRAM ABDOMEN/PELVIS: Diffuse aortoiliac atheromatous disease. Ectasia of the distal abdominal aorta measuring up to 2.2 cm in diameter. Negative for dissection, acute intimal hematoma, or significant stenosis. Apparent aortobiiliac artery stent grafts in place which are patent. Multifocal mild atheromatous narrowing in the iliac arteries bilaterally. Mild atheromatous narrowing at the origin of the celiac and superior mesenteric arteries. JAMEEL appears occluded. Moderate to marked atheromatous narrowing at the origin of the bilateral renal arteries. No definite acute vascular findings. No definite CT evidence for active GI bleed. See additional bowel findings below. LOWER CHEST: See separate chest CT report for those details. HEPATOBILIARY: Normal. PANCREAS: Normal. SPLEEN: Normal. ADRENAL GLANDS:  Multifocal nodularity left adrenal gland suspected to be due to adrenal adenomas based on this noncontrast density. Right adrenal gland is negative. KIDNEYS/BLADDER: Multiple small benign-appearing bilateral renal cysts. No hydronephrosis. Bladder is distended but otherwise grossly unremarkable. BOWEL: Bowel is normal in caliber with no evidence for obstruction. Multifocal sites of postoperative change in the left hemicolon. There is a large enhancing malignant appearing mass along the right side of the rectum measuring approximately 5.4 x 3.9 x  7.0 cm (AP x transverse x SI). This is strongly concerning for malignancy, most likely a primary rectal malignancy and likely accounts for the patient's symptoms of GI bleed. There is concern for probable invasion of tumor into the sphincter suggesting locally advanced malignancy. LYMPH NODES: There are a few enhancing bilateral groin lymph nodes which are indeterminate including a 1.2 x 1.2 cm node on the right on series 8 image 386. PELVIC ORGANS: Hysterectomy. MUSCULOSKELETAL: Moderate degenerative changes in the spine.     IMPRESSION: 1.  No acute vascular findings. Diffuse atheromatous disease. See above for nonacute vascular findings. 2.  No evidence for active GI bleed. 3.  However, there is a large malignant appearing mass in the right side of the rectum measuring up to 7 cm in diameter strongly concerning for malignancy, most likely primary rectal cancer. This likely accounts for the patient's symptoms of GI bleed. There is concern for locally advanced malignancy. MRI could be obtained for local staging purposes. 4.  Several indeterminate enhancing bilateral groin lymph nodes with metastatic lymph nodes not excluded. 5.  Multifocal nodularity left adrenal gland suspected to be due to adrenal adenomas based on noncontrast density. 6.  Hysterectomy.    CT Chest Pulmonary Embolism w Contrast    Result Date: 8/8/2022  EXAM: CT CHEST PULMONARY EMBOLISM W CONTRAST  LOCATION: [facility] DATE/TIME: 8/8/2022 2:40 AM INDICATION: Dyspnea. COMPARISON: None. TECHNIQUE: CT chest pulmonary angiogram during arterial phase injection of IV contrast. Multiplanar reformats and MIP reconstructions were performed. Dose reduction techniques were used. CONTRAST: Isovue 370 75 ml. FINDINGS: ANGIOGRAM CHEST: Pulmonary arteries are normal caliber and negative for pulmonary emboli. Thoracic aorta is negative for dissection. No CT evidence of right heart strain. LUNGS AND PLEURA: Prominent areas of bibasilar consolidation suspicious for pneumonitis. Diffuse interlobular septal thickening of the lungs compatible with fluid overload. Some subtle patchy groundglass opacities in the lungs may be due to some pulmonary edema. Moderate to large right pleural effusion and moderate left pleural effusion. MEDIASTINUM/AXILLAE: No adenopathy. Cardiac enlargement. No pericardial effusion. Normal caliber thoracic aorta. Prominent aortic atheromatous disease. Left subclavian artery stent noted. Esophagus is grossly negative. CORONARY ARTERY CALCIFICATION: Severe. UPPER ABDOMEN: See separate abdomen and pelvis CT report for those details. MUSCULOSKELETAL: Mild degenerative changes thoracic spine.     IMPRESSION: 1.  Negative for pulmonary embolism. 2.  Cardiac enlargement, diffuse interlobular septal thickening in the lungs, and bilateral pleural effusions right greater than left. Constellation of findings would be compatible with CHF or fluid overload. 3.  Prominent areas of bibasilar consolidation suspicious for potential pneumonitis. Clinical correlation. 4.  Marked coronary artery calcification.          Latest radiology report personally reviewed.    Note created using dragon voice recognition software so sounds alike errors may have escaped editing.    08/11/2022   RENEE WHITLOCK MD  HOSPITALIST, [facility]  PAGER NO. [phone]

## 2022-08-11 NOTE — PLAN OF CARE
"  Problem: Plan of Care - These are the overarching goals to be used throughout the patient stay.    Goal: Plan of Care Review/Shift Note  Description: The Plan of Care Review/Shift note should be completed every shift.  The Outcome Evaluation is a brief statement about your assessment that the patient is improving, declining, or no change.  This information will be displayed automatically on your shift note.  Outcome: Ongoing, Progressing  Flowsheets (Taken 8/10/2022 2206)  Plan of Care Reviewed With: patient  Goal: Patient-Specific Goal (Individualized)  Description: You can add care plan individualizations to a care plan. Examples of Individualization might be:  \"Parent requests to be called daily at 9am for status\", \"I have a hard time hearing out of my right ear\", or \"Do not touch me to wake me up as it startles me\".  Outcome: Ongoing, Progressing     Problem: Gas Exchange Impaired  Goal: Optimal Gas Exchange  Outcome: Ongoing, Progressing     Problem: Adjustment to Illness (Heart Failure)  Goal: Optimal Coping  Outcome: Ongoing, Progressing   Goal Outcome Evaluation:    Plan of Care Reviewed With: patient   Yun had a good shift. Minimal complaints of pain, except for her tail bone which is always tender. Mepilex in place and encouraged to stay off the area. Assist of 1 to bathroom. Good urine output. Still having rectal bleeding, which is not new but no other symptoms at this time. Gave report to corie on P2 and will transfer pt down to them this evening.                "

## 2022-08-11 NOTE — PROGRESS NOTES
Care Management Follow Up    Length of Stay (days): 3    Expected Discharge Date: 08/11/2022     Concerns to be Addressed:  Pending goals of care discussions (Family is requesting a PET scan to determine goals of care)  Patient plan of care discussed at interdisciplinary rounds: Yes    Anticipated Discharge Disposition: Assisted Living, Hospice (pending goals of care)     Anticipated Discharge Services:  To be determined by goals of care.   Anticipated Discharge DME:  Per therapy (if indicated).    Referrals Placed by CM/SW:  [facility]; [facility];   Private pay costs discussed: Not applicable at this time    Additional Information:  Patient readmitted after a previous admission at [facility] from 7/29 to 8/3. She was discharged home to [facility] with resumption of home care through [facility] care for home PT and OT. [facility] is located at [address] [place] [address].   12:30 PM:  Per palliative care, PET scans can only be completed as an outpatient. Spoke with nurse at [facility] who provided writer with number for nurse manager, Abby ([phone]). Per Abby, they could likely accept back and assist with coordination transportation to and from appointments. Patient is ambulatory per therapy notes, anticipate family transport. Also, they will be short of nursing (due to medical procedures etc) so they prefer patient return tomorrow. Also, if patient is not on hospice, they will need to send back to the hospital if she continues bleeding (especially if symptomatic).   1:05 PM:  Update provided to Dai with [facility].      Contacts for patient care coordination are:  [facility] ([phone]; fax: [phone]),  [facility]  ([facility]) phone [phone]; Fax [phone], [facility]): [phone] and Dai with [facility] ([phone]).  Patient and family are wanting a PET scan to assist with deciding goals of care. Per  previous notes, patient cannot return to her assisted living unless she is on hospice. Per nursing, no plan for a PET scan is in place at this time. Message sent to palliative care for assistance.     Lala Lopez RN

## 2022-08-11 NOTE — PLAN OF CARE
"  Problem: Plan of Care - These are the overarching goals to be used throughout the patient stay.    Goal: Plan of Care Review/Shift Note  Description: The Plan of Care Review/Shift note should be completed every shift.  The Outcome Evaluation is a brief statement about your assessment that the patient is improving, declining, or no change.  This information will be displayed automatically on your shift note.  Outcome: Ongoing, Progressing     Problem: Plan of Care - These are the overarching goals to be used throughout the patient stay.    Goal: Optimal Comfort and Wellbeing  Outcome: Ongoing, Progressing   Goal Outcome Evaluation:      Hgb-9.3. Patient had incontinent bloody brown runny stool today. Ate good today. Patient son stated \"she does not wish to continue with PET scan. Wants to go home.\"  Sticky note for Doctor. Denies pain. Mepilex on coccyx.                "

## 2022-08-11 NOTE — PROGRESS NOTES
[facility] Hematology and Oncology Inpatient Progress Note    Patient: Yun Rodgers  MRN: [id number]  Date of Service: August 11, 2022         Reason for Visit    Anal carcinoma squamous cell type.  GI bleeding    Assessment and Plan    1.  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  Causing some GI bleeding.  CT scan is showing suspicious lymph nodes in the inguinal area and perhaps even in the retroperitoneum.  Patient is deciding to go with comfort care.  2.  GI bleeding secondary to the mass.  Currently the bleeding is controlled.  She understand that she may benefit from radiation if the bleeding gets worse.  3.  Continue with other supportive care.  4.  We will follow-up on as-needed basis.      Cancer Staging  No matching staging information was found for the patient.    ECOG Performance Status: 3       History of Present Illness    Ms. Yun Rodgers is a very pleasant 87-year-old woman who has been admitted with GI bleeding.  She is found to have a large mass in the upper rectum.  Biopsies positive for squamous cell carcinoma.  This is compatible with anal carcinoma.  Staging work-up has not been completed yet.  She was seen by Dr. Sánchez yesterday.  Due to her comorbid conditions discussion ranged from standard concurrent chemoradiation therapy to comfort care approaches.  Patient does have significant comorbidities.  She has bilateral pleural effusion secondary to CHF akathisias.  She is also fairly bedbound that needs her performance status is not the greatest.    She has needed some blood transfusion due to her GI bleeding from the malignancy.  At this time she is wondering about stage before she makes any decision.    We had discussion regardinG treatment and staging options.  We were also discussing her PET scan.  The PET scan could not be done as an inpatient.    After discussing with her family and her  she has decided to go with  "comfort care out.  She does not want any PET scan.  She has no GI bleeding at this time.  Planning to be discharged to her assisted living facility with hospice care afterwards.  Very comfortable with her decision.  Somewhat emotional as well.    Review of Systems    Pertinent findings noted in history.    Physical Exam    /64 (BP Location: Left arm)   Pulse 60   Temp 100  F (37.8  C) (Oral)   Resp 18   Ht 1.651 m (5' 5\")   Wt 42.2 kg (93 lb)   SpO2 96%   BMI 15.48 kg/m      GENERAL: no acute distress. Cooperative in conversation.   HEENT: pupils are equal, round and reactive. Oral mucosa is moist and intact.  RESP:Chest symmetric. Regular respiratory rate. No stridor.  ABD: Nondistended, soft.  EXTREMITIES: 1+ lower extremity edema.   NEURO: non focal. Alert and oriented x3.   PSYCH: within normal limits. No depression or anxiety.  SKIN: warm dry intact         Lab Results    Recent Results (from the past 24 hour(s))   Basic metabolic panel    Collection Time: 08/11/22  7:17 AM   Result Value Ref Range    Sodium 132 (L) 136 - 145 mmol/L    Potassium 3.8 3.5 - 5.0 mmol/L    Chloride 101 98 - 107 mmol/L    Carbon Dioxide (CO2) 27 22 - 31 mmol/L    Anion Gap 4 (L) 5 - 18 mmol/L    Urea Nitrogen 17 8 - 28 mg/dL    Creatinine 1.05 0.60 - 1.10 mg/dL    Calcium 7.9 (L) 8.5 - 10.5 mg/dL    Glucose 83 70 - 125 mg/dL    GFR Estimate 51 (L) >60 mL/min/1.73m2   Magnesium    Collection Time: 08/11/22  7:17 AM   Result Value Ref Range    Magnesium 2.1 1.8 - 2.6 mg/dL   Hemoglobin    Collection Time: 08/11/22  7:17 AM   Result Value Ref Range    Hemoglobin 9.3 (L) 11.7 - 15.7 g/dL        Imaging         Total visit time of the visit was over 35 minutes.    Signed by: Boo Vega MD, MD    This note has been dictated using voice recognition software. Any grammatical or context distortions are unintentional and inherent to the software    "

## 2022-08-11 NOTE — PROGRESS NOTES
Physical Therapy        08/10/22 1600   Quick Adds   Type of Visit Initial PT Evaluation   Living Environment   People in Home alone   Current Living Arrangements assisted living   Home Accessibility no concerns   Transportation Anticipated family or friend will provide   Self-Care   Equipment Currently Used at Home walker, rolling;raised toilet seat;shower chair;grab bar, toilet   Activity/Exercise/Self-Care Comment Pt is independent with ADLs normally, gets help with med mangement, and can have daily checks as needed. Meal plans provided for pt.   General Information   Onset of Illness/Injury or Date of Surgery 08/08/22   Referring Physician Alex PARISH   Patient/Family Therapy Goals Statement (PT) return to [facility] soon   Pertinent History of Current Problem (include personal factors and/or comorbidities that impact the POC) lower GI bleed, anemia, rectal CA   Existing Precautions/Restrictions fall   Cognition   Affect/Mental Status (Cognition) WFL   Pain Assessment   Patient Currently in Pain No   Integumentary/Edema   Integumentary/Edema no deficits were identifed   Posture    Posture Forward head position   Range of Motion (ROM)   Range of Motion ROM is WFL   Strength (Manual Muscle Testing)   Strength (Manual Muscle Testing) strength is WFL   Bed Mobility   Comment, (Bed Mobility) SBA with HOB elevated using rail   Transfers   Comment, (Transfers) SBA from bed with 4WW   Gait/Stairs (Locomotion)   [place] Level (Gait) supervision   Assistive Device (Gait) walker, 4-wheeled   Distance in Feet (Required for LE Total Joints) 10'   Pattern (Gait) swing-through   Deviations/Abnormal Patterns (Gait) base of support, narrow   Balance   Balance no deficits were identified   Sensory Examination   Sensory Perception patient reports no sensory changes   Coordination   Coordination no deficits were identified   Muscle Tone   Muscle Tone no deficits were identified   Clinical Impression   Criteria for Skilled Therapeutic  Intervention Yes, treatment indicated   PT Diagnosis (PT) difficulty walking   Influenced by the following impairments impaired activity tolerance   Functional limitations due to impairments unable to attend meals and activities in [facility] building/community   Clinical Presentation (PT Evaluation Complexity) Stable/Uncomplicated   Clinical Presentation Rationale presents as medically diagnosed   Clinical Decision Making (Complexity) moderate complexity   Planned Therapy Interventions (PT) balance training;bed mobility training;gait training;home exercise program;neuromuscular re-education;patient/family education;strengthening;transfer training;progressive activity/exercise   Anticipated Equipment Needs at Discharge (PT) walker, rolling   Risk & Benefits of therapy have been explained evaluation/treatment results reviewed;patient   PT Discharge Planning   PT Discharge Recommendation (DC Rec) home with assist   PT Rationale for DC Rec pt has appropriate services at [facility] and is safe to mobilize within her apartment   Total Evaluation Time   Total Evaluation Time (Minutes) 12   Physical Therapy Goals   PT Frequency Daily   PT Predicted Duration/Target Date for Goal Attainment 08/17/22   PT: Bed Mobility Independent;Supervision/stand-by assist   PT: Transfers Modified independent;Sit to/from stand;Assistive device   PT: Gait Modified independent;Greater than 200 feet;Rolling walker       Pt was educated on the role of physical therapy in their care, and indicated understanding.      Esha Craig, DPT 8/11/2022

## 2022-08-12 NOTE — DISCHARGE SUMMARY
[facility] MEDICINE  DISCHARGE SUMMARY     Primary Care Physician: Giovanna Contreras  Admission Date: 8/8/2022   Discharge Provider: Alex PARISH MD Discharge Date: 8/12/2022   Diet:   Active Diet and Nourishment Order   Procedures     Snacks/Supplements Adult: Magic Cup; Between Meals     2 Gram Sodium Diet Other - please comment     Diet       Code Status: No CPR- Do NOT Intubate   Activity: DCACTIVITY: Activity as tolerated        Condition at Discharge: Good     REASON FOR PRESENTATION(See Admission Note for Details)   Shortness of breath.  Please refer to H&P for details.    PRINCIPAL & ACTIVE DISCHARGE DIAGNOSES     Principal Problem:    Acute diastolic heart failure (H)  Active Problems:    Essential hypertension    Chest pain, unspecified type    Stage 3 chronic kidney disease (H)    Lower GI bleed    Rectal mass    Congestive heart failure, unspecified HF chronicity, unspecified heart failure type (H)    Hyponatremia    Leukocytosis, unspecified type    Sinus tachycardia    Hypomagnesemia      PENDING LABS     Unresulted Labs Ordered in the Past 30 Days of this Admission     No orders found from 7/9/2022 to 8/9/2022.            PROCEDURES ( this hospitalization only)          RECOMMENDATIONS TO OUTPATIENT PROVIDER FOR F/U VISIT     Follow-up Appointments     Follow-up and recommended labs and tests       Follow up with primary care provider, Giovanna Contreras, within 3-5 days   for hospital follow- up.  The following labs/tests are recommended: PCP to   check CBC and BMP.               DISPOSITION     Assisted living Facility with home care    SUMMARY OF HOSPITAL COURSE:      Yun Rodgers is a 87 year old female with past medical history of CKD stage III, CAD status post a stent in 2007, PVD status post right carotid endarterectomy, recent hospitalization from 7/29 to 8/3/2022 for GI bleed who received blood transfusion and underwent colonoscopy, biopsy came back positive  for rectal cancer who presented to ED for evaluation of shortness of breath and subsequently admitted for further management     Shortness of breath; likely multifactorial, heart failure, anemia, physical deconditioning from recent hospitalization  Acute diastolic heart failure;  --On admission CTA chest negative for pulm embolism but reported findings consistent with heart failure or volume overload  --On admission elevated BNP  --On this admission, echo reported EF 60 to 65%  --- Patient did responded to IV diuretics.  Breathing improved.  On room air.  Did have mild increase in creatinine, Lasix discontinued and creatinine improving.  Hesitant to put on oral diuretics given her age and uncertainty about ongoing oral intake due to underlying cancer.  Of note, also patient does not want any treatment for cancer and meeting with hospice at assisted living facility on coming Monday.     Recent diagnosis of rectal cancer on 8/2/2020;  Acute on chronic lower GI bleeding; likely due to rectal cancer  --CTA abdomen/pelvis reported no evidence for active GI bleeding.  However, large malignant appearing mass in the right side of rectum which likely accounts for patient's symptoms of GI bleed.  CT also reports concern for locally advanced malignancy.  --Hemoglobin 6.9, transfuse 1 unit pRBC and hemoglobin improved and remains in the 9 range since last 3 days despite having ongoing mild intermittent rectal bleeding.  Today hemoglobin 10.9, uncertain that is true or lab error.  However, reassuring that not dropping despite ongoing intermittent rectal bleeding.  --Patient followed by radiation oncologist and medical oncologist.  --Patient and family declined further test and treatment for cancer and meeting with hospice at assisted living facility on coming Monday.  They understand that patient may benefit from radiation if bleeding get worse.     Hyponatremia; likely due to CHF  -- Serum sodium stable to trending up.   --  Hypokalemia, replaced  -- Mild GILBERTO, likely due to overdiuresis and improving with discontinued diuretics     History of CAD status post stent in 2007;  --Denied chest pain and reports breathing better with diuretics  --Not on aspirin since he had intermittent ongoing rectal bleeding  --Continue Imdur, metoprolol     Physical deconditioning due to medical illness; PT OT      Discharge Medications with Med changes:     Current Discharge Medication List      CONTINUE these medications which have NOT CHANGED    Details   acetaminophen (TYLENOL) 500 MG tablet Take 1,000 mg by mouth 2 times daily as needed for pain      CALMOSEPTINE 0.44-20.6 % OINT ointment Apply topically 3 times daily Apply to coccyx every shift at 0800, 1600, & 2200      esomeprazole (NEXIUM) 40 MG DR capsule Take 40 mg by mouth At Bedtime      Ferrous Gluconate 324 (37.5 Fe) MG TABS Take 1 tablet by mouth 2 times daily      fexofenadine (ALLEGRA) 180 MG tablet Take 180 mg by mouth daily      hydrocortisone (Perianal) (ANUSOL-HC) 2.5 % cream Place 1 applicator rectally 2 times daily as needed for hemorrhoids      !! isosorbide mononitrate (IMDUR) 120 MG 24 hr tablet [ISOSORBIDE MONONITRATE (IMDUR) 120 MG 24 HR TABLET] TAKE 1 TABLET BY MOUTH EVERY DAY **TAKE W/30 MG TABLET** - **PATIENT ONLY WANTS 1 MONTH PER FILL**  Qty: 30 tablet, Refills: 11    Associated Diagnoses: Coronary atherosclerosis      !! isosorbide mononitrate (IMDUR) 30 MG 24 hr tablet Take 30 mg by mouth daily With 120 mg tablet for total dose of 150 mg      loperamide (IMODIUM) 2 MG capsule Take 2 mg by mouth 4 times daily as needed for diarrhea      melatonin 3 MG tablet Take 3 mg by mouth At Bedtime      metoprolol tartrate (LOPRESSOR) 25 MG tablet Take 25 mg by mouth 2 times daily      mirtazapine (REMERON) 15 MG tablet [MIRTAZAPINE (REMERON) 15 MG TABLET] Take 15 mg by mouth at bedtime. Indications: major depressive disorder      MULTIVITAMIN W-MINERALS/LUTEIN (CENTRUM SILVER ORAL)  [MULTIVITAMIN W-MINERALS/LUTEIN (CENTRUM SILVER ORAL)] Take 1 tablet by mouth daily.      nitroglycerin (NITROSTAT) 0.4 MG SL tablet [NITROGLYCERIN (NITROSTAT) 0.4 MG SL TABLET] Place 0.4 mg under the tongue as needed for chest pain.      nystatin (MYCOSTATIN) 987098 UNIT/GM external powder Apply topically 2 times daily as needed (irritation, rash)      pantoprazole (PROTONIX) 40 MG tablet Take 40 mg by mouth 2 times daily (before meals)      peg 400-propylene glycol (SYSTANE) 0.4-0.3 % Drop Place 1 drop into both eyes 4 times daily At 0830, 1230, 1600, & 2030. And 4 times daily as needed      phenylephrine-cocoa butter (PREPARATION H) 0.25-88.44 % suppository Place 1 suppository rectally At Bedtime      polyethylene glycol (MIRALAX) 17 g packet Take 1 packet by mouth daily as needed for constipation      PREPARATION H 0.25-14-74.9 % rectal ointment Place rectally 4 times daily as needed for hemorrhoids      !! senna (SENOKOT) 8.6 MG tablet Take 1 tablet by mouth daily as needed for constipation      !! senna (SENOKOT) 8.6 MG tablet Take 2 tablets by mouth At Bedtime  Qty: 60 tablet, Refills: 0    Associated Diagnoses: Fecal impaction in rectum (H)      triamcinolone (KENALOG) 0.025 % cream Apply 1 Application topically 3 times daily as needed for irritation (to rectum)       !! - Potential duplicate medications found. Please discuss with provider.                Rationale for medication changes:      No change on home medication regimen        Consults       PALLIATIVE CARE ADULT IP CONSULT  CORE CLINIC EVALUATION IP CONSULT  OCCUPATIONAL THERAPY ADULT IP CONSULT  NUTRITION SERVICES ADULT IP CONSULT  CARE MANAGEMENT / SOCIAL WORK IP CONSULT  WOUND OSTOMY CONTINENCE NURSE  IP CONSULT  GASTROENTEROLOGY IP CONSULT  HEMATOLOGY & ONCOLOGY IP CONSULT  CARE MANAGEMENT / SOCIAL WORK IP CONSULT  PHYSICAL THERAPY ADULT IP CONSULT  RADIATION ONCOLOGY IP CONSULT    Immunizations given this encounter     Most Recent  Immunizations   Administered Date(s) Administered     COVID-19,PF,Moderna 12/10/2021     FLU 6-35 months 09/03/2009     FLUAD(HD)65+ QUAD 10/14/2021     Flu 65+ Years 10/08/2020     Flu, Unspecified 09/11/2017     Influenza (H1N1) 11/19/2009, 11/19/2009     Influenza (High Dose) 3 valent vaccine 09/26/2019     Influenza (IIV3) PF 09/15/2015     Influenza Vaccine, 6+MO IM (QUADRIVALENT W/PRESERVATIVES) 09/15/2016     Influenza, Quad, High Dose, Pf, 65yr+ (Fluzone HD) 10/14/2021     Mantoux Tuberculin Skin Test 01/01/1987     Pneumo Conj 13-V (2010&after) 09/15/2015     Pneumococcal 23 valent 09/03/2013     TD (ADULT, 7+) 05/03/2004     Td (Adult), Adsorbed 05/03/2004     Tdap (Adacel,Boostrix) 09/03/2013           Anticoagulation Information      Recent INR results:   Recent Labs   Lab 08/08/22  0125   INR 1.23*         SIGNIFICANT IMAGING FINDINGS     Results for orders placed or performed during the hospital encounter of 08/08/22   CT Chest Pulmonary Embolism w Contrast    Impression    IMPRESSION:  1.  Negative for pulmonary embolism.    2.  Cardiac enlargement, diffuse interlobular septal thickening in the lungs, and bilateral pleural effusions right greater than left. Constellation of findings would be compatible with CHF or fluid overload.    3.  Prominent areas of bibasilar consolidation suspicious for potential pneumonitis. Clinical correlation.    4.  Marked coronary artery calcification.   CTA Abdomen Pelvis with Contrast    Impression    IMPRESSION:  1.  No acute vascular findings. Diffuse atheromatous disease. See above for nonacute vascular findings.    2.  No evidence for active GI bleed.    3.  However, there is a large malignant appearing mass in the right side of the rectum measuring up to 7 cm in diameter strongly concerning for malignancy, most likely primary rectal cancer. This likely accounts for the patient's symptoms of GI bleed.   There is concern for locally advanced malignancy. MRI could be  obtained for local staging purposes.    4.  Several indeterminate enhancing bilateral groin lymph nodes with metastatic lymph nodes not excluded.    5.  Multifocal nodularity left adrenal gland suspected to be due to adrenal adenomas based on noncontrast density.    6.  Hysterectomy.   Echocardiogram Complete   Result Value Ref Range    LVEF  60-65%        SIGNIFICANT LABORATORY FINDINGS     Most Recent 3 CBC's:Recent Labs   Lab Test 08/12/22  0859 08/11/22  0717 08/10/22  0513 08/09/22  1828 08/09/22  0503 08/08/22  0125 08/04/22  0704   WBC  --   --   --   --  6.1 11.4* 7.7   HGB 10.9* 9.3* 9.6*   < > 6.9* 9.0* 7.9*   MCV  --   --   --   --  94 95 96   PLT  --   --   --   --  146* 168 127*    < > = values in this interval not displayed.     Most Recent 3 BMP's:Recent Labs   Lab Test 08/11/22  0717 08/10/22  1201 08/10/22  0513 08/09/22  1328 08/09/22  0503   *  --  133*  --  130*   POTASSIUM 3.8 3.8 3.3*   < > 3.1*   CHLORIDE 101  --  102  --  103   CO2 27  --  26  --  23   BUN 17  --  19  --  14   CR 1.05  --  1.23*  --  1.16*   ANIONGAP 4*  --  5  --  4*   ARON 7.9*  --  7.9*  --  7.3*   GLC 83  --  86  --  85    < > = values in this interval not displayed.       Discharge Orders        Home Care Referral      Reason for your hospital stay    Patient with recent diagnosis of rectal cancer with ongoing intermittent rectal bleeding, admitted for shortness of breath, treated for heart failure, also received blood transfusion.  Patient does not want further testing or treatment for cancer.  Patient discharged back to assisted living with home care.  Per patient and and her son, they are meeting with hospice at assisted living facility on coming Monday.     Follow-up and recommended labs and tests     Follow up with primary care provider, Giovanna Contreras, within 3-5 days for hospital follow- up.  The following labs/tests are recommended: PCP to check CBC and BMP.     Activity    Your activity upon discharge:  activity as tolerated     Diet    Follow this diet upon discharge: Orders Placed This Encounter      Snacks/Supplements Adult: Magic Cup; Between Meals      2 Gram Sodium Diet Other - please comment       Examination   Physical Exam   Temp:  [97.7  F (36.5  C)-100  F (37.8  C)] 97.7  F (36.5  C)  Pulse:  [59-71] 59  Resp:  [16-18] 16  BP: (135-162)/(61-71) 162/71  SpO2:  [94 %-99 %] 99 %  Wt Readings from Last 1 Encounters:   08/12/22 42.8 kg (94 lb 4.8 oz)       Patient seen and examined.  Notes, labs, imaging report personally reviewed.  Patient reported having 2 bowel movements with some blood but no reported clots.  Denied abdomen pain, nausea, vomiting, denied rectal pain.  Denied dizziness, shortness of breath or chest pain.    Discussed with nursing staffs.  Discussed with patient's son about plan of care after discharge      General: Not in obvious distress.  HEENT: Normocephalic, supple neck  Chest: Clear to auscultation bilateral anteriorly, no wheezing  Heart: S1S2 normal, regular  Abdomen: Soft. NT, ND. Bowel sounds- active.  Neuro: alert and awake, grossly non-focal      Please see EMR for more detailed significant labs, imaging, consultant notes etc.    IAlex MD, personally saw the patient today and spent greater than 30 minutes discharging this patient.    Alex PARISH MD  [facility]    CC:Ben, April

## 2022-08-12 NOTE — PROGRESS NOTES
Progress Note    Plan is to discharge today on Hospice. Patient and son do not want PET Scan now.    Palliative Care  SW to see her today, no symptoms to manage, GOC established, I will sign off.    AKIKO Reid, NP-C, ACHPN

## 2022-08-12 NOTE — PLAN OF CARE
Goal Outcome Evaluation:      Problem: Fluid Volume Excess  Goal: Fluid Balance  Outcome: Ongoing, Progressing     Problem: Cardiac Output Decreased (Heart Failure)  Goal: Optimal Cardiac Output  Outcome: Ongoing, Progressing     Problem: Fluid Imbalance (Heart Failure)  Goal: Fluid Balance  Outcome: Ongoing, Progressing   Patient voiding well. Post void residual scanned for 0mL. Has had two bloody stools this shift. Has had good rest throughout night. Will continue to assess and monitor patient.

## 2022-08-12 NOTE — PROGRESS NOTES
Care Management Follow Up    Length of Stay (days): 4    Expected Discharge Date: 08/12/2022     Concerns to be Addressed:  Pending hemoglobin (10.9), discharge orders.  Patient plan of care discussed at interdisciplinary rounds: Yes    Anticipated Discharge Disposition: Assisted Living, Hospice      Anticipated Discharge Services:  Hospice   Anticipated Discharge DME:  Per therapy (if indicated).    Referrals Placed by CM/SW:  [facility]; [facility];   Private pay costs discussed: Not applicable at this time    Additional Information:  Patient readmitted after a previous admission at [facility] from 7/29 to 8/3. She was discharged home to [facility] assisted living with resumption of home care through [facility] care for home PT and OT. [facility] is located at [address] [place] [address].   Plan is for patient to return to assisted living on hospice. Update provided to Dai with [facility] and Abby with the assisted at [phone].   9:30 AM:  MD anticipates discharging patient today. Left a message for Abby and spoke with Dai with [facility]. Will update LLUVIA Ya at [phone].  9:56 AM:  Reviewed with Bhakti by telephone. Discharge orders faxed to [phone].     Contacts for patient care coordination are:  [facility] ([phone]; fax: [phone]),  [facility] ([facility]) phone [phone]; Fax [phone], [facility]): [phone] and Dai with [facility] ([phone]). Abby with the assisted at [phone].     Lala Lopez RN

## 2022-08-12 NOTE — PLAN OF CARE
Physical Therapy Discharge Summary    Reason for therapy discharge:    Patient/family request discontinuation of services. Pt on hospice.     Progress towards therapy goal(s). See goals on Care Plan in Epic electronic health record for goal details.  Goals not met.  Barriers to achieving goals:   limited tolerance for therapy.    Therapy recommendation(s):    No further therapy is recommended.      Tiffany Park, PT, DPT  8/12/2022

## 2022-08-12 NOTE — PLAN OF CARE
Goal Outcome Evaluation:      Pt AxO x4, VSS on room air, pain lower back tailbone 3/10 PRN tylenol given, pt SBA in room, intake/output adequate, no BM tonight, mepilex CDI changed, nurse continue plan of care.

## 2022-08-12 NOTE — PLAN OF CARE
Occupational Therapy Discharge Summary    Reason for therapy discharge:    residential, hospice care    Progress towards therapy goal(s). See goals on Care Plan in [facility] electronic health record for goal details.  Goals partially met.  Barriers to achieving goals:   discharge from facility.    Therapy recommendation(s):    No further therapy is recommended.    Goal Outcome Evaluation:                  Mary Reyes, OTR/MEAGAN  8/12/2022

## 2022-08-14 NOTE — PROGRESS NOTES
[facility] [place]    Background: Care Coordination referral placed from [facility] discharge report for reason of patient meeting criteria for a TCM outreach call by [facility] team.    Assessment: Upon chart review, [facility] Team member will cancel/close the referral for TCM outreach due to reason below:    Patient has been discharged with Hospice Care    Plan: Care Coordination referral for TCM outreach canceled.      Leah Heart MA  [facility] [place], [facility]    *Connected Care Resource Team does NOT follow patient ongoing. Referrals are identified based on internal discharge reports and the outreach is to ensure patient has an understanding of their discharge instructions.

## 2023-04-03 NOTE — ED PROVIDER NOTES
EMERGENCY DEPARTMENT ENCOUNTER      NAME: Yun Rodgers  AGE: 87 year old female  YOB: 1935  MRN: [id number]  EVALUATION DATE & TIME: 6/14/2022  6:18 PM    PCP: Ronaldo Grijalva    ED PROVIDER: Ricardo Naik M.D.      Chief Complaint   Patient presents with     Rectal Bleeding     Generalized Weakness         FINAL IMPRESSION:  Rectal bleeding  Renal insufficiency  Hypokalemia  Urinary retention  Fecal impaction      ED COURSE & MEDICAL DECISION MAKING:    Pertinent Labs & Imaging studies reviewed. (See chart for details)  87 year old female presents to the Emergency Department for evaluation of bloody stools for the last week or so.  Patient reports now passing clots.  Occasional crampy discomfort.  No prior episodes.  Arrives with her son who also reports she has been trying to place her in a long-term care facility recently without success.  He feels she is unable to care for herself any longer.  She is a very frail-appearing elderly female in mild distress.  Temperature slightly elevated however.  Abdomen hypoactive soft with mild diffuse tenderness.  Heart and lungs unremarkable.  Rectal deferred.  Baseline blood work ordered from triage.  Intravenous fluids initiated slowly.. Patient appears non toxic with stable vitals signs. Overall exam is benign.        6:38 PM I met with the patient for the initial interview and physical examination. Discussed plan for treatment and workup in the ED.    7 PM.  Patient with markedly low potassium at 2.5,oral potassium supplementation ordered.  7:20 PM.  Bicarb low at 12.  Creatinine elevated 1.95.  Troponin normal at 0.16.  INR slightly elevated 1.39.  Hemoglobin reduced at 9.6.  Lactic acid normal 1.2.  Creatinine moderately elevated 1.95 GFR 24.  Will obtain CT without contrast.  No indications for transfusion presently.  7:40 PM.  Patient discussed with Dr. Kawasaki.  He is agreeable with plans for hospitalization.  7:55 PM I re-checked the patient and  updated patient and her son.  CT imaging with evidence of fecal impaction and urinary retention.  We will proceed with plans for Serrano catheter placement.  Patient then will need rectal examination to help with impaction.  8:24 PM I performed a rectal exam on the patient.  Patient with a large amount of soft brown stool within the rectal vault.  Small amount of dark red blood also noted on exam.  Large external hemorrhoid measuring perhaps 2 x 2 cm which was moderately tender.  We will proceed with pink lady enema  At the conclusion of the encounter I discussed the results of all of the tests and the disposition. The questions were answered and return precautions provided. The patient or family acknowledged understanding and was agreeable with the care plan.       PPE: Provider wore gloves, N95 mask, eye protection, surgical cap.     MEDICATIONS GIVEN IN THE EMERGENCY:  Medications   0.9% sodium chloride BOLUS (1,000 mLs Intravenous New Bag 6/14/22 1959)   0.9% sodium chloride BOLUS (0 mLs Intravenous Stopped 6/14/22 1946)   potassium chloride ER (KLOR-CON M) CR tablet 40 mEq (40 mEq Oral Given 6/14/22 1943)       NEW PRESCRIPTIONS STARTED AT TODAY'S ER VISIT  New Prescriptions    No medications on file          =================================================================    HPI    Patient information was obtained from: patient and son    Use of Intrepreter: N/A        Yun Rodgers is a 87 year old female with a pertient medical history of hyperlipidemia, CAD, CRI3, CKD3, adult failure to thrive, HTN, and antiplatelet or antithrombotic long-term use who presents to the ED for evaluation of generalized weakness and rectal bleeding. Patient reports that she has had rectal bleeding for the past 1 to 2 weeks. She states that she wears a diaper when she sleeps, and this morning she woke up with blood on her pajamas and sheets. She states that this was more blood than normal, and there were blood clots, which  "caused her to be concerned, and prompted her to present to the ED. Patient endorses chronic constipation and diarrhea. Patient also endorses chronic right abdominal pain.    Patient denies any previous history of rectal bleeding. Patient denies any recent colonoscopy. Patient states that she stopped taking Asprin about 2 weeks ago. Patient denies additional medical concerns or complaints at this time.        REVIEW OF SYSTEMS   Constitutional:  Denies fever, chills. Endorses generalized weakness.  Respiratory:  Denies productive cough or increased work of breathing  Cardiovascular:  Denies chest pain, palpitations  GI:  Denies nausea, vomiting, or change in bowel or bladder habits. Patient endorses abdominal pain and rectal bleeding.  Musculoskeletal:  Denies any new muscle/joint swelling  Skin:  Denies rash   Neurologic:  Denies focal weakness  All systems negative except as marked.     PAST MEDICAL HISTORY:  Past Medical History:   Diagnosis Date     Antiplatelet or antithrombotic long-term use     aspirin     Hypertension      Stented coronary artery        PAST SURGICAL HISTORY:  Past Surgical History:   Procedure Laterality Date     ABDOMEN SURGERY       APPENDECTOMY       COLOSTOMY CLOSURE       EYE SURGERY       HYSTERECTOMY       IR AORTIC ARCH 4 VESSEL ANGIOGRAM  6/7/2002     IR AORTIC ARCH 4 VESSEL ANGIOGRAM  12/27/2002     IR CAROTID ANGIOGRAM  12/27/2002     IR CAROTID ANGIOGRAM  12/27/2002     IR EXTREMITY ANGIOGRAM BILATERAL  11/27/2007     IR MISCELLANEOUS PROCEDURE  6/7/2002     IR MISCELLANEOUS PROCEDURE  12/27/2002     IR MISCELLANEOUS PROCEDURE  12/27/2002     IR MISCELLANEOUS PROCEDURE  12/27/2002     OPEN REDUCTION INTERNAL FIXATION ANKLE Right 3/2/2018    Procedure: OPEN REDUCTION INTERNAL FIXATION TRIMALLEOLAR FRACTURE RIGHT ANKLE;  Surgeon: Shawn Vega DO;  Location: [facility];  Service:      OTHER SURGICAL HISTORY      Resection of mesenteryper patient-- \"they took out my " "mesentery about 6 months after I had a baby\"     REMOVE HARDWARE LOWER EXTREMITY Right 3/4/2022    Procedure: RIGHT ANKLE REMOVAL OF HARDWARE;  Surgeon: Shawn Vega DO;  Location: [facility] OR     [facility] APPENDECTOMY      Description: Appendectomy;  Recorded: 09/26/2014;     [facility] COLOSTOMY      Description: Colostomy;  Recorded: 09/26/2014;     [facility] THROMBOENDARTECTMY NECK,NECK INCIS      Description: Carotid Thromboendarterectomy;  Recorded: 09/26/2014;     [facility] TOTAL ABDOM HYSTERECTOMY      Description: Total Abdominal Hysterectomy;  Recorded: 09/26/2014;         CURRENT MEDICATIONS:      Current Facility-Administered Medications:      0.9% sodium chloride BOLUS, 1,000 mL, Intravenous, Once, Ricardo Naik MD, Last Rate: 250 mL/hr at 06/14/22 1959, 1,000 mL at 06/14/22 1959    Current Outpatient Medications:      acetaminophen (TYLENOL) 325 MG tablet, Take 3 tablets (975 mg) by mouth every 8 hours, Disp: 50 tablet, Rfl: 0     acetaminophen (TYLENOL) 500 MG tablet, Take 500-1,000 mg by mouth every 6 hours as needed for mild pain, Disp: , Rfl:      amLODIPine (NORVASC) 10 MG tablet, [AMLODIPINE (NORVASC) 10 MG TABLET] TAKE 1 TABLET BY MOUTH EVERY DAY, Disp: 30 tablet, Rfl: 2     esomeprazole (NEXIUM) 40 MG DR capsule, Take 40 mg by mouth At Bedtime Take 30-60 minutes before eating., Disp: , Rfl:      fexofenadine (ALLEGRA) 180 MG tablet, Take 180 mg by mouth daily, Disp: , Rfl:      hydrocortisone, Perianal, (ANUSOL-HC) 2.5 % cream, Place 1 applicator rectally 2 times daily as needed for hemorrhoids, Disp: , Rfl:      isosorbide mononitrate (IMDUR) 120 MG 24 hr tablet, [ISOSORBIDE MONONITRATE (IMDUR) 120 MG 24 HR TABLET] TAKE 1 TABLET BY MOUTH EVERY DAY **TAKE W/30 MG TABLET** - **PATIENT ONLY WANTS 1 MONTH PER FILL**, Disp: 30 tablet, Rfl: 11     isosorbide mononitrate (IMDUR) 30 MG 24 hr tablet, Take 30 mg by mouth daily , Disp: , Rfl:      metoprolol succinate ER (TOPROL-XL) 25 MG 24 hr tablet, " "Take 75 mg by mouth daily , Disp: , Rfl:      mirtazapine (REMERON) 15 MG tablet, [MIRTAZAPINE (REMERON) 15 MG TABLET] Take 15 mg by mouth at bedtime. Indications: major depressive disorder, Disp: , Rfl:      MULTIVITAMIN W-MINERALS/LUTEIN (CENTRUM SILVER ORAL), [MULTIVITAMIN W-MINERALS/LUTEIN (CENTRUM SILVER ORAL)] Take 1 tablet by mouth daily., Disp: , Rfl:      nitroglycerin (NITROSTAT) 0.4 MG SL tablet, [NITROGLYCERIN (NITROSTAT) 0.4 MG SL TABLET] Place 0.4 mg under the tongue as needed for chest pain., Disp: , Rfl:      peg 400-propylene glycol (SYSTANE) 0.4-0.3 % Drop, [-PROPYLENE GLYCOL (SYSTANE) 0.4-0.3 % DROP] Administer 1 drop to both eyes 4 (four) times a day. , Disp: , Rfl:      senna (SENOKOT) 8.6 MG tablet, Take 1 tablet by mouth daily as needed for constipation, Disp: , Rfl:      triamcinolone (KENALOG) 0.025 % cream, [TRIAMCINOLONE (KENALOG) 0.025 % CREAM] Apply 1 application topically 3 (three) times a day as needed. , Disp: , Rfl:      pantoprazole (PROTONIX) 40 MG tablet, [PANTOPRAZOLE (PROTONIX) 40 MG TABLET] Take 40 mg by mouth 2 (two) times a day., Disp: , Rfl:     ALLERGIES:  Allergies   Allergen Reactions     Primaquine Rash     Atorvastatin Unknown     Demeclocycline Other (See Comments)     \"sore bottom\"     Guaiacol Hives     Robitussin D     Iodinated Contrast Media [Diagnostic X-Ray Materials] Unknown     Metrizamide Other (See Comments)     Chest tightness,sshakes     Pravastatin Unknown     Robitussin [Guaifenesin] Unknown     Simvastatin Other (See Comments)     Muscle aches      Tetracyclines Unknown     Hydroxychloroquine Sulfate [Hydroxychloroquine] Rash     Preservision Areds Rash     AREDs formula only caused rash.        FAMILY HISTORY:  Family History   Problem Relation Age of Onset     Cancer Mother      Hypertension Mother      No Known Problems Father        SOCIAL HISTORY:   Social History     Socioeconomic History     Marital status:    Tobacco Use     " Smoking status: Never Smoker     Smokeless tobacco: Never Used   Substance and Sexual Activity     Alcohol use: Never     Drug use: Never       VITALS:  Patient Vitals for the past 24 hrs:   BP Temp Temp src Pulse Resp SpO2 Weight   06/14/22 1930 138/56 -- -- 65 (!) 33 100 % --   06/14/22 1920 (!) 151/66 -- -- 65 (!) 36 100 % --   06/14/22 1910 -- -- -- 61 28 100 % --   06/14/22 1900 122/58 -- -- 53 23 -- --   06/14/22 1850 (!) 140/61 -- -- 60 -- -- --   06/14/22 1830 (!) 140/63 -- -- 51 23 -- --   06/14/22 1820 134/59 -- -- -- -- -- --   06/14/22 1720 113/47 100  F (37.8  C) Oral 57 24 100 % 42.6 kg (94 lb)        PHYSICAL EXAM    Constitutional:  Frail, elderly woman. Awake, alert, in no apparent distress  HENT:  Normocephalic, Atraumatic. Bilateral external ears normal. Oropharynx moist. Nose normal. Neck- Normal range of motion with no guarding, No midline cervical tenderness, Supple, No stridor.   Eyes:  PERRL, EOMI with no signs of entrapment, Conjunctiva normal, No discharge.   Respiratory:  Normal breath sounds, No respiratory distress, No wheezing.    Cardiovascular:  Normal heart rate, Normal rhythm, No appreciable rubs or gallops.   GI:  Soft, No distension, No palpable masses. Diffuse bilateral lower abdominal pain.  Musculoskeletal:  Intact distal pulses, No edema. Good range of motion in all major joints. No tenderness to palpation or major deformities noted.  Integument:  Warm, Dry, No erythema, No rash.   Neurologic:  Alert & oriented, Normal motor function, Normal sensory function, No focal deficits noted.   Psychiatric:  Affect normal, Judgment normal, Mood normal.     LAB:  All pertinent labs reviewed and interpreted.  Results for orders placed or performed during the hospital encounter of 06/14/22   CT Abdomen Pelvis w/o Contrast    Impression    IMPRESSION:   1.  Fecal impaction, with large volume of stool distal sigmoid colon and rectum. No current evidence for upstream colonic obstruction.  2.   Scattered diverticula throughout the colon, without evidence for diverticulitis.  3.  Severe atherosclerotic disease, with evaluation limited due to the lack of IV contrast.  4.  Left adrenal adenoma.     Result Value Ref Range    INR 1.36 (H) 0.85 - 1.15   Comprehensive metabolic panel   Result Value Ref Range    Sodium 140 136 - 145 mmol/L    Potassium 2.5 (LL) 3.5 - 5.0 mmol/L    Chloride 119 (H) 98 - 107 mmol/L    Carbon Dioxide (CO2) 12 (L) 22 - 31 mmol/L    Anion Gap 9 5 - 18 mmol/L    Urea Nitrogen 28 8 - 28 mg/dL    Creatinine 1.95 (H) 0.60 - 1.10 mg/dL    Calcium 9.0 8.5 - 10.5 mg/dL    Glucose 110 70 - 125 mg/dL    Alkaline Phosphatase 72 45 - 120 U/L    AST 15 0 - 40 U/L    ALT 10 0 - 45 U/L    Protein Total 7.6 6.0 - 8.0 g/dL    Albumin 3.3 (L) 3.5 - 5.0 g/dL    Bilirubin Total 0.5 0.0 - 1.0 mg/dL    GFR Estimate 24 (L) >60 mL/min/1.73m2   Lactic acid whole blood   Result Value Ref Range    Lactic Acid 1.2 0.7 - 2.0 mmol/L   Result Value Ref Range    Troponin I 0.16 0.00 - 0.29 ng/mL   CBC with platelets and differential   Result Value Ref Range    WBC Count 8.4 4.0 - 11.0 10e3/uL    RBC Count 2.98 (L) 3.80 - 5.20 10e6/uL    Hemoglobin 9.6 (L) 11.7 - 15.7 g/dL    Hematocrit 29.5 (L) 35.0 - 47.0 %    MCV 99 78 - 100 fL    MCH 32.2 26.5 - 33.0 pg    MCHC 32.5 31.5 - 36.5 g/dL    RDW 14.7 10.0 - 15.0 %    Platelet Count 207 150 - 450 10e3/uL    % Neutrophils 72 %    % Lymphocytes 13 %    % Monocytes 12 %    % Eosinophils 1 %    % Basophils 1 %    % Immature Granulocytes 1 %    NRBCs per 100 WBC 0 <1 /100    Absolute Neutrophils 6.2 1.6 - 8.3 10e3/uL    Absolute Lymphocytes 1.1 0.8 - 5.3 10e3/uL    Absolute Monocytes 1.0 0.0 - 1.3 10e3/uL    Absolute Eosinophils 0.1 0.0 - 0.7 10e3/uL    Absolute Basophils 0.0 0.0 - 0.2 10e3/uL    Absolute Immature Granulocytes 0.0 <=0.4 10e3/uL    Absolute NRBCs 0.0 10e3/uL   Extra Red Top Tube   Result Value Ref Range    Hold Specimen JIC    Adult Type and Screen   Result  Value Ref Range    ABO/RH(D) A NEG     Antibody Screen Negative Negative    SPECIMEN EXPIRATION DATE [id number]        RADIOLOGY:  Reviewed all pertinent imaging. Please see official radiology report.  CT Abdomen Pelvis w/o Contrast   Final Result   IMPRESSION:    1.  Fecal impaction, with large volume of stool distal sigmoid colon and rectum. No current evidence for upstream colonic obstruction.   2.  Scattered diverticula throughout the colon, without evidence for diverticulitis.   3.  Severe atherosclerotic disease, with evaluation limited due to the lack of IV contrast.   4.  Left adrenal adenoma.             EKG:    Sinus bradycardia short NV.  Rate of 55.  Right bundle branch block morphology.  No acute ST segment abnormalities.  When compared to March 2, 2018 right bundle branch now present.  I have independently reviewed and interpreted the EKG(s) documented above.    PROCEDURES:   Pink lady enema    I, Elise Gryler, am serving as a scribe to document services personally performed by Ricardo Naik MD, based on my observation and the provider's statements to me. I, Ricardo Naik MD attest that Elise Gryler is acting in a scribe capacity, has observed my performance of the services and has documented them in accordance with my direction.    Ricardo Naik M.D.  Emergency Medicine  [facility] EMERGENCY DEPARTMENT       Ricardo Naik MD  06/14/22 2030     Cardiac

## (undated) DEVICE — SU ETHILON 3-0 PS-2 18" 1669H

## (undated) DEVICE — DRAPE MINI C-ARM INSIGHT2 CF-5423

## (undated) DEVICE — SOL NACL 0.9% IRRIG 1000ML BOTTLE 2F7124

## (undated) DEVICE — SUCTION MANIFOLD NEPTUNE 2 SYS 1 PORT 702-025-000

## (undated) DEVICE — DRSG XEROFORM 1X8"

## (undated) DEVICE — CUSTOM PACK LOWER EXTREMITY SOP5BLEHEA

## (undated) DEVICE — SOL WATER IRRIG 1000ML BOTTLE 2F7114

## (undated) DEVICE — FORCEP BIOPSY 2.3MM DISP COATED 000388

## (undated) DEVICE — SOLUTION IRRIG 2B7127 .9NS 3000ML BAG

## (undated) DEVICE — ESU PENCIL SMOKE EVAC W/ROCKER SWITCH 0703-047-000

## (undated) DEVICE — CUFF TOURN 18IN STRL DISP

## (undated) DEVICE — GLOVE BIOGEL PI INDICATOR 8.0 LF 41680

## (undated) DEVICE — DRSG ABD TNDRSRB WET PRUF 8IN X 10IN STRL  9194A

## (undated) DEVICE — DRAPE STOCKINETTE IMPERVIOUS 12" 1587

## (undated) DEVICE — PLATE GROUNDING ADULT W/CORD 9165L

## (undated) DEVICE — TUBING IRRIG TUR Y TYPE 96" LF 6543-01

## (undated) DEVICE — PADDING CAST 4IN WEBRIL STRL 2502

## (undated) DEVICE — Device

## (undated) DEVICE — DRSG GAUZE 4X4" 3033

## (undated) DEVICE — GLOVE UNDER INDICATOR PI SZ 6.5 LF 41665

## (undated) DEVICE — GLOVE BIOGEL PI ULTRATOUCH G SZ 7.5 42175

## (undated) DEVICE — GLOVE BIOGEL PI SZ 6.5 40865

## (undated) DEVICE — TUBING SUCTION MEDI-VAC 1/4"X20' N620A - HE

## (undated) RX ORDER — FENTANYL CITRATE-0.9 % NACL/PF 10 MCG/ML
PLASTIC BAG, INJECTION (ML) INTRAVENOUS
Status: DISPENSED
Start: 2022-01-01

## (undated) RX ORDER — DEXAMETHASONE SODIUM PHOSPHATE 10 MG/ML
INJECTION, EMULSION INTRAMUSCULAR; INTRAVENOUS
Status: DISPENSED
Start: 2022-01-01

## (undated) RX ORDER — ONDANSETRON 2 MG/ML
INJECTION INTRAMUSCULAR; INTRAVENOUS
Status: DISPENSED
Start: 2022-01-01

## (undated) RX ORDER — PROPOFOL 10 MG/ML
INJECTION, EMULSION INTRAVENOUS
Status: DISPENSED
Start: 2022-01-01

## (undated) RX ORDER — LIDOCAINE HYDROCHLORIDE 10 MG/ML
INJECTION, SOLUTION EPIDURAL; INFILTRATION; INTRACAUDAL; PERINEURAL
Status: DISPENSED
Start: 2022-01-01